# Patient Record
Sex: MALE | Race: WHITE | Employment: OTHER | URBAN - METROPOLITAN AREA
[De-identification: names, ages, dates, MRNs, and addresses within clinical notes are randomized per-mention and may not be internally consistent; named-entity substitution may affect disease eponyms.]

---

## 2021-02-27 ENCOUNTER — HOSPITAL ENCOUNTER (INPATIENT)
Age: 71
LOS: 4 days | Discharge: HOME HEALTH | DRG: 193 | End: 2021-03-03
Attending: EMERGENCY MEDICINE | Admitting: INTERNAL MEDICINE
Payer: MEDICARE

## 2021-02-27 ENCOUNTER — APPOINTMENT (OUTPATIENT)
Dept: GENERAL RADIOLOGY | Age: 71
DRG: 193 | End: 2021-02-27
Payer: MEDICARE

## 2021-02-27 DIAGNOSIS — E87.5 HYPERKALEMIA: ICD-10-CM

## 2021-02-27 DIAGNOSIS — J18.9 PNEUMONIA DUE TO ORGANISM: Primary | ICD-10-CM

## 2021-02-27 DIAGNOSIS — E87.20 LACTIC ACIDOSIS: ICD-10-CM

## 2021-02-27 DIAGNOSIS — J96.11 CHRONIC RESPIRATORY FAILURE WITH HYPOXIA (HCC): ICD-10-CM

## 2021-02-27 DIAGNOSIS — D64.9 ANEMIA, UNSPECIFIED TYPE: ICD-10-CM

## 2021-02-27 DIAGNOSIS — R73.9 HYPERGLYCEMIA: ICD-10-CM

## 2021-02-27 DIAGNOSIS — R79.89 ELEVATED SERUM CREATININE: ICD-10-CM

## 2021-02-27 DIAGNOSIS — J44.1 COPD EXACERBATION (HCC): ICD-10-CM

## 2021-02-27 DIAGNOSIS — E87.8 HYPOCHLOREMIA: ICD-10-CM

## 2021-02-27 LAB
ALBUMIN SERPL-MCNC: 4.3 G/DL (ref 3.5–5.2)
ALP BLD-CCNC: 60 U/L (ref 40–129)
ALT SERPL-CCNC: 30 U/L (ref 0–40)
ANION GAP SERPL CALCULATED.3IONS-SCNC: 7 MMOL/L (ref 7–16)
AST SERPL-CCNC: 22 U/L (ref 0–39)
BASOPHILIC STIPPLING: ABNORMAL
BASOPHILS ABSOLUTE: 0 E9/L (ref 0–0.2)
BASOPHILS RELATIVE PERCENT: 0 % (ref 0–2)
BILIRUB SERPL-MCNC: 0.3 MG/DL (ref 0–1.2)
BUN BLDV-MCNC: 34 MG/DL (ref 8–23)
CALCIUM SERPL-MCNC: 8.4 MG/DL (ref 8.6–10.2)
CHLORIDE BLD-SCNC: 95 MMOL/L (ref 98–107)
CO2: 32 MMOL/L (ref 22–29)
CREAT SERPL-MCNC: 1.5 MG/DL (ref 0.7–1.2)
EOSINOPHILS ABSOLUTE: 0.16 E9/L (ref 0.05–0.5)
EOSINOPHILS RELATIVE PERCENT: 2.6 % (ref 0–6)
GFR AFRICAN AMERICAN: 56
GFR NON-AFRICAN AMERICAN: 46 ML/MIN/1.73
GLUCOSE BLD-MCNC: 352 MG/DL (ref 74–99)
HCT VFR BLD CALC: 34.1 % (ref 37–54)
HEMOGLOBIN: 9.8 G/DL (ref 12.5–16.5)
HYPOCHROMIA: ABNORMAL
LACTIC ACID: 3.3 MMOL/L (ref 0.5–2.2)
LYMPHOCYTES ABSOLUTE: 0.5 E9/L (ref 1.5–4)
LYMPHOCYTES RELATIVE PERCENT: 7.8 % (ref 20–42)
MCH RBC QN AUTO: 23.7 PG (ref 26–35)
MCHC RBC AUTO-ENTMCNC: 28.7 % (ref 32–34.5)
MCV RBC AUTO: 82.4 FL (ref 80–99.9)
METER GLUCOSE: 148 MG/DL (ref 74–99)
MONOCYTES ABSOLUTE: 0.19 E9/L (ref 0.1–0.95)
MONOCYTES RELATIVE PERCENT: 3.4 % (ref 2–12)
NEUTROPHILS ABSOLUTE: 5.33 E9/L (ref 1.8–7.3)
NEUTROPHILS RELATIVE PERCENT: 86.2 % (ref 43–80)
NUCLEATED RED BLOOD CELLS: 0 /100 WBC
OVALOCYTES: ABNORMAL
PDW BLD-RTO: 15.3 FL (ref 11.5–15)
PLATELET # BLD: 141 E9/L (ref 130–450)
PMV BLD AUTO: 8.6 FL (ref 7–12)
POIKILOCYTES: ABNORMAL
POLYCHROMASIA: ABNORMAL
POTASSIUM SERPL-SCNC: 6.5 MMOL/L (ref 3.5–5)
PRO-BNP: 69 PG/ML (ref 0–125)
RBC # BLD: 4.14 E12/L (ref 3.8–5.8)
SARS-COV-2, NAAT: NOT DETECTED
SODIUM BLD-SCNC: 134 MMOL/L (ref 132–146)
STOMATOCYTES: ABNORMAL
TOTAL PROTEIN: 7 G/DL (ref 6.4–8.3)
TROPONIN: <0.01 NG/ML (ref 0–0.03)
WBC # BLD: 6.2 E9/L (ref 4.5–11.5)

## 2021-02-27 PROCEDURE — 2580000003 HC RX 258: Performed by: EMERGENCY MEDICINE

## 2021-02-27 PROCEDURE — 6370000000 HC RX 637 (ALT 250 FOR IP): Performed by: INTERNAL MEDICINE

## 2021-02-27 PROCEDURE — 80053 COMPREHEN METABOLIC PANEL: CPT

## 2021-02-27 PROCEDURE — 99223 1ST HOSP IP/OBS HIGH 75: CPT | Performed by: INTERNAL MEDICINE

## 2021-02-27 PROCEDURE — 84484 ASSAY OF TROPONIN QUANT: CPT

## 2021-02-27 PROCEDURE — 2580000003 HC RX 258: Performed by: INTERNAL MEDICINE

## 2021-02-27 PROCEDURE — 2060000000 HC ICU INTERMEDIATE R&B

## 2021-02-27 PROCEDURE — 2500000003 HC RX 250 WO HCPCS: Performed by: EMERGENCY MEDICINE

## 2021-02-27 PROCEDURE — 6360000002 HC RX W HCPCS: Performed by: EMERGENCY MEDICINE

## 2021-02-27 PROCEDURE — 82962 GLUCOSE BLOOD TEST: CPT

## 2021-02-27 PROCEDURE — 6370000000 HC RX 637 (ALT 250 FOR IP): Performed by: EMERGENCY MEDICINE

## 2021-02-27 PROCEDURE — 85025 COMPLETE CBC W/AUTO DIFF WBC: CPT

## 2021-02-27 PROCEDURE — 83605 ASSAY OF LACTIC ACID: CPT

## 2021-02-27 PROCEDURE — 83880 ASSAY OF NATRIURETIC PEPTIDE: CPT

## 2021-02-27 PROCEDURE — 93005 ELECTROCARDIOGRAM TRACING: CPT | Performed by: EMERGENCY MEDICINE

## 2021-02-27 PROCEDURE — 99284 EMERGENCY DEPT VISIT MOD MDM: CPT

## 2021-02-27 PROCEDURE — 94664 DEMO&/EVAL PT USE INHALER: CPT

## 2021-02-27 PROCEDURE — 87635 SARS-COV-2 COVID-19 AMP PRB: CPT

## 2021-02-27 PROCEDURE — 71045 X-RAY EXAM CHEST 1 VIEW: CPT

## 2021-02-27 RX ORDER — SODIUM CHLORIDE 9 MG/ML
INJECTION, SOLUTION INTRAVENOUS CONTINUOUS
Status: DISCONTINUED | OUTPATIENT
Start: 2021-02-27 | End: 2021-03-01 | Stop reason: ALTCHOICE

## 2021-02-27 RX ORDER — DOXYCYCLINE HYCLATE 100 MG/1
100 CAPSULE ORAL ONCE
Status: COMPLETED | OUTPATIENT
Start: 2021-02-27 | End: 2021-02-27

## 2021-02-27 RX ORDER — DEXTROSE MONOHYDRATE 50 MG/ML
100 INJECTION, SOLUTION INTRAVENOUS PRN
Status: DISCONTINUED | OUTPATIENT
Start: 2021-02-27 | End: 2021-03-03 | Stop reason: HOSPADM

## 2021-02-27 RX ORDER — GUAIFENESIN 400 MG/1
400 TABLET ORAL 2 TIMES DAILY
COMMUNITY

## 2021-02-27 RX ORDER — LISINOPRIL 5 MG/1
10 TABLET ORAL DAILY
COMMUNITY
End: 2022-09-08

## 2021-02-27 RX ORDER — FUROSEMIDE 40 MG/1
40 TABLET ORAL DAILY
COMMUNITY
End: 2022-09-08

## 2021-02-27 RX ORDER — ALBUTEROL SULFATE 2.5 MG/3ML
2.5 SOLUTION RESPIRATORY (INHALATION) ONCE
Status: COMPLETED | OUTPATIENT
Start: 2021-02-27 | End: 2021-02-27

## 2021-02-27 RX ORDER — ISOSORBIDE MONONITRATE 30 MG/1
30 TABLET, EXTENDED RELEASE ORAL DAILY
COMMUNITY

## 2021-02-27 RX ORDER — IPRATROPIUM BROMIDE AND ALBUTEROL SULFATE 2.5; .5 MG/3ML; MG/3ML
1 SOLUTION RESPIRATORY (INHALATION)
Status: DISCONTINUED | OUTPATIENT
Start: 2021-02-28 | End: 2021-02-27 | Stop reason: CLARIF

## 2021-02-27 RX ORDER — ALBUTEROL SULFATE 90 UG/1
2 AEROSOL, METERED RESPIRATORY (INHALATION) EVERY 6 HOURS PRN
Status: DISCONTINUED | OUTPATIENT
Start: 2021-02-27 | End: 2021-03-03 | Stop reason: HOSPADM

## 2021-02-27 RX ORDER — PREDNISONE 1 MG/1
15 TABLET ORAL DAILY
Status: ON HOLD | COMMUNITY
End: 2021-03-03 | Stop reason: HOSPADM

## 2021-02-27 RX ORDER — ACETAMINOPHEN 325 MG/1
650 TABLET ORAL EVERY 6 HOURS PRN
Status: DISCONTINUED | OUTPATIENT
Start: 2021-02-27 | End: 2021-03-03 | Stop reason: HOSPADM

## 2021-02-27 RX ORDER — DEXTROSE MONOHYDRATE 25 G/50ML
12.5 INJECTION, SOLUTION INTRAVENOUS PRN
Status: DISCONTINUED | OUTPATIENT
Start: 2021-02-27 | End: 2021-03-03 | Stop reason: HOSPADM

## 2021-02-27 RX ORDER — INSULIN GLARGINE 100 [IU]/ML
50 INJECTION, SOLUTION SUBCUTANEOUS NIGHTLY
COMMUNITY

## 2021-02-27 RX ORDER — ATORVASTATIN CALCIUM 40 MG/1
40 TABLET, FILM COATED ORAL DAILY
COMMUNITY

## 2021-02-27 RX ORDER — ZOLPIDEM TARTRATE 10 MG/1
TABLET ORAL NIGHTLY PRN
Status: ON HOLD | COMMUNITY
End: 2021-03-03 | Stop reason: HOSPADM

## 2021-02-27 RX ORDER — SODIUM CHLORIDE 0.9 % (FLUSH) 0.9 %
10 SYRINGE (ML) INJECTION EVERY 12 HOURS SCHEDULED
Status: DISCONTINUED | OUTPATIENT
Start: 2021-02-27 | End: 2021-03-03 | Stop reason: HOSPADM

## 2021-02-27 RX ORDER — SODIUM CHLORIDE 0.9 % (FLUSH) 0.9 %
10 SYRINGE (ML) INJECTION PRN
Status: DISCONTINUED | OUTPATIENT
Start: 2021-02-27 | End: 2021-03-03 | Stop reason: HOSPADM

## 2021-02-27 RX ORDER — GLIPIZIDE 10 MG/1
10 TABLET ORAL
Status: ON HOLD | COMMUNITY
End: 2021-03-03 | Stop reason: HOSPADM

## 2021-02-27 RX ORDER — PRIMIDONE 50 MG/1
50 TABLET ORAL NIGHTLY
COMMUNITY

## 2021-02-27 RX ORDER — METHYLPREDNISOLONE SODIUM SUCCINATE 125 MG/2ML
60 INJECTION, POWDER, LYOPHILIZED, FOR SOLUTION INTRAMUSCULAR; INTRAVENOUS ONCE
Status: COMPLETED | OUTPATIENT
Start: 2021-02-27 | End: 2021-02-27

## 2021-02-27 RX ORDER — ACETAMINOPHEN 650 MG/1
650 SUPPOSITORY RECTAL EVERY 6 HOURS PRN
Status: DISCONTINUED | OUTPATIENT
Start: 2021-02-27 | End: 2021-03-03 | Stop reason: HOSPADM

## 2021-02-27 RX ORDER — PREDNISONE 20 MG/1
40 TABLET ORAL DAILY
Status: DISCONTINUED | OUTPATIENT
Start: 2021-02-28 | End: 2021-03-02

## 2021-02-27 RX ORDER — NICOTINE POLACRILEX 4 MG
15 LOZENGE BUCCAL PRN
Status: DISCONTINUED | OUTPATIENT
Start: 2021-02-27 | End: 2021-03-03 | Stop reason: HOSPADM

## 2021-02-27 RX ORDER — INSULIN GLARGINE 100 [IU]/ML
50 INJECTION, SOLUTION SUBCUTANEOUS NIGHTLY
Status: DISCONTINUED | OUTPATIENT
Start: 2021-02-27 | End: 2021-03-03 | Stop reason: HOSPADM

## 2021-02-27 RX ORDER — POLYETHYLENE GLYCOL 3350 17 G/17G
17 POWDER, FOR SOLUTION ORAL DAILY PRN
Status: DISCONTINUED | OUTPATIENT
Start: 2021-02-27 | End: 2021-03-03 | Stop reason: HOSPADM

## 2021-02-27 RX ORDER — SODIUM CHLORIDE 0.9 % (FLUSH) 0.9 %
SYRINGE (ML) INJECTION
Status: DISPENSED
Start: 2021-02-27 | End: 2021-02-28

## 2021-02-27 RX ORDER — 0.9 % SODIUM CHLORIDE 0.9 %
1000 INTRAVENOUS SOLUTION INTRAVENOUS ONCE
Status: COMPLETED | OUTPATIENT
Start: 2021-02-27 | End: 2021-02-27

## 2021-02-27 RX ADMIN — INSULIN LISPRO 1 UNITS: 100 INJECTION, SOLUTION INTRAVENOUS; SUBCUTANEOUS at 23:50

## 2021-02-27 RX ADMIN — ALBUTEROL SULFATE 2.5 MG: 2.5 SOLUTION RESPIRATORY (INHALATION) at 22:05

## 2021-02-27 RX ADMIN — SODIUM BICARBONATE 50 MEQ: 84 INJECTION, SOLUTION INTRAVENOUS at 21:10

## 2021-02-27 RX ADMIN — SODIUM CHLORIDE 1000 ML: 9 INJECTION, SOLUTION INTRAVENOUS at 20:45

## 2021-02-27 RX ADMIN — DOXYCYCLINE HYCLATE 100 MG: 100 CAPSULE ORAL at 20:28

## 2021-02-27 RX ADMIN — INSULIN HUMAN 10 UNITS: 100 INJECTION, SOLUTION PARENTERAL at 21:10

## 2021-02-27 RX ADMIN — METHYLPREDNISOLONE SODIUM SUCCINATE 60 MG: 125 INJECTION, POWDER, FOR SOLUTION INTRAMUSCULAR; INTRAVENOUS at 20:23

## 2021-02-27 RX ADMIN — INSULIN GLARGINE 50 UNITS: 100 INJECTION, SOLUTION SUBCUTANEOUS at 23:49

## 2021-02-27 RX ADMIN — SODIUM CHLORIDE: 9 INJECTION, SOLUTION INTRAVENOUS at 23:45

## 2021-02-27 RX ADMIN — CALCIUM GLUCONATE 1000 MG: 98 INJECTION, SOLUTION INTRAVENOUS at 20:23

## 2021-02-27 ASSESSMENT — PAIN SCALES - GENERAL: PAINLEVEL_OUTOF10: 0

## 2021-02-28 ENCOUNTER — APPOINTMENT (OUTPATIENT)
Dept: CT IMAGING | Age: 71
DRG: 193 | End: 2021-02-28
Payer: MEDICARE

## 2021-02-28 LAB
ADENOVIRUS BY PCR: NOT DETECTED
ANION GAP SERPL CALCULATED.3IONS-SCNC: 6 MMOL/L (ref 7–16)
ANISOCYTOSIS: ABNORMAL
BASOPHILIC STIPPLING: ABNORMAL
BASOPHILS ABSOLUTE: 0 E9/L (ref 0–0.2)
BASOPHILS RELATIVE PERCENT: 0 % (ref 0–2)
BILIRUBIN URINE: NEGATIVE
BLOOD, URINE: NEGATIVE
BORDETELLA PARAPERTUSSIS BY PCR: NOT DETECTED
BORDETELLA PERTUSSIS BY PCR: NOT DETECTED
BUN BLDV-MCNC: 34 MG/DL (ref 8–23)
CALCIUM SERPL-MCNC: 8.2 MG/DL (ref 8.6–10.2)
CHLAMYDOPHILIA PNEUMONIAE BY PCR: NOT DETECTED
CHLORIDE BLD-SCNC: 96 MMOL/L (ref 98–107)
CLARITY: CLEAR
CO2: 32 MMOL/L (ref 22–29)
COLOR: YELLOW
CORONAVIRUS 229E BY PCR: NOT DETECTED
CORONAVIRUS HKU1 BY PCR: NOT DETECTED
CORONAVIRUS NL63 BY PCR: NOT DETECTED
CORONAVIRUS OC43 BY PCR: NOT DETECTED
CREAT SERPL-MCNC: 1.2 MG/DL (ref 0.7–1.2)
EOSINOPHILS ABSOLUTE: 0.01 E9/L (ref 0.05–0.5)
EOSINOPHILS RELATIVE PERCENT: 0.2 % (ref 0–6)
FERRITIN: 27 NG/ML
GFR AFRICAN AMERICAN: >60
GFR NON-AFRICAN AMERICAN: 59 ML/MIN/1.73
GLUCOSE BLD-MCNC: 374 MG/DL (ref 74–99)
GLUCOSE URINE: >=1000 MG/DL
HBA1C MFR BLD: 10.5 % (ref 4–5.6)
HCT VFR BLD CALC: 31.8 % (ref 37–54)
HEMOGLOBIN: 9.2 G/DL (ref 12.5–16.5)
HUMAN METAPNEUMOVIRUS BY PCR: NOT DETECTED
HUMAN RHINOVIRUS/ENTEROVIRUS BY PCR: NOT DETECTED
IMMATURE GRANULOCYTES #: 0.05 E9/L
IMMATURE GRANULOCYTES %: 1 % (ref 0–5)
INFLUENZA A BY PCR: NOT DETECTED
INFLUENZA B BY PCR: NOT DETECTED
IRON SATURATION: 12 % (ref 20–55)
IRON: 42 MCG/DL (ref 59–158)
KETONES, URINE: NEGATIVE MG/DL
LACTIC ACID: 0.7 MMOL/L (ref 0.5–2.2)
LEUKOCYTE ESTERASE, URINE: NEGATIVE
LYMPHOCYTES ABSOLUTE: 0.36 E9/L (ref 1.5–4)
LYMPHOCYTES RELATIVE PERCENT: 7.2 % (ref 20–42)
MCH RBC QN AUTO: 23.8 PG (ref 26–35)
MCHC RBC AUTO-ENTMCNC: 28.9 % (ref 32–34.5)
MCV RBC AUTO: 82.2 FL (ref 80–99.9)
METER GLUCOSE: 244 MG/DL (ref 74–99)
METER GLUCOSE: 254 MG/DL (ref 74–99)
METER GLUCOSE: 307 MG/DL (ref 74–99)
METER GLUCOSE: 331 MG/DL (ref 74–99)
MONOCYTES ABSOLUTE: 0.14 E9/L (ref 0.1–0.95)
MONOCYTES RELATIVE PERCENT: 2.8 % (ref 2–12)
MYCOPLASMA PNEUMONIAE BY PCR: NOT DETECTED
NEUTROPHILS ABSOLUTE: 4.45 E9/L (ref 1.8–7.3)
NEUTROPHILS RELATIVE PERCENT: 88.8 % (ref 43–80)
NITRITE, URINE: NEGATIVE
OVALOCYTES: ABNORMAL
PARAINFLUENZA VIRUS 1 BY PCR: NOT DETECTED
PARAINFLUENZA VIRUS 2 BY PCR: NOT DETECTED
PARAINFLUENZA VIRUS 3 BY PCR: NOT DETECTED
PARAINFLUENZA VIRUS 4 BY PCR: NOT DETECTED
PDW BLD-RTO: 15.3 FL (ref 11.5–15)
PH UA: 5 (ref 5–9)
PLATELET # BLD: 128 E9/L (ref 130–450)
PMV BLD AUTO: 9 FL (ref 7–12)
POIKILOCYTES: ABNORMAL
POTASSIUM REFLEX MAGNESIUM: 5.5 MMOL/L (ref 3.5–5)
POTASSIUM SERPL-SCNC: 5.2 MMOL/L (ref 3.5–5)
POTASSIUM SERPL-SCNC: 5.8 MMOL/L (ref 3.5–5)
PROCALCITONIN: 0.13 NG/ML (ref 0–0.08)
PROTEIN UA: NEGATIVE MG/DL
RBC # BLD: 3.87 E12/L (ref 3.8–5.8)
RESPIRATORY SYNCYTIAL VIRUS BY PCR: NOT DETECTED
SARS-COV-2, PCR: NOT DETECTED
SODIUM BLD-SCNC: 134 MMOL/L (ref 132–146)
SPECIFIC GRAVITY UA: 1.02 (ref 1–1.03)
TOTAL IRON BINDING CAPACITY: 339 MCG/DL (ref 250–450)
UROBILINOGEN, URINE: 0.2 E.U./DL
WBC # BLD: 5 E9/L (ref 4.5–11.5)

## 2021-02-28 PROCEDURE — 83036 HEMOGLOBIN GLYCOSYLATED A1C: CPT

## 2021-02-28 PROCEDURE — 6370000000 HC RX 637 (ALT 250 FOR IP): Performed by: INTERNAL MEDICINE

## 2021-02-28 PROCEDURE — 83605 ASSAY OF LACTIC ACID: CPT

## 2021-02-28 PROCEDURE — 94664 DEMO&/EVAL PT USE INHALER: CPT

## 2021-02-28 PROCEDURE — 94640 AIRWAY INHALATION TREATMENT: CPT

## 2021-02-28 PROCEDURE — 84132 ASSAY OF SERUM POTASSIUM: CPT

## 2021-02-28 PROCEDURE — 80048 BASIC METABOLIC PNL TOTAL CA: CPT

## 2021-02-28 PROCEDURE — 83540 ASSAY OF IRON: CPT

## 2021-02-28 PROCEDURE — 86738 MYCOPLASMA ANTIBODY: CPT

## 2021-02-28 PROCEDURE — 85025 COMPLETE CBC W/AUTO DIFF WBC: CPT

## 2021-02-28 PROCEDURE — 81003 URINALYSIS AUTO W/O SCOPE: CPT

## 2021-02-28 PROCEDURE — 83550 IRON BINDING TEST: CPT

## 2021-02-28 PROCEDURE — 2700000000 HC OXYGEN THERAPY PER DAY

## 2021-02-28 PROCEDURE — 6360000002 HC RX W HCPCS: Performed by: INTERNAL MEDICINE

## 2021-02-28 PROCEDURE — 36415 COLL VENOUS BLD VENIPUNCTURE: CPT

## 2021-02-28 PROCEDURE — 87449 NOS EACH ORGANISM AG IA: CPT

## 2021-02-28 PROCEDURE — 2060000000 HC ICU INTERMEDIATE R&B

## 2021-02-28 PROCEDURE — 84145 PROCALCITONIN (PCT): CPT

## 2021-02-28 PROCEDURE — 6370000000 HC RX 637 (ALT 250 FOR IP): Performed by: FAMILY MEDICINE

## 2021-02-28 PROCEDURE — 0202U NFCT DS 22 TRGT SARS-COV-2: CPT

## 2021-02-28 PROCEDURE — 71250 CT THORAX DX C-: CPT

## 2021-02-28 PROCEDURE — 99232 SBSQ HOSP IP/OBS MODERATE 35: CPT | Performed by: INTERNAL MEDICINE

## 2021-02-28 PROCEDURE — 82728 ASSAY OF FERRITIN: CPT

## 2021-02-28 PROCEDURE — 2580000003 HC RX 258: Performed by: INTERNAL MEDICINE

## 2021-02-28 PROCEDURE — 82962 GLUCOSE BLOOD TEST: CPT

## 2021-02-28 RX ORDER — GUAIFENESIN 400 MG/1
400 TABLET ORAL 2 TIMES DAILY
Status: DISCONTINUED | OUTPATIENT
Start: 2021-02-28 | End: 2021-03-03 | Stop reason: HOSPADM

## 2021-02-28 RX ORDER — SODIUM POLYSTYRENE SULFONATE 15 G/60ML
15 SUSPENSION ORAL; RECTAL ONCE
Status: COMPLETED | OUTPATIENT
Start: 2021-02-28 | End: 2021-02-28

## 2021-02-28 RX ORDER — ATORVASTATIN CALCIUM 40 MG/1
40 TABLET, FILM COATED ORAL DAILY
Status: DISCONTINUED | OUTPATIENT
Start: 2021-02-28 | End: 2021-03-03 | Stop reason: HOSPADM

## 2021-02-28 RX ORDER — IPRATROPIUM BROMIDE AND ALBUTEROL SULFATE 2.5; .5 MG/3ML; MG/3ML
1 SOLUTION RESPIRATORY (INHALATION)
Status: DISCONTINUED | OUTPATIENT
Start: 2021-02-28 | End: 2021-03-03 | Stop reason: HOSPADM

## 2021-02-28 RX ORDER — PRIMIDONE 50 MG/1
50 TABLET ORAL NIGHTLY
Status: DISCONTINUED | OUTPATIENT
Start: 2021-02-28 | End: 2021-03-03 | Stop reason: HOSPADM

## 2021-02-28 RX ORDER — ISOSORBIDE MONONITRATE 30 MG/1
30 TABLET, EXTENDED RELEASE ORAL DAILY
Status: DISCONTINUED | OUTPATIENT
Start: 2021-02-28 | End: 2021-03-03 | Stop reason: HOSPADM

## 2021-02-28 RX ADMIN — ATORVASTATIN CALCIUM 40 MG: 40 TABLET, FILM COATED ORAL at 09:24

## 2021-02-28 RX ADMIN — PRIMIDONE 50 MG: 50 TABLET ORAL at 21:11

## 2021-02-28 RX ADMIN — SODIUM POLYSTYRENE SULFONATE 15 G: 15 SUSPENSION ORAL; RECTAL at 19:19

## 2021-02-28 RX ADMIN — CEFTRIAXONE 1000 MG: 1 INJECTION, POWDER, FOR SOLUTION INTRAMUSCULAR; INTRAVENOUS at 02:01

## 2021-02-28 RX ADMIN — SODIUM CHLORIDE, PRESERVATIVE FREE 10 ML: 5 INJECTION INTRAVENOUS at 02:00

## 2021-02-28 RX ADMIN — GUAIFENESIN 400 MG: 400 TABLET, FILM COATED ORAL at 09:24

## 2021-02-28 RX ADMIN — CEFTRIAXONE 1000 MG: 1 INJECTION, POWDER, FOR SOLUTION INTRAMUSCULAR; INTRAVENOUS at 21:12

## 2021-02-28 RX ADMIN — ISOSORBIDE MONONITRATE 30 MG: 30 TABLET, EXTENDED RELEASE ORAL at 09:24

## 2021-02-28 RX ADMIN — IPRATROPIUM BROMIDE AND ALBUTEROL 1 PUFF: 20; 100 SPRAY, METERED RESPIRATORY (INHALATION) at 11:39

## 2021-02-28 RX ADMIN — INSULIN LISPRO 9 UNITS: 100 INJECTION, SOLUTION INTRAVENOUS; SUBCUTANEOUS at 16:02

## 2021-02-28 RX ADMIN — INSULIN GLARGINE 50 UNITS: 100 INJECTION, SOLUTION SUBCUTANEOUS at 21:19

## 2021-02-28 RX ADMIN — METOPROLOL TARTRATE 25 MG: 25 TABLET, FILM COATED ORAL at 09:24

## 2021-02-28 RX ADMIN — GUAIFENESIN 400 MG: 400 TABLET, FILM COATED ORAL at 21:11

## 2021-02-28 RX ADMIN — IPRATROPIUM BROMIDE AND ALBUTEROL SULFATE 1 AMPULE: 2.5; .5 SOLUTION RESPIRATORY (INHALATION) at 20:15

## 2021-02-28 RX ADMIN — IPRATROPIUM BROMIDE AND ALBUTEROL 1 PUFF: 20; 100 SPRAY, METERED RESPIRATORY (INHALATION) at 08:00

## 2021-02-28 RX ADMIN — ENOXAPARIN SODIUM 40 MG: 40 INJECTION, SOLUTION INTRAVENOUS; SUBCUTANEOUS at 09:24

## 2021-02-28 RX ADMIN — GUAIFENESIN 400 MG: 400 TABLET, FILM COATED ORAL at 02:41

## 2021-02-28 RX ADMIN — AZITHROMYCIN DIHYDRATE 500 MG: 500 INJECTION, POWDER, LYOPHILIZED, FOR SOLUTION INTRAVENOUS at 02:01

## 2021-02-28 RX ADMIN — INSULIN LISPRO 6 UNITS: 100 INJECTION, SOLUTION INTRAVENOUS; SUBCUTANEOUS at 16:00

## 2021-02-28 RX ADMIN — INSULIN LISPRO 4 UNITS: 100 INJECTION, SOLUTION INTRAVENOUS; SUBCUTANEOUS at 21:20

## 2021-02-28 RX ADMIN — INSULIN LISPRO 9 UNITS: 100 INJECTION, SOLUTION INTRAVENOUS; SUBCUTANEOUS at 11:21

## 2021-02-28 RX ADMIN — INSULIN LISPRO 4 UNITS: 100 INJECTION, SOLUTION INTRAVENOUS; SUBCUTANEOUS at 11:21

## 2021-02-28 RX ADMIN — PREDNISONE 40 MG: 20 TABLET ORAL at 09:24

## 2021-02-28 RX ADMIN — INSULIN LISPRO 8 UNITS: 100 INJECTION, SOLUTION INTRAVENOUS; SUBCUTANEOUS at 06:48

## 2021-02-28 RX ADMIN — METOPROLOL TARTRATE 25 MG: 25 TABLET, FILM COATED ORAL at 21:11

## 2021-02-28 RX ADMIN — AZITHROMYCIN DIHYDRATE 500 MG: 500 INJECTION, POWDER, LYOPHILIZED, FOR SOLUTION INTRAVENOUS at 21:17

## 2021-02-28 RX ADMIN — ALBUTEROL SULFATE 2 PUFF: 90 AEROSOL, METERED RESPIRATORY (INHALATION) at 02:42

## 2021-02-28 RX ADMIN — INSULIN LISPRO 9 UNITS: 100 INJECTION, SOLUTION INTRAVENOUS; SUBCUTANEOUS at 06:49

## 2021-02-28 ASSESSMENT — PAIN SCALES - GENERAL
PAINLEVEL_OUTOF10: 0
PAINLEVEL_OUTOF10: 0

## 2021-02-28 NOTE — H&P
[facility] History and Physical      CHIEF COMPLAINT:  Shortness of breath    History of Present Illness: 40-year-old male with history of COPD, BPH, chronic hypoxia on 3 L, diabetes mellitus type 2 on insulin. Follows in [place] but recently moved care to this area. Presented to the ED due to shortness of breath that is worse in the past 3 days associated with a cough productive of yellow sputum. His baseline cough is dry and less severe. Did notice some congestion and sneezing in the past week. No fever or chills. No sick contacts. No anosmia. ?  Had dyschezia this morning. Received second dose of moderna Covid vaccine on Tuesday. His wife also noticed that he has generalized weakness and drowsiness for the past 2 days. The patient states that he has had insomnia and not sleeping well at night. Used to follow with a pulmonologist and [place] but switched and is now seeing Dr. Teresa Camp. Quit smoking 15 years ago. Reports having haziness in the right lung that is chronic. Found to have a creatinine of 1.5 with elevated potassium on presentation. He recalls kidney/prostate issues and is referred to a specialist but he believes its urologist.  He is not sure if he has any kidney problems. Informant(s) for H&P:patient and wife. REVIEW OF SYSTEMS:  A comprehensive 14 point review of systems was negative except for: what is in the HPI  As tremors for the past few months. PMH:  Past Medical History:   Diagnosis Date    COPD (chronic obstructive pulmonary disease) ([address]     Diabetes mellitus ([address]     GERD (gastroesophageal reflux disease)     Hyperlipidemia        Surgical History:  Past Surgical History:   Procedure Laterality Date    CORONARY ANGIOPLASTY WITH STENT PLACEMENT         Medications Prior to Admission:    Prior to Admission medications    Not on File       Allergies:    Patient has no known allergies. Social History:   Quit 15 years ago. No alcohol    Family History:   Father lung cancer. PHYSICAL EXAM:  Vitals:  /60 Comment: manual  Pulse 84   Temp 97 °F (36.1 °C) (Oral)   Resp 18   Ht 6' 1\" (1.854 m)   Wt 247 lb (112 kg)   SpO2 96%   BMI 32.59 kg/m²   General Appearance: alert and oriented to person, place and time and in no acute distress  Skin: warm and dry, turgor not diminished  Head: normocephalic and atraumatic  Eyes: pupils equal, round, and reactive to light, extraocular eye movements intact, conjunctivae normal  Neck: neck supple and non tender without mass   Pulmonary/Chest: clear to auscultation bilaterally- no wheezes, rales or rhonchi, normal air movement, no respiratory distress, prolonged expiratory phase  Cardiovascular: normal rate, normal S1 and S2 and no M/R/R  Abdomen: soft, non-tender, non-distended, normal bowel sounds, no masses or organomegaly  Extremities: no cyanosis, no clubbing and no edema  Neurologic: no cranial nerve deficit and speech normal    LABS:  Recent Labs     02/27/21  1757      K 6.5*   CL 95*   CO2 32*   BUN 34*   CREATININE 1.5*   GLUCOSE 352*   CALCIUM 8.4*       Recent Labs     02/27/21  1757   WBC 6.2   RBC 4.14   HGB 9.8*   HCT 34.1*   MCV 82.4   MCH 23.7*   MCHC 28.7*   RDW 15.3*      MPV 8.6       No results for input(s): POCGLU in the last 72 hours. Radiology:   XR CHEST PORTABLE   Final Result   Right mid lung airspace opacity worrisome for pneumonia. EKG: No acute normality    ASSESSMENT:    COPD with acute exacerbation  ? Community-acquired pneumonia  Chronic respiratory failure with hypoxia    Hyperkalemia  Elevated creatinine: Unclear baseline  BPH  Diabetes mellitus type 2  Lactic acidosis  Microcytic anemia  Essential hypertension    PLAN:  COPD exacerbation:  -Check ABG  -Prednisone, bronchodilators  -Azithromycin    ? Community-acquired pneumonia: Check procalcitonin. Add Rocephin to azithromycin. Urine serology.     Hyperkalemia: Medical management. Trend. Low potassium diet. Lactic acidosis: IV fluids and trend    Elevated creatinine: Unclear baseline. Check bladder scan for residual.  They will search through [facility] for prior labs. Diabetes mellitus type 2: Basal/bolus with SSI    Microcytic anemia: Check iron panel    Code Status: Full  DVT prophylaxis: Lovenox      NOTE: This report was transcribed using voice recognition software. Every effort was made to ensure accuracy; however, inadvertent computerized transcription errors may be present.   Electronically signed by Amalia Byrnes MD on 2/27/2021 at 8:32 PM

## 2021-02-28 NOTE — H&P (VIEW-ONLY)
[facility] History and Physical      CHIEF COMPLAINT:  Shortness of breath    History of Present Illness: 77-year-old male with history of COPD, BPH, chronic hypoxia on 3 L, diabetes mellitus type 2 on insulin. Follows in [place] but recently moved care to this area. Presented to the ED due to shortness of breath that is worse in the past 3 days associated with a cough productive of yellow sputum. His baseline cough is dry and less severe. Did notice some congestion and sneezing in the past week. No fever or chills. No sick contacts. No anosmia. ?  Had dyschezia this morning. Received second dose of moderna Covid vaccine on Tuesday. His wife also noticed that he has generalized weakness and drowsiness for the past 2 days. The patient states that he has had insomnia and not sleeping well at night. Used to follow with a pulmonologist and [place] but switched and is now seeing Dr. Buddy Figueroa. Quit smoking 15 years ago. Reports having haziness in the right lung that is chronic. Found to have a creatinine of 1.5 with elevated potassium on presentation. He recalls kidney/prostate issues and is referred to a specialist but he believes its urologist.  He is not sure if he has any kidney problems. Informant(s) for H&P:patient and wife. REVIEW OF SYSTEMS:  A comprehensive 14 point review of systems was negative except for: what is in the HPI  As tremors for the past few months. PMH:  Past Medical History:   Diagnosis Date    [facility] (chronic obstructive [facility] disease) [facility] [facility]     Diabetes mellitus ([facility] [address] [facility]     GERD (gastroesophageal reflux disease)     Hyperlipidemia        Surgical History:  Past Surgical History:   Procedure Laterality Date    CORONARY ANGIOPLASTY WITH STENT PLACEMENT         Medications Prior to Admission:    Prior to Admission medications    Not on File       Allergies:    Patient has no known allergies. Social History:   Quit 15 years ago. No alcohol    Family History:   Father lung cancer. PHYSICAL EXAM:  Vitals:  /60 Comment: manual  Pulse 84   Temp 97 °F (36.1 °C) (Oral)   Resp 18   Ht 6' 1\" (1.854 m)   Wt 247 lb (112 kg)   SpO2 96%   BMI 32.59 kg/m²   General Appearance: alert and oriented to person, place and time and in no acute distress  Skin: warm and dry, turgor not diminished  Head: normocephalic and atraumatic  Eyes: pupils equal, round, and reactive to light, extraocular eye movements intact, conjunctivae normal  Neck: neck supple and non tender without mass   Pulmonary/Chest: clear to auscultation bilaterally- no wheezes, rales or rhonchi, normal air movement, no respiratory distress, prolonged expiratory phase  Cardiovascular: normal rate, normal S1 and S2 and no M/R/R  Abdomen: soft, non-tender, non-distended, normal bowel sounds, no masses or organomegaly  Extremities: no cyanosis, no clubbing and no edema  Neurologic: no cranial nerve deficit and speech normal    LABS:  Recent Labs     02/27/21  1757      K 6.5*   CL 95*   CO2 32*   BUN 34*   CREATININE 1.5*   GLUCOSE 352*   CALCIUM 8.4*       Recent Labs     02/27/21  1757   WBC 6.2   RBC 4.14   HGB 9.8*   HCT 34.1*   MCV 82.4   MCH 23.7*   MCHC 28.7*   RDW 15.3*      MPV 8.6       No results for input(s): POCGLU in the last 72 hours. Radiology:   XR CHEST PORTABLE   Final Result   Right mid lung airspace opacity worrisome for pneumonia. EKG: No acute normality    ASSESSMENT:    COPD with acute exacerbation  ? Community-acquired pneumonia  Chronic respiratory failure with hypoxia    Hyperkalemia  Elevated creatinine: Unclear baseline  BPH  Diabetes mellitus type 2  Lactic acidosis  Microcytic anemia  Essential hypertension    PLAN:  COPD exacerbation:  Check ABG  Prednisone, bronchodilators  Azithromycin    ? Community-acquired pneumonia: Check procalcitonin. Add Rocephin to azithromycin. Urine serology.     Hyperkalemia: Medical management. Trend. Low potassium diet. Lactic acidosis: IV fluids and trend    Elevated creatinine: Unclear baseline. Check bladder scan for residual.  They will search through [employer] for prior labs. Diabetes mellitus type 2: Basal/bolus with SSI    Microcytic anemia: Check iron panel    Code Status: Full  DVT prophylaxis: Lovenox      NOTE: This report was transcribed using voice recognition software. Every effort was made to ensure accuracy; however, inadvertent computerized transcription errors may be present.   Electronically signed by Brittany Damon MD on 2/27/2021 at 8:32 PM

## 2021-02-28 NOTE — PROGRESS NOTES
[facility]   [facility] Note    Admitting Date and Time: 2/27/2021  4:07 PM  Admit Dx: Hyperkalemia [E87.5]    Subjective: Admitted on 27th, patient with COPD, BPH, diabetes, chronic hypoxia, did have cough and sputum production, stopped tobacco 15 years ago, initial impression of possible pneumonia, possible COPD exacerbation. Covid negative. Low iron saturations, increased TIBC. Patient was admitted with Hyperkalemia [E87.5]. Patient is awake, alert, comfortable, sitting in the bed, does inform that he sees Dr. Catrachita Hugo    Per RN: No new issues    ROS: denies fever, chills, cp, sob, n/v, HA unless stated above.      guaiFENesin  400 mg Oral BID    atorvastatin  40 mg Oral Daily    isosorbide mononitrate  30 mg Oral Daily    metoprolol tartrate  25 mg Oral BID    primidone  50 mg Oral Nightly    sodium chloride flush  10 mL Intravenous 2 times per day    enoxaparin  40 mg Subcutaneous Daily    insulin glargine  50 Units Subcutaneous Nightly    insulin lispro  0-12 Units Subcutaneous TID WC    insulin lispro  0-6 Units Subcutaneous Nightly    insulin lispro  0.08 Units/kg Subcutaneous TID WC    predniSONE  40 mg Oral Daily    cefTRIAXone (ROCEPHIN) IV  1,000 mg Intravenous Q24H    azithromycin  500 mg Intravenous Q24H    albuterol-ipratropium  1 puff Inhalation Q4H WA         glucose, 15 g, PRN      dextrose, 12.5 g, PRN      glucagon (rDNA), 1 mg, PRN      dextrose, 100 mL/hr, PRN      sodium chloride flush, 10 mL, PRN      polyethylene glycol, 17 g, Daily PRN      acetaminophen, 650 mg, Q6H PRN    Or      acetaminophen, 650 mg, Q6H PRN      albuterol sulfate HFA, 2 puff, Q6H PRN         Objective:    /70   Pulse 96   Temp 96.3 °F (35.7 °C) (Axillary)   Resp 18   Ht 6' 1\" (1.854 m)   Wt 245 lb (111.1 kg)   SpO2 97%   BMI 32.32 kg/m²   General Appearance: alert and oriented to person, place and time, well-developed and well-nourished, in no acute

## 2021-02-28 NOTE — ED NOTES
Bladder scan done 105ml post void residual     Ebb NIKKIE Mina  02/27/21 2108       Herminia Murillo.  Francia Libman, NIKKIE  02/27/21 2123

## 2021-02-28 NOTE — PROGRESS NOTES
Spoke to Dr Cameron Ceron who is covering Dr Chela Saldana, notified her potassium went from   5.2 to 5.8

## 2021-02-28 NOTE — ED PROVIDER NOTES
HPI:  2/27/21, Time: 7:54 PM CARMINA Moore is a 65 y.o. male presenting to the ED for dizziness and shortness of breath beginning a few days ago. Symptoms have been moderate in severity and intermittent. No exacerbating or relieving factors. Patient reports associated symptoms of cough productive of yellow sputum which is worse than baseline. Patient has a history of COPD, and he is chronically on 3 L of oxygen at baseline. No increased oxygen requirements. No chest pain, syncope, abdominal pain, nausea, or vomiting. He has had some constipation. No black or bloody stools. Patient has a history of CAD status post stent placement. He is not currently on anticoagulation. States he has previously received his care in [place], but he would like to establish with a physician in [facility]. He also states that he was recently referred to a nephrologist, but he has not followed up yet. He is not on dialysis. States he has been having dysuria, urinary frequency, and urinary incontinence recently. Also states that he was having increased swelling to his lower extremities recently. He was placed on 40 mg of Lasix daily which he has been taking with good improvement of the swelling. No hemoptysis, recent illness, or known exposures to COVID-19. Review of Systems:   Pertinent positives and negatives are stated within HPI, all other systems reviewed and are negative.          --------------------------------------------- PAST HISTORY ---------------------------------------------  Past Medical History:  has a past medical history of COPD (chronic obstructive pulmonary disease) ([facility] [address] Diabetes mellitus ([facility] [address] GERD (gastroesophageal reflux disease), and Hyperlipidemia. Past Surgical History:  has a past surgical history that includes Coronary angioplasty with stent. Social History:      Family History: family history is not on file.      The patients home medications have been reviewed. Allergies: Patient has no known allergies.     -------------------------------------------------- RESULTS -------------------------------------------------  All laboratory and radiology results have been personally reviewed by myself   LABS:  Results for orders placed or performed during the hospital encounter of 02/27/21   COVID-19, Rapid    Specimen: Nasopharyngeal Swab   Result Value Ref Range    SARS-CoV-2, NAAT Not Detected Not Detected   CBC auto differential   Result Value Ref Range    WBC 6.2 4.5 - 11.5 E9/L    RBC 4.14 3.80 - 5.80 E12/L    Hemoglobin 9.8 (L) 12.5 - 16.5 g/dL    Hematocrit 34.1 (L) 37.0 - 54.0 %    MCV 82.4 80.0 - 99.9 fL    MCH 23.7 (L) 26.0 - 35.0 pg    MCHC 28.7 (L) 32.0 - 34.5 %    RDW 15.3 (H) 11.5 - 15.0 fL    Platelets 089 191 - 624 E9/L    MPV 8.6 7.0 - 12.0 fL    Neutrophils % 86.2 (H) 43.0 - 80.0 %    Lymphocytes % 7.8 (L) 20.0 - 42.0 %    Monocytes % 3.4 2.0 - 12.0 %    Eosinophils % 2.6 0.0 - 6.0 %    Basophils % 0.0 0.0 - 2.0 %    Neutrophils Absolute 5.33 1.80 - 7.30 E9/L    Lymphocytes Absolute 0.50 (L) 1.50 - 4.00 E9/L    Monocytes Absolute 0.19 0.10 - 0.95 E9/L    Eosinophils Absolute 0.16 0.05 - 0.50 E9/L    Basophils Absolute 0.00 0.00 - 0.20 E9/L    nRBC 0.0 /100 WBC    Polychromasia 1+     Hypochromia 1+     Poikilocytes 1+     Ovalocytes 1+     Stomatocytes 1+     Basophilic Stippling 1+    Comprehensive Metabolic Panel   Result Value Ref Range    Sodium 134 132 - 146 mmol/L    Potassium 6.5 (H) 3.5 - 5.0 mmol/L    Chloride 95 (L) 98 - 107 mmol/L    CO2 32 (H) 22 - 29 mmol/L    Anion Gap 7 7 - 16 mmol/L    Glucose 352 (H) 74 - 99 mg/dL    BUN 34 (H) 8 - 23 mg/dL    CREATININE 1.5 (H) 0.7 - 1.2 mg/dL    GFR Non-African American 46 >=60 mL/min/1.73    GFR African American 56     Calcium 8.4 (L) 8.6 - 10.2 mg/dL    Total Protein 7.0 6.4 - 8.3 g/dL    Albumin 4.3 3.5 - 5.2 g/dL    Total Bilirubin 0.3 0.0 - 1.2 mg/dL    Alkaline Phosphatase 60 40 - 129 U/L    ALT 30 0 - 40 U/L    AST 22 0 - 39 U/L   Lactic Acid, Plasma   Result Value Ref Range    Lactic Acid 3.3 (H) 0.5 - 2.2 mmol/L   Brain Natriuretic Peptide   Result Value Ref Range    Pro-BNP 69 0 - 125 pg/mL   Troponin   Result Value Ref Range    Troponin <0.01 0.00 - 0.03 ng/mL   EKG 12 Lead   Result Value Ref Range    Ventricular Rate 81 BPM    Atrial Rate 81 BPM    P-R Interval 132 ms    QRS Duration 88 ms    Q-T Interval 350 ms    QTc Calculation (Bazett) 406 ms    P Axis 70 degrees    R Axis 58 degrees    T Axis 57 degrees       RADIOLOGY:  Interpreted by Radiologist.  XR CHEST PORTABLE   Final Result   Right mid lung airspace opacity worrisome for pneumonia.          ------------------------- NURSING NOTES AND VITALS REVIEWED ---------------------------   The nursing notes within the ED encounter and vital signs as below have been reviewed. BP (!) 142/70   Pulse 82   Temp 98 °F (36.7 °C) (Oral)   Resp 14   Ht 6' 1\" (1.854 m)   Wt 247 lb (112 kg)   SpO2 96%   BMI 32.59 kg/m²   Oxygen Saturation Interpretation: Abnormal - but at baseline      ---------------------------------------------------PHYSICAL EXAM--------------------------------------      Constitutional/General: Alert and oriented x3, appears uncomfortable, non toxic in NAD  Head: Normocephalic and atraumatic  Eyes: PERRL, EOMI  Mouth: Oropharynx clear, handling secretions, no trismus  Neck: Supple, full ROM,   Pulmonary: Lungs mildly diminished but clear to auscultation bilaterally, no wheezes, rales, or rhonchi. Not in respiratory distress  Cardiovascular:  Regular rate and rhythm, no murmurs, gallops, or rubs. 2+ distal pulses  Abdomen: Soft, non tender, non distended,   Extremities: Moves all extremities x 4. Warm and well perfused. Pedal edema. No calf tenderness.   Skin: warm and dry without rash  Neurologic: GCS 15, no focal motor or sensory deficits   Psych: Normal Affect      ------------------------------ ED COURSE/MEDICAL DECISION MAKING----------------------  Medications   sodium chloride flush 0.9 % injection (has no administration in time range)   glucose (GLUTOSE) 40 % oral gel 15 g (has no administration in time range)   dextrose 50 % IV solution (has no administration in time range)   glucagon (rDNA) injection 1 mg (has no administration in time range)   dextrose 5 % solution (has no administration in time range)   0.9 % sodium chloride infusion (has no administration in time range)   sodium chloride flush 0.9 % injection 10 mL (has no administration in time range)   sodium chloride flush 0.9 % injection 10 mL (has no administration in time range)   enoxaparin (LOVENOX) injection 40 mg (has no administration in time range)   polyethylene glycol (GLYCOLAX) packet 17 g (has no administration in time range)   acetaminophen (TYLENOL) tablet 650 mg (has no administration in time range)     Or   acetaminophen (TYLENOL) suppository 650 mg (has no administration in time range)   insulin glargine (LANTUS) injection vial 50 Units (has no administration in time range)   insulin lispro (HUMALOG) injection vial 0-12 Units (has no administration in time range)   insulin lispro (HUMALOG) injection vial 0-6 Units (has no administration in time range)   insulin lispro (HUMALOG) injection vial 9 Units (has no administration in time range)   predniSONE (DELTASONE) tablet 40 mg (has no administration in time range)   albuterol sulfate  (90 Base) MCG/ACT inhaler 2 puff (has no administration in time range)   cefTRIAXone (ROCEPHIN) 1,000 mg in sterile water 10 mL IV syringe (has no administration in time range)   azithromycin (ZITHROMAX) 500 mg in D5W 250ml addavial (has no administration in time range)   albuterol-ipratropium (COMBIVENT RESPIMAT)  MCG/ACT inhaler 1 puff (has no administration in time range)   calcium gluconate 1,000 mg in dextrose 5 % 100 mL IVPB (0 mg Intravenous Stopped 2/27/21 2111)   sodium bicarbonate 8.4 % injection 50 mEq (50 mEq Intravenous Given 2/27/21 2110)   albuterol (PROVENTIL) nebulizer solution 2.5 mg (2.5 mg Nebulization Given 2/27/21 2205)   insulin regular (HUMULIN R;NOVOLIN R) injection 10 Units (10 Units Intravenous Given 2/27/21 2110)   methylPREDNISolone sodium (SOLU-MEDROL) injection 60 mg (60 mg Intravenous Given 2/27/21 2023)   doxycycline hyclate (VIBRAMYCIN) capsule 100 mg (100 mg Oral Given 2/27/21 2028)   0.9 % sodium chloride bolus (1,000 mLs Intravenous New Bag 2/27/21 2045)       Medical Decision Making/ED COURSE:   Patient is a 64-year-old male with history of COPD on 3 L of oxygen at baseline presenting with shortness of breath, productive cough, leg swelling, and urinary frequency. In the ED, patient was hemodynamically stable and afebrile. He was saturating well on his baseline oxygen requirements. On exam, patient was nontoxic with no evidence of acute respiratory distress. Patient was placed on the cardiac monitor. I interpreted findings. Rhythm -sinus. Labs and x-ray obtained. Patient administered doxycycline, steroids, and breathing treatments for suspected COPD exacerbation. I reviewed and interpreted labs. Creatinine was elevated at 1.5. There are no prior labs for comparison. Patient also had lactic acidosis of 3.3. He was also noted to have hyperkalemia of 6.5. No EKG changes. Patient given hyperkalemia protocol including calcium, sodium bicarbonate, insulin, and albuterol to prevent life-threatening arrhythmia. Patient also noted to be hyperglycemic at 352 with no anion gap to suggest DKA. Patient also noted to have anemia of 9.8. No evidence of active bleeding. Unsure of patient's baseline. Rapid COVID-19 testing negative. Chest x-ray showed likely pneumonia. Patient treated with IV fluids in addition to antibiotics. Patient will be admitted for further work-up and management. Urinalysis was pending upon admission.   Patient was able to void without difficulty in the ED.    Patient remained hemodynamically stable throughout ED course. ED Course as of Feb 27 2246   Sat Feb 27, 2021 2025 Hospitalist was consulted. Dr. Qing Alvarado accepted the patient for admission. [JA]   2059 EKG: This EKG is signed and interpreted by me. Rate: 81  Rhythm: Sinus  Interpretation: Normal sinus rhythm, normal WA interval, normal QRS, normal QT interval, no acute ST or T wave changes  Comparison: no previous EKG available        [JA]      ED Course User Index  [JA] Yadi Floyd MD       Critical Care:  Please note that the withdrawal or failure to initiate urgent interventions for this patient would likely result in a life threatening deterioration or permanent disability. Accordingly this patient received 32 minutes of critical care time, excluding separately billable procedures. Counseling: The emergency provider has spoken with the patient and discussed todays results, in addition to providing specific details for the plan of care and counseling regarding the diagnosis and prognosis. Questions are answered at this time and they are agreeable with the plan.      --------------------------------- IMPRESSION AND DISPOSITION ---------------------------------    IMPRESSION  1. Pneumonia due to organism    2. Hyperkalemia    3. Hyperglycemia    4. Lactic acidosis    5. Hypochloremia    6. COPD exacerbation ([address]    7. Chronic respiratory failure with hypoxia (HCC)    8. Anemia, unspecified type    9. Elevated serum creatinine        DISPOSITION  Disposition: Admit to telemetry  Patient condition is stable      NOTE: This report was transcribed using voice recognition software.  Every effort was made to ensure accuracy; however, inadvertent computerized transcription errors may be present    IYadi MD, am the primary provider of this record       Yadi Floyd MD  02/27/21 2242

## 2021-02-28 NOTE — CONSULTS
[address] NOTE    Patient: New Gonzales  MRN: [id number]  : 1950    Encounter Date: 2021  Encounter Time: 2:31 PM     Date of Admission: .2021  4:07 PM    Consulting Physician:  Primary Care Physician:      Patsy Prather MD     [phone]     None    PROBLEM LIST:  Patient Active Problem List   Diagnosis    Hyperkalemia     Reason for Consultation: Dyspnea, COPD    HPI:   Mr. Emanuel Jennings is a 64 y/o male with past medical history noted for COPD, BPH, chronic hypoxia on 3 L O2 NC that presented to [facility] with moderate dyspnea and yellow sputum production. Patient was established in [place] and recently moved to the area. He has IFTIKHAR but is not compliant with NIV mask. He denies anosmia, fevers, chills, sick contacts, COVID contacts, chest pains, nausea, vomiting, diarrhea. Patient received second dose of Moderna vaccine the Tuesday prior to admission. Patient is no longer a smoker as of 15 years prior to this point. CXR noted RML/RLL infiltrate and clinical examination concerning for right sided pneumonia. PAST MEDICAL HISTORY:   Past Medical History:   Diagnosis Date    COPD (chronic obstructive pulmonary disease) ([facility] [address]     Diabetes mellitus (HCC)     GERD (gastroesophageal reflux disease)     Hyperlipidemia        PAST SURGICAL HISTORY:   Past Surgical History:   Procedure Laterality Date    CORONARY ANGIOPLASTY WITH STENT PLACEMENT         FAMILY HISTORY:   No family history on file.     SOCIAL HISTORY:   Social History     Socioeconomic History    Marital status:      Spouse name: Not on file    Number of children: Not on file    Years of education: Not on file    Highest education level: Not on file   Occupational History    Not on file   Social Needs    Financial resource strain: Not on file    Food insecurity     Worry: Not on file     Inability: Not on file    Transportation needs     Medical: Not on file Non-medical: Not on file   Tobacco Use    Smoking status: Not on file   Substance and Sexual Activity    Alcohol use: Not on file    Drug use: Not on file    Sexual activity: Not on file   Lifestyle    Physical activity     Days per week: Not on file     Minutes per session: Not on file    Stress: Not on file   Relationships    Social connections     Talks on phone: Not on file     Gets together: Not on file     Attends Taoist service: Not on file     Active member of club or organization: Not on file     Attends meetings of clubs or organizations: Not on file     Relationship status: Not on file    Intimate partner violence     Fear of current or ex partner: Not on file     Emotionally abused: Not on file     Physically abused: Not on file     Forced sexual activity: Not on file   Other Topics Concern    Not on file   Social History Narrative    Not on file     Social History     Tobacco Use   Smoking Status Not on file     Social History     Substance and Sexual Activity   Alcohol Use Not on file     Social History     Substance and Sexual Activity   Drug Use Not on file     HOME MEDICATIONS:  Prior to Admission medications    Medication Sig Start Date End Date Taking?  Authorizing Provider   lisinopril (PRINIVIL;ZESTRIL) 5 MG tablet Take 5 mg by mouth daily   Yes Historical Provider, MD   atorvastatin (LIPITOR) 40 MG tablet Take 40 mg by mouth daily   Yes Historical Provider, MD   furosemide (LASIX) 40 MG tablet Take 40 mg by mouth daily   Yes Historical Provider, MD   glipiZIDE (GLUCOTROL) 10 MG tablet Take 10 mg by mouth 2 times daily (before meals)   Yes Historical Provider, MD   metFORMIN (GLUCOPHAGE) 1000 MG tablet Take 1,000 mg by mouth 2 times daily (with meals)   Yes Historical Provider, MD   metoprolol tartrate (LOPRESSOR) 25 MG tablet Take 25 mg by mouth 2 times daily   Yes Historical Provider, MD   isosorbide mononitrate (IMDUR) 30 MG extended release tablet Take 30 mg by mouth daily Yes Historical Provider, MD   insulin glargine (LANTUS) 100 UNIT/ML injection vial Inject 50 Units into the skin nightly   Yes Historical Provider, MD   primidone (MYSOLINE) 50 MG tablet Take 50 mg by mouth nightly   Yes Historical Provider, MD   zolpidem (AMBIEN) 10 MG tablet Take by mouth nightly as needed for Sleep.    Yes Historical Provider, MD   predniSONE (DELTASONE) 5 MG tablet Take 15 mg by mouth daily   Yes Historical Provider, MD   Multiple Vitamins-Minerals (OCULAR VITAMINS PO) Take by mouth   Yes Historical Provider, MD   guaiFENesin 400 MG tablet Take 400 mg by mouth 2 times daily   Yes Historical Provider, MD       CURRENT MEDICATIONS:  Current Facility-Administered Medications: guaiFENesin tablet 400 mg, 400 mg, Oral, BID  atorvastatin (LIPITOR) tablet 40 mg, 40 mg, Oral, Daily  isosorbide mononitrate (IMDUR) extended release tablet 30 mg, 30 mg, Oral, Daily  metoprolol tartrate (LOPRESSOR) tablet 25 mg, 25 mg, Oral, BID  primidone (MYSOLINE) tablet 50 mg, 50 mg, Oral, Nightly  glucose (GLUTOSE) 40 % oral gel 15 g, 15 g, Oral, PRN  dextrose 50 % IV solution, 12.5 g, Intravenous, PRN  glucagon (rDNA) injection 1 mg, 1 mg, Intramuscular, PRN  dextrose 5 % solution, 100 mL/hr, Intravenous, PRN  0.9 % sodium chloride infusion, , Intravenous, Continuous  sodium chloride flush 0.9 % injection 10 mL, 10 mL, Intravenous, 2 times per day  sodium chloride flush 0.9 % injection 10 mL, 10 mL, Intravenous, PRN  enoxaparin (LOVENOX) injection 40 mg, 40 mg, Subcutaneous, Daily  polyethylene glycol (GLYCOLAX) packet 17 g, 17 g, Oral, Daily PRN  acetaminophen (TYLENOL) tablet 650 mg, 650 mg, Oral, Q6H PRN **OR** acetaminophen (TYLENOL) suppository 650 mg, 650 mg, Rectal, Q6H PRN  insulin glargine (LANTUS) injection vial 50 Units, 50 Units, Subcutaneous, Nightly  insulin lispro (HUMALOG) injection vial 0-12 Units, 0-12 Units, Subcutaneous, TID WC  insulin lispro (HUMALOG) injection vial 0-6 Units, 0-6 Units, Subcutaneous, Nightly  insulin lispro (HUMALOG) injection vial 9 Units, 0.08 Units/kg, Subcutaneous, TID WC  predniSONE (DELTASONE) tablet 40 mg, 40 mg, Oral, Daily  albuterol sulfate  (90 Base) MCG/ACT inhaler 2 puff, 2 puff, Inhalation, Q6H PRN  cefTRIAXone (ROCEPHIN) 1,000 mg in sterile water 10 mL IV syringe, 1,000 mg, Intravenous, Q24H  azithromycin (ZITHROMAX) 500 mg in D5W 250ml addavial, 500 mg, Intravenous, Q24H  albuterol-ipratropium (COMBIVENT RESPIMAT)  MCG/ACT inhaler 1 puff, 1 puff, Inhalation, Q4H WA    IV MEDICATIONS:   dextrose      sodium chloride 100 mL/hr at 02/27/21 4843       ALLERGIES:  No Known Allergies    REVIEW OF SYSTEMS:  General ROS:     - Positive For:     - Negative For: chills, fatigue, fever, malaise, night sweats or sleep disturbance   ENT ROS:      - Positive For:     - Negative For: epistaxis, headaches, sinus pain, sneezing or sore throat   Allergy and Immunology ROS:     - Negative For: nasal congestion, postnasal drip or seasonal allergies   Hematological and Lymphatic ROS:      - Negative For: bleeding problems, bruising, fatigue, night sweats or pallor   Respiratory ROS:      - Positive For:       - Negative For: hemoptysis, pleuritic type chest pains  Cardiovascular ROS:      - Positive For:      - Negative For: chest pain, dyspnea on exertion, irregular heartbeat, loss of consciousness, murmur, orthopnea or palpitations   Gastrointestinal ROS:      - Positive For:     - Negative For: abdominal pain, appetite loss, blood in stools, change in bowel habits, change in stools, constipation, diarrhea, hematemesis, melena, nausea/vomiting or stool incontinence   Genito-Urinary ROS:      - Negative For: change in urinary stream, dysuria, hematuria or incontinence   Musculoskeletal ROS:      - Negative For: joint pain, joint stiffness, joint swelling or muscle pain   Neurological ROS:     - Negative For: behavioral changes, confusion, dizziness, headaches, impaired coordination/balance or memory loss   Dermatological ROS:      - Negative For: hair changes, lumps, mole changes, nail changes or pruritus    PHYSICAL EXAMINATION:     VITAL SIGNS:  /70   Pulse 82   Temp 98.2 °F (36.8 °C) (Oral)   Resp 18   Ht 6' 1\" (1.854 m)   Wt 245 lb (111.1 kg)   SpO2 96%   BMI 32.32 kg/m²   Wt Readings from Last 3 Encounters:   21 245 lb (111.1 kg)     Temp Readings from Last 3 Encounters:   21 98.2 °F (36.8 °C) (Oral)     TMAX:  BP Readings from Last 3 Encounters:   21 131/70     Pulse Readings from Last 3 Encounters:   21 82       CURRENT PULSE OXIMETRY: SpO2: 96 %  24HR PULSE OXIMETRY RANGE: SpO2  Av.6 %  Min: 92 %  Max: 97 %  CVP:      ________________________________________________________________________    VENTILATOR SETTINGS (if applicable):         Vent Information  SpO2: 96 %  Additional Respiratory  Assessments  Pulse: 82  Resp: 18  SpO2: 96 %  ETCO2:  Peak Inspiratory Pressure:  End-Inspiratory Plateau Pressure:    ABG:  No results for input(s): PH, PO2, PCO2, HCO3, BE, O2SAT, METHB, O2HB, COHB, O2CON, HHB, THB in the last 72 hours. ________________________________________________________________________    IV ACCESS:    NUTRITION: DIET CARDIAC; Low Potassium    INTAKE/OUTPUTS:  No intake/output data recorded.     Intake/Output Summary (Last 24 hours) at 2021 1431  Last data filed at 2021 1408  Gross per 24 hour   Intake 1180 ml   Output 400 ml   Net 780 ml     General Appearance: alert and oriented to person, place and time, well-developed and   well-nourished, in no acute distress   Eyes: pupils equal, round, and reactive to light, extraocular eye movements intact, conjunctivae normal and sclera anicteric   Neck: neck supple and non tender without mass, no thyromegaly, no thyroid nodules and no cervical adenopathy   Pulmonary/Chest: decreased breath sounds, no accessory muscles of inspiration, no focal wheezes  - rhonchi noted right side and RLL predominant   Cardiovascular: normal rate, regular rhythm, normal S1 and S2, no murmurs, rubs, clicks or gallops, distal pulses intact, no carotid bruits, no murmurs, no gallops, no carotid bruits and no JVD   Abdomen: soft, non-tender, non-distended, normal bowel sounds, no masses or organomegaly   Extremities: no cyanosis, no clubbing, no edema  Musculoskeletal: normal range of motion, no joint swelling, deformity or tenderness   Neurologic: reflexes normal and symmetric, no cranial nerve deficit noted    LABS/IMAGING:    CBC:  Lab Results   Component Value Date    WBC 5.0 02/28/2021    HGB 9.2 (L) 02/28/2021    HCT 31.8 (L) 02/28/2021    MCV 82.2 02/28/2021     (L) 02/28/2021    LYMPHOPCT 7.2 (L) 02/28/2021    RBC 3.87 02/28/2021    MCH 23.8 (L) 02/28/2021    MCHC 28.9 (L) 02/28/2021    RDW 15.3 (H) 02/28/2021    NEUTOPHILPCT 88.8 (H) 02/28/2021    MONOPCT 2.8 02/28/2021    BASOPCT 0.0 02/28/2021    NEUTROABS 4.45 02/28/2021    LYMPHSABS 0.36 (L) 02/28/2021    MONOSABS 0.14 02/28/2021    EOSABS 0.01 (L) 02/28/2021    BASOSABS 0.00 02/28/2021       Recent Labs     02/28/21  0516 02/27/21  1757   WBC 5.0 6.2   HGB 9.2* 9.8*   HCT 31.8* 34.1*   MCV 82.2 82.4   * 141       BMP:   Recent Labs     02/27/21  1757 02/28/21  0516 02/28/21  1045    134  --    K 6.5* 5.5* 5.2*   CL 95* 96*  --    CO2 32* 32*  --    BUN 34* 34*  --    CREATININE 1.5* 1.2  --        MG: No results found for: MG  Ca/Phos:   Lab Results   Component Value Date    CALCIUM 8.2 (L) 02/28/2021     Amylase: No results found for: AMYLASE  Lipase: No results found for: LIPASE  LIVER PROFILE:   Recent Labs     02/27/21  1757   AST 22   ALT 30   BILITOT 0.3   ALKPHOS 60       PT/INR: No results for input(s): PROTIME, INR in the last 72 hours. APTT: No results for input(s): APTT in the last 72 hours.     Cardiac Enzymes:  Lab Results   Component Value Date    TROPONINI <0.01 02/27/2021       Hgb A1C:   Lab Results   Component Value Date    LABA1C 10.5 (H) 02/28/2021     No results found for: EAG  RAFAEL: No results found for: RAFAEL  ESR: No results found for: SEDRATE  CRP: No results found for: CRP  D Dimer: No results found for: DDIMER  Folate and B12: No results found for: [id number], No results found for: FOLATE    Lactic Acid:   Lab Results   Component Value Date    LACTA 0.7 02/28/2021     Ammonia:   Cortisol:  Thyroid Studies:  No results found for: TSH, H8SXAXX, R0ODUIN, THYROIDAB    CT CHEST WO CONTRAST   Final Result   1. Pulmonary infiltrates in the right upper and lower lobes likely represent   pneumonia. 2. Shotty mediastinal lymph nodes are likely reactive. 3. Mildly enlarged thyroid gland, with multiple nodules and calcifications. Sonographic evaluation recommended. XR CHEST PORTABLE   Final Result   Right mid lung airspace opacity worrisome for pneumonia. ASSESSMENT:  1.) Right Multi-Lobar Pneumonia   2.) Acute Exacerbation of COPD  3.) Acute on Chronic Hypoxic Respiratory Failure   - baseline O2 demand 3 L O2 NC  - current oxygen demand 6 L O2 NC with increased work of breathing on 3 L O2 NC  4.) Underlying IFTIKHAR - not compliant with Nocturnal NIV  5.) Obesity   6.) Hyperkalemia     PLAN:  1.) O2, IS, steroids  2.) ceftriaxone, azithromycin, check procalcitonin   3.) CT chest reviewed, no role for bronchoscopy at this time  4.) sputum culture, urine legionella, urine strep pneumoniae, blood culture x 2  5.) outpatient PFT and PSG  6.) DVT Prophylaxis     - COVID negative  - duoneb q 6 hrs prn    Thank you Bonita Sinha* very much for allowing me to see this patient in consultation and follow up. Care reviewed with nursing staff, medical and surgical specialty care, primary care and the patient's family as available. Restraints are ordered when the patient can do harm to him/herself by pulling out devices.     Dali Page M.D.

## 2021-03-01 ENCOUNTER — APPOINTMENT (OUTPATIENT)
Dept: GENERAL RADIOLOGY | Age: 71
DRG: 193 | End: 2021-03-01
Payer: MEDICARE

## 2021-03-01 ENCOUNTER — APPOINTMENT (OUTPATIENT)
Dept: ULTRASOUND IMAGING | Age: 71
DRG: 193 | End: 2021-03-01
Payer: MEDICARE

## 2021-03-01 LAB
ANION GAP SERPL CALCULATED.3IONS-SCNC: 3 MMOL/L (ref 7–16)
ANISOCYTOSIS: ABNORMAL
BASOPHILIC STIPPLING: ABNORMAL
BASOPHILS ABSOLUTE: 0.02 E9/L (ref 0–0.2)
BASOPHILS RELATIVE PERCENT: 0.4 % (ref 0–2)
BUN BLDV-MCNC: 28 MG/DL (ref 8–23)
CALCIUM SERPL-MCNC: 8 MG/DL (ref 8.6–10.2)
CHLORIDE BLD-SCNC: 99 MMOL/L (ref 98–107)
CO2: 34 MMOL/L (ref 22–29)
CREAT SERPL-MCNC: 1.1 MG/DL (ref 0.7–1.2)
EKG ATRIAL RATE: 81 BPM
EKG P AXIS: 70 DEGREES
EKG P-R INTERVAL: 132 MS
EKG Q-T INTERVAL: 350 MS
EKG QRS DURATION: 88 MS
EKG QTC CALCULATION (BAZETT): 406 MS
EKG R AXIS: 58 DEGREES
EKG T AXIS: 57 DEGREES
EKG VENTRICULAR RATE: 81 BPM
EOSINOPHILS ABSOLUTE: 0.04 E9/L (ref 0.05–0.5)
EOSINOPHILS RELATIVE PERCENT: 0.7 % (ref 0–6)
GFR AFRICAN AMERICAN: >60
GFR NON-AFRICAN AMERICAN: >60 ML/MIN/1.73
GLUCOSE BLD-MCNC: 209 MG/DL (ref 74–99)
HCT VFR BLD CALC: 33.8 % (ref 37–54)
HEMOGLOBIN: 9.5 G/DL (ref 12.5–16.5)
HYPOCHROMIA: ABNORMAL
IMMATURE GRANULOCYTES #: 0.08 E9/L
IMMATURE GRANULOCYTES %: 1.4 % (ref 0–5)
L. PNEUMOPHILA SEROGP 1 UR AG: NORMAL
LYMPHOCYTES ABSOLUTE: 1.07 E9/L (ref 1.5–4)
LYMPHOCYTES RELATIVE PERCENT: 18.8 % (ref 20–42)
MCH RBC QN AUTO: 23.7 PG (ref 26–35)
MCHC RBC AUTO-ENTMCNC: 28.1 % (ref 32–34.5)
MCV RBC AUTO: 84.3 FL (ref 80–99.9)
METER GLUCOSE: 182 MG/DL (ref 74–99)
METER GLUCOSE: 217 MG/DL (ref 74–99)
METER GLUCOSE: 223 MG/DL (ref 74–99)
METER GLUCOSE: 246 MG/DL (ref 74–99)
MONOCYTES ABSOLUTE: 0.45 E9/L (ref 0.1–0.95)
MONOCYTES RELATIVE PERCENT: 7.9 % (ref 2–12)
NEUTROPHILS ABSOLUTE: 4.02 E9/L (ref 1.8–7.3)
NEUTROPHILS RELATIVE PERCENT: 70.8 % (ref 43–80)
OVALOCYTES: ABNORMAL
PDW BLD-RTO: 15.2 FL (ref 11.5–15)
PLATELET # BLD: 134 E9/L (ref 130–450)
PMV BLD AUTO: 8.9 FL (ref 7–12)
POIKILOCYTES: ABNORMAL
POTASSIUM REFLEX MAGNESIUM: 4.9 MMOL/L (ref 3.5–5)
POTASSIUM SERPL-SCNC: 4.9 MMOL/L (ref 3.5–5)
POTASSIUM SERPL-SCNC: 5.2 MMOL/L (ref 3.5–5)
POTASSIUM SERPL-SCNC: 5.3 MMOL/L (ref 3.5–5)
PROCALCITONIN: 0.12 NG/ML (ref 0–0.08)
RBC # BLD: 4.01 E12/L (ref 3.8–5.8)
SODIUM BLD-SCNC: 136 MMOL/L (ref 132–146)
STOMATOCYTES: ABNORMAL
STREP PNEUMONIAE ANTIGEN, URINE: NORMAL
WBC # BLD: 5.7 E9/L (ref 4.5–11.5)

## 2021-03-01 PROCEDURE — 76770 US EXAM ABDO BACK WALL COMP: CPT

## 2021-03-01 PROCEDURE — 82962 GLUCOSE BLOOD TEST: CPT

## 2021-03-01 PROCEDURE — 6370000000 HC RX 637 (ALT 250 FOR IP): Performed by: INTERNAL MEDICINE

## 2021-03-01 PROCEDURE — 36415 COLL VENOUS BLD VENIPUNCTURE: CPT

## 2021-03-01 PROCEDURE — 6360000002 HC RX W HCPCS: Performed by: INTERNAL MEDICINE

## 2021-03-01 PROCEDURE — 85025 COMPLETE CBC W/AUTO DIFF WBC: CPT

## 2021-03-01 PROCEDURE — 84145 PROCALCITONIN (PCT): CPT

## 2021-03-01 PROCEDURE — 99232 SBSQ HOSP IP/OBS MODERATE 35: CPT | Performed by: INTERNAL MEDICINE

## 2021-03-01 PROCEDURE — 94640 AIRWAY INHALATION TREATMENT: CPT

## 2021-03-01 PROCEDURE — 94660 CPAP INITIATION&MGMT: CPT

## 2021-03-01 PROCEDURE — 2700000000 HC OXYGEN THERAPY PER DAY

## 2021-03-01 PROCEDURE — 84132 ASSAY OF SERUM POTASSIUM: CPT

## 2021-03-01 PROCEDURE — 80048 BASIC METABOLIC PNL TOTAL CA: CPT

## 2021-03-01 PROCEDURE — 2580000003 HC RX 258: Performed by: INTERNAL MEDICINE

## 2021-03-01 PROCEDURE — 51798 US URINE CAPACITY MEASURE: CPT

## 2021-03-01 PROCEDURE — 93010 ELECTROCARDIOGRAM REPORT: CPT | Performed by: INTERNAL MEDICINE

## 2021-03-01 PROCEDURE — 74018 RADEX ABDOMEN 1 VIEW: CPT

## 2021-03-01 PROCEDURE — 6370000000 HC RX 637 (ALT 250 FOR IP): Performed by: NURSE PRACTITIONER

## 2021-03-01 PROCEDURE — 76536 US EXAM OF HEAD AND NECK: CPT

## 2021-03-01 PROCEDURE — 2060000000 HC ICU INTERMEDIATE R&B

## 2021-03-01 RX ORDER — TAMSULOSIN HYDROCHLORIDE 0.4 MG/1
0.4 CAPSULE ORAL DAILY
Status: DISCONTINUED | OUTPATIENT
Start: 2021-03-01 | End: 2021-03-03 | Stop reason: HOSPADM

## 2021-03-01 RX ADMIN — GUAIFENESIN 400 MG: 400 TABLET, FILM COATED ORAL at 21:37

## 2021-03-01 RX ADMIN — IPRATROPIUM BROMIDE AND ALBUTEROL SULFATE 1 AMPULE: 2.5; .5 SOLUTION RESPIRATORY (INHALATION) at 22:42

## 2021-03-01 RX ADMIN — ATORVASTATIN CALCIUM 40 MG: 40 TABLET, FILM COATED ORAL at 08:38

## 2021-03-01 RX ADMIN — INSULIN LISPRO 9 UNITS: 100 INJECTION, SOLUTION INTRAVENOUS; SUBCUTANEOUS at 06:00

## 2021-03-01 RX ADMIN — PRIMIDONE 50 MG: 50 TABLET ORAL at 21:36

## 2021-03-01 RX ADMIN — METOPROLOL TARTRATE 25 MG: 25 TABLET, FILM COATED ORAL at 21:37

## 2021-03-01 RX ADMIN — CEFTRIAXONE 1000 MG: 1 INJECTION, POWDER, FOR SOLUTION INTRAMUSCULAR; INTRAVENOUS at 21:37

## 2021-03-01 RX ADMIN — INSULIN LISPRO 4 UNITS: 100 INJECTION, SOLUTION INTRAVENOUS; SUBCUTANEOUS at 16:39

## 2021-03-01 RX ADMIN — TAMSULOSIN HYDROCHLORIDE 0.4 MG: 0.4 CAPSULE ORAL at 21:37

## 2021-03-01 RX ADMIN — AZITHROMYCIN DIHYDRATE 500 MG: 500 INJECTION, POWDER, LYOPHILIZED, FOR SOLUTION INTRAVENOUS at 21:40

## 2021-03-01 RX ADMIN — IPRATROPIUM BROMIDE AND ALBUTEROL SULFATE 1 AMPULE: 2.5; .5 SOLUTION RESPIRATORY (INHALATION) at 08:43

## 2021-03-01 RX ADMIN — PREDNISONE 40 MG: 20 TABLET ORAL at 08:38

## 2021-03-01 RX ADMIN — SODIUM CHLORIDE, PRESERVATIVE FREE 10 ML: 5 INJECTION INTRAVENOUS at 21:40

## 2021-03-01 RX ADMIN — SODIUM CHLORIDE: 9 INJECTION, SOLUTION INTRAVENOUS at 10:12

## 2021-03-01 RX ADMIN — INSULIN LISPRO 4 UNITS: 100 INJECTION, SOLUTION INTRAVENOUS; SUBCUTANEOUS at 06:03

## 2021-03-01 RX ADMIN — IPRATROPIUM BROMIDE AND ALBUTEROL SULFATE 1 AMPULE: 2.5; .5 SOLUTION RESPIRATORY (INHALATION) at 16:34

## 2021-03-01 RX ADMIN — ISOSORBIDE MONONITRATE 30 MG: 30 TABLET, EXTENDED RELEASE ORAL at 08:38

## 2021-03-01 RX ADMIN — INSULIN LISPRO 2 UNITS: 100 INJECTION, SOLUTION INTRAVENOUS; SUBCUTANEOUS at 21:39

## 2021-03-01 RX ADMIN — GUAIFENESIN 400 MG: 400 TABLET, FILM COATED ORAL at 08:38

## 2021-03-01 RX ADMIN — INSULIN LISPRO 2 UNITS: 100 INJECTION, SOLUTION INTRAVENOUS; SUBCUTANEOUS at 11:01

## 2021-03-01 RX ADMIN — IPRATROPIUM BROMIDE AND ALBUTEROL SULFATE 1 AMPULE: 2.5; .5 SOLUTION RESPIRATORY (INHALATION) at 12:48

## 2021-03-01 RX ADMIN — INSULIN GLARGINE 50 UNITS: 100 INJECTION, SOLUTION SUBCUTANEOUS at 21:38

## 2021-03-01 RX ADMIN — ENOXAPARIN SODIUM 40 MG: 40 INJECTION, SOLUTION INTRAVENOUS; SUBCUTANEOUS at 08:38

## 2021-03-01 RX ADMIN — METOPROLOL TARTRATE 25 MG: 25 TABLET, FILM COATED ORAL at 08:38

## 2021-03-01 RX ADMIN — INSULIN LISPRO 9 UNITS: 100 INJECTION, SOLUTION INTRAVENOUS; SUBCUTANEOUS at 11:03

## 2021-03-01 RX ADMIN — INSULIN LISPRO 9 UNITS: 100 INJECTION, SOLUTION INTRAVENOUS; SUBCUTANEOUS at 16:40

## 2021-03-01 ASSESSMENT — PAIN SCALES - GENERAL
PAINLEVEL_OUTOF10: 0

## 2021-03-01 NOTE — CONSULTS
3/1/2021 6:12 PM  Service: Urology  Group: [facility] (Klever/Lauren/Sara)    Rafita Will  [id number]     Chief Complaint:    Urinary retention    History of Present Illness: The patient is a 65 y.o. male patient who presented to the hospital 2 days ago with complaints of shortness of breath and cough. He was admitted for COPD exacerbation and pneumonia. He was bladder scanned today for approximately 600 mL and a Dickerson catheter was placed for approximately 600ml of output per nursing notes. This is currently draining yellow urine. He states that he saw a Urologist in [address] recently for elevated PSA. He cannot recall how elevated this was. He is now trying to establish care locally. He does not recall being told he needs a biopsy. He was to follow up again for what sounds to be a repeat PSA test. He does report some bothersome urinary symptoms at home that include weak stream and nocturia.        Past Medical History:   Diagnosis Date    COPD (chronic obstructive pulmonary disease) ([facility] [address]     Diabetes mellitus ([facility] [address]     GERD (gastroesophageal reflux disease)     Hyperlipidemia          Past Surgical History:   Procedure Laterality Date    CORONARY ANGIOPLASTY WITH STENT PLACEMENT         Medications Prior to Admission:    Medications Prior to Admission: lisinopril (PRINIVIL;ZESTRIL) 5 MG tablet, Take 5 mg by mouth daily  atorvastatin (LIPITOR) 40 MG tablet, Take 40 mg by mouth daily  furosemide (LASIX) 40 MG tablet, Take 40 mg by mouth daily  glipiZIDE (GLUCOTROL) 10 MG tablet, Take 10 mg by mouth 2 times daily (before meals)  metFORMIN (GLUCOPHAGE) 1000 MG tablet, Take 1,000 mg by mouth 2 times daily (with meals)  metoprolol tartrate (LOPRESSOR) 25 MG tablet, Take 25 mg by mouth 2 times daily  isosorbide mononitrate (IMDUR) 30 MG extended release tablet, Take 30 mg by mouth daily  insulin glargine (LANTUS) 100 UNIT/ML injection vial, Inject 50 Units into the skin nightly  primidone (MYSOLINE) 50 MG tablet, Take 50 mg by mouth nightly  zolpidem (AMBIEN) 10 MG tablet, Take by mouth nightly as needed for Sleep.  predniSONE (DELTASONE) 5 MG tablet, Take 15 mg by mouth daily  Multiple Vitamins-Minerals (OCULAR VITAMINS PO), Take by mouth  guaiFENesin 400 MG tablet, Take 400 mg by mouth 2 times daily    Allergies:    Patient has no known allergies. Social History:        Family History:   Non-contributory to this Urological problem  family history is not on file. Review of Systems:  Constitutional: No fever or chills   Respiratory: positive for cough and shortness of breath   Cardiovascular: negative for chest pain and dyspnea  Gastrointestinal: negative for abdominal pain, diarrhea, nausea and vomiting   Derm: negative for rash and skin lesion(s)  Neurological: negative for seizures and tremors  Musculoskeletal: Negative    Psychiatric: Negative   : As above in the HPI, otherwise negative  All other reviews are negative    Physical Exam:     Vitals:  BP (!) 146/81   Pulse 83   Temp 98.2 °F (36.8 °C) (Oral)   Resp 18   Ht 6' 1\" (1.854 m)   Wt 245 lb (111.1 kg)   SpO2 92%   BMI 32.32 kg/m²     General:  Awake, alert, oriented X 3. No apparent distress. HEENT:  Normocephalic, atraumatic. Lungs:  Respirations symmetric and non-labored. Abdomen:  soft, nontender, no masses  Extremities:  No clubbing, cyanosis, or edema  Skin:  Warm and dry, no open lesions or rashes  Neuro: There are no motor or sensory deficits in the 4 quadrant extremities   Rectal: deferred  Genitourinary:   Dickerson catheter intact draining yellow urine     Labs:     Recent Labs     02/27/21 1757 02/28/21  0516 03/01/21  0554   WBC 6.2 5.0 5.7   RBC 4.14 3.87 4.01   HGB 9.8* 9.2* 9.5*   HCT 34.1* 31.8* 33.8*   MCV 82.4 82.2 84.3   MCH 23.7* 23.8* 23.7*   MCHC 28.7* 28.9* 28.1*   RDW 15.3* 15.3* 15.2*    128* 134   MPV 8.6 9.0 8.9         Recent Labs     02/27/21 1757 02/28/21  0516 03/01/21  0554   CREATININE 1.5* 1.2 1.1 Assessment/plan:  Urinary retention (600ml)      UA unremarkable   Cont to watch the creatinine   Will add Flomax, watch for orthostatic hypotension  Renal ultrasound and KUB ordered   Cont the han catheter  This should be left indwelling until the patient is more ambulatory  Will need LILI and PSA (will need to obtain records from prior Urologist) as an outpatient as well   Will cont to follow               Electronically signed by ANDIE Harding CNP on 3/1/2021 at [address] PM   I have reviewed the chart and discussed the case with the nurse practitioner and I agree with the above note and plan

## 2021-03-01 NOTE — PROGRESS NOTES
Release of information form filled and faxed to [facility] for sleep study report for Dr Maki Galindo

## 2021-03-01 NOTE — CARE COORDINATION
CASE MANAGEMENT. ... Chart reviewed. Met with patient at the bedside. Mr Lorraine Iraheta was found walking back from the bathroom without slipper socks on. Instructed him to use when ambulating. There is a pair of socks at his bedside. He voices an understanding. Patient is currently on 6lnc. He has home o2. I confirmed with Khushbu Meng from [address] [place] [address] at [phone] that patient has stationary and portable o2 thru their agency. Stationary order reads 3lnc with exertion and the portable reads pulse dose of 3lnc. His portable o2 is at the bedside. Mr Lorraine Iraheta follows with Dr Salvador Arellano and uses [address] in [place]. I called and spoke with Mrs Lorraine Iraheta and confirmed the above. States he also has a glucometer/supplies and pulse ox. Mrs Lorraine Iraheta is interested in obtaining a new pcp for patient. [address] [facility] phone number placed in discharge instructions for her to call after discharge. She voices concerns that patient is unsteady on his feet, has urinary retention and states \"he dribbles\" with urination. She has concerns regarding his diet. He is currently on a cardiac diet/low potassium, but he is also diabetic. The above was addressed with patients direct care nurse and orders obtained for pt/ot eval, dietician consult, urinary consult and pvr check. Mr Lorraine Iraheta was also noted to have a moist nonproductive cough and orders placed to stop ivf. Discharge plan is for patient to return home. Mrs Lorraine Iraheta is interested in [facility]. Choices provided. She chose [facility]). Called referral to [facility] at  [phone]. No answer. Left vm. Will cont to follow along and assist with needs accordingly. In the meantime, patient is being followed pulm for pneumonia and jayshree. On iv rocephin and iv zithromax.

## 2021-03-01 NOTE — PROGRESS NOTES
PULMONARY/CRITICAL CARE PROGRESS NOTE    Patient: Heidy Coffey  MRN: [id number]  : 1950    Encounter Date: 3/1/2021  Encounter Time: 8:52 AM     Date of Admission: .2021  4:07 PM    Consulting Physician:  Primary Care Physician:      Flavio Conrad MD     [phone]     None    SUBJECTIVE:   Feels better this am, had a rough night with breathing but better than previous  Feels he is a little confused at times  On 6L O2 nc, wears 3L at home  Still with dyspnea, but no cough, wheezing  Denies CP/tightness     PAST MEDICAL HISTORY:     Past Medical History:   Diagnosis Date    COPD [facility]chronic obstructive pulmonary disease) ([facility]     [facility]     GERD (gastroesophageal reflux disease)     Hyperlipidemia        PAST SURGICAL HISTORY:   Past Surgical History:   Procedure Laterality Date    CORONARY ANGIOPLASTY WITH STENT PLACEMENT         FAMILY HISTORY:   No family history on file.     SOCIAL HISTORY:   Social History     Socioeconomic History    Marital status:      Spouse name: Not on file    Number of children: Not on file    Years of education: Not on file    Highest education level: Not on file   Occupational History    Not on file   Social Needs    Financial resource strain: Not on file    Food insecurity     Worry: Not on file     Inability: Not on file    Transportation needs     Medical: Not on file     Non-medical: Not on file   Tobacco Use    Smoking status: Not on file   Substance and Sexual Activity    Alcohol use: Not on file    Drug use: Not on file    Sexual activity: Not on file   Lifestyle    Physical activity     Days per week: Not on file     Minutes per session: Not on file    Stress: Not on file   Relationships    Social connections     Talks on phone: Not on file     Gets together: Not on file     Attends Worship service: Not on file     Active member of club or organization: Not on file     Attends meetings of clubs or organizations: Not on file     Relationship status: Not on file    Intimate partner violence     Fear of current or ex partner: Not on file     Emotionally abused: Not on file     Physically abused: Not on file     Forced sexual activity: Not on file   Other Topics Concern    Not on file   Social History Narrative    Not on file     Social History     Tobacco Use   Smoking Status Not on file     Social History     Substance and Sexual Activity   Alcohol Use Not on file     Social History     Substance and Sexual Activity   Drug Use Not on file     HOME MEDICATIONS:  Prior to Admission medications    Medication Sig Start Date End Date Taking? Authorizing Provider   lisinopril (PRINIVIL;ZESTRIL) 5 MG tablet Take 5 mg by mouth daily   Yes Historical Provider, MD   atorvastatin (LIPITOR) 40 MG tablet Take 40 mg by mouth daily   Yes Historical Provider, MD   furosemide (LASIX) 40 MG tablet Take 40 mg by mouth daily   Yes Historical Provider, MD   glipiZIDE (GLUCOTROL) 10 MG tablet Take 10 mg by mouth 2 times daily (before meals)   Yes Historical Provider, MD   metFORMIN (GLUCOPHAGE) 1000 MG tablet Take 1,000 mg by mouth 2 times daily (with meals)   Yes Historical Provider, MD   metoprolol tartrate (LOPRESSOR) 25 MG tablet Take 25 mg by mouth 2 times daily   Yes Historical Provider, MD   isosorbide mononitrate (IMDUR) 30 MG extended release tablet Take 30 mg by mouth daily   Yes Historical Provider, MD   insulin glargine (LANTUS) 100 UNIT/ML injection vial Inject 50 Units into the skin nightly   Yes Historical Provider, MD   primidone (MYSOLINE) 50 MG tablet Take 50 mg by mouth nightly   Yes Historical Provider, MD   zolpidem (AMBIEN) 10 MG tablet Take by mouth nightly as needed for Sleep.    Yes Historical Provider, MD   predniSONE (DELTASONE) 5 MG tablet Take 15 mg by mouth daily   Yes Historical Provider, MD   Multiple Vitamins-Minerals (OCULAR VITAMINS PO) Take by mouth   Yes Historical Provider, MD   guaiFENesin 400 MG tablet 100 mL/hr at 21 2345       ALLERGIES:  No Known Allergies    REVIEW OF SYSTEMS:  Dyspnea, confusion    PHYSICAL EXAMINATION:     VITAL SIGNS:  BP (!) 149/82   Pulse 85   Temp 97.8 °F (36.6 °C) (Oral)   Resp 20   Ht 6' 1\" (1.854 m)   Wt 245 lb (111.1 kg)   SpO2 94%   BMI 32.32 kg/m²   Wt Readings from Last 3 Encounters:   21 245 lb (111.1 kg)     Temp Readings from Last 3 Encounters:   21 97.8 °F (36.6 °C) (Oral)     TMAX:  BP Readings from Last 3 Encounters:   21 (!) 149/82     Pulse Readings from Last 3 Encounters:   21 85       CURRENT PULSE OXIMETRY: SpO2: 94 %  24HR PULSE OXIMETRY RANGE: SpO2  Av.1 %  Min: 94 %  Max: 98 %  CVP:      ________________________________________________________________________    VENTILATOR SETTINGS (if applicable):         Vent Information  SpO2: 94 %  Additional Respiratory  Assessments  Pulse: 85  Resp: 20  SpO2: 94 %  ETCO2:  Peak Inspiratory Pressure:  End-Inspiratory Plateau Pressure:    ABG:  No results for input(s): PH, PO2, PCO2, HCO3, BE, O2SAT, METHB, O2HB, COHB, O2CON, HHB, THB in the last 72 hours. ________________________________________________________________________    IV ACCESS:    NUTRITION: DIET CARDIAC; Low Potassium    INTAKE/OUTPUTS:  I/O last 3 completed shifts: In: 9257 [P.O.:720;  I.V.:700]  Out: 400 [Urine:400]    Intake/Output Summary (Last 24 hours) at 3/1/2021 1120  Last data filed at 3/1/2021 6736  Gross per 24 hour   Intake 1420 ml   Output --   Net 1420 ml     General Appearance: alert and oriented to person, place and time, well-developed and   well-nourished, in no acute distress   Eyes: pupils equal, round, and reactive to light, extraocular eye movements intact, conjunctivae normal and sclera anicteric   Neck: neck supple and non tender without mass, no thyromegaly, no thyroid nodules and no cervical adenopathy   Pulmonary/Chest: decreased breath sounds, no accessory muscles of inspiration, no rales/rhonchi. Exp wheezing throughout posterior  Cardiovascular: normal rate, regular rhythm, normal S1 and S2, no murmurs, rubs, clicks or gallops, distal pulses intact, and no JVD   Abdomen: soft, non-tender, non-distended, normal bowel sounds, no masses or organomegaly   Extremities: no cyanosis, no clubbing, no edema  Musculoskeletal: normal range of motion, no joint swelling, deformity or tenderness   Neurologic: reflexes normal and symmetric, no cranial nerve deficit noted    LABS/IMAGING:    CBC:  Lab Results   Component Value Date    WBC 5.7 03/01/2021    HGB 9.5 (L) 03/01/2021    HCT 33.8 (L) 03/01/2021    MCV 84.3 03/01/2021     03/01/2021    LYMPHOPCT 7.2 (L) 02/28/2021    RBC 4.01 03/01/2021    MCH 23.7 (L) 03/01/2021    MCHC 28.1 (L) 03/01/2021    RDW 15.2 (H) 03/01/2021    NEUTOPHILPCT 88.8 (H) 02/28/2021    MONOPCT 2.8 02/28/2021    BASOPCT 0.0 02/28/2021    NEUTROABS 4.45 02/28/2021    LYMPHSABS 0.36 (L) 02/28/2021    MONOSABS 0.14 02/28/2021    EOSABS 0.01 (L) 02/28/2021    BASOSABS 0.00 02/28/2021       Recent Labs     03/01/21  0554 02/28/21  0516 02/27/21  1757   WBC 5.7 5.0 6.2   HGB 9.5* 9.2* 9.8*   HCT 33.8* 31.8* 34.1*   MCV 84.3 82.2 82.4    128* 141       BMP:   Recent Labs     02/27/21  1757 02/28/21  0516 02/28/21  0516 03/01/21  0004 03/01/21  0554    134  --   --  136   K 6.5* 5.5*   < > 5.3* 4.9  4.9   CL 95* 96*  --   --  99   CO2 32* 32*  --   --  34*   BUN 34* 34*  --   --  28*   CREATININE 1.5* 1.2  --   --  1.1    < > = values in this interval not displayed.        MG: No results found for: MG  Ca/Phos:   Lab Results   Component Value Date    CALCIUM 8.0 (L) 03/01/2021     Amylase: No results found for: AMYLASE  Lipase: No results found for: LIPASE  LIVER PROFILE:   Recent Labs     02/27/21  1757   AST 22   ALT 30   BILITOT 0.3   ALKPHOS 60       Cardiac Enzymes:  Lab Results   Component Value Date    TROPONINI <0.01 02/27/2021       Hgb A1C:   Lab Results Component Value Date    LABA1C 10.5 (H) 02/28/2021     Lactic Acid:   Lab Results   Component Value Date    LACTA 0.7 02/28/2021     Results for Andrey Carranza (MRN [id number]) as of 3/1/2021 09:00   Ref. Range 2/28/2021 06:50   Influenza A by PCR Latest Ref Range: Not Detected  Not Detected   Influenza B by PCR Latest Ref Range: Not Detected  Not Detected   Adenovirus by PCR Latest Ref Range: Not Detected  Not Detected   Coronavirus 229E by PCR Latest Ref Range: Not Detected  Not Detected   Coronavirus HKU1 by PCR Latest Ref Range: Not Detected  Not Detected   Coronavirus NL63 by PCR Latest Ref Range: Not Detected  Not Detected   Coronavirus OC43 by PCR Latest Ref Range: Not Detected  Not Detected   Human Metapneumovirus by PCR Latest Ref Range: Not Detected  Not Detected   Human Rhinovirus/Enterovirus by PCR Latest Ref Range: Not Detected  Not Detected   Parainfluenza Virus 1 by PCR Latest Ref Range: Not Detected  Not Detected   Parainfluenza Virus 2 by PCR Latest Ref Range: Not Detected  Not Detected   Parainfluenza Virus 3 by PCR Latest Ref Range: Not Detected  Not Detected   Parainfluenza Virus 4 by PCR Latest Ref Range: Not Detected  Not Detected   Respiratory Syncytial Virus by PCR Latest Ref Range: Not Detected  Not Detected   Bordetella parapertussis by PCR Latest Ref Range: Not Detected  Not Detected   Chlamydophilia pneumoniae by PCR Latest Ref Range: Not Detected  Not Detected   Mycoplasma pneumoniae by PCR Latest Ref Range: Not Detected  Not Detected   SARS-CoV-2, PCR Latest Ref Range: Not Detected  Not Detected   Bordetella pertussis by PCR Latest Ref Range: Not Detected  Not Detected       CT CHEST WO CONTRAST   Final Result   1. Pulmonary infiltrates in the right upper and lower lobes likely represent   pneumonia. 2. Shotty mediastinal lymph nodes are likely reactive. 3. Mildly enlarged thyroid gland, with multiple nodules and calcifications. Sonographic evaluation recommended.       XR CHEST PORTABLE   Final Result   Right mid lung airspace opacity worrisome for pneumonia. ASSESSMENT:    68-year-old man with history of COPD, chronic hypoxic respiratory failure on 3 L DM, GERD, CAD with stents HLD presents on 2/27 with shortness of breath cough productive of sputum and increased lower extremity swelling  2/27 Covid rapid and PCR negative  Chest x-ray right middle lobe lower lobe infiltrate  2/28 CT chest showing right upper and lower lobe infiltrates enlarged thyroid with nodules and calcifications. Procalcitonin 0.13  3/1 on 6 L  Strep pneumonia Legionella negative    1. Right Multi-Lobar Pneumonia   2. Acute Exacerbation of COPD  3. Acute on Chronic Hypoxic Respiratory Failure - baseline O2 demand 3 L O2 NC  4. - current oxygen demand 6 L O2 NC with increased work of breathing on 3 L O2 NC  5. IFTIKHAR    6. nonccompliance with Nocturnal NIV  7. Anemia  8. Obesity   9. Hyperkalemia   10. CKD baseline creatinine unknown  11. CAD with stents    PLAN:  · Continue O2 at 6L NC - baseline at home is 3L. Wean as able  · Continue steroids   · Continue ceftriaxone, azithromycin  · Continue duonebs  · Procalcitonin 0.13  · Need sputum culture. Urine legionella, urine strep pneumoniae, blood culture x 2  - still pending  · outpatient PFT and repeat PSG  · Pt agreeable to attempt CPAP - he is not sure if he had a CPAP or BiPAP at home. He could not tolerate the pressure he said. Will order and titrate to comfort. · DVT Prophylaxis   · IS  · COVID negative    ANDIE Hood-CNP    I have personally participated in the history, exam, medical decision making with the NP on the date of service and I agree with all of the pertinent clinical information unless otherwise noted. I have also reviewed and agree with the past medical, family, and social history unless otherwise noted.   We will get ultrasound of the thyroid  Patient had a sleep study done at [facility] he was told it was very severe

## 2021-03-01 NOTE — PROGRESS NOTES
Measured post void residual, bladder scanner read greater than 565. Notified Dr. Franklin Ivy, new order received for urology consult.

## 2021-03-01 NOTE — PROGRESS NOTES
[facility] Hospitalist   Progress Note    Admitting Date and Time: 2/27/2021  4:07 PM  Admit Dx: Hyperkalemia [E87.5]    Subjective: Admitted on 27th, patient with COPD, BPH, diabetes, chronic hypoxia, did have cough and sputum production, stopped tobacco 15 years ago, initial impression of possible pneumonia, possible COPD exacerbation. Covid negative. Low iron saturations, increased TIBC. Seen by pulmonary, right-sided multilobar pneumonia, no role for bronchoscopy at this time. Patient was admitted with Hyperkalemia [E87.5]. Patient is awake, alert, comfortable, sitting in the bed, do have some improvement in his shortness of breath. Per RN: No new issues    ROS: denies fever, chills, cp, sob, n/v, HA unless stated above.      guaiFENesin  400 mg Oral BID    atorvastatin  40 mg Oral Daily    isosorbide mononitrate  30 mg Oral Daily    metoprolol tartrate  25 mg Oral BID    primidone  50 mg Oral Nightly    ipratropium-albuterol  1 ampule Inhalation Q4H WA    sodium chloride flush  10 mL Intravenous 2 times per day    enoxaparin  40 mg Subcutaneous Daily    insulin glargine  50 Units Subcutaneous Nightly    insulin lispro  0-12 Units Subcutaneous TID WC    insulin lispro  0-6 Units Subcutaneous Nightly    insulin lispro  0.08 Units/kg Subcutaneous TID WC    predniSONE  40 mg Oral Daily    cefTRIAXone (ROCEPHIN) IV  1,000 mg Intravenous Q24H    azithromycin  500 mg Intravenous Q24H         glucose, 15 g, PRN      dextrose, 12.5 g, PRN      glucagon (rDNA), 1 mg, PRN      dextrose, 100 mL/hr, PRN      sodium chloride flush, 10 mL, PRN      polyethylene glycol, 17 g, Daily PRN      acetaminophen, 650 mg, Q6H PRN    Or      acetaminophen, 650 mg, Q6H PRN      albuterol sulfate HFA, 2 puff, Q6H PRN         Objective:    BP (!) 149/82   Pulse 85   Temp 97.8 °F (36.6 °C) (Oral)   Resp 20   Ht 6' 1\" (1.854 m)   Wt 245 lb (111.1 kg)   SpO2 94%   BMI 32.32 kg/m² General Appearance: alert and oriented to person, place and time, well-developed and well-nourished, in no acute distress  Skin: warm and dry, no rash or erythema  Head: normocephalic and atraumatic  Eyes: pupils equal, round, and reactive to light, extraocular eye movements intact, conjunctivae normal  ENT: tympanic membrane, external ear and ear canal normal bilaterally, oropharynx clear and moist with normal mucous membranes  Neck: neck supple and non tender without mass, no thyromegaly or thyroid nodules, no cervical lymphadenopathy   Pulmonary/Chest: clear to auscultation bilaterally- no wheezes, rales or rhonchi, normal air movement, no respiratory distress  Cardiovascular: normal rate, normal S1 and S2, no gallops, intact distal pulses and no carotid bruits  Abdomen: soft, non-tender, non-distended, normal bowel sounds, no masses or organomegaly      Recent Labs     02/27/21 1757 02/28/21 0516 02/28/21  0516 03/01/21  0004 03/01/21  0554    134  --   --  136   K 6.5* 5.5*   < > 5.3* 4.9  4.9   CL 95* 96*  --   --  99   CO2 32* 32*  --   --  34*   BUN 34* 34*  --   --  28*   CREATININE 1.5* 1.2  --   --  1.1   GLUCOSE 352* 374*  --   --  209*   CALCIUM 8.4* 8.2*  --   --  8.0*    < > = values in this interval not displayed. Recent Labs     02/27/21 1757 02/28/21 0516 03/01/21  0554   WBC 6.2 5.0 5.7   RBC 4.14 3.87 4.01   HGB 9.8* 9.2* 9.5*   HCT 34.1* 31.8* 33.8*   MCV 82.4 82.2 84.3   MCH 23.7* 23.8* 23.7*   MCHC 28.7* 28.9* 28.1*   RDW 15.3* 15.3* 15.2*    128* 134   MPV 8.6 9.0 8.9     K5.5 /4.9  Creatinine 1.2 /1.1  Hemoglobin 9.2 /9.5   /134    Radiology:   CT CHEST WO CONTRAST   Final Result   1. Pulmonary infiltrates in the right upper and lower lobes likely represent   pneumonia. 2. Shotty mediastinal lymph nodes are likely reactive. 3. Mildly enlarged thyroid gland, with multiple nodules and calcifications. Sonographic evaluation recommended.       XR CHEST PORTABLE   Final Result   Right mid lung airspace opacity worrisome for pneumonia. Assessment:    Active Problems:    Hyperkalemia  Resolved Problems:    * No resolved hospital problems. *      Plan:  1. COPD exacerbation, patient remains on steroids as well as aerosols, pulmonary following  2. Community-acquired pneumonia, patient with Rocephin and Zithromax, day 3  3. Lactic acidosis, improved  4. Diabetes, on insulin, Lantus 50+ sliding scale. Fair control, more due to prednisone. 5. Hyperlipidemia, remains on statins. 6. DVT prophylaxis with Lovenox. 7. DC planning, not ready yet, will be when okay from pulmonary side.         Electronically signed by Chelsey Hanley MD on 3/1/2021 at 9:02 AM

## 2021-03-01 NOTE — PROGRESS NOTES
[address] [place] [address] notified of consult. Will see patient later or tomorrow.   Gilberto WASSERMAN  3:29 PM

## 2021-03-02 LAB
ALBUMIN SERPL-MCNC: 3.8 G/DL (ref 3.5–5.2)
ALP BLD-CCNC: 56 U/L (ref 40–129)
ALT SERPL-CCNC: 59 U/L (ref 0–40)
ANION GAP SERPL CALCULATED.3IONS-SCNC: 2 MMOL/L (ref 7–16)
ANISOCYTOSIS: ABNORMAL
AST SERPL-CCNC: 35 U/L (ref 0–39)
BASOPHILIC STIPPLING: ABNORMAL
BASOPHILS ABSOLUTE: 0 E9/L (ref 0–0.2)
BASOPHILS RELATIVE PERCENT: 0 % (ref 0–2)
BILIRUB SERPL-MCNC: <0.2 MG/DL (ref 0–1.2)
BUN BLDV-MCNC: 20 MG/DL (ref 8–23)
CALCIUM SERPL-MCNC: 8.4 MG/DL (ref 8.6–10.2)
CHLORIDE BLD-SCNC: 99 MMOL/L (ref 98–107)
CO2: 36 MMOL/L (ref 22–29)
CREAT SERPL-MCNC: 1.1 MG/DL (ref 0.7–1.2)
EOSINOPHILS ABSOLUTE: 0.06 E9/L (ref 0.05–0.5)
EOSINOPHILS RELATIVE PERCENT: 1.7 % (ref 0–6)
GFR AFRICAN AMERICAN: >60
GFR NON-AFRICAN AMERICAN: >60 ML/MIN/1.73
GLUCOSE BLD-MCNC: 155 MG/DL (ref 74–99)
HCT VFR BLD CALC: 33.4 % (ref 37–54)
HEMOGLOBIN: 9.4 G/DL (ref 12.5–16.5)
HYPOCHROMIA: ABNORMAL
LYMPHOCYTES ABSOLUTE: 0.72 E9/L (ref 1.5–4)
LYMPHOCYTES RELATIVE PERCENT: 19.1 % (ref 20–42)
MCH RBC QN AUTO: 23.7 PG (ref 26–35)
MCHC RBC AUTO-ENTMCNC: 28.1 % (ref 32–34.5)
MCV RBC AUTO: 84.3 FL (ref 80–99.9)
METER GLUCOSE: 170 MG/DL (ref 74–99)
METER GLUCOSE: 196 MG/DL (ref 74–99)
METER GLUCOSE: 263 MG/DL (ref 74–99)
METER GLUCOSE: 282 MG/DL (ref 74–99)
MONOCYTES ABSOLUTE: 0.15 E9/L (ref 0.1–0.95)
MONOCYTES RELATIVE PERCENT: 3.5 % (ref 2–12)
MYELOCYTE PERCENT: 2.6 % (ref 0–0)
NEUTROPHILS ABSOLUTE: 2.89 E9/L (ref 1.8–7.3)
NEUTROPHILS RELATIVE PERCENT: 73.1 % (ref 43–80)
NUCLEATED RED BLOOD CELLS: 0.9 /100 WBC
OVALOCYTES: ABNORMAL
PDW BLD-RTO: 15 FL (ref 11.5–15)
PLATELET # BLD: 127 E9/L (ref 130–450)
PMV BLD AUTO: 8.8 FL (ref 7–12)
POIKILOCYTES: ABNORMAL
POLYCHROMASIA: ABNORMAL
POTASSIUM REFLEX MAGNESIUM: 4.3 MMOL/L (ref 3.5–5)
POTASSIUM SERPL-SCNC: 4.3 MMOL/L (ref 3.5–5)
RBC # BLD: 3.96 E12/L (ref 3.8–5.8)
SODIUM BLD-SCNC: 137 MMOL/L (ref 132–146)
TOTAL PROTEIN: 6.2 G/DL (ref 6.4–8.3)
WBC # BLD: 3.8 E9/L (ref 4.5–11.5)

## 2021-03-02 PROCEDURE — 94660 CPAP INITIATION&MGMT: CPT

## 2021-03-02 PROCEDURE — 2700000000 HC OXYGEN THERAPY PER DAY

## 2021-03-02 PROCEDURE — 94640 AIRWAY INHALATION TREATMENT: CPT

## 2021-03-02 PROCEDURE — 97161 PT EVAL LOW COMPLEX 20 MIN: CPT

## 2021-03-02 PROCEDURE — 6370000000 HC RX 637 (ALT 250 FOR IP): Performed by: INTERNAL MEDICINE

## 2021-03-02 PROCEDURE — 80048 BASIC METABOLIC PNL TOTAL CA: CPT

## 2021-03-02 PROCEDURE — 87206 SMEAR FLUORESCENT/ACID STAI: CPT

## 2021-03-02 PROCEDURE — 6370000000 HC RX 637 (ALT 250 FOR IP): Performed by: NURSE PRACTITIONER

## 2021-03-02 PROCEDURE — 2060000000 HC ICU INTERMEDIATE R&B

## 2021-03-02 PROCEDURE — 82962 GLUCOSE BLOOD TEST: CPT

## 2021-03-02 PROCEDURE — 80053 COMPREHEN METABOLIC PANEL: CPT

## 2021-03-02 PROCEDURE — 36415 COLL VENOUS BLD VENIPUNCTURE: CPT

## 2021-03-02 PROCEDURE — 85025 COMPLETE CBC W/AUTO DIFF WBC: CPT

## 2021-03-02 PROCEDURE — 97165 OT EVAL LOW COMPLEX 30 MIN: CPT

## 2021-03-02 PROCEDURE — 99222 1ST HOSP IP/OBS MODERATE 55: CPT | Performed by: INTERNAL MEDICINE

## 2021-03-02 PROCEDURE — 6360000002 HC RX W HCPCS: Performed by: INTERNAL MEDICINE

## 2021-03-02 PROCEDURE — 87070 CULTURE OTHR SPECIMN AEROBIC: CPT

## 2021-03-02 PROCEDURE — 2580000003 HC RX 258: Performed by: INTERNAL MEDICINE

## 2021-03-02 PROCEDURE — 99232 SBSQ HOSP IP/OBS MODERATE 35: CPT | Performed by: INTERNAL MEDICINE

## 2021-03-02 RX ORDER — PREDNISONE 1 MG/1
10 TABLET ORAL DAILY
Status: DISCONTINUED | OUTPATIENT
Start: 2021-03-09 | End: 2021-03-03 | Stop reason: HOSPADM

## 2021-03-02 RX ORDER — PREDNISONE 20 MG/1
20 TABLET ORAL DAILY
Status: DISCONTINUED | OUTPATIENT
Start: 2021-03-06 | End: 2021-03-03 | Stop reason: HOSPADM

## 2021-03-02 RX ADMIN — INSULIN GLARGINE 50 UNITS: 100 INJECTION, SOLUTION SUBCUTANEOUS at 20:49

## 2021-03-02 RX ADMIN — GUAIFENESIN 400 MG: 400 TABLET, FILM COATED ORAL at 20:48

## 2021-03-02 RX ADMIN — INSULIN LISPRO 2 UNITS: 100 INJECTION, SOLUTION INTRAVENOUS; SUBCUTANEOUS at 06:14

## 2021-03-02 RX ADMIN — INSULIN LISPRO 3 UNITS: 100 INJECTION, SOLUTION INTRAVENOUS; SUBCUTANEOUS at 20:49

## 2021-03-02 RX ADMIN — IPRATROPIUM BROMIDE AND ALBUTEROL SULFATE 1 AMPULE: 2.5; .5 SOLUTION RESPIRATORY (INHALATION) at 07:39

## 2021-03-02 RX ADMIN — IPRATROPIUM BROMIDE AND ALBUTEROL SULFATE 1 AMPULE: 2.5; .5 SOLUTION RESPIRATORY (INHALATION) at 19:44

## 2021-03-02 RX ADMIN — TAMSULOSIN HYDROCHLORIDE 0.4 MG: 0.4 CAPSULE ORAL at 08:58

## 2021-03-02 RX ADMIN — ATORVASTATIN CALCIUM 40 MG: 40 TABLET, FILM COATED ORAL at 08:59

## 2021-03-02 RX ADMIN — ENOXAPARIN SODIUM 40 MG: 40 INJECTION, SOLUTION INTRAVENOUS; SUBCUTANEOUS at 08:58

## 2021-03-02 RX ADMIN — SODIUM CHLORIDE, PRESERVATIVE FREE 10 ML: 5 INJECTION INTRAVENOUS at 08:59

## 2021-03-02 RX ADMIN — INSULIN LISPRO 2 UNITS: 100 INJECTION, SOLUTION INTRAVENOUS; SUBCUTANEOUS at 11:26

## 2021-03-02 RX ADMIN — INSULIN LISPRO 4 UNITS: 100 INJECTION, SOLUTION INTRAVENOUS; SUBCUTANEOUS at 16:40

## 2021-03-02 RX ADMIN — INSULIN LISPRO 9 UNITS: 100 INJECTION, SOLUTION INTRAVENOUS; SUBCUTANEOUS at 06:18

## 2021-03-02 RX ADMIN — INSULIN LISPRO 9 UNITS: 100 INJECTION, SOLUTION INTRAVENOUS; SUBCUTANEOUS at 11:28

## 2021-03-02 RX ADMIN — GUAIFENESIN 400 MG: 400 TABLET, FILM COATED ORAL at 08:59

## 2021-03-02 RX ADMIN — PREDNISONE 40 MG: 20 TABLET ORAL at 09:02

## 2021-03-02 RX ADMIN — IPRATROPIUM BROMIDE AND ALBUTEROL SULFATE 1 AMPULE: 2.5; .5 SOLUTION RESPIRATORY (INHALATION) at 16:12

## 2021-03-02 RX ADMIN — PRIMIDONE 50 MG: 50 TABLET ORAL at 20:48

## 2021-03-02 RX ADMIN — METOPROLOL TARTRATE 25 MG: 25 TABLET, FILM COATED ORAL at 08:58

## 2021-03-02 RX ADMIN — INSULIN LISPRO 9 UNITS: 100 INJECTION, SOLUTION INTRAVENOUS; SUBCUTANEOUS at 16:43

## 2021-03-02 RX ADMIN — METOPROLOL TARTRATE 25 MG: 25 TABLET, FILM COATED ORAL at 20:48

## 2021-03-02 RX ADMIN — ISOSORBIDE MONONITRATE 30 MG: 30 TABLET, EXTENDED RELEASE ORAL at 08:58

## 2021-03-02 RX ADMIN — IPRATROPIUM BROMIDE AND ALBUTEROL SULFATE 1 AMPULE: 2.5; .5 SOLUTION RESPIRATORY (INHALATION) at 11:46

## 2021-03-02 RX ADMIN — SODIUM CHLORIDE, PRESERVATIVE FREE 10 ML: 5 INJECTION INTRAVENOUS at 20:55

## 2021-03-02 ASSESSMENT — PAIN SCALES - GENERAL
PAINLEVEL_OUTOF10: 0
PAINLEVEL_OUTOF10: 0

## 2021-03-02 NOTE — PROGRESS NOTES
non-distended  Genitourinary: Han catheter intact draining yellow urine  Extremities: no cyanosis, clubbing or edema         Labs:     Recent Labs     03/02/21  0615      K 4.3  4.3   CL 99   CO2 36*   BUN 20   CREATININE 1.1   GLUCOSE 155*   CALCIUM 8.4*       Lab Results   Component Value Date    HGB 9.4 03/02/2021    HCT 33.4 03/02/2021     Narrative   EXAMINATION:   RETROPERITONEAL ULTRASOUND OF THE KIDNEYS AND URINARY BLADDER   3/1/2021   COMPARISON:   None   HISTORY:   ORDERING SYSTEM PROVIDED HISTORY: rule out hydronephrosis   TECHNOLOGIST PROVIDED HISTORY:   Reason for exam:->rule out hydronephrosis   What reading provider will be dictating this exam?->CRC   FINDINGS:   Kidneys: The right kidney measures 12.2 cm in length and the left kidney measures 11.5   cm in length. Right kidney: Corticomedullary differentiation and cortical thickness is   decreased.  Anechoic thin walled nonvascular 2.3 cm right upper pole renal   cyst.  No concerning renal mass.  No intrarenal calcification.  There is no   hydronephrosis. Left kidney: Corticomedullary differentiation and cortical thickness is   decreased.  Anechoic thin walled nonvascular 4.5 cm left lower pole renal   cyst.  No concerning renal mass, intrarenal calcification, nor hydronephrosis. Bladder:   A Han catheter is decompressing the bladder       Impression   1.  Mild bilateral renal cortical thinning.  No evidence of hydronephrosis. Simple appearing bilateral renal cysts.  These are nonaggressive and require   no follow-up. 2.  A Han catheter is decompressing the bladder.                  Assessment/Plan:  Urinary retention (600ml)  Bilateral renal cysts      UA unremarkable   Cont to watch the creatinine   Continue the Flomax  Renal ultrasound reviewed, bilateral renal cysts, no hydronephrosis   Recommend good bowel regimen  Cont the han catheter  This should be left indwelling until the patient is more ambulatory and has a good bowel regimen  Possible voiding trial prior to discharge if bowel regimen in place  Will need LILI and PSA (will need to obtain records from prior Urologist) as an outpatient as well   Will cont to follow             ANDIE Alvarez CNP  Urology

## 2021-03-02 NOTE — PROGRESS NOTES
[x]Therapeutic exercise to improve tolerance and functional strength for ADLs   [x]Balance retraining/tolerance tasks for facilitation of postural control with dynamic challenges during ADLs . []Neuromuscular re-education: facilitation of righting/equilibrium reactions, midline orientation, scapular stability/mobility, Normalization muscle     tone and facilitation active functional movement/Attention                         []Delirium prevention/treatment    [x]Positioning to improve functional independence  []Other:       Rehab Potential:  Good for established goals     Patient / Family Goal: return home      Patient and/or family were instructed on functional diagnosis, prognosis/goals and OT plan of care. Demonstrated good  understanding. Eval Complexity: Low      Time In:1041  Time Out: 1100        Min Units   OT Eval Low 97165  x 1   OT Eval Medium 29068      OT Eval High 37659       OT Re-Eval [id number]       Therapeutic Ex 70004       Therapeutic Activities 65661       ADL/Self Care 16349       Orthotic Management 58061       Neuro Re-Ed 11723       Non-Billable Time          Evaluation Time includes thorough review of current medical information, gathering information on past medical history/social history and prior level of function, completion of standardized testing/informal observation of tasks, assessment of data and education on plan of care and goals.             David Betancourt OTR/L [id number]

## 2021-03-02 NOTE — PROGRESS NOTES
PULMONARY/CRITICAL CARE PROGRESS NOTE    Patient: Aline Alves  MRN: [id number]  : 1950    Encounter Date: 3/2/2021  Encounter Time: 11:22 AM     Date of Admission: .2021  4:07 PM    Consulting Physician:  Primary Care Physician:      Avinash Barraza MD     [phone]     None    SUBJECTIVE:   Resting in bedside chair, wife at bedside . Patient on 6L. Breathing feels better today. Still with occasional cough.  Tried bipap only short period-mask uncomfortable                CURRENT MEDICATIONS:  Current Facility-Administered Medications: tamsulosin (FLOMAX) capsule 0.4 mg, 0.4 mg, Oral, Daily  guaiFENesin tablet 400 mg, 400 mg, Oral, BID  atorvastatin (LIPITOR) tablet 40 mg, 40 mg, Oral, Daily  isosorbide mononitrate (IMDUR) extended release tablet 30 mg, 30 mg, Oral, Daily  metoprolol tartrate (LOPRESSOR) tablet 25 mg, 25 mg, Oral, BID  primidone (MYSOLINE) tablet 50 mg, 50 mg, Oral, Nightly  ipratropium-albuterol (DUONEB) nebulizer solution 1 ampule, 1 ampule, Inhalation, Q4H WA  glucose (GLUTOSE) 40 % oral gel 15 g, 15 g, Oral, PRN  dextrose 50 % IV solution, 12.5 g, Intravenous, PRN  glucagon (rDNA) injection 1 mg, 1 mg, Intramuscular, PRN  dextrose 5 % solution, 100 mL/hr, Intravenous, PRN  sodium chloride flush 0.9 % injection 10 mL, 10 mL, Intravenous, 2 times per day  sodium chloride flush 0.9 % injection 10 mL, 10 mL, Intravenous, PRN  enoxaparin (LOVENOX) injection 40 mg, 40 mg, Subcutaneous, Daily  polyethylene glycol (GLYCOLAX) packet 17 g, 17 g, Oral, Daily PRN  acetaminophen (TYLENOL) tablet 650 mg, 650 mg, Oral, Q6H PRN **OR** acetaminophen (TYLENOL) suppository 650 mg, 650 mg, Rectal, Q6H PRN  insulin glargine (LANTUS) injection vial 50 Units, 50 Units, Subcutaneous, Nightly  insulin lispro (HUMALOG) injection vial 0-12 Units, 0-12 Units, Subcutaneous, TID WC  insulin lispro (HUMALOG) injection vial 0-6 Units, 0-6 Units, Subcutaneous, Nightly  insulin lispro (HUMALOG) injection vial 9 Units, 0.08 Units/kg, Subcutaneous, TID WC  predniSONE (DELTASONE) tablet 40 mg, 40 mg, Oral, Daily  albuterol sulfate  (90 Base) MCG/ACT inhaler 2 puff, 2 puff, Inhalation, Q6H PRN  cefTRIAXone (ROCEPHIN) 1,000 mg in sterile water 10 mL IV syringe, 1,000 mg, Intravenous, Q24H  azithromycin (ZITHROMAX) 500 mg in D5W 250ml addavial, 500 mg, Intravenous, Q24H    IV MEDICATIONS:   dextrose         ALLERGIES:  No Known Allergies    REVIEW OF SYSTEMS:  Dyspnea, confusion    PHYSICAL EXAMINATION:     VITAL SIGNS:  BP (!) 178/81   Pulse 94   Temp 98.6 °F (37 °C) (Oral)   Resp 16   Ht 6' 1\" (1.854 m)   Wt 245 lb (111.1 kg)   SpO2 95%   BMI 32.32 kg/m²   Wt Readings from Last 3 Encounters:   21 245 lb (111.1 kg)     Temp Readings from Last 3 Encounters:   21 98.6 °F (37 °C) (Oral)     TMAX:  BP Readings from Last 3 Encounters:   21 (!) 178/81     Pulse Readings from Last 3 Encounters:   21 94       CURRENT PULSE OXIMETRY: SpO2: 95 %  24HR PULSE OXIMETRY RANGE: SpO2  Av.8 %  Min: 92 %  Max: 95 %  CVP:      ________________________________________________________________________  ________________________________________________________    IV ACCESS:    NUTRITION: DIET CARDIAC; Low Potassium    INTAKE/OUTPUTS:  I/O last 3 completed shifts: In: 860 [P.O.:860]  Out: 2700 [Urine:2700]    Intake/Output Summary (Last 24 hours) at 3/2/2021 1122  Last data filed at 3/2/2021 0845  Gross per 24 hour   Intake 820 ml   Output 2700 ml   Net -1880 ml       EXAM: Moderately obese  General:  Awake, alert, oriented X 3. No apparent distress. HEENT:  moist membranes   Cardiac: S1S2, no murmur   Lungs:  Respirations symmetric and non-labored.  Rales > right   Abdomen:  soft, nontender, no masses  Extremities:  No clubbing, cyanosis, 1+ BLE  edema  Skin:  Warm and dry, no open lesions or rashes  Neuro A/ox3, LUCERO, appropriate        LABS/IMAGING:    CBC:  Lab Results   Component Value Date    WBC 3.8 (L) 03/02/2021    HGB 9.4 (L) 03/02/2021    HCT 33.4 (L) 03/02/2021    MCV 84.3 03/02/2021     (L) 03/02/2021    LYMPHOPCT 19.1 (L) 03/02/2021    RBC 3.96 03/02/2021    MCH 23.7 (L) 03/02/2021    MCHC 28.1 (L) 03/02/2021    RDW 15.0 03/02/2021    NEUTOPHILPCT 73.1 03/02/2021    MONOPCT 3.5 03/02/2021    BASOPCT 0.0 03/02/2021    NEUTROABS 2.89 03/02/2021    LYMPHSABS 0.72 (L) 03/02/2021    MONOSABS 0.15 03/02/2021    EOSABS 0.06 03/02/2021    BASOSABS 0.00 03/02/2021       Recent Labs     03/02/21  0615 03/01/21  0554 02/28/21  0516   WBC 3.8* 5.7 5.0   HGB 9.4* 9.5* 9.2*   HCT 33.4* 33.8* 31.8*   MCV 84.3 84.3 82.2   * 134 128*       BMP:   Recent Labs     02/28/21  0516 02/28/21  0516 03/01/21  0554 03/01/21  1102 03/02/21  0615     --  136  --  137   K 5.5*   < > 4.9  4.9 5.2* 4.3  4.3   CL 96*  --  99  --  99   CO2 32*  --  34*  --  36*   BUN 34*  --  28*  --  20   CREATININE 1.2  --  1.1  --  1.1    < > = values in this interval not displayed. MG: No results found for: MG  Ca/Phos:   Lab Results   Component Value Date    CALCIUM 8.4 (L) 03/02/2021     Amylase: No results found for: AMYLASE  Lipase: No results found for: LIPASE  LIVER PROFILE:   Recent Labs     02/27/21  1757 03/02/21  0615   AST 22 35   ALT 30 59*   BILITOT 0.3 <0.2   ALKPHOS 60 56       Results for Ernesto Tay (MRN [id number]) as of 3/1/2021 09:00   Ref.  Range 2/28/2021 06:50   Influenza A by PCR Latest Ref Range: Not Detected  Not Detected   Influenza B by PCR Latest Ref Range: Not Detected  Not Detected   Adenovirus by PCR Latest Ref Range: Not Detected  Not Detected   Coronavirus 229E by PCR Latest Ref Range: Not Detected  Not Detected   Coronavirus HKU1 by PCR Latest Ref Range: Not Detected  Not Detected   Coronavirus NL63 by PCR Latest Ref Range: Not Detected  Not Detected   Coronavirus OC43 by PCR Latest Ref Range: Not Detected  Not Detected   Human Metapneumovirus by PCR Latest Ref Range: Not Detected Not Detected   Human Rhinovirus/Enterovirus by PCR Latest Ref Range: Not Detected  Not Detected   Parainfluenza Virus 1 by PCR Latest Ref Range: Not Detected  Not Detected   Parainfluenza Virus 2 by PCR Latest Ref Range: Not Detected  Not Detected   Parainfluenza Virus 3 by PCR Latest Ref Range: Not Detected  Not Detected   Parainfluenza Virus 4 by PCR Latest Ref Range: Not Detected  Not Detected   Respiratory Syncytial Virus by PCR Latest Ref Range: Not Detected  Not Detected   Bordetella parapertussis by PCR Latest Ref Range: Not Detected  Not Detected   Chlamydophilia pneumoniae by PCR Latest Ref Range: Not Detected  Not Detected   Mycoplasma pneumoniae by PCR Latest Ref Range: Not Detected  Not Detected   SARS-CoV-2, PCR Latest Ref Range: Not Detected  Not Detected   Bordetella pertussis by PCR Latest Ref Range: Not Detected  Not Detected       US HEAD NECK SOFT TISSUE THYROID   Final Result   Enlarged thyroid with multiple nodules overall most consistent with a   multinodular goiter. Continued annual sonographic surveillance is recommended. US RETROPERITONEAL COMPLETE   Final Result   1. Mild bilateral renal cortical thinning. No evidence of hydronephrosis. Simple appearing bilateral renal cysts. These are nonaggressive and require   no follow-up. 2.  A Dickerson catheter is decompressing the bladder. XR ABDOMEN (KUB) (SINGLE AP VIEW)   Final Result   Large amount of stool in the colon which may reflect constipation. No radiographic evidence of renal or ureteral calculi. CT CHEST WO CONTRAST   Final Result   1. Pulmonary infiltrates in the right upper and lower lobes likely represent   pneumonia. 2. Shotty mediastinal lymph nodes are likely reactive. 3. Mildly enlarged thyroid gland, with multiple nodules and calcifications. Sonographic evaluation recommended. XR CHEST PORTABLE   Final Result   Right mid lung airspace opacity worrisome for pneumonia. ASSESSMENT:    64-year-old man with history of COPD, chronic hypoxic respiratory failure on 3 L DM, GERD, CAD with stents HLD presents on 2/27 with shortness of breath cough productive of sputum and increased lower extremity swelling  2/27 Covid rapid and PCR negative  Chest x-ray right middle lobe lower lobe infiltrate  2/28 CT chest showing right upper and lower lobe infiltrates enlarged thyroid with nodules and calcifications. Procalcitonin 0.13  3/1 on 6 L  Strep pneumonia Legionella negative  3/2 6L    1. Right Multi-Lobar Pneumonia   2. Acute Exacerbation of COPD  3. Acute on Chronic Hypoxic Respiratory Failure - baseline O2 demand 3 L O2 NC  4. - current oxygen demand 6 L O2 NC with increased work of breathing on 3 L O2 NC  5. IFTIKHAR    6. nonccompliance with Nocturnal NIV  7. Anemia  8. Obesity   9. Hyperkalemia   10. CKD baseline creatinine unknown  11. CAD with stents    PLAN:  · Continue O2 at 6L NC - baseline at home is 3L. Wean as able, Keep POX >90%  · Continue steroid taper 30x3, 20x3, 10x3  · Will dc abx, pct.13--->.12  · Continue duonebs  · Known asbestos exposure previously. · Need sputum culture. Urine legionella, urine strep pneumoniae, blood culture x 2  - still pending  · outpatient PFT and repeat PSG  We will get the sleep study from [facility]. · Pt agreeable to attempt CPAP - he is not sure if he had a CPAP or BiPAP at home. He could not tolerate the pressure he said. Will order and titrate to comfort, attempted last night for short time. .  · DVT Prophylaxis     SILVER Rodriguez-BC     I have personally participated in the history, exam, medical decision making with the NP on the date of service and I agree with all of the pertinent clinical information unless otherwise noted. I have also reviewed and agree with the past medical, family, and social history unless otherwise noted.     Obtained sleep study from [facility]  · 8/17/2018 apnea link showed apnea-hypopnea index of 19 supine was 24. At that time he weighed 235 pounds. 22 minutes were with saturations below 90%  · CPAP titration 10/16/2018 showed that he required a CPAP of 12 cm water pressure    Wife was on speaker phone when I was talking to the patient  Says that he is foggy   patient is to go home on prednisone we will set up an outpatient sleep study ASAP.   We will try to get BiPAP for him for treatment  Wean O2 to keep sats above 92%

## 2021-03-02 NOTE — PROGRESS NOTES
Physical Therapy    Facility/Department: [address] INTERMEDIATE 1  Initial Assessment    NAME: Caleb Clements  : 1950  MRN: [id number]    Date of Service: 3/2/2021       REQUIRES PT FOLLOW UP: Yes       Patient Diagnosis(es): The primary encounter diagnosis was Pneumonia due to organism. Diagnoses of Hyperkalemia, Hyperglycemia, Lactic acidosis, Hypochloremia, COPD exacerbation (HCC), Chronic respiratory failure with hypoxia ([address] Anemia, unspecified type, and Elevated serum creatinine were also pertinent to this visit. has a past medical history of COPD (chronic obstructive pulmonary disease) ([address] Diabetes mellitus ([address] GERD (gastroesophageal reflux disease), and Hyperlipidemia. has a past surgical history that includes Coronary angioplasty with stent. Evaluating Therapist: Adrianna Cool PT     Referring Provider:   Dr. Beulah Welch #:  [id number]   DIAGNOSIS:  Hyperkalemia   PRECAUTIONS: falls, O2@ 6 LNC     Social:  Pt lives with wife  in a  2  floor plan  2 steps and no  rails to enter. Prior to admission pt walked with  No AD, independent. Home O2      Initial Evaluation  Date: 3/2/2021  Treatment      Short Term/ Long Term   Goals   Was pt agreeable to Eval/treatment?  Yes      Does pt have pain?  none reported      Bed Mobility  Rolling:  NT   Supine to sit: independent   Sit to supine: NT   Scooting: independent   Independent    Transfers Sit to stand:  S/I   Stand to sit:  S/I   Stand pivot:  SBA    independent    Ambulation    300  feet with no AD with  SBA    300 feet+  with  No AD with  Independent        Stair negotiation: ascended and descended NT    4-12  steps with 0-1  rail with S/I    LE ROM  WFL     LE strength  WFL      AM- PAC RAW score   20/ 24            Pt is alert and Oriented x  3     Balance:  SBA, fall risk due to mild unsteadiness at times with mobility   Endurance: [facility]   Bed/Chair alarm: no     ASSESSMENT  Pt displays functional ability as noted in the objective portion of this evaluation. Treatment/Education:     Mobility as above. Cues to slow down with mobility and for increased attention to O2 line. Mild unsteadiness noted with turning; fall risk   O2@ 6 LNC . Pulse ox at rest 94%, decreased to 90% with gait, returned to 94 % with rest. Cues for proper breathing technique given    Pt educated on fall risk,  Safe and proper technique with mobility        Patient response to education:   Pt verbalized understanding Pt demonstrated skill Pt requires further education in this area   x  with cues   x       Comments:  Pt left  Sitting EOB per pt request after session, with call light in reach. Rehab potential is Good for reaching above PT goals. Pts/ family goals   1. None stated     Patient and or family understand(s) diagnosis, prognosis, and plan of care. -  Yes     PLAN  PT care will be provided in accordance with the objectives noted above. Whenever appropriate, clear delegation orders will be provided for nursing staff. Exercises and functional mobility practice will be used as well as appropriate assistive devices or modalities to obtain goals. Patient and family education will also be administered as needed. PLAN OF CARE:    Current Treatment Recommendations     [x] Strengthening     [] ROM   [x] Balance Training   [x] Endurance Training   [x] Transfer Training   [x] Gait Training   [x] Stair Training   [] Positioning   [x] Safety and Education Training   [x] Patient/Caregiver Education   [] HEP  [] Other       Frequency of treatments will be 2-5x/week x 7 days. Time in: 1422   Time out: 1439       Evaluation Time includes thorough review of current medical information, gathering information on past medical history/social history and prior level of function, completion of standardized testing/informal observation of tasks, assessment of data and education on plan of care and goals.     CPT codes:  [x] Low Complexity PT evaluation 78083  []

## 2021-03-02 NOTE — PROGRESS NOTES
[facility]   Progress Note    Admitting Date and Time: 2/27/2021  4:07 PM  Admit Dx: Hyperkalemia [E87.5]    Subjective: Admitted on 27th, patient with COPD, BPH, diabetes, chronic hypoxia, did have cough and sputum production, stopped tobacco 15 years ago, initial impression of possible pneumonia, possible COPD exacerbation. Covid negative. Low iron saturations, increased TIBC. Seen by pulmonary, right-sided multilobar pneumonia, no role for bronchoscopy at this time. PVR was requested yesterday afternoon, high, urology consult requested. Seen by urology, Flomax added, catheter to be left in. Pulmonary plans for steroid taper. Pulmonary did trial of CPAP, patient did well. Patient was admitted with Hyperkalemia [E87.5]. Patient is awake, alert, comfortable, sitting in the bed, do have some improvement in his shortness of breath. Per RN: No new issues    ROS: denies fever, chills, cp, sob, n/v, HA unless stated above.      [START ON 3/3/2021] predniSONE  30 mg Oral Daily    Followed by   Beverly Belle ON 3/6/2021] predniSONE  20 mg Oral Daily    Followed by   Beverly Belle ON 3/9/2021] predniSONE  10 mg Oral Daily    tamsulosin  0.4 mg Oral Daily    guaiFENesin  400 mg Oral BID    atorvastatin  40 mg Oral Daily    isosorbide mononitrate  30 mg Oral Daily    metoprolol tartrate  25 mg Oral BID    primidone  50 mg Oral Nightly    ipratropium-albuterol  1 ampule Inhalation Q4H WA    sodium chloride flush  10 mL Intravenous 2 times per day    enoxaparin  40 mg Subcutaneous Daily    insulin glargine  50 Units Subcutaneous Nightly    insulin lispro  0-12 Units Subcutaneous TID WC    insulin lispro  0-6 Units Subcutaneous Nightly    insulin lispro  0.08 Units/kg Subcutaneous TID WC         glucose, 15 g, PRN      dextrose, 12.5 g, PRN      glucagon (rDNA), 1 mg, PRN      dextrose, 100 mL/hr, PRN      sodium chloride flush, 10 mL, PRN      polyethylene glycol, 17 g, Daily subspecialties.         Electronically signed by Princess Salvatore MD on 3/2/2021 at 3:09 PM

## 2021-03-02 NOTE — CARE COORDINATION
CASE MANAGEMENT. .. Chart reviewed. Patient being followed by pulm and urology. Dickerson cath remains in place for urinary retention and patient continues on flomax. Consider voiding trial prior to discharge. While here, patient is agreeable to trial cpap at hs. Requiring 6lnc. Baseline is 3lnc. Will wean as tolerated. Potassium levels stable today at 4.3. Plan is home with [facility]. Spoke with Kelly Owen from [address]. He accepted patient at discharge. Demos and h+p  faxed to him at [phone]. Will need to fax c orders once obtained along with dc summary. Will follow.

## 2021-03-03 VITALS
HEIGHT: 73 IN | DIASTOLIC BLOOD PRESSURE: 84 MMHG | HEART RATE: 107 BPM | OXYGEN SATURATION: 98 % | TEMPERATURE: 98.2 F | BODY MASS INDEX: 32.7 KG/M2 | WEIGHT: 246.7 LBS | RESPIRATION RATE: 16 BRPM | SYSTOLIC BLOOD PRESSURE: 167 MMHG

## 2021-03-03 LAB
ANION GAP SERPL CALCULATED.3IONS-SCNC: 4 MMOL/L (ref 7–16)
ANISOCYTOSIS: ABNORMAL
BASOPHILS ABSOLUTE: 0.01 E9/L (ref 0–0.2)
BASOPHILS RELATIVE PERCENT: 0.3 % (ref 0–2)
BUN BLDV-MCNC: 17 MG/DL (ref 8–23)
CALCIUM SERPL-MCNC: 8.7 MG/DL (ref 8.6–10.2)
CHLORIDE BLD-SCNC: 99 MMOL/L (ref 98–107)
CO2: 35 MMOL/L (ref 22–29)
CREAT SERPL-MCNC: 1.2 MG/DL (ref 0.7–1.2)
EOSINOPHILS ABSOLUTE: 0.04 E9/L (ref 0.05–0.5)
EOSINOPHILS RELATIVE PERCENT: 1.1 % (ref 0–6)
GFR AFRICAN AMERICAN: >60
GFR NON-AFRICAN AMERICAN: 59 ML/MIN/1.73
GLUCOSE BLD-MCNC: 133 MG/DL (ref 74–99)
HCT VFR BLD CALC: 30.5 % (ref 37–54)
HEMOGLOBIN: 8.7 G/DL (ref 12.5–16.5)
HYPOCHROMIA: ABNORMAL
IMMATURE GRANULOCYTES #: 0.05 E9/L
IMMATURE GRANULOCYTES %: 1.4 % (ref 0–5)
LYMPHOCYTES ABSOLUTE: 0.68 E9/L (ref 1.5–4)
LYMPHOCYTES RELATIVE PERCENT: 18.8 % (ref 20–42)
MCH RBC QN AUTO: 23.8 PG (ref 26–35)
MCHC RBC AUTO-ENTMCNC: 28.5 % (ref 32–34.5)
MCV RBC AUTO: 83.3 FL (ref 80–99.9)
METER GLUCOSE: 120 MG/DL (ref 74–99)
METER GLUCOSE: 179 MG/DL (ref 74–99)
METER GLUCOSE: 408 MG/DL (ref 74–99)
MONOCYTES ABSOLUTE: 0.27 E9/L (ref 0.1–0.95)
MONOCYTES RELATIVE PERCENT: 7.5 % (ref 2–12)
NEUTROPHILS ABSOLUTE: 2.57 E9/L (ref 1.8–7.3)
NEUTROPHILS RELATIVE PERCENT: 70.9 % (ref 43–80)
OVALOCYTES: ABNORMAL
PDW BLD-RTO: 15 FL (ref 11.5–15)
PLATELET # BLD: 125 E9/L (ref 130–450)
PMV BLD AUTO: 9.2 FL (ref 7–12)
POIKILOCYTES: ABNORMAL
POLYCHROMASIA: ABNORMAL
POTASSIUM REFLEX MAGNESIUM: 4.3 MMOL/L (ref 3.5–5)
RBC # BLD: 3.66 E12/L (ref 3.8–5.8)
SODIUM BLD-SCNC: 138 MMOL/L (ref 132–146)
WBC # BLD: 3.6 E9/L (ref 4.5–11.5)

## 2021-03-03 PROCEDURE — 85025 COMPLETE CBC W/AUTO DIFF WBC: CPT

## 2021-03-03 PROCEDURE — 94640 AIRWAY INHALATION TREATMENT: CPT

## 2021-03-03 PROCEDURE — 94660 CPAP INITIATION&MGMT: CPT

## 2021-03-03 PROCEDURE — 36415 COLL VENOUS BLD VENIPUNCTURE: CPT

## 2021-03-03 PROCEDURE — 6370000000 HC RX 637 (ALT 250 FOR IP): Performed by: INTERNAL MEDICINE

## 2021-03-03 PROCEDURE — 6370000000 HC RX 637 (ALT 250 FOR IP): Performed by: NURSE PRACTITIONER

## 2021-03-03 PROCEDURE — 2700000000 HC OXYGEN THERAPY PER DAY

## 2021-03-03 PROCEDURE — 99232 SBSQ HOSP IP/OBS MODERATE 35: CPT | Performed by: INTERNAL MEDICINE

## 2021-03-03 PROCEDURE — 6370000000 HC RX 637 (ALT 250 FOR IP): Performed by: CLINICAL NURSE SPECIALIST

## 2021-03-03 PROCEDURE — 2580000003 HC RX 258: Performed by: INTERNAL MEDICINE

## 2021-03-03 PROCEDURE — 97110 THERAPEUTIC EXERCISES: CPT

## 2021-03-03 PROCEDURE — 99239 HOSP IP/OBS DSCHRG MGMT >30: CPT | Performed by: INTERNAL MEDICINE

## 2021-03-03 PROCEDURE — 6360000002 HC RX W HCPCS: Performed by: INTERNAL MEDICINE

## 2021-03-03 PROCEDURE — 80048 BASIC METABOLIC PNL TOTAL CA: CPT

## 2021-03-03 PROCEDURE — 82962 GLUCOSE BLOOD TEST: CPT

## 2021-03-03 RX ORDER — PREDNISONE 10 MG/1
30 TABLET ORAL DAILY
Qty: 30 TABLET | Refills: 0 | Status: SHIPPED | OUTPATIENT
Start: 2021-03-04 | End: 2021-03-03

## 2021-03-03 RX ORDER — PREDNISONE 10 MG/1
10 TABLET ORAL DAILY
Qty: 3 TABLET | Refills: 0 | Status: SHIPPED | OUTPATIENT
Start: 2021-03-10 | End: 2021-03-13

## 2021-03-03 RX ORDER — ALBUTEROL SULFATE 90 UG/1
2 AEROSOL, METERED RESPIRATORY (INHALATION) EVERY 6 HOURS PRN
Qty: 1 INHALER | Refills: 3 | Status: SHIPPED | OUTPATIENT
Start: 2021-03-03

## 2021-03-03 RX ORDER — PREDNISONE 20 MG/1
20 TABLET ORAL DAILY
Qty: 10 TABLET | Refills: 0 | Status: SHIPPED | OUTPATIENT
Start: 2021-03-06 | End: 2021-03-03

## 2021-03-03 RX ORDER — PREDNISONE 10 MG/1
10 TABLET ORAL DAILY
Qty: 10 TABLET | Refills: 0 | Status: SHIPPED | OUTPATIENT
Start: 2021-03-09 | End: 2021-03-03

## 2021-03-03 RX ORDER — PREDNISONE 20 MG/1
20 TABLET ORAL DAILY
Qty: 3 TABLET | Refills: 0 | Status: SHIPPED | OUTPATIENT
Start: 2021-03-07 | End: 2021-03-10

## 2021-03-03 RX ORDER — IPRATROPIUM BROMIDE AND ALBUTEROL SULFATE 2.5; .5 MG/3ML; MG/3ML
3 SOLUTION RESPIRATORY (INHALATION)
Qty: 360 ML | Refills: 0 | Status: SHIPPED | OUTPATIENT
Start: 2021-03-03

## 2021-03-03 RX ORDER — PREDNISONE 10 MG/1
30 TABLET ORAL DAILY
Qty: 9 TABLET | Refills: 0 | Status: SHIPPED | OUTPATIENT
Start: 2021-03-04 | End: 2021-03-07

## 2021-03-03 RX ORDER — TAMSULOSIN HYDROCHLORIDE 0.4 MG/1
0.4 CAPSULE ORAL DAILY
Qty: 30 CAPSULE | Refills: 3 | Status: SHIPPED | OUTPATIENT
Start: 2021-03-04

## 2021-03-03 RX ADMIN — INSULIN LISPRO 15 UNITS: 100 INJECTION, SOLUTION INTRAVENOUS; SUBCUTANEOUS at 18:06

## 2021-03-03 RX ADMIN — INSULIN LISPRO 3 UNITS: 100 INJECTION, SOLUTION INTRAVENOUS; SUBCUTANEOUS at 12:09

## 2021-03-03 RX ADMIN — INSULIN LISPRO 15 UNITS: 100 INJECTION, SOLUTION INTRAVENOUS; SUBCUTANEOUS at 12:09

## 2021-03-03 RX ADMIN — TAMSULOSIN HYDROCHLORIDE 0.4 MG: 0.4 CAPSULE ORAL at 09:41

## 2021-03-03 RX ADMIN — SODIUM CHLORIDE, PRESERVATIVE FREE 10 ML: 5 INJECTION INTRAVENOUS at 09:41

## 2021-03-03 RX ADMIN — IPRATROPIUM BROMIDE AND ALBUTEROL SULFATE 1 AMPULE: 2.5; .5 SOLUTION RESPIRATORY (INHALATION) at 16:33

## 2021-03-03 RX ADMIN — GUAIFENESIN 400 MG: 400 TABLET, FILM COATED ORAL at 09:41

## 2021-03-03 RX ADMIN — INSULIN LISPRO 18 UNITS: 100 INJECTION, SOLUTION INTRAVENOUS; SUBCUTANEOUS at 17:58

## 2021-03-03 RX ADMIN — ENOXAPARIN SODIUM 40 MG: 40 INJECTION, SOLUTION INTRAVENOUS; SUBCUTANEOUS at 09:40

## 2021-03-03 RX ADMIN — IPRATROPIUM BROMIDE AND ALBUTEROL SULFATE 1 AMPULE: 2.5; .5 SOLUTION RESPIRATORY (INHALATION) at 10:00

## 2021-03-03 RX ADMIN — IPRATROPIUM BROMIDE AND ALBUTEROL SULFATE 1 AMPULE: 2.5; .5 SOLUTION RESPIRATORY (INHALATION) at 13:01

## 2021-03-03 RX ADMIN — PREDNISONE 30 MG: 5 TABLET ORAL at 09:41

## 2021-03-03 RX ADMIN — METOPROLOL TARTRATE 25 MG: 25 TABLET, FILM COATED ORAL at 09:41

## 2021-03-03 RX ADMIN — ISOSORBIDE MONONITRATE 30 MG: 30 TABLET, EXTENDED RELEASE ORAL at 09:41

## 2021-03-03 RX ADMIN — ATORVASTATIN CALCIUM 40 MG: 40 TABLET, FILM COATED ORAL at 09:40

## 2021-03-03 ASSESSMENT — PAIN SCALES - GENERAL
PAINLEVEL_OUTOF10: 0
PAINLEVEL_OUTOF10: 0

## 2021-03-03 NOTE — PROGRESS NOTES
muscle     tone and facilitation active functional movement/Attention                         []? ? Delirium prevention/treatment    [x]? Positioning to improve functional independence  []? ? Other:     Treatment Charges: Mins Units   Ther Ex  [id number] 1   Manual Therapy Kendra Naranjo [id number]     ADL/Home Mgt [id number]     Neuro Re-ed [id number]     Group Therapy      Orthotic manage/training  [id number]     Non-Billable Time         Total Time: Maya 70 YUSUF/L [id number]

## 2021-03-03 NOTE — CONSULTS
ENDOCRINOLOGY INITIAL CONSULTATION NOTE      Date of admission: 2/27/2021  Date of service: 3/2/2021  Admitting physician: Lizet York MD   Primary Care Physician: Caro Graff MD  Consultant physician: Vania Yun MD     Reason for the consultation:  Uncontrolled DM, MNG     History of Present Illness: The history is provided by the patient. Accuracy of the patient data is excellent    Surjit Mooney is a very pleasant 65 y.o. old male with PMH COPD, Obesity, DM type 2 and other listed below admitted to [facility] on 2/27/2021 because of SOB. endocrine service was consulted for management of MNG and DM type 2,  Over the past few days pt was c/o worsening productive cough with yellow sputum. He denied fever, chills, sick contacts,     As a part of work up of SOB, CT chest was done and incidentally showed enlarged thyroid    Dedicated thyroid US 3/1/2021  Thyroid glans diffusely heterogeneous  Right lobe measured 10.2 x 6.3 x 6.4 cm --> large hypoechoic nodule measures 6.8 cm    Lt lobe measures 9.2 x 3.7 x 4.3 cm --> multiple nodules, largest measuring 5 cm, 3.1 cm and 2.6 cm    Isthmus measures 1.9 cm --> no nodules   No abnormal lymph nodes in the imaged portions of the neck. CT chest 3/1/2021 --> MNG goiter with some compression on trachea      Regarding DM type 2  The patient was diagnosed with type 2 DM long time ago. Prior to admission patient was on Lantus 50 units daily, Metformin 1000 mg BID, Glipizide 10 mg BID. Patient has had no hypoglycemic episodes. Patient has not been eating consistent carbohydrate meals, self-blood glucose monitoring has been above goal prior to admission. In addition, patient reported microvascular complications (neuropathy).     Lab Results   Component Value Date    LABA1C 10.5 02/28/2021     Inpatient diet:   Carb Restricted diet     Point of care glucose monitoring   (Independently reviewed)   Recent Labs     02/28/21  2119 03/01/21  0553 03/01/21  1059 03/01/21  1550 03/01/21 2032 03/02/21  0612 03/02/21  1121 03/02/21  1639   GLUMET 307* 217* 182* 246* 223* 170* 196* 282*       Past medical history:   Past Medical History:   Diagnosis Date    COPD (chronic obstructive pulmonary disease) ([facility]     Diabetes mellitus ([facility]     GERD (gastroesophageal reflux disease)     Hyperlipidemia        Past surgical history:  Past Surgical History:   Procedure Laterality Date    CORONARY ANGIOPLASTY WITH STENT PLACEMENT         Social history:   Tobacco:   has no history on file for tobacco.  Alcohol:   has no history on file for alcohol. Drugs:   has no history on file for drug. Family history:    No family history on file. Allergy and drug reactions:   No Known Allergies    Scheduled Meds:   [START ON 3/3/2021] predniSONE  30 mg Oral Daily    Followed by   Mallissa Going ON 3/6/2021] predniSONE  20 mg Oral Daily    Followed by   Mallissa Going ON 3/9/2021] predniSONE  10 mg Oral Daily    tamsulosin  0.4 mg Oral Daily    guaiFENesin  400 mg Oral BID    atorvastatin  40 mg Oral Daily    isosorbide mononitrate  30 mg Oral Daily    metoprolol tartrate  25 mg Oral BID    primidone  50 mg Oral Nightly    ipratropium-albuterol  1 ampule Inhalation Q4H WA    sodium chloride flush  10 mL Intravenous 2 times per day    enoxaparin  40 mg Subcutaneous Daily    insulin glargine  50 Units Subcutaneous Nightly    insulin lispro  0-12 Units Subcutaneous TID WC    insulin lispro  0-6 Units Subcutaneous Nightly    insulin lispro  0.08 Units/kg Subcutaneous TID WC     PRN Meds:       glucose, 15 g, PRN      dextrose, 12.5 g, PRN      glucagon (rDNA), 1 mg, PRN      dextrose, 100 mL/hr, PRN      sodium chloride flush, 10 mL, PRN      polyethylene glycol, 17 g, Daily PRN      acetaminophen, 650 mg, Q6H PRN    Or      acetaminophen, 650 mg, Q6H PRN      albuterol sulfate HFA, 2 puff, Q6H PRN      Continuous Infusions:   dextrose         Review of Systems  All systems reviewed.  All negative except for symptoms mentioned in HPI     OBJECTIVE    BP (!) 178/81   Pulse 94   Temp 98.6 °F (37 °C) (Oral)   Resp 18   Ht 6' 1\" (1.854 m)   Wt 245 lb (111.1 kg)   SpO2 95%   BMI 32.32 kg/m²     Intake/Output Summary (Last 24 hours) at 3/2/2021 2036  Last data filed at 3/2/2021 1345  Gross per 24 hour   Intake 380 ml   Output 3400 ml   Net -3020 ml       Physical examination:  General: awake alert, oriented x3  HEENT: normocephalic non traumatic, no exophthalmos   Neck: supple, + thyromegaly, thyroid tenderness,    Pulm: bilateral basal crabcles with prolonged expiratory phase   CVS: S1 + S2  Abd: soft lax, no tenderness  Skin: warm, no lesions, no rash. No open wounds, no ulcers   Neuro: CN intact,  muscle power normal  Psych: normal mood, and affect    Review of Laboratory Data:  I personally reviewed the following labs:   Recent Labs     02/28/21 0516 03/01/21  0554 03/02/21  0615   WBC 5.0 5.7 3.8*   RBC 3.87 4.01 3.96   HGB 9.2* 9.5* 9.4*   HCT 31.8* 33.8* 33.4*   MCV 82.2 84.3 84.3   MCH 23.8* 23.7* 23.7*   MCHC 28.9* 28.1* 28.1*   RDW 15.3* 15.2* 15.0   * 134 127*   MPV 9.0 8.9 8.8     Recent Labs     02/28/21  0516 02/28/21  0516 03/01/21  0554 03/01/21  1102 03/02/21  0615     --  136  --  137   K 5.5*   < > 4.9  4.9 5.2* 4.3  4.3   CL 96*  --  99  --  99   CO2 32*  --  34*  --  36*   BUN 34*  --  28*  --  20   CREATININE 1.2  --  1.1  --  1.1   GLUCOSE 374*  --  209*  --  155*   CALCIUM 8.2*  --  8.0*  --  8.4*   PROT  --   --   --   --  6.2*   LABALBU  --   --   --   --  3.8   BILITOT  --   --   --   --  <0.2   ALKPHOS  --   --   --   --  56   AST  --   --   --   --  35   ALT  --   --   --   --  59*    < > = values in this interval not displayed.      No results found for: BHYDRXBUT  Lab Results   Component Value Date    LABA1C 10.5 02/28/2021     No results found for: TSH, T4FREE, J4PLLNT, FT3, C7OGZVM, TSI, TPOABS, THGAB  Lab Results   Component Value Date    LABA1C 10.5 02/28/2021    GLUCOSE 155 03/02/2021     No results found for: TRIG, HDL, LDLCALC, CHOL    Blood culture   No results found for: [facility]    Radiology:  US HEAD NECK SOFT TISSUE THYROID   Final Result   Enlarged thyroid with multiple nodules overall most consistent with a   multinodular goiter. Continued annual sonographic surveillance is recommended. US RETROPERITONEAL COMPLETE   Final Result   1. Mild bilateral renal cortical thinning. No evidence of hydronephrosis. Simple appearing bilateral renal cysts. These are nonaggressive and require   no follow-up. 2.  A Dickerson catheter is decompressing the bladder. XR ABDOMEN (KUB) (SINGLE AP VIEW)   Final Result   Large amount of stool in the colon which may reflect constipation. No radiographic evidence of renal or ureteral calculi. CT CHEST WO CONTRAST   Final Result   1. Pulmonary infiltrates in the right upper and lower lobes likely represent   pneumonia. 2. Shotty mediastinal lymph nodes are likely reactive. 3. Mildly enlarged thyroid gland, with multiple nodules and calcifications. Sonographic evaluation recommended. XR CHEST PORTABLE   Final Result   Right mid lung airspace opacity worrisome for pneumonia.           Medical Records/Labs/Images review:   I personally reviewed and summarized previous records   All labs and imaging were reviewed independently     [address] [facility] CIARA Ldumila Engel, a 65 y.o.-old male seen today for inpatient diabetes management     Diabetes Mellitus type 2    · Patient's diabetes is uncontrolled , A1c 10.5% and worsened with steroids   · For now, will change diabetes regimen to:  · Lantus 50 units nightly   · Humalog 12 units with meals   · High dose sliding scale with meals and at night   · Continue glucose check with meals and at bedtime   · Will titrate insulin dose based on the blood glucose trend & insulin requirement  · Will arrange for patient to be seen in endocrinology clinic upon discharge for

## 2021-03-03 NOTE — DISCHARGE SUMMARY
[facility] EMERGENCY SERVICE Physician Discharge Summary         SAMPSON Lopez 57    [phone]    call to establish a new primary care physician    Lewis Brennan [address]  phone: [phone]     home health care services      Activity level: As tolerated    Diet: DIET CARDIAC; Low Potassium    Labs:    Dispo: Home    Condition at discharge: Stable    Continue supplemental oxygen via nasal canula @ 2 LPM round-the-clock. Continue CPAP / BiPAP during sleep as prior to admission. Patient ID:  Rafita Will  [id number]  39 y.o.  1950    Admit date: 2/27/2021    Discharge date and time:  3/3/2021  10:05 AM    Admission Diagnoses: Active Problems:    Hyperkalemia  Resolved Problems:    * No resolved hospital problems. *      Discharge Diagnoses: Active Problems:    Hyperkalemia  Resolved Problems:    * No resolved hospital problems. *      Consults:  IP CONSULT TO PULMONOLOGY  IP CONSULT TO DIETITIAN  IP CONSULT TO UROLOGY  IP CONSULT TO ENDOCRINOLOGY    Procedures: CT, ultrasound    Hospital Course: Patient was admitted with Hyperkalemia [E87.5]. Patient Admitted on 27th, patient with COPD, BPH, diabetes, chronic hypoxia, did have cough and sputum production, stopped tobacco 15 years ago, initial impression of possible pneumonia, possible COPD exacerbation. Covid negative. Low iron saturations, increased TIBC. Seen by pulmonary, right-sided multilobar pneumonia, no role for bronchoscopy at this time. Patient was treated with antibiotics. There were stopped by pulmonary following improvement. PVR was requested yesterday afternoon, high, urology consult requested. Seen by urology, Flomax added, catheter to be left in. Pulmonary plans for steroid taper. Pulmonary did trial of CPAP, patient did well.   Patient with multinodule goiter, endocrine consultation was requested for both management of diabetes as well as further work-up on 03/03/21  0312   WBC 5.7 3.8* 3.6*   RBC 4.01 3.96 3.66*   HGB 9.5* 9.4* 8.7*   HCT 33.8* 33.4* 30.5*   MCV 84.3 84.3 83.3   MCH 23.7* 23.7* 23.8*   MCHC 28.1* 28.1* 28.5*   RDW 15.2* 15.0 15.0    127* 125*   MPV 8.9 8.8 9.2       Imaging:   US HEAD NECK SOFT TISSUE THYROID   Final Result   Enlarged thyroid with multiple nodules overall most consistent with a   multinodular goiter. Continued annual sonographic surveillance is recommended. US RETROPERITONEAL COMPLETE   Final Result   1. Mild bilateral renal cortical thinning. No evidence of hydronephrosis. Simple appearing bilateral renal cysts. These are nonaggressive and require   no follow-up. 2.  A Dickerson catheter is decompressing the bladder. XR ABDOMEN (KUB) (SINGLE AP VIEW)   Final Result   Large amount of stool in the colon which may reflect constipation. No radiographic evidence of renal or ureteral calculi. CT CHEST WO CONTRAST   Final Result   1. Pulmonary infiltrates in the right upper and lower lobes likely represent   pneumonia. 2. Shotty mediastinal lymph nodes are likely reactive. 3. Mildly enlarged thyroid gland, with multiple nodules and calcifications. Sonographic evaluation recommended. XR CHEST PORTABLE   Final Result   Right mid lung airspace opacity worrisome for pneumonia. Patient Instructions:      Medication List      START taking these medications    albuterol sulfate  (90 Base) MCG/ACT inhaler  Inhale 2 puffs into the lungs every 6 hours as needed for Wheezing     Full Kit Nebulizer Set Misc  Use as directed with nebulized medication.      * insulin lispro 100 UNIT/ML injection vial  Commonly known as: HUMALOG  Inject 0-18 Units into the skin 3 times daily (with meals)     * insulin lispro 100 UNIT/ML injection vial  Commonly known as: HUMALOG  Inject 0-9 Units into the skin nightly     * insulin lispro 100 UNIT/ML injection vial  Commonly known as: HUMALOG  Inject 15 Units into the skin 3 times daily (with meals)     ipratropium-albuterol 0.5-2.5 (3) MG/3ML Soln nebulizer solution  Commonly known as: DUONEB  Inhale 3 mLs into the lungs every 4 hours (while awake)     tamsulosin 0.4 MG capsule  Commonly known as: FLOMAX  Take 1 capsule by mouth daily  Start taking on: March 4, 2021         * This list has 3 medication(s) that are the same as other medications prescribed for you. Read the directions carefully, and ask your doctor or other care provider to review them with you. CHANGE how you take these medications    * predniSONE 10 MG tablet  Commonly known as: DELTASONE  Take 3 tablets by mouth daily for 10 days  Start taking on: March 4, 2021  What changed:   · medication strength  · how much to take     * predniSONE 20 MG tablet  Commonly known as: DELTASONE  Take 1 tablet by mouth daily for 10 days  Start taking on: March 6, 2021  What changed: You were already taking a medication with the same name, and this prescription was added. Make sure you understand how and when to take each. * predniSONE 10 MG tablet  Commonly known as: DELTASONE  Take 1 tablet by mouth daily for 10 days  Start taking on: March 9, 2021  What changed: You were already taking a medication with the same name, and this prescription was added. Make sure you understand how and when to take each. * This list has 3 medication(s) that are the same as other medications prescribed for you. Read the directions carefully, and ask your doctor or other care provider to review them with you.             CONTINUE taking these medications    atorvastatin 40 MG tablet  Commonly known as: LIPITOR     furosemide 40 MG tablet  Commonly known as: LASIX     guaiFENesin 400 MG tablet     insulin glargine 100 UNIT/ML injection vial  Commonly known as: LANTUS     isosorbide mononitrate 30 MG extended release tablet  Commonly known as: IMDUR     lisinopril 5 MG tablet  Commonly known as: PRINIVIL;ZESTRIL     metFORMIN 1000 MG tablet  Commonly known as: GLUCOPHAGE     metoprolol tartrate 25 MG tablet  Commonly known as: LOPRESSOR     OCULAR VITAMINS PO     primidone 50 MG tablet  Commonly known as: MYSOLINE        STOP taking these medications    glipiZIDE 10 MG tablet  Commonly known as: GLUCOTROL     zolpidem 10 MG tablet  Commonly known as: AMBIEN           Where to Get Your Medications      These medications were sent to [address] [place] [address], [facility] [phone]  [facility] Kw[address] Malone [address], [address]    Phone: [phone]   · albuterol sulfate  (90 Base) MCG/ACT inhaler  · insulin lispro 100 UNIT/ML injection vial  · insulin lispro 100 UNIT/ML injection vial  · insulin lispro 100 UNIT/ML injection vial  · ipratropium-albuterol 0.5-2.5 (3) MG/3ML Soln nebulizer solution  · predniSONE 10 MG tablet  · predniSONE 10 MG tablet  · predniSONE 20 MG tablet  · tamsulosin 0.4 MG capsule     You can get these medications from any pharmacy    Bring a paper prescription for each of these medications  · Full Kit Nebulizer Set Misc           Note that more than 30 minutes was spent in preparing discharge papers, discussing discharge with patient, medication review, etc.    Signed:  Electronically signed by Cary Duane, MD on 3/3/2021 at 10:05 AM    NOTE: This report was transcribed using voice recognition software. Every effort was made to ensure accuracy; however, inadvertent computerized transcription errors may be present.

## 2021-03-03 NOTE — PROGRESS NOTES
PULMONARY/CRITICAL CARE PROGRESS NOTE    Patient: Freddy Walls  MRN: [id number]  : 1950    Encounter Date: 3/3/2021  Encounter Time: 6:47 AM     Date of Admission: .2021  4:07 PM    Consulting Physician:  Primary Care Physician:      Yamilex Machuca MD     [phone]     None    SUBJECTIVE:   Sleeping but wakes with name  Breathing better but not back to baseline yet  On 4L n/c with 3L baseline  Occ cough  Attempted CPAP last night but only wore it for 1-1 1/2 hrs. States mask did not fit right.       CURRENT MEDICATIONS:  Current Facility-Administered Medications: predniSONE (DELTASONE) tablet 30 mg, 30 mg, Oral, Daily **FOLLOWED BY** [START ON 3/6/2021] predniSONE (DELTASONE) tablet 20 mg, 20 mg, Oral, Daily **FOLLOWED BY** [START ON 3/9/2021] predniSONE (DELTASONE) tablet 10 mg, 10 mg, Oral, Daily  insulin lispro (HUMALOG) injection vial 15 Units, 15 Units, Subcutaneous, TID WC  insulin lispro (HUMALOG) injection vial 0-18 Units, 0-18 Units, Subcutaneous, TID WC  insulin lispro (HUMALOG) injection vial 0-9 Units, 0-9 Units, Subcutaneous, Nightly  tamsulosin (FLOMAX) capsule 0.4 mg, 0.4 mg, Oral, Daily  guaiFENesin tablet 400 mg, 400 mg, Oral, BID  atorvastatin (LIPITOR) tablet 40 mg, 40 mg, Oral, Daily  isosorbide mononitrate (IMDUR) extended release tablet 30 mg, 30 mg, Oral, Daily  metoprolol tartrate (LOPRESSOR) tablet 25 mg, 25 mg, Oral, BID  primidone (MYSOLINE) tablet 50 mg, 50 mg, Oral, Nightly  ipratropium-albuterol (DUONEB) nebulizer solution 1 ampule, 1 ampule, Inhalation, Q4H WA  glucose (GLUTOSE) 40 % oral gel 15 g, 15 g, Oral, PRN  dextrose 50 % IV solution, 12.5 g, Intravenous, PRN  glucagon (rDNA) injection 1 mg, 1 mg, Intramuscular, PRN  dextrose 5 % solution, 100 mL/hr, Intravenous, PRN  sodium chloride flush 0.9 % injection 10 mL, 10 mL, Intravenous, 2 times per day  sodium chloride flush 0.9 % injection 10 mL, 10 mL, Intravenous, PRN  enoxaparin (LOVENOX) injection 40 mg, or rashes  Neuro A/ox3, LUCERO, appropriate        LABS/IMAGING:    CBC:  Lab Results   Component Value Date    WBC 3.6 (L) 03/03/2021    HGB 8.7 (L) 03/03/2021    HCT 30.5 (L) 03/03/2021    MCV 83.3 03/03/2021     (L) 03/03/2021    LYMPHOPCT 18.8 (L) 03/03/2021    RBC 3.66 (L) 03/03/2021    MCH 23.8 (L) 03/03/2021    MCHC 28.5 (L) 03/03/2021    RDW 15.0 03/03/2021    NEUTOPHILPCT 70.9 03/03/2021    MONOPCT 7.5 03/03/2021    BASOPCT 0.3 03/03/2021    NEUTROABS 2.57 03/03/2021    LYMPHSABS 0.68 (L) 03/03/2021    MONOSABS 0.27 03/03/2021    EOSABS 0.04 (L) 03/03/2021    BASOSABS 0.01 03/03/2021       Recent Labs     03/03/21  0312 03/02/21  0615 03/01/21  0554   WBC 3.6* 3.8* 5.7   HGB 8.7* 9.4* 9.5*   HCT 30.5* 33.4* 33.8*   MCV 83.3 84.3 84.3   * 127* 134       BMP:   Recent Labs     03/01/21  0554 03/01/21  0554 03/02/21  0615 03/03/21  0312     --  137 138   K 4.9  4.9   < > 4.3  4.3 4.3   CL 99  --  99 99   CO2 34*  --  36* 35*   BUN 28*  --  20 17   CREATININE 1.1  --  1.1 1.2    < > = values in this interval not displayed. MG: No results found for: MG  Ca/Phos:   Lab Results   Component Value Date    CALCIUM 8.7 03/03/2021     Amylase: No results found for: AMYLASE  Lipase: No results found for: LIPASE  LIVER PROFILE:   Recent Labs     03/02/21  0615   AST 35   ALT 59*   BILITOT <0.2   ALKPHOS 56       Results for Debria Median (MRN [id number]) as of 3/1/2021 09:00   Ref.  Range 2/28/2021 06:50   Influenza A by PCR Latest Ref Range: Not Detected  Not Detected   Influenza B by PCR Latest Ref Range: Not Detected  Not Detected   Adenovirus by PCR Latest Ref Range: Not Detected  Not Detected   Coronavirus 229E by PCR Latest Ref Range: Not Detected  Not Detected   Coronavirus HKU1 by PCR Latest Ref Range: Not Detected  Not Detected   Coronavirus NL63 by PCR Latest Ref Range: Not Detected  Not Detected   Coronavirus OC43 by PCR Latest Ref Range: Not Detected  Not Detected   Human Metapneumovirus by PCR Latest Ref Range: Not Detected  Not Detected   Human Rhinovirus/Enterovirus by PCR Latest Ref Range: Not Detected  Not Detected   Parainfluenza Virus 1 by PCR Latest Ref Range: Not Detected  Not Detected   Parainfluenza Virus 2 by PCR Latest Ref Range: Not Detected  Not Detected   Parainfluenza Virus 3 by PCR Latest Ref Range: Not Detected  Not Detected   Parainfluenza Virus 4 by PCR Latest Ref Range: Not Detected  Not Detected   Respiratory Syncytial Virus by PCR Latest Ref Range: Not Detected  Not Detected   Bordetella parapertussis by PCR Latest Ref Range: Not Detected  Not Detected   Chlamydophilia pneumoniae by PCR Latest Ref Range: Not Detected  Not Detected   Mycoplasma pneumoniae by PCR Latest Ref Range: Not Detected  Not Detected   SARS-CoV-2, PCR Latest Ref Range: Not Detected  Not Detected   Bordetella pertussis by PCR Latest Ref Range: Not Detected  Not Detected       US HEAD NECK SOFT TISSUE THYROID   Final Result   Enlarged thyroid with multiple nodules overall most consistent with a   multinodular goiter. Continued annual sonographic surveillance is recommended. US RETROPERITONEAL COMPLETE   Final Result   1. Mild bilateral renal cortical thinning. No evidence of hydronephrosis. Simple appearing bilateral renal cysts. These are nonaggressive and require   no follow-up. 2.  A Dickerson catheter is decompressing the bladder. XR ABDOMEN (KUB) (SINGLE AP VIEW)   Final Result   Large amount of stool in the colon which may reflect constipation. No radiographic evidence of renal or ureteral calculi. CT CHEST WO CONTRAST   Final Result   1. Pulmonary infiltrates in the right upper and lower lobes likely represent   pneumonia. 2. Shotty mediastinal lymph nodes are likely reactive. 3. Mildly enlarged thyroid gland, with multiple nodules and calcifications. Sonographic evaluation recommended.       XR CHEST PORTABLE   Final Result   Right mid lung airspace agree with the past medical, family, and social history unless otherwise noted. Okay for DC with prednisone taper  Home sleep study written for.   Patient willing to try nasal pillows

## 2021-03-03 NOTE — CARE COORDINATION
CASE MANAGEMENT. Gage Almonte [facility] Chart reviewed. Discharge order noted. Confirmed with patient that he has nebulizer at home for aerosols. Per pulm, patient will follow up for outpt sleep study etc. Nursing to clarify with urology regarding need for han cath at discharge or not. [facility] orders will need placed once all home going needs are finalized. Need to fax [facility] orders and dc summary to [facility] at [phone] and notify them of discharge by calling [phone]. Will cont to follow.

## 2021-03-03 NOTE — CARE COORDINATION
CASE MANAGEMENT. .. Patient will discharge home today with han cath. [facility] orders obtained and faxed to Parul Lopez with [facility] Isaiah Augustine at [phone] along with the dc summary. 79

## 2021-03-03 NOTE — PROGRESS NOTES
Spoke with Alma with Urology, pt will be discharged with han cath and voiding trials will be done as outpt.

## 2021-03-04 ENCOUNTER — CARE COORDINATION (OUTPATIENT)
Dept: CASE MANAGEMENT | Age: 71
End: 2021-03-04

## 2021-03-04 DIAGNOSIS — E11.65 POORLY CONTROLLED DIABETES MELLITUS (HCC): Primary | ICD-10-CM

## 2021-03-04 LAB
CULTURE, RESPIRATORY: NORMAL
MYCOPLASMA PNEUMONIAE IGM: NORMAL
SMEAR, RESPIRATORY: NORMAL

## 2021-03-04 RX ORDER — PEN NEEDLE, DIABETIC 31 G X1/4"
1 NEEDLE, DISPOSABLE MISCELLANEOUS 4 TIMES DAILY
Qty: 150 EACH | Refills: 11 | Status: SHIPPED
Start: 2021-03-04 | End: 2022-03-22

## 2021-03-04 RX ORDER — INSULIN LISPRO 100 [IU]/ML
INJECTION, SOLUTION INTRAVENOUS; SUBCUTANEOUS
Qty: 10 PEN | Refills: 5 | Status: SHIPPED | OUTPATIENT
Start: 2021-03-04

## 2021-03-04 RX ORDER — BLOOD SUGAR DIAGNOSTIC
1 STRIP MISCELLANEOUS 4 TIMES DAILY
Qty: 150 EACH | Refills: 11 | Status: SHIPPED
Start: 2021-03-04 | End: 2021-03-22 | Stop reason: SDUPTHER

## 2021-03-04 NOTE — PROGRESS NOTES
3/4/21  1130am-pt's wife called with many questions regarding the discharge order for insulin,  Message left at office of Dr Clau Whiting with instruction for his office to call the patient's wife to answer questions regarding the insulin.

## 2021-03-04 NOTE — CARE COORDINATION
[address] Transitions Initial Follow Up Call    Call within 2 business days of discharge: Yes    Patient: Semaj Padron Patient : 1950   MRN: [id number]  Reason for Admission: Pneumonia, SOB   Discharge Date: 3/3/21 RARS: Readmission Risk Score: 17      Last Discharge [facility]       Complaint Diagnosis Description Type Department Provider    21 Shortness of Breath; Tremors; Neck Pain Pneumonia due to organism . .. ED to Hosp-Admission (Discharged) (ADMITTED) AKILAH Murcia MD; Becky Worthy. .. Spoke with: [place] and then his wife Adelita Speak per his request     Facility: [address] [place] [address]      Non-face-to-face services provided:  Obtained and reviewed discharge summary and/or continuity of care documents    Care Transitions 24 Hour Call    Schedule Follow Up Appointment with PCP: Declined  Do you have any ongoing symptoms?: No  Do you have a copy of your discharge instructions?: Yes  Do you have all of your prescriptions and are they filled?: No  Have you been contacted by a [facility] Pharmacist?: No  Have you scheduled your follow up appointment?: No  Were you discharged with any Home Care or Post Acute Services: Yes  Post Acute Services: Home Health (Comment: [facility] )  Do you feel like you have everything you need to keep you well at home?: Yes  Care Transitions Interventions     Spoke with Chico briefly and then his wife Adelita Speak per his request for initial BPCI care transition call post hospital discharge. Explained the role of Care Transition Nurse and the BPCI-A program, they are agreeable to follow up post discharge from the hospital.     Aaron Larson reports that he is \"terrible\". Luiz then got on the phone and expressed her frustration with the discharge. She stated he was discharged at 8:30 last night without a completed insulin sliding scale.  She stated she tried to tell the nurse that the pharmacy would be closed by the time they got to [facility] and they would not be able to obtain the new medications, including the insulin and a nebulizer, but feels that nobody listened to them. Irma Bloom reports Chico used his rescue inhaler multiple times to get through the night. She picked up the nebulizer and insulin today, however the insulin was only a vial and did not include needles, the sliding scale, and she stated they were never taught how to administer insulin based off of a sliding scale. Irma Bloom reports that the han catheter is draining without issue, the urine fluctuates between yellow and having some blood at times, which she reports urology told her is normal. He is wearing 3L O2. Irma Bloom reports speaking with [facility] earlier and they will start services tomorrow, 3/5/2021. CTN offered the number for the Patient Advocate, she kindly declined. She reports that she just got off the phone with Dr. Doyle [place] office prior to this call and they are supposed to send needles to the pharmacy and the nurse also gave her a sliding scale to use. She plans to drive to his office tomorrow to learn how to give the insulin.l     Med review completed. CTN explained that a member of the Care Transition Central Team will be contacting them for further follow up calls, she is in agreement and denies any other needs or concerns at this time.       Follow Up  Future Appointments   Date Time Provider Iwona Guan   3/30/2021 11:00 AM Saad Ford MD [facility]       Joshua Bray RN

## 2021-03-04 NOTE — TELEPHONE ENCOUNTER
Hospital called: patient discharged from hospital yesterday. Patient's family has questions regarding insulin orders. Please call wife Lowell Jaquez at [phone].   Thank you

## 2021-03-04 NOTE — PROGRESS NOTES
03/02/21 2048 03/03/21  0627 03/03/21  1123 03/03/21  1757   GLUMET 223* 170* 196* 282* 263* 120* 179* 408*     Subjective  · Seen and examined     Scheduled Meds:   predniSONE  30 mg Oral Daily    Followed by   Carlin Lopes ON 3/6/2021] predniSONE  20 mg Oral Daily    Followed by   Carlin Lopes ON 3/9/2021] predniSONE  10 mg Oral Daily    insulin lispro  15 Units Subcutaneous TID WC    insulin lispro  0-18 Units Subcutaneous TID WC    insulin lispro  0-9 Units Subcutaneous Nightly    tamsulosin  0.4 mg Oral Daily    guaiFENesin  400 mg Oral BID    atorvastatin  40 mg Oral Daily    isosorbide mononitrate  30 mg Oral Daily    metoprolol tartrate  25 mg Oral BID    primidone  50 mg Oral Nightly    ipratropium-albuterol  1 ampule Inhalation Q4H WA    sodium chloride flush  10 mL Intravenous 2 times per day    enoxaparin  40 mg Subcutaneous Daily    insulin glargine  50 Units Subcutaneous Nightly     PRN Meds:       glucose, 15 g, PRN      dextrose, 12.5 g, PRN      glucagon (rDNA), 1 mg, PRN      dextrose, 100 mL/hr, PRN      sodium chloride flush, 10 mL, PRN      polyethylene glycol, 17 g, Daily PRN      acetaminophen, 650 mg, Q6H PRN    Or      acetaminophen, 650 mg, Q6H PRN      albuterol sulfate HFA, 2 puff, Q6H PRN      Continuous Infusions:   dextrose         Review of Systems  All systems reviewed.  All negative except for symptoms mentioned in HPI     OBJECTIVE    BP (!) 167/84   Pulse 107   Temp 98.2 °F (36.8 °C) (Temporal)   Resp 16   Ht 6' 1\" (1.854 m)   Wt 246 lb 11.2 oz (111.9 kg)   SpO2 98%   BMI 32.55 kg/m²     Intake/Output Summary (Last 24 hours) at 3/3/2021 1919  Last data filed at 3/3/2021 1702  Gross per 24 hour   Intake 720 ml   Output 2300 ml   Net -1580 ml       Physical examination:  General: awake alert, oriented x3  HEENT: normocephalic non traumatic, no exophthalmos   Neck: supple, + thyromegaly, thyroid tenderness,    Pulm: bilateral basal crabcles with prolonged expiratory phase   CVS: S1 + S2  Abd: soft lax, no tenderness  Skin: warm, no lesions, no rash. No open wounds, no ulcers   Neuro: CN intact,  muscle power normal  Psych: normal mood, and affect    Review of Laboratory Data:  I personally reviewed the following labs:   Recent Labs     03/01/21  0554 03/02/21 0615 03/03/21 0312   WBC 5.7 3.8* 3.6*   RBC 4.01 3.96 3.66*   HGB 9.5* 9.4* 8.7*   HCT 33.8* 33.4* 30.5*   MCV 84.3 84.3 83.3   MCH 23.7* 23.7* 23.8*   MCHC 28.1* 28.1* 28.5*   RDW 15.2* 15.0 15.0    127* 125*   MPV 8.9 8.8 9.2     Recent Labs     03/01/21 0554 03/01/21 0554 03/02/21 0615 03/03/21 0312     --  137 138   K 4.9  4.9   < > 4.3  4.3 4.3   CL 99  --  99 99   CO2 34*  --  36* 35*   BUN 28*  --  20 17   CREATININE 1.1  --  1.1 1.2   GLUCOSE 209*  --  155* 133*   CALCIUM 8.0*  --  8.4* 8.7   PROT  --   --  6.2*  --    LABALBU  --   --  3.8  --    BILITOT  --   --  <0.2  --    ALKPHOS  --   --  56  --    AST  --   --  35  --    ALT  --   --  59*  --     < > = values in this interval not displayed. No results found for: BHYDRXBUT  Lab Results   Component Value Date    LABA1C 10.5 02/28/2021     No results found for: TSH, T4FREE, I2VRMZC, FT3, W8ZKEJN, TSI, TPOABS, THGAB  Lab Results   Component Value Date    LABA1C 10.5 02/28/2021    GLUCOSE 133 03/03/2021     No results found for: TRIG, HDL, LDLCALC, CHOL    Blood culture   No results found for: [facility]    Radiology:  US HEAD NECK SOFT TISSUE THYROID   Final Result   Enlarged thyroid with multiple nodules overall most consistent with a   multinodular goiter. Continued annual sonographic surveillance is recommended. US RETROPERITONEAL COMPLETE   Final Result   1. Mild bilateral renal cortical thinning. No evidence of hydronephrosis. Simple appearing bilateral renal cysts. These are nonaggressive and require   no follow-up. 2.  A Dickerson catheter is decompressing the bladder.       XR ABDOMEN (KUB) (SINGLE AP VIEW)   Final understood and agreed with the plan. Thank you for allowing us to participate in the care of this patient. Please do not hesitate to contact us with any additional questions. Tae Guevara MD  Endocrinologist, [facility] - BE[facility]AVIORAL HEALTH [facility] Diabetes Care and Endocrinology   [address] [place] [address]   Phone: [phone]  Fax: [phone]  --------------------------------------------  An electronic signature was used to authenticate this note.  Jose Pierre MD on 3/3/2021 at 7:19 PM

## 2021-03-05 ENCOUNTER — TELEPHONE (OUTPATIENT)
Dept: ENDOCRINOLOGY | Age: 71
End: 2021-03-05

## 2021-03-05 DIAGNOSIS — E04.2 MULTINODULAR GOITER: Primary | ICD-10-CM

## 2021-03-05 NOTE — TELEPHONE ENCOUNTER
The pt's wife was notified yesterday. I faxed the [address] [facility] letter with the order to IR this morning. IR is aware.

## 2021-03-09 DIAGNOSIS — Z01.818 PRE-OP TESTING: Primary | ICD-10-CM

## 2021-03-10 ENCOUNTER — CARE COORDINATION (OUTPATIENT)
Dept: CASE MANAGEMENT | Age: 71
End: 2021-03-10

## 2021-03-10 NOTE — CARE COORDINATION
[facility] Follow Up Call    3/10/2021    Patient: Marie Thompson  Patient : 1950   MRN: <[id number]>  Reason for Admission:   Discharge Date: 3/3/21 RARS: Readmission Risk Score: 17         Spoke with:  Louie Merrill, patient and his wife    Follow Up:  Contacted patient for BPCI-A follow up. Spoke with patient initially. He stated \"how do you think I'm doing, I'm not doing very well. He then placed his wife on the phone. CTN introduced self and explained reason for call. Wife explained discharge situation as she previously discussed during initial transition call. She stated they are moving forward. Guzman Blanchard has a biopsy appointment tomorrow. She stated Guzman Blanchard will be having his nodules removed at a facility in [place]. She stated right now the appointment is scheduled for 21 but waiting on biopsy. She stated that his medications and insulin have been straightened out. Home health nurse is making visits. Per wife, Guzman Blanchard is currently using 5 L of oxygen. He has a pulmonary appointment on 3/23/21. Dickerson catheter is in place. She stated he is getting very annoyed with the catheter. Has appointment with urology on Friday, 3/12/21.       Future Appointments   Date Time Provider Iwona Guan   3/11/2021 11:00 AM [facility] US RM 1 SEYZ [facility] Radiolo   3/30/2021 11:00 AM Saad Madrigal MD [facility]       Jeannette Velasco RN

## 2021-03-11 ENCOUNTER — HOSPITAL ENCOUNTER (OUTPATIENT)
Dept: ULTRASOUND IMAGING | Age: 71
Discharge: HOME | End: 2021-03-13
Payer: MEDICARE

## 2021-03-11 DIAGNOSIS — E04.2 MULTINODULAR GOITER: ICD-10-CM

## 2021-03-11 PROCEDURE — 88173 CYTOPATH EVAL FNA REPORT: CPT

## 2021-03-11 PROCEDURE — 10005 FNA BX W/US GDN 1ST LES: CPT | Performed by: RADIOLOGY

## 2021-03-11 PROCEDURE — 10005 FNA BX W/US GDN 1ST LES: CPT

## 2021-03-11 PROCEDURE — 10006 FNA BX W/US GDN EA ADDL: CPT | Performed by: RADIOLOGY

## 2021-03-11 PROCEDURE — 88305 TISSUE EXAM BY PATHOLOGIST: CPT

## 2021-03-11 NOTE — INTERVAL H&P NOTE
H&P Update    Patient's History and Physical  was reviewed. The patient appears likely to able to tolerate the procedure. Risk and benefits discussed including ultimate complications, possibly death and consent obtained.     Sunshine Dutta, II

## 2021-03-14 ENCOUNTER — TELEPHONE (OUTPATIENT)
Dept: ENDOCRINOLOGY | Age: 71
End: 2021-03-14

## 2021-03-14 DIAGNOSIS — E04.2 MULTINODULAR GOITER: Primary | ICD-10-CM

## 2021-03-14 NOTE — TELEPHONE ENCOUNTER
Notify pt,  I have reviewed your recent results    Excellent Biopsy to largest thyroid nodules was benign but because of thee size of thyroid nodules we still recommend thyroid surgery     Will refer to [facility]

## 2021-03-15 NOTE — TELEPHONE ENCOUNTER
The pt's wife was notified. I also faxed the reports to Dr Krystian Kenny. The pt's wife is aware that she needs to bring the cd and the slides to his appt.

## 2021-03-17 ENCOUNTER — CARE COORDINATION (OUTPATIENT)
Dept: CASE MANAGEMENT | Age: 71
End: 2021-03-17

## 2021-03-17 NOTE — CARE COORDINATION
[facility] 45 Transitions Follow Up Call    3/17/2021    Patient: Yemi Bautista  Patient : 1950   MRN: <[id number]>  Reason for Admission: PNA  Discharge Date: 3/3/21 RARS: Readmission Risk Score: 17         Spoke with: Pt's wife Zaid Morrow stated pt is doing well, has procedure scheduled for Thyroid surgery on 21 in [place]. Pt was d/c's from PT/OT, has visiting RN until 3/19/21. Pt's BS was 110 this morning. Stated they are still adjusting to insulin and sliding scale but are improving. Declined referral to dietician. Wife stated she needs to  CD with radiology info, prefers to pick it up at [facility]. Stated she left VM but has not heard back. CTN called and left message to [facility] Radiology dept requesting call back at number provided. CTN received call back from [facility] in Radiology who stated she will make a CD of pt's tests and have it ready at Radiology desk for pickup tomorrow. Stated wife must either be POA or have written consent to pick it up without pt. CTN called Orion Bee and updated her. Orion Bee is not POA. Informed her that [facility] stated she can call dept and they may be able to bring it out to the car, other may have consent form signed by pt giving permission to have her pick it up. Care Transitions Subsequent and Final Call    Subsequent and Final Calls  Do you have any ongoing symptoms?: No  Have your medications changed?: No  Do you have any questions related to your medications?: No  Do you currently have any active services?: Yes  Are you currently active with any services?: Home Health  Do you have any needs or concerns that I can assist you with?: Yes  Patient-reported Needs or Concerns: needs to  CD with test results from [facility] for upcoming procedure  Identified Barriers: None  Care Transitions Interventions  Other Interventions:            Follow Up  Future Appointments   Date Time Provider Iwona Guan   3/30/2021 11:00 AM Saad Allen MD [facility]

## 2021-03-22 DIAGNOSIS — E11.65 POORLY CONTROLLED DIABETES MELLITUS (HCC): ICD-10-CM

## 2021-03-22 RX ORDER — BLOOD SUGAR DIAGNOSTIC
1 STRIP MISCELLANEOUS 4 TIMES DAILY
Qty: 150 EACH | Refills: 11 | Status: SHIPPED
Start: 2021-03-22 | End: 2021-03-30

## 2021-03-30 ENCOUNTER — OFFICE VISIT (OUTPATIENT)
Dept: ENDOCRINOLOGY | Age: 71
End: 2021-03-30
Payer: MEDICARE

## 2021-03-30 VITALS
TEMPERATURE: 97 F | SYSTOLIC BLOOD PRESSURE: 124 MMHG | DIASTOLIC BLOOD PRESSURE: 74 MMHG | BODY MASS INDEX: 32.06 KG/M2 | HEART RATE: 90 BPM | WEIGHT: 243 LBS | OXYGEN SATURATION: 94 %

## 2021-03-30 DIAGNOSIS — E04.2 MULTINODULAR GOITER: Primary | ICD-10-CM

## 2021-03-30 DIAGNOSIS — E55.9 VITAMIN D DEFICIENCY: ICD-10-CM

## 2021-03-30 DIAGNOSIS — E11.65 POORLY CONTROLLED DIABETES MELLITUS (HCC): ICD-10-CM

## 2021-03-30 PROCEDURE — 1111F DSCHRG MED/CURRENT MED MERGE: CPT | Performed by: INTERNAL MEDICINE

## 2021-03-30 PROCEDURE — G8419 CALC BMI OUT NRM PARAM NOF/U: HCPCS | Performed by: INTERNAL MEDICINE

## 2021-03-30 PROCEDURE — 99214 OFFICE O/P EST MOD 30 MIN: CPT | Performed by: INTERNAL MEDICINE

## 2021-03-30 PROCEDURE — 2022F DILAT RTA XM EVC RTNOPTHY: CPT | Performed by: INTERNAL MEDICINE

## 2021-03-30 PROCEDURE — G8484 FLU IMMUNIZE NO ADMIN: HCPCS | Performed by: INTERNAL MEDICINE

## 2021-03-30 PROCEDURE — 1036F TOBACCO NON-USER: CPT | Performed by: INTERNAL MEDICINE

## 2021-03-30 PROCEDURE — 3017F COLORECTAL CA SCREEN DOC REV: CPT | Performed by: INTERNAL MEDICINE

## 2021-03-30 PROCEDURE — G8428 CUR MEDS NOT DOCUMENT: HCPCS | Performed by: INTERNAL MEDICINE

## 2021-03-30 PROCEDURE — 3046F HEMOGLOBIN A1C LEVEL >9.0%: CPT | Performed by: INTERNAL MEDICINE

## 2021-03-30 PROCEDURE — 4040F PNEUMOC VAC/ADMIN/RCVD: CPT | Performed by: INTERNAL MEDICINE

## 2021-03-30 PROCEDURE — 1123F ACP DISCUSS/DSCN MKR DOCD: CPT | Performed by: INTERNAL MEDICINE

## 2021-03-30 RX ORDER — BLOOD SUGAR DIAGNOSTIC
1 STRIP MISCELLANEOUS 3 TIMES DAILY
Qty: 150 EACH | Refills: 11 | Status: SHIPPED | OUTPATIENT
Start: 2021-03-30

## 2021-03-30 NOTE — PROGRESS NOTES
[address] [place] [address] Department of Endocrinology Diabetes and Metabolism   [address] [facility] [address]   Phone: [phone]  Fax: [phone]    Date of service: 3/30/2021  Primary Care Physician: Robel Black MD  Provider: Héctor Sheehan MD     Reason for the consultation:  DM type 2 on insulin, MNG     History of Present Illness: The history is provided by the patient. Accuracy of the patient data is excellent    Ashley Tirado is a very pleasant 65 y.o. old male with PMH COPD, Obesity, DM type 2 who was recently admitted to [address] [place] [address] because of worsening SOB and found to have large multinodular goiter      As a part of work up of SOB, CT chest was done and incidentally showed enlarged thyroid    Dedicated thyroid US 3/1/2021  Thyroid glans diffusely heterogeneous  Right lobe measured 10.2 x 6.3 x 6.4 cm --> large hypoechoic nodule measures 6.8 cm    Lt lobe measures 9.2 x 3.7 x 4.3 cm --> multiple nodules, largest measuring 5 cm, 3.1 cm and 2.6 cm    Isthmus measures 1.9 cm --> no nodules   No abnormal lymph nodes in the imaged portions of the neck. CT chest 3/1/2021 --> MNG goiter with some compression on trachea      3/11/2021 - FNA to dominant Rt (6.8 cm), Lt 5 cm nodules --> benign     DM type 2  The patient was diagnosed with type 2 DM long time ago. Prior to admission patient was on Lantus 45 units daily, Metformin 1000 mg BID, Glipizide 10 mg BID. Patient has had no hypoglycemic episodes. Patient has not been eating consistent carbohydrate meals, self-blood glucose monitoring has been above goal prior to admission. In addition, patient reported microvascular complications (neuropathy).     Lab Results   Component Value Date    LABA1C 10.5 02/28/2021     Past medical history:   Past Medical History:   Diagnosis Date    [facility]ronic obstructive [facility]) [facility]     Diabetes mellitus [facility]     GERD (gastroesophageal reflux disease)     Hyperlipidemia Past surgical history:  Past Surgical History:   Procedure Laterality Date    CORONARY ANGIOPLASTY WITH STENT PLACEMENT         Social history:   Tobacco:   has no history on file for tobacco.  Alcohol:   has no history on file for alcohol. Drugs:   has no history on file for drug. Family history:    No family history on file. Allergy and drug reactions:   No Known Allergies    Current medications  Current Outpatient Medications on File Prior to Visit   Medication Sig Dispense Refill    insulin lispro, 1 Unit Dial, (HUMALOG KWIKPEN) 100 UNIT/ML SOPN 12 units before meals plus a scale. A max of 100 units/day 10 pen 5    Insulin Pen Needle (PEN NEEDLES) 31G X 6 MM MISC 1 each by Does not apply route 4 times daily 150 each 11    tamsulosin (FLOMAX) 0.4 MG capsule Take 1 capsule by mouth daily 30 capsule 3    ipratropium-albuterol (DUONEB) 0.5-2.5 (3) MG/3ML SOLN nebulizer solution Inhale 3 mLs into the lungs every 4 hours (while awake) 360 mL 0    albuterol sulfate  (90 Base) MCG/ACT inhaler Inhale 2 puffs into the lungs every 6 hours as needed for Wheezing 1 Inhaler 3    Respiratory Therapy Supplies (FULL KIT NEBULIZER SET) MISC Indications: Acute Worsening of Chronic Obstructive Pulmonary Disease Use as directed with nebulized medication.  1 each 0    lisinopril (PRINIVIL;ZESTRIL) 5 MG tablet Take 5 mg by mouth daily      atorvastatin (LIPITOR) 40 MG tablet Take 40 mg by mouth daily      furosemide (LASIX) 40 MG tablet Take 40 mg by mouth daily      metFORMIN (GLUCOPHAGE) 1000 MG tablet Take 1,000 mg by mouth 2 times daily (with meals)      metoprolol tartrate (LOPRESSOR) 25 MG tablet Take 25 mg by mouth 2 times daily      isosorbide mononitrate (IMDUR) 30 MG extended release tablet Take 30 mg by mouth daily      insulin glargine (LANTUS) 100 UNIT/ML injection vial Inject 50 Units into the skin nightly      primidone (MYSOLINE) 50 MG tablet Take 50 mg by mouth nightly      Multiple Vitamins-Minerals (OCULAR VITAMINS PO) Take by mouth      guaiFENesin 400 MG tablet Take 400 mg by mouth 2 times daily       No current facility-administered medications on file prior to visit. OBJECTIVE    /74   Pulse 90   Temp 97 °F (36.1 °C)   Wt 243 lb (110.2 kg)   SpO2 94%   BMI 32.06 kg/m²   No intake or output data in the 24 hours ending 03/30/21 1127    Physical examination:  General: awake alert, oriented x3  HEENT: normocephalic non traumatic, no exophthalmos   Neck: supple, + thyromegaly, thyroid tenderness,    Pulm: bilateral basal crabcles with prolonged expiratory phase   CVS: S1 + S2  Abd: soft lax, no tenderness  Skin: warm, no lesions, no rash. No open wounds, no ulcers   Neuro: CN intact,  muscle power normal  Psych: normal mood, and affect    Review of Laboratory Data:  I personally reviewed the following labs:   No results for input(s): WBC, RBC, HGB, HCT, MCV, MCH, MCHC, RDW, PLT, MPV in the last 72 hours. No results for input(s): NA, K, CL, CO2, BUN, CREATININE, GLUCOSE, CALCIUM, PROT, LABALBU, BILITOT, ALKPHOS, AST, ALT in the last 72 hours.   No results found for: BHYDRXBUT  Lab Results   Component Value Date    LABA1C 10.5 02/28/2021     No results found for: TSH, T4FREE, C8CEFWJ, FT3, T6UTVVI, TSI, TPOABS, THGAB  Lab Results   Component Value Date    LABA1C 10.5 02/28/2021    GLUCOSE 133 03/03/2021     No results found for: TRIG, HDL, LDLCALC, CHOL    Blood culture   No results found for: [facility]    Radiology:  No orders to display     [address], a 65 y.o.-old male seen today for inpatient diabetes management     Diabetes Mellitus type 2    · Patient's diabetes is uncontrolled , A1c 10.5%   · Change DM regimen to Metformin 1000 mg BID, Lantus 50 units nightly, Humalog 15 units with meals + ss 3:50>150   · Continue glucose check with meals and at bedtime   · Will titrate insulin dose based on the blood glucose trend & insulin requirement  · Will arrange for patient to be seen in endocrinology clinic upon discharge for routine diabetes maintenance and prevention. Multinodular goiter   · I have reviewed images of both thyroid US and CT scan. Thyroid gland significantly enlarged with multiple large nodules causing some compression on his airway. This might (at least to some extent) playing some role in his progressive SOB over the past few years   · 3/11/2021 - FNA to dominant Rt side (6.8 cm), Lt (5cm) nodules --> benign   · Given compressive symptoms and the size of thyroid nodules (>4cm) pt will need thyroid surgery     HLD   · Continue Lipitor 40 mg daily     The above issues were reviewed with the patient who understood and agreed with the plan. Thank you for allowing us to participate in the care of this patient. Please do not hesitate to contact us with any additional questions. Sandip Jin MD  Endocrinologist, [facility] - BEHAVIORAL HEALTH SERVICES Diabetes Care and Endocrinology   [address] [facility] [address]   Phone: [phone]  Fax: [phone]  --------------------------------------------  An electronic signature was used to authenticate this note.  Tristin Moses MD on 3/30/2021 at 11:27 AM

## 2021-03-31 ENCOUNTER — CARE COORDINATION (OUTPATIENT)
Dept: CASE MANAGEMENT | Age: 71
End: 2021-03-31

## 2021-03-31 NOTE — CARE COORDINATION
[facility] 45 Transitions Follow Up Call    3/31/2021    Patient: Donna Martin  Patient : 1950   MRN: <[id number]>  Reason for Admission:   Discharge Date: 3/3/21 RARS: Readmission Risk Score: 17    Patient reports he is doing ok. He has to have a goiter and some nodules taken out of his throat. Appetite and fluid intake good. Bladder and bowel elimination normal. He denies cp, chills, fever or swelling. wears oxygen at 3 lpm. FBS this morning was 94. No questions or concerns at this time. Will continue to follow. Care Transitions Subsequent and Final Call    Subsequent and Final Calls  Are you currently active with any services?: Home Health  Care Transitions Interventions  Other Interventions:            Follow Up  Future Appointments   Date Time Provider Iwona Guan   2021  8:20 AM Saad Norris MD [facility]       Kasey Mota LPN

## 2021-04-09 ENCOUNTER — CARE COORDINATION (OUTPATIENT)
Dept: CASE MANAGEMENT | Age: 71
End: 2021-04-09

## 2021-04-09 NOTE — CARE COORDINATION
Attempted to reach pt for BPCI-A follow up call. Woman answered and states pt is resting right now and to call him another time.      Jeaneth Chris RN  Care Transition Coordinator  [phone]

## 2021-04-19 ENCOUNTER — CARE COORDINATION (OUTPATIENT)
Dept: CASE MANAGEMENT | Age: 71
End: 2021-04-19

## 2021-04-19 NOTE — CARE COORDINATION
[facility] 45 Transitions Follow Up Call    2021    Patient: Xiomy Pierce  Patient : 1950   MRN: <[id number]>  Reason for Admission:   Discharge Date: 3/3/21 RARS: Readmission Risk Score: 17    Attempted to contact patient for BPCI-A follow up. Unable to reach patient. User busy signal.  Attempted to call back a second time. Call was picked up and immediately disconnected. Will try again at a later time.       Follow Up  Future Appointments   Date Time Provider Department Center   2021  8:20 AM Willie Das MD [facility]       Patricia Gutierrez RN

## 2021-05-06 ENCOUNTER — CARE COORDINATION (OUTPATIENT)
Dept: CASE MANAGEMENT | Age: 71
End: 2021-05-06

## 2021-05-06 NOTE — CARE COORDINATION
[address] [facility] ([facility]) Follow Up Call  Qualifying Diagnosis related to Pulmonary Function and Health. 5/6/2021  Patient Name:  Natan Morillo   YOB: 1950  Discharge Date:  3/3/21  RARS:  Readmission Risk Score: 17    PCP:  Lynn Prieto MD    Assessment:      Short of Breath: yes, always, getting worse.  Cough Frequency, Productive/Color: Occasional coughing spells, \"rattles\". Instructed importance of seeing his Pulmonologist ASAP. V/U   Appetite:OK   Sleep pattern: \"always bad\", states that the medications he has tried have more side effects. Instructed that the ordering physician can work with him to assist him to sleep. Suggested that he communicate this to his physician. V/U   Thinking clearly:yes   Tired/weakness: legs are weak   Fever, Chills Sweating: denies   Headaches: denies  [address],  Active: denies need   Medication Needs/Questions: denies   Transportation Need:  drives   Live Alone: no   Ability to Prepare Meals, Bathe: ok   Recent loss of balance, Falls: denies   Do you feel like you have everything you need to stay well at home? yes   Follow Up Appointment: completed   Agreeable to ongoing Care Transition Communications: yes  Care Transitions will continue to follow per [facility] Program.  Leena Noel, RN, CTN  Follow Up Concerns: SOB, Cough, Rattles, difficulty sleeping, weakness.     Future Appointments   Date Time Provider Iwona Guan   7/20/2021  8:20 AM Franco Loera MD [facility]

## 2021-05-20 ENCOUNTER — CARE COORDINATION (OUTPATIENT)
Dept: CASE MANAGEMENT | Age: 71
End: 2021-05-20

## 2021-05-20 NOTE — CARE COORDINATION
[facility] Transitions Bundled Payments for Care Improvement (BP) Follow Up Call  Qualifying Diagnosis related to Pulmonary Function and Health.    5/20/2021  Patient Name:  [place]   YOB: 1950  Discharge Date:  3/3/21  RARS:  Readmission Risk Score: 17    PCP:  Willie Mercado MD    Assessment:     Michi Ravens of Breath: always   Cough Frequency, Productive/Color: dry, frequent. Feels like the presence of a 6.5 mm goiter and 4 other nodules impaire his ability to breath. States he has physician appointments set up. Needs to be cleared for the removal of goiters.  Appetite: ok   Sleep pattern: does not sleep, states is irritable and naps a little bit during the day.  Thinking clearly: \"probably not\"   Tired/weakness: \"Terrible\"  [address],  Active: no   Medication Needs/Questions: denies   Transportation Need: drives   Live Alone: wife is in [place] for a few weeks.  Ability to Prepare Meals, Bathe: ok   Recent loss of balance, Falls: denies   Agreeable to ongoing Care Transition Communications:  Care Transitions will continue to follow per [facility] , RN, CTN  Follow Up Concerns: waiting on appointments to be cleared for removal of goiters. Insomnia, weakness, short of breath, evening coughing spells.     Future Appointments   Date Time Provider Iwona Guan   7/20/2021  8:20 AM Erwin Doherty MD [facility]

## 2021-05-27 ENCOUNTER — CARE COORDINATION (OUTPATIENT)
Dept: CASE MANAGEMENT | Age: 71
End: 2021-05-27

## 2021-07-23 ENCOUNTER — HOSPITAL ENCOUNTER (INPATIENT)
Dept: HOSPITAL 83 - ED | Age: 71
LOS: 5 days | Discharge: NURSING, REHAB OR LONG-TERM CARE | DRG: 871 | End: 2021-07-28
Attending: STUDENT IN AN ORGANIZED HEALTH CARE EDUCATION/TRAINING PROGRAM | Admitting: STUDENT IN AN ORGANIZED HEALTH CARE EDUCATION/TRAINING PROGRAM
Payer: MEDICARE

## 2021-07-23 VITALS — DIASTOLIC BLOOD PRESSURE: 62 MMHG | SYSTOLIC BLOOD PRESSURE: 148 MMHG

## 2021-07-23 VITALS — DIASTOLIC BLOOD PRESSURE: 50 MMHG | SYSTOLIC BLOOD PRESSURE: 102 MMHG

## 2021-07-23 VITALS — DIASTOLIC BLOOD PRESSURE: 66 MMHG

## 2021-07-23 VITALS — DIASTOLIC BLOOD PRESSURE: 52 MMHG

## 2021-07-23 VITALS — DIASTOLIC BLOOD PRESSURE: 49 MMHG

## 2021-07-23 VITALS — SYSTOLIC BLOOD PRESSURE: 98 MMHG | DIASTOLIC BLOOD PRESSURE: 60 MMHG

## 2021-07-23 VITALS — DIASTOLIC BLOOD PRESSURE: 67 MMHG | SYSTOLIC BLOOD PRESSURE: 128 MMHG

## 2021-07-23 VITALS — DIASTOLIC BLOOD PRESSURE: 88 MMHG

## 2021-07-23 VITALS — WEIGHT: 242.51 LBS | HEIGHT: 72.83 IN | BODY MASS INDEX: 32.14 KG/M2

## 2021-07-23 VITALS — SYSTOLIC BLOOD PRESSURE: 132 MMHG | DIASTOLIC BLOOD PRESSURE: 57 MMHG

## 2021-07-23 VITALS — DIASTOLIC BLOOD PRESSURE: 64 MMHG

## 2021-07-23 DIAGNOSIS — Z79.899: ICD-10-CM

## 2021-07-23 DIAGNOSIS — Z20.822: ICD-10-CM

## 2021-07-23 DIAGNOSIS — E87.1: ICD-10-CM

## 2021-07-23 DIAGNOSIS — Z79.52: ICD-10-CM

## 2021-07-23 DIAGNOSIS — Z91.19: ICD-10-CM

## 2021-07-23 DIAGNOSIS — E66.01: ICD-10-CM

## 2021-07-23 DIAGNOSIS — S09.90XA: ICD-10-CM

## 2021-07-23 DIAGNOSIS — N17.0: ICD-10-CM

## 2021-07-23 DIAGNOSIS — E11.65: ICD-10-CM

## 2021-07-23 DIAGNOSIS — G47.00: ICD-10-CM

## 2021-07-23 DIAGNOSIS — J96.21: ICD-10-CM

## 2021-07-23 DIAGNOSIS — E87.3: ICD-10-CM
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
## 2021-07-23 DIAGNOSIS — Z80.1: ICD-10-CM

## 2021-07-23 DIAGNOSIS — E11.649: ICD-10-CM

## 2021-07-23 DIAGNOSIS — J15.6: ICD-10-CM

## 2021-07-23 DIAGNOSIS — J96.22: ICD-10-CM
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
## 2021-07-23 DIAGNOSIS — Z80.0: ICD-10-CM

## 2021-07-23 DIAGNOSIS — I10: ICD-10-CM
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
## 2021-07-23 DIAGNOSIS — Z95.5: ICD-10-CM

## 2021-07-23 DIAGNOSIS — Z79.4: ICD-10-CM

## 2021-07-23 DIAGNOSIS — D64.9: ICD-10-CM

## 2021-07-23 DIAGNOSIS — A41.9: Primary | ICD-10-CM

## 2021-07-23 DIAGNOSIS — J44.0: ICD-10-CM

## 2021-07-23 DIAGNOSIS — I25.10: ICD-10-CM

## 2021-07-23 DIAGNOSIS — D72.819: ICD-10-CM

## 2021-07-23 DIAGNOSIS — E04.2: ICD-10-CM

## 2021-07-23 DIAGNOSIS — N40.0: ICD-10-CM

## 2021-07-23 DIAGNOSIS — F17.210: ICD-10-CM

## 2021-07-23 DIAGNOSIS — E87.5: ICD-10-CM

## 2021-07-23 DIAGNOSIS — E78.5: ICD-10-CM

## 2021-07-23 LAB
25(OH)D3 SERPL-MCNC: 34.6 NG/ML (ref 30–100)
ALBUMIN SERPL-MCNC: 3.2 GM/DL (ref 3.1–4.5)
ALBUMIN SERPL-MCNC: 3.4 GM/DL (ref 3.1–4.5)
ALP SERPL-CCNC: 72 U/L (ref 45–117)
ALP SERPL-CCNC: 79 U/L (ref 45–117)
ALT SERPL W P-5'-P-CCNC: 32 U/L (ref 12–78)
ALT SERPL W P-5'-P-CCNC: 32 U/L (ref 12–78)
AST SERPL-CCNC: 19 IU/L (ref 3–35)
AST SERPL-CCNC: 22 IU/L (ref 3–35)
BASOPHILS # BLD AUTO: 0 10*3/UL (ref 0–0.1)
BASOPHILS # BLD AUTO: 0 10*3/UL (ref 0–0.1)
BASOPHILS NFR BLD AUTO: 0.2 % (ref 0–1)
BASOPHILS NFR BLD AUTO: 0.2 % (ref 0–1)
BUN SERPL-MCNC: 36 MG/DL (ref 7–24)
BUN SERPL-MCNC: 36 MG/DL (ref 7–24)
CHLORIDE SERPL-SCNC: 104 MMOL/L (ref 98–107)
CHLORIDE SERPL-SCNC: 105 MMOL/L (ref 98–107)
CHOLEST SERPL-MCNC: 84 MG/DL (ref ?–200)
CREAT SERPL-MCNC: 1.52 MG/DL (ref 0.7–1.3)
CREAT SERPL-MCNC: 1.64 MG/DL (ref 0.7–1.3)
EOSINOPHIL # BLD AUTO: 0 10*3/UL (ref 0–0.4)
EOSINOPHIL # BLD AUTO: 0 10*3/UL (ref 0–0.4)
EOSINOPHIL # BLD AUTO: 0.6 % (ref 1–4)
EOSINOPHIL # BLD AUTO: 0.9 % (ref 1–4)
ERYTHROCYTE [DISTWIDTH] IN BLOOD BY AUTOMATED COUNT: 14 % (ref 0–14.5)
ERYTHROCYTE [DISTWIDTH] IN BLOOD BY AUTOMATED COUNT: 14.2 % (ref 0–14.5)
HCT VFR BLD AUTO: 31.7 % (ref 42–52)
HCT VFR BLD AUTO: 33 % (ref 42–52)
LDLC SERPL DIRECT ASSAY-MCNC: 22 MG/DL (ref 9–159)
LYMPHOCYTES # BLD AUTO: 0.6 10*3/UL (ref 1.3–4.4)
LYMPHOCYTES # BLD AUTO: 0.6 10*3/UL (ref 1.3–4.4)
LYMPHOCYTES NFR BLD AUTO: 11.4 % (ref 27–41)
LYMPHOCYTES NFR BLD AUTO: 12.9 % (ref 27–41)
MCH RBC QN AUTO: 24.1 PG (ref 27–31)
MCH RBC QN AUTO: 24.4 PG (ref 27–31)
MCHC RBC AUTO-ENTMCNC: 27.9 G/DL (ref 33–37)
MCHC RBC AUTO-ENTMCNC: 28.1 G/DL (ref 33–37)
MCV RBC AUTO: 86.4 FL (ref 80–94)
MCV RBC AUTO: 86.8 FL (ref 80–94)
MONOCYTES # BLD AUTO: 0.3 10*3/UL (ref 0.1–1)
MONOCYTES # BLD AUTO: 0.5 10*3/UL (ref 0.1–1)
MONOCYTES NFR BLD MANUAL: 10.6 % (ref 3–9)
MONOCYTES NFR BLD MANUAL: 7 % (ref 3–9)
NEUT #: 3.2 10*3/UL (ref 2.3–7.9)
NEUT #: 3.8 10*3/UL (ref 2.3–7.9)
NEUT %: 72.7 % (ref 47–73)
NEUT %: 78.9 % (ref 47–73)
NRBC BLD QL AUTO: 0 % (ref 0–0)
NRBC BLD QL AUTO: 0 10*3/UL (ref 0–0)
PLATELET # BLD AUTO: 162 10*3/UL (ref 130–400)
PLATELET # BLD AUTO: 163 10*3/UL (ref 130–400)
PMV BLD AUTO: 10 FL (ref 9.6–12.3)
PMV BLD AUTO: 9.2 FL (ref 9.6–12.3)
POTASSIUM SERPL-SCNC: 4.2 MMOL/L (ref 3.5–5.1)
POTASSIUM SERPL-SCNC: 5.2 MMOL/L (ref 3.5–5.1)
PROT SERPL-MCNC: 6.7 GM/DL (ref 6.4–8.2)
PROT SERPL-MCNC: 6.9 GM/DL (ref 6.4–8.2)
RBC # BLD AUTO: 3.65 10*6/UL (ref 4.5–5.9)
RBC # BLD AUTO: 3.82 10*6/UL (ref 4.5–5.9)
SODIUM SERPL-SCNC: 138 MMOL/L (ref 136–145)
SODIUM SERPL-SCNC: 139 MMOL/L (ref 136–145)
T4 FREE SERPL-MCNC: 0.94 NG/DL (ref 0.76–1.46)
TRIGL SERPL-MCNC: 52 MG/DL (ref ?–150)
TSH SERPL DL<=0.005 MIU/L-ACNC: 0.94 UIU/ML (ref 0.36–4.75)
VITAMIN B12: 274 PG/ML (ref 247–911)
WBC NRBC COR # BLD AUTO: 4.4 10*3/UL (ref 4.8–10.8)
WBC NRBC COR # BLD AUTO: 4.8 10*3/UL (ref 4.8–10.8)

## 2021-07-24 VITALS — DIASTOLIC BLOOD PRESSURE: 50 MMHG | SYSTOLIC BLOOD PRESSURE: 110 MMHG

## 2021-07-24 VITALS — SYSTOLIC BLOOD PRESSURE: 96 MMHG | DIASTOLIC BLOOD PRESSURE: 46 MMHG

## 2021-07-24 VITALS — DIASTOLIC BLOOD PRESSURE: 74 MMHG | SYSTOLIC BLOOD PRESSURE: 146 MMHG

## 2021-07-24 VITALS — DIASTOLIC BLOOD PRESSURE: 67 MMHG

## 2021-07-24 VITALS — DIASTOLIC BLOOD PRESSURE: 89 MMHG | SYSTOLIC BLOOD PRESSURE: 136 MMHG

## 2021-07-24 VITALS — DIASTOLIC BLOOD PRESSURE: 62 MMHG

## 2021-07-24 VITALS — SYSTOLIC BLOOD PRESSURE: 148 MMHG | DIASTOLIC BLOOD PRESSURE: 62 MMHG

## 2021-07-24 LAB
BASOPHILS # BLD AUTO: 0 10*3/UL (ref 0–0.1)
BASOPHILS NFR BLD AUTO: 0.4 % (ref 0–1)
BUN SERPL-MCNC: 20 MG/DL (ref 7–24)
CHLORIDE SERPL-SCNC: 101 MMOL/L (ref 98–107)
CREAT SERPL-MCNC: 1.09 MG/DL (ref 0.7–1.3)
EOSINOPHIL # BLD AUTO: 0.1 10*3/UL (ref 0–0.4)
EOSINOPHIL # BLD AUTO: 1.8 % (ref 1–4)
ERYTHROCYTE [DISTWIDTH] IN BLOOD BY AUTOMATED COUNT: 14.1 % (ref 0–14.5)
HCT VFR BLD AUTO: 30.9 % (ref 42–52)
LYMPHOCYTES # BLD AUTO: 0.6 10*3/UL (ref 1.3–4.4)
LYMPHOCYTES NFR BLD AUTO: 12.2 % (ref 27–41)
MCH RBC QN AUTO: 24 PG (ref 27–31)
MCHC RBC AUTO-ENTMCNC: 28.2 G/DL (ref 33–37)
MCV RBC AUTO: 85.1 FL (ref 80–94)
MONOCYTES # BLD AUTO: 0.4 10*3/UL (ref 0.1–1)
MONOCYTES NFR BLD MANUAL: 8.2 % (ref 3–9)
NEUT #: 3.4 10*3/UL (ref 2.3–7.9)
NEUT %: 76.1 % (ref 47–73)
NRBC BLD QL AUTO: 0 % (ref 0–0)
PLATELET # BLD AUTO: 176 10*3/UL (ref 130–400)
PMV BLD AUTO: 9.8 FL (ref 9.6–12.3)
POTASSIUM SERPL-SCNC: 4.7 MMOL/L (ref 3.5–5.1)
RBC # BLD AUTO: 3.63 10*6/UL (ref 4.5–5.9)
SODIUM SERPL-SCNC: 138 MMOL/L (ref 136–145)
WBC NRBC COR # BLD AUTO: 4.5 10*3/UL (ref 4.8–10.8)

## 2021-07-25 VITALS — DIASTOLIC BLOOD PRESSURE: 46 MMHG

## 2021-07-25 VITALS — SYSTOLIC BLOOD PRESSURE: 128 MMHG | DIASTOLIC BLOOD PRESSURE: 74 MMHG

## 2021-07-25 VITALS — DIASTOLIC BLOOD PRESSURE: 66 MMHG

## 2021-07-25 VITALS — SYSTOLIC BLOOD PRESSURE: 115 MMHG | DIASTOLIC BLOOD PRESSURE: 44 MMHG

## 2021-07-25 VITALS — DIASTOLIC BLOOD PRESSURE: 64 MMHG | SYSTOLIC BLOOD PRESSURE: 150 MMHG

## 2021-07-25 LAB
BASOPHILS # BLD AUTO: 0 10*3/UL (ref 0–0.1)
BASOPHILS NFR BLD AUTO: 0.3 % (ref 0–1)
BUN SERPL-MCNC: 15 MG/DL (ref 7–24)
CHLORIDE SERPL-SCNC: 100 MMOL/L (ref 98–107)
CREAT SERPL-MCNC: 1.18 MG/DL (ref 0.7–1.3)
EOSINOPHIL # BLD AUTO: 0.1 10*3/UL (ref 0–0.4)
EOSINOPHIL # BLD AUTO: 1.7 % (ref 1–4)
ERYTHROCYTE [DISTWIDTH] IN BLOOD BY AUTOMATED COUNT: 14.1 % (ref 0–14.5)
HCT VFR BLD AUTO: 29.9 % (ref 42–52)
LYMPHOCYTES # BLD AUTO: 0.5 10*3/UL (ref 1.3–4.4)
LYMPHOCYTES NFR BLD AUTO: 14.1 % (ref 27–41)
MCH RBC QN AUTO: 24.2 PG (ref 27–31)
MCHC RBC AUTO-ENTMCNC: 28.1 G/DL (ref 33–37)
MCV RBC AUTO: 86.2 FL (ref 80–94)
MONOCYTES # BLD AUTO: 0.3 10*3/UL (ref 0.1–1)
MONOCYTES NFR BLD MANUAL: 8.6 % (ref 3–9)
NEUT #: 2.5 10*3/UL (ref 2.3–7.9)
NEUT %: 73.3 % (ref 47–73)
NRBC BLD QL AUTO: 0 % (ref 0–0)
PLATELET # BLD AUTO: 147 10*3/UL (ref 130–400)
PMV BLD AUTO: 9.7 FL (ref 9.6–12.3)
POTASSIUM SERPL-SCNC: 4.5 MMOL/L (ref 3.5–5.1)
RBC # BLD AUTO: 3.47 10*6/UL (ref 4.5–5.9)
SODIUM SERPL-SCNC: 135 MMOL/L (ref 136–145)
WBC NRBC COR # BLD AUTO: 3.5 10*3/UL (ref 4.8–10.8)

## 2021-07-26 VITALS — SYSTOLIC BLOOD PRESSURE: 109 MMHG | DIASTOLIC BLOOD PRESSURE: 56 MMHG

## 2021-07-26 VITALS — DIASTOLIC BLOOD PRESSURE: 52 MMHG

## 2021-07-26 VITALS — DIASTOLIC BLOOD PRESSURE: 59 MMHG

## 2021-07-26 VITALS — DIASTOLIC BLOOD PRESSURE: 76 MMHG

## 2021-07-26 VITALS — DIASTOLIC BLOOD PRESSURE: 64 MMHG | SYSTOLIC BLOOD PRESSURE: 116 MMHG

## 2021-07-27 VITALS — DIASTOLIC BLOOD PRESSURE: 52 MMHG

## 2021-07-27 VITALS — DIASTOLIC BLOOD PRESSURE: 68 MMHG

## 2021-07-27 VITALS — DIASTOLIC BLOOD PRESSURE: 50 MMHG | SYSTOLIC BLOOD PRESSURE: 106 MMHG

## 2021-07-27 VITALS — DIASTOLIC BLOOD PRESSURE: 49 MMHG

## 2021-07-27 VITALS — DIASTOLIC BLOOD PRESSURE: 71 MMHG

## 2021-07-27 LAB
BASE EXCESS BLDA CALC-SCNC: 10.9 MMOL/L (ref -2–2)
BASE EXCESS BLDA CALC-SCNC: 11.7 MMOL/L (ref -2–2)
BASE EXCESS BLDA CALC-SCNC: 13.6 MMOL/L (ref -2–2)
BASOPHILS # BLD AUTO: 0 10*3/UL (ref 0–0.1)
BASOPHILS NFR BLD AUTO: 0.3 % (ref 0–1)
BUN SERPL-MCNC: 14 MG/DL (ref 7–24)
CHLORIDE SERPL-SCNC: 99 MMOL/L (ref 98–107)
CREAT SERPL-MCNC: 1.23 MG/DL (ref 0.7–1.3)
EOSINOPHIL # BLD AUTO: 0.1 10*3/UL (ref 0–0.4)
EOSINOPHIL # BLD AUTO: 1.7 % (ref 1–4)
ERYTHROCYTE [DISTWIDTH] IN BLOOD BY AUTOMATED COUNT: 14.1 % (ref 0–14.5)
HCT VFR BLD AUTO: 30.3 % (ref 42–52)
LYMPHOCYTES # BLD AUTO: 0.4 10*3/UL (ref 1.3–4.4)
LYMPHOCYTES NFR BLD AUTO: 12.4 % (ref 27–41)
MCH RBC QN AUTO: 24.2 PG (ref 27–31)
MCHC RBC AUTO-ENTMCNC: 27.4 G/DL (ref 33–37)
MCV RBC AUTO: 88.3 FL (ref 80–94)
MONOCYTES # BLD AUTO: 0.3 10*3/UL (ref 0.1–1)
MONOCYTES NFR BLD MANUAL: 9 % (ref 3–9)
NEUT #: 2.6 10*3/UL (ref 2.3–7.9)
NEUT %: 75.2 % (ref 47–73)
NRBC BLD QL AUTO: 0 % (ref 0–0)
PCO2 BLDA: 72 MMHG (ref 35–45)
PCO2 BLDA: 76 MMHG (ref 35–45)
PCO2 BLDA: 82 MMHG (ref 35–45)
PH BLDA: 7.33 [PH] (ref 7.35–7.45)
PH BLDA: 7.33 [PH] (ref 7.35–7.45)
PH BLDA: 7.34 [PH] (ref 7.35–7.45)
PLATELET # BLD AUTO: 141 10*3/UL (ref 130–400)
PMV BLD AUTO: 9.3 FL (ref 9.6–12.3)
PO2 BLDA: 74.1 MM[HG] (ref 80–90)
PO2 BLDA: 76.8 MM[HG] (ref 80–90)
PO2 BLDA: 79.5 MM[HG] (ref 80–90)
POTASSIUM SERPL-SCNC: 5.1 MMOL/L (ref 3.5–5.1)
RBC # BLD AUTO: 3.43 10*6/UL (ref 4.5–5.9)
SAO2 % BLDA: 94 % (ref 95–97)
SAO2 % BLDA: 96 % (ref 95–97)
SAO2 % BLDA: 96 % (ref 95–97)
SODIUM SERPL-SCNC: 135 MMOL/L (ref 136–145)
WBC NRBC COR # BLD AUTO: 3.5 10*3/UL (ref 4.8–10.8)

## 2021-07-27 PROCEDURE — 5A09357 ASSISTANCE WITH RESPIRATORY VENTILATION, LESS THAN 24 CONSECUTIVE HOURS, CONTINUOUS POSITIVE AIRWAY PRESSURE: ICD-10-PCS | Performed by: STUDENT IN AN ORGANIZED HEALTH CARE EDUCATION/TRAINING PROGRAM

## 2021-07-27 PROCEDURE — 5A0935A ASSISTANCE WITH RESPIRATORY VENTILATION, LESS THAN 24 CONSECUTIVE HOURS, HIGH NASAL FLOW/VELOCITY: ICD-10-PCS | Performed by: STUDENT IN AN ORGANIZED HEALTH CARE EDUCATION/TRAINING PROGRAM

## 2021-07-28 VITALS — DIASTOLIC BLOOD PRESSURE: 53 MMHG

## 2021-07-28 VITALS — DIASTOLIC BLOOD PRESSURE: 73 MMHG

## 2021-07-28 LAB
BASE EXCESS BLDA CALC-SCNC: 13.1 MMOL/L (ref -2–2)
BASOPHILS # BLD AUTO: 0 10*3/UL (ref 0–0.1)
BASOPHILS NFR BLD AUTO: 0.3 % (ref 0–1)
BUN SERPL-MCNC: 16 MG/DL (ref 7–24)
CHLORIDE SERPL-SCNC: 99 MMOL/L (ref 98–107)
CREAT SERPL-MCNC: 1.17 MG/DL (ref 0.7–1.3)
EOSINOPHIL # BLD AUTO: 0.1 10*3/UL (ref 0–0.4)
EOSINOPHIL # BLD AUTO: 1.3 % (ref 1–4)
ERYTHROCYTE [DISTWIDTH] IN BLOOD BY AUTOMATED COUNT: 14.3 % (ref 0–14.5)
HCT VFR BLD AUTO: 30.3 % (ref 42–52)
LYMPHOCYTES # BLD AUTO: 0.5 10*3/UL (ref 1.3–4.4)
LYMPHOCYTES NFR BLD AUTO: 12.4 % (ref 27–41)
MCH RBC QN AUTO: 24.2 PG (ref 27–31)
MCHC RBC AUTO-ENTMCNC: 27.4 G/DL (ref 33–37)
MCV RBC AUTO: 88.3 FL (ref 80–94)
MONOCYTES # BLD AUTO: 0.3 10*3/UL (ref 0.1–1)
MONOCYTES NFR BLD MANUAL: 8.8 % (ref 3–9)
NEUT #: 3 10*3/UL (ref 2.3–7.9)
NEUT %: 75.9 % (ref 47–73)
NRBC BLD QL AUTO: 0 10*3/UL (ref 0–0)
PCO2 BLDA: 93 MMHG (ref 35–45)
PH BLDA: 7.28 [PH] (ref 7.35–7.45)
PLATELET # BLD AUTO: 153 10*3/UL (ref 130–400)
PMV BLD AUTO: 9.7 FL (ref 9.6–12.3)
PO2 BLDA: 65.3 MM[HG] (ref 80–90)
POTASSIUM SERPL-SCNC: 5.5 MMOL/L (ref 3.5–5.1)
RBC # BLD AUTO: 3.43 10*6/UL (ref 4.5–5.9)
SAO2 % BLDA: 92 % (ref 95–97)
SODIUM SERPL-SCNC: 137 MMOL/L (ref 136–145)
WBC NRBC COR # BLD AUTO: 3.9 10*3/UL (ref 4.8–10.8)

## 2021-07-28 PROCEDURE — 5A09357 ASSISTANCE WITH RESPIRATORY VENTILATION, LESS THAN 24 CONSECUTIVE HOURS, CONTINUOUS POSITIVE AIRWAY PRESSURE: ICD-10-PCS | Performed by: STUDENT IN AN ORGANIZED HEALTH CARE EDUCATION/TRAINING PROGRAM

## 2022-03-21 DIAGNOSIS — E11.65 POORLY CONTROLLED DIABETES MELLITUS (HCC): ICD-10-CM

## 2022-03-22 RX ORDER — PEN NEEDLE, DIABETIC 32GX 5/32"
NEEDLE, DISPOSABLE MISCELLANEOUS
Qty: 100 EACH | Refills: 5 | Status: SHIPPED | OUTPATIENT
Start: 2022-03-22

## 2022-05-17 ENCOUNTER — HOSPITAL ENCOUNTER (OUTPATIENT)
Dept: CT IMAGING | Age: 72
Discharge: HOME | End: 2022-05-19
Payer: MEDICARE

## 2022-05-17 DIAGNOSIS — J44.9 OBSTRUCTIVE CHRONIC BRONCHITIS WITHOUT EXACERBATION (HCC): ICD-10-CM

## 2022-05-17 PROCEDURE — 71250 CT THORAX DX C-: CPT

## 2022-09-01 ENCOUNTER — HOSPITAL ENCOUNTER (OUTPATIENT)
Dept: CT IMAGING | Age: 72
Discharge: HOME | DRG: 291 | End: 2022-09-03
Payer: MEDICARE

## 2022-09-01 DIAGNOSIS — R93.89 ABNORMAL CHEST CT: ICD-10-CM

## 2022-09-01 PROCEDURE — 71250 CT THORAX DX C-: CPT

## 2022-09-04 ENCOUNTER — HOSPITAL ENCOUNTER (INPATIENT)
Age: 72
LOS: 4 days | Discharge: HOME | DRG: 291 | End: 2022-09-08
Attending: EMERGENCY MEDICINE | Admitting: INTERNAL MEDICINE
Payer: MEDICARE

## 2022-09-04 ENCOUNTER — APPOINTMENT (OUTPATIENT)
Dept: GENERAL RADIOLOGY | Age: 72
DRG: 291 | End: 2022-09-04
Payer: MEDICARE

## 2022-09-04 DIAGNOSIS — R06.02 SHORTNESS OF BREATH: ICD-10-CM

## 2022-09-04 DIAGNOSIS — I50.9 ACUTE ON CHRONIC CONGESTIVE HEART FAILURE, UNSPECIFIED HEART FAILURE TYPE (HCC): Primary | ICD-10-CM

## 2022-09-04 LAB
ANION GAP SERPL CALCULATED.3IONS-SCNC: 11 MMOL/L (ref 7–16)
BUN BLDV-MCNC: 27 MG/DL (ref 6–23)
CALCIUM SERPL-MCNC: 9.2 MG/DL (ref 8.6–10.2)
CHLORIDE BLD-SCNC: 99 MMOL/L (ref 98–107)
CO2: 30 MMOL/L (ref 22–29)
CREAT SERPL-MCNC: 1.4 MG/DL (ref 0.7–1.2)
D DIMER: <200 NG/ML DDU
GFR AFRICAN AMERICAN: 60
GFR NON-AFRICAN AMERICAN: 50 ML/MIN/1.73
GLUCOSE BLD-MCNC: 133 MG/DL (ref 74–99)
HCT VFR BLD CALC: 36 % (ref 37–54)
HEMOGLOBIN: 11.6 G/DL (ref 12.5–16.5)
INFLUENZA A BY PCR: NOT DETECTED
INFLUENZA B BY PCR: NOT DETECTED
INR BLD: 1.1
MCH RBC QN AUTO: 28.9 PG (ref 26–35)
MCHC RBC AUTO-ENTMCNC: 32.2 % (ref 32–34.5)
MCV RBC AUTO: 89.8 FL (ref 80–99.9)
METER GLUCOSE: 79 MG/DL (ref 74–99)
PDW BLD-RTO: 14.2 FL (ref 11.5–15)
PLATELET # BLD: 141 E9/L (ref 130–450)
PMV BLD AUTO: 9.4 FL (ref 7–12)
POTASSIUM SERPL-SCNC: 5.3 MMOL/L (ref 3.5–5)
PRO-BNP: 143 PG/ML (ref 0–125)
PROTHROMBIN TIME: 11.9 SEC (ref 9.3–12.4)
RBC # BLD: 4.01 E12/L (ref 3.8–5.8)
SARS-COV-2, NAAT: NOT DETECTED
SODIUM BLD-SCNC: 140 MMOL/L (ref 132–146)
TROPONIN, HIGH SENSITIVITY: 29 NG/L (ref 0–11)
TROPONIN, HIGH SENSITIVITY: 34 NG/L (ref 0–11)
WBC # BLD: 7.6 E9/L (ref 4.5–11.5)

## 2022-09-04 PROCEDURE — 6360000002 HC RX W HCPCS: Performed by: INTERNAL MEDICINE

## 2022-09-04 PROCEDURE — 2580000003 HC RX 258: Performed by: INTERNAL MEDICINE

## 2022-09-04 PROCEDURE — 99285 EMERGENCY DEPT VISIT HI MDM: CPT

## 2022-09-04 PROCEDURE — 83880 ASSAY OF NATRIURETIC PEPTIDE: CPT

## 2022-09-04 PROCEDURE — 87635 SARS-COV-2 COVID-19 AMP PRB: CPT

## 2022-09-04 PROCEDURE — 2060000000 HC ICU INTERMEDIATE R&B

## 2022-09-04 PROCEDURE — 93005 ELECTROCARDIOGRAM TRACING: CPT | Performed by: PHYSICIAN ASSISTANT

## 2022-09-04 PROCEDURE — 85610 PROTHROMBIN TIME: CPT

## 2022-09-04 PROCEDURE — 85378 FIBRIN DEGRADE SEMIQUANT: CPT

## 2022-09-04 PROCEDURE — 87502 INFLUENZA DNA AMP PROBE: CPT

## 2022-09-04 PROCEDURE — 6370000000 HC RX 637 (ALT 250 FOR IP): Performed by: INTERNAL MEDICINE

## 2022-09-04 PROCEDURE — 82962 GLUCOSE BLOOD TEST: CPT

## 2022-09-04 PROCEDURE — 84484 ASSAY OF TROPONIN QUANT: CPT

## 2022-09-04 PROCEDURE — 71046 X-RAY EXAM CHEST 2 VIEWS: CPT

## 2022-09-04 PROCEDURE — 80048 BASIC METABOLIC PNL TOTAL CA: CPT

## 2022-09-04 PROCEDURE — 99223 1ST HOSP IP/OBS HIGH 75: CPT | Performed by: INTERNAL MEDICINE

## 2022-09-04 PROCEDURE — 85027 COMPLETE CBC AUTOMATED: CPT

## 2022-09-04 PROCEDURE — 94640 AIRWAY INHALATION TREATMENT: CPT

## 2022-09-04 PROCEDURE — 6360000002 HC RX W HCPCS: Performed by: STUDENT IN AN ORGANIZED HEALTH CARE EDUCATION/TRAINING PROGRAM

## 2022-09-04 RX ORDER — ALBUTEROL SULFATE 2.5 MG/3ML
2.5 SOLUTION RESPIRATORY (INHALATION) 4 TIMES DAILY
Status: DISCONTINUED | OUTPATIENT
Start: 2022-09-04 | End: 2022-09-08 | Stop reason: HOSPADM

## 2022-09-04 RX ORDER — ACETAMINOPHEN 325 MG/1
650 TABLET ORAL EVERY 6 HOURS PRN
Status: DISCONTINUED | OUTPATIENT
Start: 2022-09-04 | End: 2022-09-08 | Stop reason: HOSPADM

## 2022-09-04 RX ORDER — DEXTROSE MONOHYDRATE 100 MG/ML
INJECTION, SOLUTION INTRAVENOUS CONTINUOUS PRN
Status: DISCONTINUED | OUTPATIENT
Start: 2022-09-04 | End: 2022-09-08 | Stop reason: HOSPADM

## 2022-09-04 RX ORDER — INSULIN LISPRO 100 [IU]/ML
0-4 INJECTION, SOLUTION INTRAVENOUS; SUBCUTANEOUS NIGHTLY
Status: DISCONTINUED | OUTPATIENT
Start: 2022-09-04 | End: 2022-09-08 | Stop reason: HOSPADM

## 2022-09-04 RX ORDER — METHYLPREDNISOLONE SODIUM SUCCINATE 40 MG/ML
40 INJECTION, POWDER, LYOPHILIZED, FOR SOLUTION INTRAMUSCULAR; INTRAVENOUS EVERY 12 HOURS
Status: DISCONTINUED | OUTPATIENT
Start: 2022-09-04 | End: 2022-09-06

## 2022-09-04 RX ORDER — FUROSEMIDE 10 MG/ML
20 INJECTION INTRAMUSCULAR; INTRAVENOUS ONCE
Status: COMPLETED | OUTPATIENT
Start: 2022-09-04 | End: 2022-09-04

## 2022-09-04 RX ORDER — FUROSEMIDE 40 MG/1
40 TABLET ORAL DAILY
Status: DISCONTINUED | OUTPATIENT
Start: 2022-09-05 | End: 2022-09-07

## 2022-09-04 RX ORDER — INSULIN LISPRO 100 [IU]/ML
12 INJECTION, SOLUTION INTRAVENOUS; SUBCUTANEOUS
Status: DISCONTINUED | OUTPATIENT
Start: 2022-09-05 | End: 2022-09-08 | Stop reason: HOSPADM

## 2022-09-04 RX ORDER — ENOXAPARIN SODIUM 100 MG/ML
30 INJECTION SUBCUTANEOUS 2 TIMES DAILY
Status: DISCONTINUED | OUTPATIENT
Start: 2022-09-04 | End: 2022-09-08 | Stop reason: HOSPADM

## 2022-09-04 RX ORDER — INSULIN GLARGINE 100 [IU]/ML
50 INJECTION, SOLUTION SUBCUTANEOUS NIGHTLY
Status: DISCONTINUED | OUTPATIENT
Start: 2022-09-04 | End: 2022-09-08 | Stop reason: HOSPADM

## 2022-09-04 RX ORDER — SODIUM CHLORIDE 9 MG/ML
INJECTION, SOLUTION INTRAVENOUS PRN
Status: DISCONTINUED | OUTPATIENT
Start: 2022-09-04 | End: 2022-09-08 | Stop reason: HOSPADM

## 2022-09-04 RX ORDER — LISINOPRIL 10 MG/1
10 TABLET ORAL DAILY
Status: DISCONTINUED | OUTPATIENT
Start: 2022-09-05 | End: 2022-09-08 | Stop reason: HOSPADM

## 2022-09-04 RX ORDER — TAMSULOSIN HYDROCHLORIDE 0.4 MG/1
0.4 CAPSULE ORAL 2 TIMES DAILY
Status: DISCONTINUED | OUTPATIENT
Start: 2022-09-04 | End: 2022-09-08 | Stop reason: HOSPADM

## 2022-09-04 RX ORDER — ASPIRIN 81 MG/1
81 TABLET ORAL DAILY
COMMUNITY

## 2022-09-04 RX ORDER — INSULIN LISPRO 100 [IU]/ML
0-8 INJECTION, SOLUTION INTRAVENOUS; SUBCUTANEOUS
Status: DISCONTINUED | OUTPATIENT
Start: 2022-09-05 | End: 2022-09-08 | Stop reason: HOSPADM

## 2022-09-04 RX ORDER — POLYETHYLENE GLYCOL 3350 17 G/17G
17 POWDER, FOR SOLUTION ORAL DAILY PRN
Status: DISCONTINUED | OUTPATIENT
Start: 2022-09-04 | End: 2022-09-08 | Stop reason: HOSPADM

## 2022-09-04 RX ORDER — ISOSORBIDE MONONITRATE 30 MG/1
30 TABLET, EXTENDED RELEASE ORAL DAILY
Status: DISCONTINUED | OUTPATIENT
Start: 2022-09-05 | End: 2022-09-08 | Stop reason: HOSPADM

## 2022-09-04 RX ORDER — ACETAMINOPHEN 650 MG/1
650 SUPPOSITORY RECTAL EVERY 6 HOURS PRN
Status: DISCONTINUED | OUTPATIENT
Start: 2022-09-04 | End: 2022-09-08 | Stop reason: HOSPADM

## 2022-09-04 RX ORDER — LISINOPRIL 5 MG/1
5 TABLET ORAL DAILY
Status: DISCONTINUED | OUTPATIENT
Start: 2022-09-05 | End: 2022-09-04

## 2022-09-04 RX ORDER — PREDNISONE 10 MG/1
15 TABLET ORAL DAILY
COMMUNITY

## 2022-09-04 RX ORDER — SODIUM CHLORIDE 0.9 % (FLUSH) 0.9 %
5-40 SYRINGE (ML) INJECTION EVERY 12 HOURS SCHEDULED
Status: DISCONTINUED | OUTPATIENT
Start: 2022-09-04 | End: 2022-09-08 | Stop reason: HOSPADM

## 2022-09-04 RX ORDER — ONDANSETRON 4 MG/1
4 TABLET, ORALLY DISINTEGRATING ORAL EVERY 8 HOURS PRN
Status: DISCONTINUED | OUTPATIENT
Start: 2022-09-04 | End: 2022-09-08 | Stop reason: HOSPADM

## 2022-09-04 RX ORDER — SODIUM CHLORIDE 0.9 % (FLUSH) 0.9 %
5-40 SYRINGE (ML) INJECTION PRN
Status: DISCONTINUED | OUTPATIENT
Start: 2022-09-04 | End: 2022-09-08 | Stop reason: HOSPADM

## 2022-09-04 RX ORDER — TAMSULOSIN HYDROCHLORIDE 0.4 MG/1
0.4 CAPSULE ORAL DAILY
Status: DISCONTINUED | OUTPATIENT
Start: 2022-09-05 | End: 2022-09-04

## 2022-09-04 RX ORDER — ASPIRIN 81 MG/1
81 TABLET, CHEWABLE ORAL DAILY
Status: DISCONTINUED | OUTPATIENT
Start: 2022-09-05 | End: 2022-09-08 | Stop reason: HOSPADM

## 2022-09-04 RX ORDER — ONDANSETRON 2 MG/ML
4 INJECTION INTRAMUSCULAR; INTRAVENOUS EVERY 6 HOURS PRN
Status: DISCONTINUED | OUTPATIENT
Start: 2022-09-04 | End: 2022-09-08 | Stop reason: HOSPADM

## 2022-09-04 RX ORDER — ATORVASTATIN CALCIUM 40 MG/1
40 TABLET, FILM COATED ORAL DAILY
Status: DISCONTINUED | OUTPATIENT
Start: 2022-09-05 | End: 2022-09-08 | Stop reason: HOSPADM

## 2022-09-04 RX ORDER — PRIMIDONE 50 MG/1
50 TABLET ORAL NIGHTLY
Status: DISCONTINUED | OUTPATIENT
Start: 2022-09-04 | End: 2022-09-08 | Stop reason: HOSPADM

## 2022-09-04 RX ADMIN — FUROSEMIDE 20 MG: 10 INJECTION INTRAMUSCULAR; INTRAVENOUS at 20:54

## 2022-09-04 RX ADMIN — METHYLPREDNISOLONE SODIUM SUCCINATE 40 MG: 40 INJECTION, POWDER, LYOPHILIZED, FOR SOLUTION INTRAMUSCULAR; INTRAVENOUS at 23:28

## 2022-09-04 RX ADMIN — ALBUTEROL SULFATE 2.5 MG: 2.5 SOLUTION RESPIRATORY (INHALATION) at 23:46

## 2022-09-04 RX ADMIN — TAMSULOSIN HYDROCHLORIDE 0.4 MG: 0.4 CAPSULE ORAL at 23:28

## 2022-09-04 RX ADMIN — SODIUM CHLORIDE, PRESERVATIVE FREE 10 ML: 5 INJECTION INTRAVENOUS at 23:29

## 2022-09-04 RX ADMIN — ENOXAPARIN SODIUM 30 MG: 100 INJECTION SUBCUTANEOUS at 23:28

## 2022-09-04 ASSESSMENT — ENCOUNTER SYMPTOMS
PHOTOPHOBIA: 0
COUGH: 1
DIARRHEA: 0
RHINORRHEA: 0
ABDOMINAL PAIN: 0
SHORTNESS OF BREATH: 1
VOMITING: 0
VOICE CHANGE: 0
NAUSEA: 0
TROUBLE SWALLOWING: 0

## 2022-09-04 NOTE — ED NOTES
Resting   HR- 72  Sp02- 94% on 4L n/c     After ambulating approx 25 ft   HR- 98  Sp02- 88% on 4L n/c      During ambulation pt c/o feeling SOB and stating it was getting harder to breath.         Chadwick Farrell RN  09/04/22 6546

## 2022-09-04 NOTE — ED NOTES
Department of Emergency Medicine  FIRST PROVIDER TRIAGE NOTE             Independent MLP           9/4/22  4:06 PM EDT    Date of Encounter: 9/4/22   MRN: [id number]      HPI: Alicia Fowler is a 66 y.o. male who presents to the ED for Leg Swelling (Bilat.), Shortness of Breath (On home O2), and Cough (Causes pain across upper back)       ROS: Negative for cp, abd pain, back pain, or fever. PE: Gen Appearance/Constitutional: alert  HEENT: NC/NT. PERRLA,  Airway patent. Neck: supple     Initial Plan of Care: All treatment areas with department are currently occupied. Plan to order/Initiate the following while awaiting opening in ED: labs, EKG, and imaging studies.   Initiate Treatment-Testing, Proceed toTreatment Area When Bed Available for ED Attending/MLP to Continue Care    Electronically signed by GUEVARA Sanders   DD: 9/4/22       Albertina Leone [place]  09/04/22 6124

## 2022-09-04 NOTE — ED PROVIDER NOTES
HPI   Patient is a 49-year-old male with past medical history of hyperlipidemia, diabetes, COPD on chronic oxygen at 4 L ATC and CHF presenting the emergency department due to worsening shortness of breath. Patient states that her symptoms have been ongoing for several days and progressively worsened today. He is also complaining of vague chest pain as well. He localizes the chest pain to his mid chest.  It is sharp, intermittent and radiates to the back occasionally. Patient states that when the pain is present it takes his breath away. Pain is worsened by cough but better by nothing. Apparently, patient's shortness of breath is worsened with exertion. At this point, patient can only walk about 10 feet before feeling extremely short of breath. Of note, patient states that he saw his pulmonologist on Tuesday as an outpatient. CT chest without contrast was done on 9/1/2022 and remarkable for persistent medial right upper lobe soft tissue mass concerning for mucous pleasant. Patient had PET scan scheduled for next week but has not gone yet. Due to his worsening symptoms, he came to the ED for further evaluation. Patient does admit to cough but he states that this is chronic. Cough is dry with no sputum production. He is concerned because the cough is making his chest pain worse. Patient otherwise denying other symptoms including nausea, vomiting, diaphoresis, fever, chills, headache, lightheadedness, syncope, generalized weakness, sore throat, abdominal pain, urinary or bowel changes. His symptoms are moderate with no remitting or exacerbating factors. Patient has not required any additional increase in his oxygen requirements despite his symptoms. ADLs slightly affected due to shortness of breath with low levels of exertion. Patient denies any recent sick contacts. Review of Systems   Constitutional:  Negative for chills and fever.    HENT:  Negative for congestion, rhinorrhea, trouble swallowing and voice change. Eyes:  Negative for photophobia and visual disturbance. Respiratory:  Positive for cough and shortness of breath. Chronic dry cough   Cardiovascular:  Positive for chest pain. Negative for palpitations. Gastrointestinal:  Negative for abdominal pain, diarrhea, nausea and vomiting. Genitourinary:  Negative for dysuria, frequency and hematuria. Musculoskeletal:  Negative for arthralgias. Skin:  Negative for rash and wound. Neurological:  Negative for dizziness and headaches. Psychiatric/Behavioral:  Negative for behavioral problems and confusion. Physical Exam  Constitutional:       General: He is not in acute distress. Appearance: Normal appearance. He is not ill-appearing. HENT:      Head: Normocephalic and atraumatic. Mouth/Throat:      Mouth: Mucous membranes are moist.      Pharynx: Oropharynx is clear. Eyes:      Pupils: Pupils are equal, round, and reactive to light. Cardiovascular:      Rate and Rhythm: Normal rate and regular rhythm. Pulses: Normal pulses. Heart sounds: Normal heart sounds. Pulmonary:      Effort: Pulmonary effort is normal. No respiratory distress. Breath sounds: No wheezing or rales. Comments: Mild bibasilar crackles. No wheezes or ronchi noted. Abdominal:      Tenderness: There is no abdominal tenderness. There is no guarding or rebound. Musculoskeletal:      Cervical back: Normal range of motion and neck supple. Right lower leg: Edema present. Left lower leg: Edema present. Comments: 1-2 + pitting edema of the bilateral lower extremities. Skin:     General: Skin is warm and dry. Capillary Refill: Capillary refill takes less than 2 seconds. Neurological:      General: No focal deficit present. Mental Status: He is alert and oriented to person, place, and time. Cranial Nerves: No cranial nerve deficit.       Coordination: Coordination normal.   Psychiatric: Mood and Affect: Mood normal.         Behavior: Behavior normal.        MDM   Patient is a 49-year-old male with past medical history of hyperlipidemia, diabetes, COPD on chronic oxygen at 4 L ATC and CHF presenting the emergency department due to worsening shortness of breath. On initial evaluation, patient in no acute respiratory distress. Saturations appropriate on chronic home oxygen needs. Physical exam remarkable for mild bibasilar crackles but no wheezes or rhonchi noted. Patient breathing somewhat comfortably with mild conversational dyspnea. 1-2+ pitting edema of the bilateral lower extremities noted. Work-up in the emergency department remarkable for mild bilateral pleural effusions. Viral testing negative. Patient ambulated short distance and felt extremely short of breath. Clinically patient appears slightly fluid overloaded. Troponin elevated at 34 with a repeat of 29, delta 5. Likely type II ischemia in the setting of demand secondary to shortness of breath. D-dimer negative. EKG sinus and nonspecific with no new ischemic changes noted. Patient given 20 mg Lasix in the emergency department. Plan for admission for further work-up and evaluation. Spoke with Dr. Mulu Dumont who accepted patient for admission. Patient remained stable for admission and was agreeable with plan. ED Course as of 09/04/22 2146   Silvino Fabian Sep 04, 2022   1756 EKG: This EKG is signed and interpreted by me. Rate: 70  Rhythm: Sinus  Interpretation: no acute changes, NSR, Qtc at 406, normal axis, no st elevation, no ectopy  Comparison: was normal  [BP]   1939 Ambulated patient around department. He was noting worsening shortness of breath with short walk.   [PP]      ED Course User Index  [BP] Yruy Madison DO  [PP] Dee Villalpando DO       --------------------------------------------- PAST HISTORY ---------------------------------------------  Past Medical History:  has a past medical history of COPD (chronic obstructive pulmonary disease) ([facility] [address] Diabetes mellitus ([facility] [address] GERD (gastroesophageal reflux disease), and Hyperlipidemia. Past Surgical History:  has a past surgical history that includes Coronary angioplasty with stent. Social History:  reports that he has never smoked. He has never used smokeless tobacco. He reports that he does not use drugs. Family History: family history is not on file. The patients home medications have been reviewed. Allergies: Patient has no known allergies.     -------------------------------------------------- RESULTS -------------------------------------------------    LABS:  Results for orders placed or performed during the hospital encounter of 09/04/22   COVID-19, Rapid    Specimen: Nasopharyngeal Swab   Result Value Ref Range    SARS-CoV-2, NAAT Not Detected Not Detected   RAPID INFLUENZA A/B ANTIGENS    Specimen: Nasopharyngeal   Result Value Ref Range    Influenza A by PCR Not Detected Not Detected    Influenza B by PCR Not Detected Not Detected   Basic metabolic panel   Result Value Ref Range    Sodium 140 132 - 146 mmol/L    Potassium 5.3 (H) 3.5 - 5.0 mmol/L    Chloride 99 98 - 107 mmol/L    CO2 30 (H) 22 - 29 mmol/L    Anion Gap 11 7 - 16 mmol/L    Glucose 133 (H) 74 - 99 mg/dL    BUN 27 (H) 6 - 23 mg/dL    Creatinine 1.4 (H) 0.7 - 1.2 mg/dL    GFR Non-African American 50 >=60 mL/min/1.73    GFR African American 60     Calcium 9.2 8.6 - 10.2 mg/dL   CBC   Result Value Ref Range    WBC 7.6 4.5 - 11.5 E9/L    RBC 4.01 3.80 - 5.80 E12/L    Hemoglobin 11.6 (L) 12.5 - 16.5 g/dL    Hematocrit 36.0 (L) 37.0 - 54.0 %    MCV 89.8 80.0 - 99.9 fL    MCH 28.9 26.0 - 35.0 pg    MCHC 32.2 32.0 - 34.5 %    RDW 14.2 11.5 - 15.0 fL    Platelets 592 132 - 883 E9/L    MPV 9.4 7.0 - 12.0 fL   Troponin   Result Value Ref Range    Troponin, High Sensitivity 34 (H) 0 - 11 ng/L   Brain Natriuretic Peptide   Result Value Ref Range    Pro- (H) 0 - 125 pg/mL   Protime-INR   Result Value Ref Range    Protime 11.9 9.3 - 12.4 sec    INR 1.1    Troponin   Result Value Ref Range    Troponin, High Sensitivity 29 (H) 0 - 11 ng/L   D-Dimer, Quantitative   Result Value Ref Range    D-Dimer, Quant <200 ng/mL DDU   EKG 12 Lead   Result Value Ref Range    Ventricular Rate 70 BPM    Atrial Rate 70 BPM    P-R Interval 138 ms    QRS Duration 82 ms    Q-T Interval 376 ms    QTc Calculation (Bazett) 406 ms    P Axis 65 degrees    R Axis 47 degrees    T Axis 63 degrees       RADIOLOGY:  XR CHEST (2 VW)   Final Result   1. Cardiomegaly, small bilateral pleural effusions, and bibasilar   atelectasis or infiltrates. 2.  Right suprahilar mass. Please see recent CT chest report and with CT   recommendation for PET-CT evaluation or CT-guided tissue sampling.               ------------------------- NURSING NOTES AND VITALS REVIEWED ---------------------------  Date / Time Roomed:  9/4/2022  4:46 PM  ED Bed Assignment:  [id number]    The nursing notes within the ED encounter and vital signs as below have been reviewed. Patient Vitals for the past 24 hrs:   BP Temp Temp src Pulse Resp SpO2 Height Weight   09/04/22 2027 (!) 190/86 98 °F (36.7 °C) Oral 68 16 94 % -- --   09/04/22 1604 (!) 135/101 97 °F (36.1 °C) Temporal 66 14 100 % 6' 1\" (1.854 m) 255 lb (115.7 kg)       Oxygen Saturation Interpretation: Normal    ------------------------------------------ PROGRESS NOTES ------------------------------------------  Re-evaluation(s):  See ED course    Counseling:  I have spoken with the patient and discussed todays results, in addition to providing specific details for the plan of care and counseling regarding the diagnosis and prognosis. Their questions are answered at this time and they are agreeable with the plan of admission.    --------------------------------- ADDITIONAL PROVIDER NOTES ---------------------------------  Consultations:  Time: 1939. Spoke with Dr. Mulu Dumont. Discussed case.   They will admit the patient. This patient's ED course included: a personal history and physicial examination, re-evaluation prior to disposition, multiple bedside re-evaluations, IV medications, cardiac monitoring, and continuous pulse oximetry    This patient has remained hemodynamically stable during their ED course. Diagnosis:  1. Acute on chronic congestive heart failure, unspecified heart failure type ([address]    2. Shortness of breath        Disposition:  Patient's disposition: Admit to telemetry  Patient's condition is stable.             Gigi Montgomery DO  Resident  09/04/22 2168

## 2022-09-05 PROBLEM — I50.9 ACUTE ON CHRONIC CONGESTIVE HEART FAILURE (HCC): Status: ACTIVE | Noted: 2022-09-05

## 2022-09-05 PROBLEM — J44.1 CHRONIC OBSTRUCTIVE PULMONARY DISEASE WITH ACUTE EXACERBATION (HCC): Status: ACTIVE | Noted: 2022-09-05

## 2022-09-05 PROBLEM — I25.119 CORONARY ARTERY DISEASE INVOLVING NATIVE CORONARY ARTERY OF NATIVE HEART WITH ANGINA PECTORIS (HCC): Status: ACTIVE | Noted: 2022-09-05

## 2022-09-05 LAB
ALBUMIN SERPL-MCNC: 4 G/DL (ref 3.5–5.2)
ALP BLD-CCNC: 62 U/L (ref 40–129)
ALT SERPL-CCNC: 22 U/L (ref 0–40)
ANION GAP SERPL CALCULATED.3IONS-SCNC: 11 MMOL/L (ref 7–16)
ANISOCYTOSIS: ABNORMAL
AST SERPL-CCNC: 20 U/L (ref 0–39)
BASOPHILS ABSOLUTE: 0.02 E9/L (ref 0–0.2)
BASOPHILS RELATIVE PERCENT: 0.4 % (ref 0–2)
BILIRUB SERPL-MCNC: 0.4 MG/DL (ref 0–1.2)
BUN BLDV-MCNC: 26 MG/DL (ref 6–23)
CALCIUM SERPL-MCNC: 9 MG/DL (ref 8.6–10.2)
CHLORIDE BLD-SCNC: 98 MMOL/L (ref 98–107)
CO2: 31 MMOL/L (ref 22–29)
CREAT SERPL-MCNC: 1.2 MG/DL (ref 0.7–1.2)
EKG ATRIAL RATE: 70 BPM
EKG P AXIS: 65 DEGREES
EKG P-R INTERVAL: 138 MS
EKG Q-T INTERVAL: 376 MS
EKG QRS DURATION: 82 MS
EKG QTC CALCULATION (BAZETT): 406 MS
EKG R AXIS: 47 DEGREES
EKG T AXIS: 63 DEGREES
EKG VENTRICULAR RATE: 70 BPM
EOSINOPHILS ABSOLUTE: 0.01 E9/L (ref 0.05–0.5)
EOSINOPHILS RELATIVE PERCENT: 0.2 % (ref 0–6)
GFR AFRICAN AMERICAN: >60
GFR NON-AFRICAN AMERICAN: 59 ML/MIN/1.73
GLUCOSE BLD-MCNC: 209 MG/DL (ref 74–99)
HCT VFR BLD CALC: 33.2 % (ref 37–54)
HEMOGLOBIN: 10.9 G/DL (ref 12.5–16.5)
HYPOCHROMIA: ABNORMAL
IMMATURE GRANULOCYTES #: 0.12 E9/L
IMMATURE GRANULOCYTES %: 2.3 % (ref 0–5)
LYMPHOCYTES ABSOLUTE: 0.49 E9/L (ref 1.5–4)
LYMPHOCYTES RELATIVE PERCENT: 9.3 % (ref 20–42)
MCH RBC QN AUTO: 29.1 PG (ref 26–35)
MCHC RBC AUTO-ENTMCNC: 32.8 % (ref 32–34.5)
MCV RBC AUTO: 88.5 FL (ref 80–99.9)
METER GLUCOSE: 160 MG/DL (ref 74–99)
METER GLUCOSE: 181 MG/DL (ref 74–99)
METER GLUCOSE: 205 MG/DL (ref 74–99)
METER GLUCOSE: 309 MG/DL (ref 74–99)
MONOCYTES ABSOLUTE: 0.18 E9/L (ref 0.1–0.95)
MONOCYTES RELATIVE PERCENT: 3.4 % (ref 2–12)
NEUTROPHILS ABSOLUTE: 4.43 E9/L (ref 1.8–7.3)
NEUTROPHILS RELATIVE PERCENT: 84.4 % (ref 43–80)
OVALOCYTES: ABNORMAL
PDW BLD-RTO: 13.9 FL (ref 11.5–15)
PLATELET # BLD: 111 E9/L (ref 130–450)
PMV BLD AUTO: 9 FL (ref 7–12)
POIKILOCYTES: ABNORMAL
POTASSIUM REFLEX MAGNESIUM: 5 MMOL/L (ref 3.5–5)
RBC # BLD: 3.75 E12/L (ref 3.8–5.8)
SODIUM BLD-SCNC: 140 MMOL/L (ref 132–146)
TOTAL PROTEIN: 6.5 G/DL (ref 6.4–8.3)
WBC # BLD: 5.3 E9/L (ref 4.5–11.5)

## 2022-09-05 PROCEDURE — 94640 AIRWAY INHALATION TREATMENT: CPT

## 2022-09-05 PROCEDURE — 2580000003 HC RX 258: Performed by: INTERNAL MEDICINE

## 2022-09-05 PROCEDURE — 6360000002 HC RX W HCPCS: Performed by: INTERNAL MEDICINE

## 2022-09-05 PROCEDURE — 6370000000 HC RX 637 (ALT 250 FOR IP): Performed by: INTERNAL MEDICINE

## 2022-09-05 PROCEDURE — 99222 1ST HOSP IP/OBS MODERATE 55: CPT | Performed by: INTERNAL MEDICINE

## 2022-09-05 PROCEDURE — 80053 COMPREHEN METABOLIC PANEL: CPT

## 2022-09-05 PROCEDURE — 36415 COLL VENOUS BLD VENIPUNCTURE: CPT

## 2022-09-05 PROCEDURE — 2700000000 HC OXYGEN THERAPY PER DAY

## 2022-09-05 PROCEDURE — 82962 GLUCOSE BLOOD TEST: CPT

## 2022-09-05 PROCEDURE — 85025 COMPLETE CBC W/AUTO DIFF WBC: CPT

## 2022-09-05 PROCEDURE — 99233 SBSQ HOSP IP/OBS HIGH 50: CPT | Performed by: INTERNAL MEDICINE

## 2022-09-05 PROCEDURE — 94660 CPAP INITIATION&MGMT: CPT

## 2022-09-05 PROCEDURE — 2060000000 HC ICU INTERMEDIATE R&B

## 2022-09-05 PROCEDURE — 6360000002 HC RX W HCPCS: Performed by: NURSE PRACTITIONER

## 2022-09-05 RX ORDER — FUROSEMIDE 10 MG/ML
40 INJECTION INTRAMUSCULAR; INTRAVENOUS ONCE
Status: COMPLETED | OUTPATIENT
Start: 2022-09-05 | End: 2022-09-05

## 2022-09-05 RX ORDER — BUDESONIDE 0.5 MG/2ML
500 INHALANT ORAL 2 TIMES DAILY
Status: DISCONTINUED | OUTPATIENT
Start: 2022-09-05 | End: 2022-09-08 | Stop reason: HOSPADM

## 2022-09-05 RX ORDER — FUROSEMIDE 10 MG/ML
INJECTION INTRAMUSCULAR; INTRAVENOUS
Status: DISPENSED
Start: 2022-09-05 | End: 2022-09-05

## 2022-09-05 RX ORDER — ARFORMOTEROL TARTRATE 15 UG/2ML
15 SOLUTION RESPIRATORY (INHALATION) 2 TIMES DAILY
Status: DISCONTINUED | OUTPATIENT
Start: 2022-09-05 | End: 2022-09-08 | Stop reason: HOSPADM

## 2022-09-05 RX ADMIN — ARFORMOTEROL TARTRATE 15 MCG: 15 SOLUTION RESPIRATORY (INHALATION) at 20:48

## 2022-09-05 RX ADMIN — SODIUM CHLORIDE, PRESERVATIVE FREE 10 ML: 5 INJECTION INTRAVENOUS at 21:08

## 2022-09-05 RX ADMIN — INSULIN LISPRO 12 UNITS: 100 INJECTION, SOLUTION INTRAVENOUS; SUBCUTANEOUS at 11:23

## 2022-09-05 RX ADMIN — SODIUM CHLORIDE, PRESERVATIVE FREE 10 ML: 5 INJECTION INTRAVENOUS at 09:15

## 2022-09-05 RX ADMIN — BUDESONIDE 500 MCG: 0.5 SUSPENSION RESPIRATORY (INHALATION) at 20:48

## 2022-09-05 RX ADMIN — INSULIN LISPRO 12 UNITS: 100 INJECTION, SOLUTION INTRAVENOUS; SUBCUTANEOUS at 16:08

## 2022-09-05 RX ADMIN — ISOSORBIDE MONONITRATE 30 MG: 30 TABLET, EXTENDED RELEASE ORAL at 09:14

## 2022-09-05 RX ADMIN — PRIMIDONE 50 MG: 50 TABLET ORAL at 21:08

## 2022-09-05 RX ADMIN — METHYLPREDNISOLONE SODIUM SUCCINATE 40 MG: 40 INJECTION, POWDER, LYOPHILIZED, FOR SOLUTION INTRAMUSCULAR; INTRAVENOUS at 11:22

## 2022-09-05 RX ADMIN — ALBUTEROL SULFATE 2.5 MG: 2.5 SOLUTION RESPIRATORY (INHALATION) at 08:11

## 2022-09-05 RX ADMIN — LISINOPRIL 10 MG: 10 TABLET ORAL at 09:14

## 2022-09-05 RX ADMIN — ALBUTEROL SULFATE 2.5 MG: 2.5 SOLUTION RESPIRATORY (INHALATION) at 16:23

## 2022-09-05 RX ADMIN — ATORVASTATIN CALCIUM 40 MG: 40 TABLET, FILM COATED ORAL at 09:15

## 2022-09-05 RX ADMIN — ENOXAPARIN SODIUM 30 MG: 100 INJECTION SUBCUTANEOUS at 21:07

## 2022-09-05 RX ADMIN — ALBUTEROL SULFATE 2.5 MG: 2.5 SOLUTION RESPIRATORY (INHALATION) at 20:48

## 2022-09-05 RX ADMIN — INSULIN LISPRO 2 UNITS: 100 INJECTION, SOLUTION INTRAVENOUS; SUBCUTANEOUS at 09:18

## 2022-09-05 RX ADMIN — ASPIRIN 81 MG CHEWABLE TABLET 81 MG: 81 TABLET CHEWABLE at 09:14

## 2022-09-05 RX ADMIN — TAMSULOSIN HYDROCHLORIDE 0.4 MG: 0.4 CAPSULE ORAL at 21:08

## 2022-09-05 RX ADMIN — INSULIN LISPRO 6 UNITS: 100 INJECTION, SOLUTION INTRAVENOUS; SUBCUTANEOUS at 11:24

## 2022-09-05 RX ADMIN — TAMSULOSIN HYDROCHLORIDE 0.4 MG: 0.4 CAPSULE ORAL at 09:14

## 2022-09-05 RX ADMIN — METHYLPREDNISOLONE SODIUM SUCCINATE 40 MG: 40 INJECTION, POWDER, LYOPHILIZED, FOR SOLUTION INTRAMUSCULAR; INTRAVENOUS at 21:07

## 2022-09-05 RX ADMIN — ENOXAPARIN SODIUM 30 MG: 100 INJECTION SUBCUTANEOUS at 09:14

## 2022-09-05 RX ADMIN — METOPROLOL TARTRATE 75 MG: 50 TABLET, FILM COATED ORAL at 09:14

## 2022-09-05 RX ADMIN — METOPROLOL TARTRATE 75 MG: 50 TABLET, FILM COATED ORAL at 21:08

## 2022-09-05 RX ADMIN — ALBUTEROL SULFATE 2.5 MG: 2.5 SOLUTION RESPIRATORY (INHALATION) at 12:08

## 2022-09-05 RX ADMIN — METFORMIN HYDROCHLORIDE 1000 MG: 1000 TABLET ORAL at 09:14

## 2022-09-05 RX ADMIN — PRIMIDONE 50 MG: 50 TABLET ORAL at 00:33

## 2022-09-05 RX ADMIN — INSULIN LISPRO 12 UNITS: 100 INJECTION, SOLUTION INTRAVENOUS; SUBCUTANEOUS at 09:16

## 2022-09-05 RX ADMIN — BUDESONIDE 500 MCG: 0.5 SUSPENSION RESPIRATORY (INHALATION) at 09:27

## 2022-09-05 RX ADMIN — METFORMIN HYDROCHLORIDE 1000 MG: 1000 TABLET ORAL at 16:08

## 2022-09-05 RX ADMIN — FUROSEMIDE 40 MG: 10 INJECTION, SOLUTION INTRAMUSCULAR; INTRAVENOUS at 09:22

## 2022-09-05 RX ADMIN — ARFORMOTEROL TARTRATE 15 MCG: 15 SOLUTION RESPIRATORY (INHALATION) at 09:27

## 2022-09-05 NOTE — PLAN OF CARE
Problem: Discharge Planning  Goal: Discharge to home or other facility with appropriate resources  Outcome: Progressing     Problem: Safety - Adult  Goal: Free from fall injury  9/5/2022 1010 by Cris Chavez RN  Outcome: Progressing  Flowsheets (Taken 9/5/2022 0815)  Free From Fall Injury: Instruct family/caregiver on patient safety  9/5/2022 0233 by Amanda Ramirez RN  Outcome: Progressing     Problem: Chronic Conditions and Co-morbidities  Goal: Patient's chronic conditions and co-morbidity symptoms are monitored and maintained or improved  Outcome: Progressing

## 2022-09-05 NOTE — CONSULTS
[address]    PULMONARY CONSULTATION NOTE    Patient: Peter Rogers  MRN: [id number]  : 1950    Encounter Date: 2022  Encounter Time: 8:40 AM     Date of Admission: .2022  4:46 PM    Consulting Physician:  Primary Care Physician:      Marva León MD     [phone]     None    PROBLEM LIST:  Patient Active Problem List   Diagnosis    Hyperkalemia    Shortness of breath       Reason for Consultation:  Lung mass, COPD     HPI:   66year old  male known to [facility] with PMHx of COPD on brovana/budesonide and chronic prednisone 15mg, IFTIKHAR on Bipap, chronic resp failure on 4. 5LNC, known lung mass presented to [facility] 2022 with worsening dyspnea for the past 3-4 days. Patient was seen in the office  and given prednisone with little relief. When seen today, patient states breathing is a little better, still with dyspnea on exertion, CP with exertion/dsypnea, chronic leg edema. Denies cough, wz, fever, chills, ha, dizziness, abd pain, muscle ache. No sick contact, traveling, prolonged immobilization. Not up to date on health screenings. In ER, cxr showing some pulm edema, s/p lasix   pulm consulted. On 4.5lnc      PAST MEDICAL HISTORY:     Past Medical History:   Diagnosis Date    COPD (chronic obstructive pulmonary disease) (HCC)     Diabetes mellitus (HCC)     GERD (gastroesophageal reflux disease)     Hyperlipidemia    IFTIKHAR  DM    PAST SURGICAL HISTORY:   Past Surgical History:   Procedure Laterality Date    CORONARY ANGIOPLASTY WITH STENT PLACEMENT         FAMILY HISTORY:   No family history on file. M colon cancer  F lung cancer     SOCIAL HISTORY:   1ppd 20 years. No aa or drug use. Worked in [employer].  with 2 children. 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   Social History     Socioeconomic History    Marital status:      Spouse name: Not on file    Number of children: Not on file    Years of education: Not on file    Highest education level: Not on file Occupational History    Not on file   Tobacco Use    Smoking status: Never    Smokeless tobacco: Never   Vaping Use    Vaping Use: Not on file   Substance and Sexual Activity    Alcohol use: Not on file    Drug use: Never    Sexual activity: Not on file   Other Topics Concern    Not on file   Social History Narrative    Not on file     Social Determinants of Health     Financial Resource Strain: Not on file   Food Insecurity: Not on file   Transportation Needs: Not on file   Physical Activity: Not on file   Stress: Not on file   Social Connections: Not on file   Intimate Partner Violence: Not on file   Housing Stability: Not on file     Social History     Tobacco Use   Smoking Status Never   Smokeless Tobacco Never     Social History     Substance and Sexual Activity   Alcohol Use None     Social History     Substance and Sexual Activity   Drug Use Never     HOME MEDICATIONS:  Prior to Admission medications    Medication Sig Start Date End Date Taking? Authorizing Provider   aspirin 81 MG EC tablet Take 81 mg by mouth daily   Yes Historical Provider, MD   predniSONE (DELTASONE) 10 MG tablet Take 15 mg by mouth daily   Yes Historical Provider, MD   BD PEN NEEDLE FAUSTO 2ND GEN 32G X 4 MM MISC USE  4 TIMES DAILY 3/22/22   Sima Adamson MD   blood glucose test strips (FREESTYLE PRECISION CIRA TEST) strip 1 each by In Vitro route 3 times daily As needed. 3/30/21   Saad Su MD   insulin lispro, 1 Unit Dial, (HUMALOG KWIKPEN) 100 UNIT/ML SOPN 12 units before meals plus a scale.  A max of 100 units/day 3/4/21   Sima Adamson MD   [facility] 0.4 MG capsule Take 1 capsule by mouth daily  Patient taking differently: Take 0.4 mg by mouth 2 times daily 3/4/21   Martin Stoddard MD   ipratropium-albuterol (DUONEB) 0.5-2.5 (3) MG/3ML SOLN nebulizer solution Inhale 3 mLs into the lungs every 4 hours (while awake) 3/3/21   Bonita North MD   albuterol sulfate  (90 Base) MCG/ACT inhaler Inhale 2 puffs into the lungs every 6 hours as needed for Wheezing 3/3/21   Bonita Santos MD   Respiratory Therapy Supplies (FULL KIT NEBULIZER SET) MISC Indications: Acute Worsening of Chronic Obstructive Pulmonary Disease Use as directed with nebulized medication.  3/3/21   Maura Gonzalez MD   lisinopril (PRINIVIL;ZESTRIL) 5 MG tablet Take 10 mg by mouth daily    Historical Provider, MD   atorvastatin (LIPITOR) 40 MG tablet Take 40 mg by mouth daily    Historical Provider, MD   furosemide (LASIX) 40 MG tablet Take 40 mg by mouth daily    Historical Provider, MD   metFORMIN (GLUCOPHAGE) 1000 MG tablet Take 1,000 mg by mouth 2 times daily (with meals)    Historical Provider, MD   metoprolol tartrate (LOPRESSOR) 25 MG tablet Take 75 mg by mouth 2 times daily    Historical Provider, MD   isosorbide mononitrate (IMDUR) 30 MG extended release tablet Take 30 mg by mouth daily    Historical Provider, MD   insulin glargine (LANTUS) 100 UNIT/ML injection vial Inject 50 Units into the skin nightly    Historical Provider, MD   primidone (MYSOLINE) 50 MG tablet Take 50 mg by mouth nightly    Historical Provider, MD   Multiple Vitamins-Minerals (OCULAR VITAMINS PO) Take by mouth    Historical Provider, MD   guaiFENesin 400 MG tablet Take 400 mg by mouth 2 times daily    Historical Provider, MD       CURRENT MEDICATIONS:  Current Facility-Administered Medications: albuterol (PROVENTIL) nebulizer solution 2.5 mg, 2.5 mg, Nebulization, 4x daily  atorvastatin (LIPITOR) tablet 40 mg, 40 mg, Oral, Daily  furosemide (LASIX) tablet 40 mg, 40 mg, Oral, Daily  insulin glargine (LANTUS) injection vial 50 Units, 50 Units, SubCUTAneous, Nightly  insulin lispro (HUMALOG) injection vial 12 Units, 12 Units, SubCUTAneous, TID WC  insulin lispro (HUMALOG) injection vial 0-8 Units, 0-8 Units, SubCUTAneous, TID WC  insulin lispro (HUMALOG) injection vial 0-4 Units, 0-4 Units, SubCUTAneous, Nightly  glucose chewable tablet 16 g, 4 tablet, Oral, PRN  dextrose bolus 10% 125 mL, 125 mL, IntraVENous, PRN **OR** dextrose bolus 10% 250 mL, 250 mL, IntraVENous, PRN  glucagon (rDNA) injection 1 mg, 1 mg, SubCUTAneous, PRN  dextrose 10 % infusion, , IntraVENous, Continuous PRN  methylPREDNISolone sodium (SOLU-MEDROL) injection 40 mg, 40 mg, IntraVENous, Q12H  isosorbide mononitrate (IMDUR) extended release tablet 30 mg, 30 mg, Oral, Daily  metFORMIN (GLUCOPHAGE) tablet 1,000 mg, 1,000 mg, Oral, BID WC  primidone (MYSOLINE) tablet 50 mg, 50 mg, Oral, Nightly  sodium chloride flush 0.9 % injection 5-40 mL, 5-40 mL, IntraVENous, 2 times per day  sodium chloride flush 0.9 % injection 5-40 mL, 5-40 mL, IntraVENous, PRN  0.9 % sodium chloride infusion, , IntraVENous, PRN  enoxaparin Sodium (LOVENOX) injection 30 mg, 30 mg, SubCUTAneous, BID  ondansetron (ZOFRAN-ODT) disintegrating tablet 4 mg, 4 mg, Oral, Q8H PRN **OR** ondansetron (ZOFRAN) injection 4 mg, 4 mg, IntraVENous, Q6H PRN  polyethylene glycol (GLYCOLAX) packet 17 g, 17 g, Oral, Daily PRN  acetaminophen (TYLENOL) tablet 650 mg, 650 mg, Oral, Q6H PRN **OR** acetaminophen (TYLENOL) suppository 650 mg, 650 mg, Rectal, Q6H PRN  perflutren lipid microspheres (DEFINITY) injection 1.65 mg, 1.5 mL, IntraVENous, ONCE PRN  aspirin chewable tablet 81 mg, 81 mg, Oral, Daily  lisinopril (PRINIVIL;ZESTRIL) tablet 10 mg, 10 mg, Oral, Daily  metoprolol tartrate (LOPRESSOR) tablet 75 mg, 75 mg, Oral, BID  tamsulosin (FLOMAX) capsule 0.4 mg, 0.4 mg, Oral, BID    IV MEDICATIONS:   dextrose      sodium chloride         ALLERGIES:  No Known Allergies    REVIEW OF SYSTEMS:  General ROS:     - Positive For:     - Negative For: chills, fatigue, fever, malaise, night sweats or sleep disturbance   ENT ROS:      - Positive For:     - Negative For: epistaxis, headaches, sinus pain, sneezing or sore throat   Allergy and Immunology ROS:     - Negative For: nasal congestion, postnasal drip or seasonal allergies   Hematological and Lymphatic ROS:      - Negative For: bleeding problems, bruising, fatigue, night sweats or pallor   Respiratory ROS:      - Positive For:  dyspnea      - Negative For: hemoptysis, pleuritic type chest pains  Cardiovascular ROS:      - Positive For: CP, edema      - Negative For:  irregular heartbeat, loss of consciousness, murmur, orthopnea or palpitations   Gastrointestinal ROS:      - Positive For:     - Negative For: abdominal pain, appetite loss, blood in stools, change in bowel habits  Genito-Urinary ROS:      - Negative For: change in urinary stream, dysuria, hematuria or incontinence   Musculoskeletal ROS:      - Negative For: joint pain, joint stiffness, joint swelling or muscle pain   Neurological ROS:     - Negative For: behavioral changes, confusion, dizziness, headaches, impaired coordination/balance or memory loss   Dermatological ROS:      - Negative For: hair changes, lumps, mole changes, nail changes or pruritus    PHYSICAL EXAMINATION:     VITAL SIGNS:  BP (!) 171/77   Pulse 76   Temp 97.9 °F (36.6 °C) (Oral)   Resp 18   Ht 6' 1\" (1.854 m)   Wt 255 lb (115.7 kg)   SpO2 95%   BMI 33.64 kg/m²   Wt Readings from Last 3 Encounters:   22 255 lb (115.7 kg)   21 243 lb (110.2 kg)   21 246 lb 11.2 oz (111.9 kg)     Temp Readings from Last 3 Encounters:   22 97.9 °F (36.6 °C) (Oral)   21 97 °F (36.1 °C)   21 98.2 °F (36.8 °C) (Temporal)     TMAX:  BP Readings from Last 3 Encounters:   22 (!) 171/77   21 124/74   21 (!) 167/84     Pulse Readings from Last 3 Encounters:   22 76   21 90   21 107       CURRENT PULSE OXIMETRY: SpO2: 95 %  24HR PULSE OXIMETRY RANGE: SpO2  Av %  Min: 94 %  Max: 100 %  CVP:      ________________________________________________________________________    VENTILATOR SETTINGS (if applicable):             Additional Respiratory Assessments  Heart Rate: 76  Resp: 18  SpO2: 95 %  ETCO2:  Peak Inspiratory Pressure:  End-Inspiratory Plateau Pressure:    ABG:  No results for input(s): PH, PO2, PCO2, HCO3, BE, O2SAT, METHB, O2HB, COHB, O2CON, HHB, THB in the last 72 hours. FiO2 : 40 %     ________________________________________________________________________    IV ACCESS:    NUTRITION: ADULT DIET; Regular; 4 carb choices (60 gm/meal)    INTAKE/OUTPUTS:  No intake/output data recorded.   No intake or output data in the 24 hours ending 09/05/22 0840    General Appearance: alert and oriented to person, place and time, well-developed and   well-nourished, in no acute distress on 4.5 lnc   Eyes: pupils equal, round, and reactive to light, extraocular eye movements intact, conjunctivae normal and sclera anicteric   Neck: neck supple and non tender without mass,  no cervical adenopathy   Pulmonary/Chest: decreased breath sounds, no accessory muscles of inspiration, equal, nonlabored, mild wz   Cardiovascular: normal rate, regular rhythm, normal S1 and S2, no murmurs, rubs, clicks or gallops, distal pulses intact, no carotid bruits, no murmurs, no gallops, no carotid bruits and no JVD   Abdomen: soft, non-tender, non-distended, normal bowel sounds, no masses or organomegaly, obese   Extremities: no cyanosis, no clubbing, 1+ ble edema  Musculoskeletal: normal range of motion  Neurologic: AOX4    LABS/IMAGING:    CBC:  Lab Results   Component Value Date    WBC 5.3 09/05/2022    HGB 10.9 (L) 09/05/2022    HCT 33.2 (L) 09/05/2022    MCV 88.5 09/05/2022     (L) 09/05/2022    LABLYMP 0.5 (L) 07/28/2021    LYMPHOPCT 9.3 (L) 09/05/2022    RBC 3.75 (L) 09/05/2022    MCH 29.1 09/05/2022    MCHC 32.8 09/05/2022    RDW 13.9 09/05/2022    NEUTOPHILPCT 84.4 (H) 09/05/2022    MONOPCT 3.4 09/05/2022    BASOPCT 0.4 09/05/2022    NEUTROABS 4.43 09/05/2022    LYMPHSABS 0.49 (L) 09/05/2022    MONOSABS 0.18 09/05/2022    EOSABS 0.01 (L) 09/05/2022    BASOSABS 0.02 09/05/2022       Recent Labs     09/05/22  4246 09/04/22  1730   WBC 5.3 7.6   HGB 10.9* 11.6*   HCT 33.2* 36.0*   MCV 88.5 89.8   * 141       BMP:   Recent Labs     09/04/22  1709 09/05/22  0616    140   K 5.3* 5.0   CL 99 98   CO2 30* 31*   BUN 27* 26*   CREATININE 1.4* 1.2       MG:   Lab Results   Component Value Date/Time    MG 1.7 07/23/2021 04:59 AM     Ca/Phos:   Lab Results   Component Value Date    CALCIUM 9.0 09/05/2022    PHOS 5.9 (H) 07/23/2021     Amylase: No results found for: AMYLASE  Lipase: No results found for: LIPASE  LIVER PROFILE:   Recent Labs     09/05/22  0616   AST 20   ALT 22   BILITOT 0.4   ALKPHOS 62       PT/INR:   Recent Labs     09/04/22 1709   PROTIME 11.9   INR 1.1     APTT: No results for input(s): APTT in the last 72 hours. Cardiac Enzymes:  Lab Results   Component Value Date    TROPONINI <0.01 02/27/2021       Hgb A1C:   Lab Results   Component Value Date    LABA1C 7.2 (H) 07/23/2021     Lab Results   Component Value Date     07/23/2021     RAFAEL: No results found for: RAFAEL  ESR: No results found for: SEDRATE  CRP: No results found for: CRP  D Dimer:   Lab Results   Component Value Date    DDIMER <200 09/04/2022     Folate and B12:   Lab Results   Component Value Date    [id number] 274 07/23/2021   , No results found for: FOLATE    Lactic Acid:   Lab Results   Component Value Date    LACTA 0.7 02/28/2021     Ammonia:   Cortisol:  Thyroid Studies:  Lab Results   Component Value Date    TSH 0.937 07/23/2021       Toxicology:    MICROBIOLOGY:  Inf a/b and covid negative     CXR:  9/4 cxr    Cardiomegaly, small bilateral pleural effusions, and bibasilar  atelectasis or infiltrates. 2.  Right suprahilar mass. Please see recent CT chest report and with CT  recommendation for PET-CT evaluation or CT-guided tissue sampling. CT Chest:  9/1 Persistent medial right upper lobe soft tissue mass measuring 5.9 x 3.5 x  2.1 cm.   Neoplasm should be excluded        ASSESSMENT/Plan:  COPD exacerbation  Uses brovana, budesonide, prednisone 15mg as an outpateint  On solumedrol 40mg bid and albuterol  Start brovana and budesonide   Chronic hypoxic respiratory failure  Wears 4.5 lnc at baseline  Currently on 4.5 lnc   Lung mass   As seen on ct 9/1, being followed by [facility]  Former smoker, worked in [employer], not up to date on health screenings, family history of cancer in parents   Patient was scheduled for outpatient PET scan 9/6  IFTIKHAR  Continue NIV qhs and PRN  Pulmonary edema  S/p dose of lasix   Consider echo       Bernardo Ireland, APRN - CNP

## 2022-09-05 NOTE — PROGRESS NOTES
Admission database completed to the best of this RN's ability. Patient medications reviewed with patient at bedside. Notified Dr. Kate Wu of updated medications/changes. Also notified him that no one is on call for cardiology when trying to place consult. Per Dr. Kate Wu okay to consult cardiology in the morning.

## 2022-09-05 NOTE — PROGRESS NOTES
[facility] Progress Note    --------------------------------------------------------------------------------------  Assessment  SOB/BEEBE due to COPD exacerbation with chronic hypoxia  Non-zero troponin / hx CAD / questionable CHF  Pleural effusions  NIDDM  Hx anemia, GERD, HTN, HPL    Plan  Continue Merlin Ode / wean as able / wears 4L at baseline  Continue IV Solumedrol / breathing tx  Continue metformin / standard diabetic mgmt w insulin while inpt; expect some higher BGs being on steroids  Check echo  May benefit from ischemic eval given hx  S/p IV Lasix x1 and home diuretic continued - keep eye on renal function  Pulmonology and cardiology have both been consulted; f/u w their recommendations  Please see orders for further plan of care    Code status  Full  DVT prophylaxis Lovenox  Disposition  Likely home when ready  --------------------------------------------------------------------------------------    Admission Date  9/4/2022  4:46 PM  Chief Complaint SOB, leg swelling, cough    Subjective  History of Present Illness  71M PMH COPD, CAD, NIDDM, GERD, HPL, HTN, anemia presented to ED late 9/4 w inc SOB and LE swelling over the past two weeks, now difficult to walk just <20 feet to bathroom. On Lasix but he was unaware of past dx of CHF. Workup here showed cardiomegaly and small effusions on CXR as well as previously-noted R suprahilar mass for which pt was already scheduled to get PET-CT. Pt was admitted, started on IV steroids, nebulized breathing tx, abx being held d/t no convincing evidence of infection. Had 1 dose Lasix in ED but further diuresis deferred for now as pt had mild renal dysfunction on admission now resolved.       Resting in bed  No distress  On home amount of O2 / afebrile  No family present during my visit  No new issues identified today  Case was discussed with nursing    Review of Systems - 12-point review of systems has been reviewed and is otherwise negative except as listed in the HPI    Objective  Physical Exam  Vitals: BP (!) 171/77   Pulse 76   Temp 97.9 °F (36.6 °C) (Oral)   Resp 18   Ht 6' 1\" (1.854 m)   Wt 255 lb (115.7 kg)   SpO2 95%   BMI 33.64 kg/m²   General: well-developed, well-nourished, no acute distress, cooperative  Skin: generally warm, dry, and intact, with normal color  HEENT: normocephalic, atraumatic, no gross abnormalities, +NCO2  Respiratory: clear to auscultation bilaterally without respiratory distress  Cardiovascular: regular rate and rhythm without murmur / rub / gallop  Abdominal: soft, nontender, nondistended, normoactive bowel sounds  Extremities: no obvious edema or deformity  Neurologic: awake, alert, no gross deficits  Psychiatric: normal affect, cooperative    Electronically signed by Shruti Sutton DO on 9/5/2022 at 8:33 AM

## 2022-09-05 NOTE — H&P
[facility] History and Physical          ASSESSMENT:      Principal Problem:    Shortness of breath  Resolved Problems:    * No resolved hospital problems. *      PLAN:    1. Increasing shortness of breath and dyspnea on exertion  Patient with severe COPD and chronic respiratory failure on 4 L of oxygen. However symptoms have become much worse over the past several weeks. Likely multifactorial in nature. Did hear fair amount of rhonchi so we will start IV steroids and around-the-clock nebulizers. No change in volume or character of sputum so we will hold antibiotics. Pulmonary consult with Dr. Wilson Ards. Patient's history sounds more like worsening fluid overload and CHF, however BNP is low. Chest x-ray does mention pleural effusions which were not seen 4 days ago on CT scan. He did get 1 dose of IV Lasix in the emergency room, but creatinine is 1.4 which is elevated for him. This may be cardiorenal in nature but will not dose more Lasix until repeat creatinine in the morning. I have also continued his ACE inhibitor. I am mostly worried about history of coronary artery disease with no work-up in many years and stents that are more than 8years old. EKG looks completely normal to me. Troponins mildly positive but with creatinine of 1.4 and GFR of 50. I did continue his oral Lasix for now. Cardiology consultation has been ordered as well as echocardiogram.  He will almost certainly need either ischemia evaluation or heart cath. 2.   History of coronary artery disease  Continue Lipitor, Imdur, lisinopril, Lopressor. As above, will likely need ischemia eval.    3.   Diabetes -insulin requiring  Continue Lantus and mealtime Humalog. Medium dose sliding scale and mealtime Accu-Cheks.   Watch sugars closely as patient started on IV steroids    Code Status: Full  DVT prophylaxis: Lovenox    CHIEF COMPLAINT:  Shortness of breath    History of Present Illness: Hyperlipidemia        Surgical History:  Past Surgical History:   Procedure Laterality Date    CORONARY ANGIOPLASTY WITH STENT PLACEMENT         Medications Prior to Admission:    Prior to Admission medications    Medication Sig Start Date End Date Taking? Authorizing Provider   BD PEN NEEDLE FAUSTO 2ND GEN 32G X 4 MM MISC USE  4 TIMES DAILY 3/22/22   Alia Gan MD   blood glucose test strips (FREESTYLE PRECISION CIRA TEST) strip 1 each by In Vitro route 3 times daily As needed. 3/30/21   Saad Villalobos MD   insulin lispro, 1 Unit Dial, (HUMALOG KWIKPEN) 100 UNIT/ML SOPN 12 units before meals plus a scale. A max of 100 units/day 3/4/21   Alia Gan MD   [facility]) 0.4 MG capsule Take 1 capsule by mouth daily 3/4/21   Bonita Helton MD   ipratropium-albuterol (DUONEB) 0.5-2.5 (3) MG/3ML SOLN nebulizer solution Inhale 3 mLs into the lungs every 4 hours (while awake) 3/3/21   Bonita Helton MD   albuterol sulfate  (90 Base) MCG/ACT inhaler Inhale 2 puffs into the lungs every 6 hours as needed for Wheezing 3/3/21   Bonita Helton MD   Respiratory Therapy Supplies (FULL KIT NEBULIZER SET) MISC Indications: Acute Worsening of Chronic Obstructive Pulmonary Disease Use as directed with nebulized medication.  3/3/21   Shira Avalos MD   lisinopril (PRINIVIL;ZESTRIL) 5 MG tablet Take 5 mg by mouth daily    Historical Provider, MD   atorvastatin (LIPITOR) 40 MG tablet Take 40 mg by mouth daily    Historical Provider, MD   furosemide (LASIX) 40 MG tablet Take 40 mg by mouth daily    Historical Provider, MD   metFORMIN (GLUCOPHAGE) 1000 MG tablet Take 1,000 mg by mouth 2 times daily (with meals)    Historical Provider, MD   metoprolol tartrate (LOPRESSOR) 25 MG tablet Take 25 mg by mouth 2 times daily    Historical Provider, MD   isosorbide mononitrate (IMDUR) 30 MG extended release tablet Take 30 mg by mouth daily    Historical Provider, MD insulin glargine (LANTUS) 100 UNIT/ML injection vial Inject 50 Units into the skin nightly    Historical Provider, MD   primidone (MYSOLINE) 50 MG tablet Take 50 mg by mouth nightly    Historical Provider, MD   Multiple Vitamins-Minerals (OCULAR VITAMINS PO) Take by mouth    Historical Provider, MD   guaiFENesin 400 MG tablet Take 400 mg by mouth 2 times daily    Historical Provider, MD       Allergies:    Patient has no known allergies. Social History:    reports that he has never smoked. He has never used smokeless tobacco. He reports that he does not use drugs. Family History:   HTN    PHYSICAL EXAM:  Vitals:  BP (!) 190/86   Pulse 68   Temp 98 °F (36.7 °C) (Oral)   Resp 16   Ht 6' 1\" (1.854 m)   Wt 255 lb (115.7 kg)   SpO2 94%   BMI 33.64 kg/m²   General Appearance: alert and oriented to person, place and time and in no acute distress  Skin: warm and dry  Head: normocephalic and atraumatic  Eyes: pupils equal, round, and reactive to light, extraocular eye movements intact, conjunctivae normal  Neck: neck supple and non tender   Pulmonary/Chest: decreased sounds throughout, rhonchi bilateral bases, no wheezes or crackles  Cardiovascular: regular, no murmur  Abdomen: soft, non-tender, non-distended, obese  Extremities: 2+ bilateral LE edema  Neurologic: grossly non-focal    LABS:  Recent Labs     09/04/22  1709      K 5.3*   CL 99   CO2 30*   BUN 27*   CREATININE 1.4*   GLUCOSE 133*   CALCIUM 9.2       Recent Labs     09/04/22  1730   WBC 7.6   RBC 4.01   HGB 11.6*   HCT 36.0*   MCV 89.8   MCH 28.9   MCHC 32.2   RDW 14.2      MPV 9.4       Radiology:   XR CHEST (2 VW)   Final Result   1. Cardiomegaly, small bilateral pleural effusions, and bibasilar   atelectasis or infiltrates. 2.  Right suprahilar mass. Please see recent CT chest report and with CT   recommendation for PET-CT evaluation or CT-guided tissue sampling.              EKG: NSR, normal appearing EKG    NOTE: This report was transcribed using voice recognition software. Every effort was made to ensure accuracy; however, inadvertent computerized transcription errors may be present.   Electronically signed by Jass Banks MD on 9/4/2022 at 9:18 PM

## 2022-09-06 LAB
ALBUMIN SERPL-MCNC: 4.2 G/DL (ref 3.5–5.2)
ALP BLD-CCNC: 66 U/L (ref 40–129)
ALT SERPL-CCNC: 20 U/L (ref 0–40)
ANION GAP SERPL CALCULATED.3IONS-SCNC: 11 MMOL/L (ref 7–16)
ANION GAP SERPL CALCULATED.3IONS-SCNC: 8 MMOL/L (ref 7–16)
AST SERPL-CCNC: 19 U/L (ref 0–39)
BASOPHILS ABSOLUTE: 0.01 E9/L (ref 0–0.2)
BASOPHILS RELATIVE PERCENT: 0.1 % (ref 0–2)
BILIRUB SERPL-MCNC: 0.4 MG/DL (ref 0–1.2)
BUN BLDV-MCNC: 32 MG/DL (ref 6–23)
BUN BLDV-MCNC: 32 MG/DL (ref 6–23)
CALCIUM SERPL-MCNC: 9 MG/DL (ref 8.6–10.2)
CALCIUM SERPL-MCNC: 9.3 MG/DL (ref 8.6–10.2)
CHLORIDE BLD-SCNC: 92 MMOL/L (ref 98–107)
CHLORIDE BLD-SCNC: 96 MMOL/L (ref 98–107)
CO2: 31 MMOL/L (ref 22–29)
CO2: 33 MMOL/L (ref 22–29)
CREAT SERPL-MCNC: 1.3 MG/DL (ref 0.7–1.2)
CREAT SERPL-MCNC: 1.5 MG/DL (ref 0.7–1.2)
EOSINOPHILS ABSOLUTE: 0 E9/L (ref 0.05–0.5)
EOSINOPHILS RELATIVE PERCENT: 0 % (ref 0–6)
GFR AFRICAN AMERICAN: 55
GFR AFRICAN AMERICAN: >60
GFR NON-AFRICAN AMERICAN: 46 ML/MIN/1.73
GFR NON-AFRICAN AMERICAN: 54 ML/MIN/1.73
GLUCOSE BLD-MCNC: 206 MG/DL (ref 74–99)
GLUCOSE BLD-MCNC: 213 MG/DL (ref 74–99)
HCT VFR BLD CALC: 30.7 % (ref 37–54)
HCT VFR BLD CALC: 33.3 % (ref 37–54)
HEMOGLOBIN: 10.2 G/DL (ref 12.5–16.5)
HEMOGLOBIN: 10.9 G/DL (ref 12.5–16.5)
IMMATURE GRANULOCYTES #: 0.14 E9/L
IMMATURE GRANULOCYTES %: 1.5 % (ref 0–5)
LYMPHOCYTES ABSOLUTE: 0.71 E9/L (ref 1.5–4)
LYMPHOCYTES RELATIVE PERCENT: 7.4 % (ref 20–42)
MCH RBC QN AUTO: 28 PG (ref 26–35)
MCH RBC QN AUTO: 28.3 PG (ref 26–35)
MCHC RBC AUTO-ENTMCNC: 32.7 % (ref 32–34.5)
MCHC RBC AUTO-ENTMCNC: 33.2 % (ref 32–34.5)
MCV RBC AUTO: 85 FL (ref 80–99.9)
MCV RBC AUTO: 85.6 FL (ref 80–99.9)
METER GLUCOSE: 153 MG/DL (ref 74–99)
METER GLUCOSE: 184 MG/DL (ref 74–99)
METER GLUCOSE: 211 MG/DL (ref 74–99)
METER GLUCOSE: 232 MG/DL (ref 74–99)
MONOCYTES ABSOLUTE: 0.28 E9/L (ref 0.1–0.95)
MONOCYTES RELATIVE PERCENT: 2.9 % (ref 2–12)
NEUTROPHILS ABSOLUTE: 8.49 E9/L (ref 1.8–7.3)
NEUTROPHILS RELATIVE PERCENT: 88.1 % (ref 43–80)
PDW BLD-RTO: 13.7 FL (ref 11.5–15)
PDW BLD-RTO: 13.9 FL (ref 11.5–15)
PLATELET # BLD: 118 E9/L (ref 130–450)
PLATELET # BLD: 140 E9/L (ref 130–450)
PMV BLD AUTO: 8.9 FL (ref 7–12)
PMV BLD AUTO: 9 FL (ref 7–12)
POTASSIUM SERPL-SCNC: 5.6 MMOL/L (ref 3.5–5)
POTASSIUM SERPL-SCNC: 5.6 MMOL/L (ref 3.5–5)
RBC # BLD: 3.61 E12/L (ref 3.8–5.8)
RBC # BLD: 3.89 E12/L (ref 3.8–5.8)
SODIUM BLD-SCNC: 134 MMOL/L (ref 132–146)
SODIUM BLD-SCNC: 137 MMOL/L (ref 132–146)
TOTAL PROTEIN: 6.8 G/DL (ref 6.4–8.3)
WBC # BLD: 6.6 E9/L (ref 4.5–11.5)
WBC # BLD: 9.6 E9/L (ref 4.5–11.5)

## 2022-09-06 PROCEDURE — 82962 GLUCOSE BLOOD TEST: CPT

## 2022-09-06 PROCEDURE — 99232 SBSQ HOSP IP/OBS MODERATE 35: CPT | Performed by: INTERNAL MEDICINE

## 2022-09-06 PROCEDURE — 6370000000 HC RX 637 (ALT 250 FOR IP): Performed by: INTERNAL MEDICINE

## 2022-09-06 PROCEDURE — 99233 SBSQ HOSP IP/OBS HIGH 50: CPT | Performed by: INTERNAL MEDICINE

## 2022-09-06 PROCEDURE — 6370000000 HC RX 637 (ALT 250 FOR IP): Performed by: NURSE PRACTITIONER

## 2022-09-06 PROCEDURE — 94640 AIRWAY INHALATION TREATMENT: CPT

## 2022-09-06 PROCEDURE — 6360000002 HC RX W HCPCS: Performed by: NURSE PRACTITIONER

## 2022-09-06 PROCEDURE — 2580000003 HC RX 258: Performed by: INTERNAL MEDICINE

## 2022-09-06 PROCEDURE — 80048 BASIC METABOLIC PNL TOTAL CA: CPT

## 2022-09-06 PROCEDURE — 36415 COLL VENOUS BLD VENIPUNCTURE: CPT

## 2022-09-06 PROCEDURE — 6360000002 HC RX W HCPCS: Performed by: INTERNAL MEDICINE

## 2022-09-06 PROCEDURE — 80053 COMPREHEN METABOLIC PANEL: CPT

## 2022-09-06 PROCEDURE — 85027 COMPLETE CBC AUTOMATED: CPT

## 2022-09-06 PROCEDURE — 2700000000 HC OXYGEN THERAPY PER DAY

## 2022-09-06 PROCEDURE — 2060000000 HC ICU INTERMEDIATE R&B

## 2022-09-06 PROCEDURE — 94660 CPAP INITIATION&MGMT: CPT

## 2022-09-06 PROCEDURE — 85025 COMPLETE CBC W/AUTO DIFF WBC: CPT

## 2022-09-06 RX ORDER — PREDNISONE 20 MG/1
40 TABLET ORAL DAILY
Status: DISCONTINUED | OUTPATIENT
Start: 2022-09-06 | End: 2022-09-08 | Stop reason: HOSPADM

## 2022-09-06 RX ORDER — GUAIFENESIN/DEXTROMETHORPHAN 100-10MG/5
5 SYRUP ORAL EVERY 4 HOURS PRN
Status: DISCONTINUED | OUTPATIENT
Start: 2022-09-06 | End: 2022-09-08 | Stop reason: HOSPADM

## 2022-09-06 RX ADMIN — GUAIFENESIN AND DEXTROMETHORPHAN 5 ML: 100; 10 SYRUP ORAL at 20:41

## 2022-09-06 RX ADMIN — SODIUM CHLORIDE, PRESERVATIVE FREE 10 ML: 5 INJECTION INTRAVENOUS at 20:42

## 2022-09-06 RX ADMIN — INSULIN GLARGINE 50 UNITS: 100 INJECTION, SOLUTION SUBCUTANEOUS at 20:41

## 2022-09-06 RX ADMIN — PREDNISONE 40 MG: 20 TABLET ORAL at 14:48

## 2022-09-06 RX ADMIN — SODIUM CHLORIDE, PRESERVATIVE FREE 10 ML: 5 INJECTION INTRAVENOUS at 08:41

## 2022-09-06 RX ADMIN — PRIMIDONE 50 MG: 50 TABLET ORAL at 20:40

## 2022-09-06 RX ADMIN — ARFORMOTEROL TARTRATE 15 MCG: 15 SOLUTION RESPIRATORY (INHALATION) at 21:16

## 2022-09-06 RX ADMIN — ATORVASTATIN CALCIUM 40 MG: 40 TABLET, FILM COATED ORAL at 08:40

## 2022-09-06 RX ADMIN — ALBUTEROL SULFATE 2.5 MG: 2.5 SOLUTION RESPIRATORY (INHALATION) at 08:26

## 2022-09-06 RX ADMIN — ASPIRIN 81 MG CHEWABLE TABLET 81 MG: 81 TABLET CHEWABLE at 08:40

## 2022-09-06 RX ADMIN — METOPROLOL TARTRATE 75 MG: 50 TABLET, FILM COATED ORAL at 20:40

## 2022-09-06 RX ADMIN — LISINOPRIL 10 MG: 10 TABLET ORAL at 08:41

## 2022-09-06 RX ADMIN — METFORMIN HYDROCHLORIDE 1000 MG: 1000 TABLET ORAL at 08:40

## 2022-09-06 RX ADMIN — GUAIFENESIN AND DEXTROMETHORPHAN 5 ML: 100; 10 SYRUP ORAL at 14:37

## 2022-09-06 RX ADMIN — INSULIN LISPRO 2 UNITS: 100 INJECTION, SOLUTION INTRAVENOUS; SUBCUTANEOUS at 06:00

## 2022-09-06 RX ADMIN — ALBUTEROL SULFATE 2.5 MG: 2.5 SOLUTION RESPIRATORY (INHALATION) at 17:07

## 2022-09-06 RX ADMIN — INSULIN LISPRO 12 UNITS: 100 INJECTION, SOLUTION INTRAVENOUS; SUBCUTANEOUS at 11:27

## 2022-09-06 RX ADMIN — METHYLPREDNISOLONE SODIUM SUCCINATE 40 MG: 40 INJECTION, POWDER, LYOPHILIZED, FOR SOLUTION INTRAMUSCULAR; INTRAVENOUS at 08:41

## 2022-09-06 RX ADMIN — INSULIN LISPRO 12 UNITS: 100 INJECTION, SOLUTION INTRAVENOUS; SUBCUTANEOUS at 16:58

## 2022-09-06 RX ADMIN — METFORMIN HYDROCHLORIDE 1000 MG: 1000 TABLET ORAL at 16:57

## 2022-09-06 RX ADMIN — METOPROLOL TARTRATE 75 MG: 50 TABLET, FILM COATED ORAL at 08:40

## 2022-09-06 RX ADMIN — BUDESONIDE 500 MCG: 0.5 SUSPENSION RESPIRATORY (INHALATION) at 08:26

## 2022-09-06 RX ADMIN — ACETAMINOPHEN 650 MG: 325 TABLET ORAL at 20:40

## 2022-09-06 RX ADMIN — BUDESONIDE 500 MCG: 0.5 SUSPENSION RESPIRATORY (INHALATION) at 21:16

## 2022-09-06 RX ADMIN — ALBUTEROL SULFATE 2.5 MG: 2.5 SOLUTION RESPIRATORY (INHALATION) at 12:17

## 2022-09-06 RX ADMIN — TAMSULOSIN HYDROCHLORIDE 0.4 MG: 0.4 CAPSULE ORAL at 20:40

## 2022-09-06 RX ADMIN — ENOXAPARIN SODIUM 30 MG: 100 INJECTION SUBCUTANEOUS at 20:40

## 2022-09-06 RX ADMIN — INSULIN LISPRO 12 UNITS: 100 INJECTION, SOLUTION INTRAVENOUS; SUBCUTANEOUS at 06:03

## 2022-09-06 RX ADMIN — TAMSULOSIN HYDROCHLORIDE 0.4 MG: 0.4 CAPSULE ORAL at 08:40

## 2022-09-06 RX ADMIN — ALBUTEROL SULFATE 2.5 MG: 2.5 SOLUTION RESPIRATORY (INHALATION) at 21:16

## 2022-09-06 RX ADMIN — ISOSORBIDE MONONITRATE 30 MG: 30 TABLET, EXTENDED RELEASE ORAL at 08:41

## 2022-09-06 RX ADMIN — ENOXAPARIN SODIUM 30 MG: 100 INJECTION SUBCUTANEOUS at 08:41

## 2022-09-06 RX ADMIN — ARFORMOTEROL TARTRATE 15 MCG: 15 SOLUTION RESPIRATORY (INHALATION) at 08:26

## 2022-09-06 ASSESSMENT — PAIN SCALES - GENERAL
PAINLEVEL_OUTOF10: 7
PAINLEVEL_OUTOF10: 3

## 2022-09-06 ASSESSMENT — PAIN DESCRIPTION - DESCRIPTORS: DESCRIPTORS: DISCOMFORT

## 2022-09-06 ASSESSMENT — PAIN DESCRIPTION - ORIENTATION: ORIENTATION: MID

## 2022-09-06 ASSESSMENT — PAIN DESCRIPTION - LOCATION: LOCATION: CHEST

## 2022-09-06 NOTE — PROGRESS NOTES
[address]    PULMONARY PROGRESS NOTE    Patient: Lu Guo  MRN: [id number]  : 1950    Encounter Date: 2022  Encounter Time: 1:57 PM     Date of Admission: .2022  4:46 PM    Consulting Physician:  Primary Care Physician:      Rina Barry MD     [phone]     None    PROBLEM LIST:  Patient Active Problem List   Diagnosis    Hyperkalemia    Shortness of breath    Acute on chronic congestive heart failure (HCC)    Coronary artery disease involving native coronary artery of native heart with angina pectoris (HCC)    Chronic obstructive pulmonary disease with acute exacerbation ([facility] [address]       SUBJECTIVE:  Sitting on edge of bed with breathing treatment  Son at bedside  Has sob with activity  Has a dry cough - he feels it's from the upper airway  Was scheduled for PET today - had to reschedule      CURRENT MEDICATIONS:  Current Facility-Administered Medications: guaiFENesin-dextromethorphan (ROBITUSSIN DM) 100-10 MG/5ML syrup 5 mL, 5 mL, Oral, Q4H PRN  Arformoterol Tartrate (BROVANA) nebulizer solution 15 mcg, 15 mcg, Nebulization, BID  budesonide (PULMICORT) nebulizer suspension 500 mcg, 500 mcg, Nebulization, BID  albuterol (PROVENTIL) nebulizer solution 2.5 mg, 2.5 mg, Nebulization, 4x daily  atorvastatin (LIPITOR) tablet 40 mg, 40 mg, Oral, Daily  [Held by provider] furosemide (LASIX) tablet 40 mg, 40 mg, Oral, Daily  insulin glargine (LANTUS) injection vial 50 Units, 50 Units, SubCUTAneous, Nightly  insulin lispro (HUMALOG) injection vial 12 Units, 12 Units, SubCUTAneous, TID WC  insulin lispro (HUMALOG) injection vial 0-8 Units, 0-8 Units, SubCUTAneous, TID WC  insulin lispro (HUMALOG) injection vial 0-4 Units, 0-4 Units, SubCUTAneous, Nightly  glucose chewable tablet 16 g, 4 tablet, Oral, PRN  dextrose bolus 10% 125 mL, 125 mL, IntraVENous, PRN **OR** dextrose bolus 10% 250 mL, 250 mL, IntraVENous, PRN  glucagon (rDNA) injection 1 mg, 1 mg, SubCUTAneous, PRN  dextrose 10 % infusion, , IntraVENous, Continuous PRN  methylPREDNISolone sodium (SOLU-MEDROL) injection 40 mg, 40 mg, IntraVENous, Q12H  isosorbide mononitrate (IMDUR) extended release tablet 30 mg, 30 mg, Oral, Daily  metFORMIN (GLUCOPHAGE) tablet 1,000 mg, 1,000 mg, Oral, BID WC  primidone (MYSOLINE) tablet 50 mg, 50 mg, Oral, Nightly  sodium chloride flush 0.9 % injection 5-40 mL, 5-40 mL, IntraVENous, 2 times per day  sodium chloride flush 0.9 % injection 5-40 mL, 5-40 mL, IntraVENous, PRN  0.9 % sodium chloride infusion, , IntraVENous, PRN  enoxaparin Sodium (LOVENOX) injection 30 mg, 30 mg, SubCUTAneous, BID  ondansetron (ZOFRAN-ODT) disintegrating tablet 4 mg, 4 mg, Oral, Q8H PRN **OR** ondansetron (ZOFRAN) injection 4 mg, 4 mg, IntraVENous, Q6H PRN  polyethylene glycol (GLYCOLAX) packet 17 g, 17 g, Oral, Daily PRN  acetaminophen (TYLENOL) tablet 650 mg, 650 mg, Oral, Q6H PRN **OR** acetaminophen (TYLENOL) suppository 650 mg, 650 mg, Rectal, Q6H PRN  perflutren lipid microspheres (DEFINITY) injection 1.65 mg, 1.5 mL, IntraVENous, ONCE PRN  aspirin chewable tablet 81 mg, 81 mg, Oral, Daily  lisinopril (PRINIVIL;ZESTRIL) tablet 10 mg, 10 mg, Oral, Daily  metoprolol tartrate (LOPRESSOR) tablet 75 mg, 75 mg, Oral, BID  tamsulosin (FLOMAX) capsule 0.4 mg, 0.4 mg, Oral, BID    IV MEDICATIONS:   dextrose      sodium chloride         ALLERGIES:  No Known Allergies      PHYSICAL EXAMINATION:     VITAL SIGNS:  BP (!) 155/91   Pulse 73   Temp 98.4 °F (36.9 °C) (Oral)   Resp 18   Ht 6' 1\" (1.854 m)   Wt 251 lb 9.6 oz (114.1 kg)   SpO2 93%   BMI 33.19 kg/m²   Wt Readings from Last 3 Encounters:   09/06/22 251 lb 9.6 oz (114.1 kg)   03/30/21 243 lb (110.2 kg)   03/03/21 246 lb 11.2 oz (111.9 kg)     Temp Readings from Last 3 Encounters:   09/06/22 98.4 °F (36.9 °C) (Oral)   03/30/21 97 °F (36.1 °C)   03/03/21 98.2 °F (36.8 °C) (Temporal)     TMAX:  BP Readings from Last 3 Encounters:   09/06/22 (!) 155/91 21 124/74   21 (!) 167/84     Pulse Readings from Last 3 Encounters:   22 73   21 90   21 107       CURRENT PULSE OXIMETRY: SpO2: 93 %  24HR PULSE OXIMETRY RANGE: SpO2  Av.5 %  Min: 93 %  Max: 100 %  CVP:      ________________________________________________________________________    VENTILATOR SETTINGS (if applicable): Additional Respiratory Assessments  Heart Rate: 73  Resp: 18  SpO2: 93 %  ETCO2:  Peak Inspiratory Pressure:  End-Inspiratory Plateau Pressure:    ABG:  No results for input(s): PH, PO2, PCO2, HCO3, BE, O2SAT, METHB, O2HB, COHB, O2CON, HHB, THB in the last 72 hours. FiO2 : 40 %     ________________________________________________________________________    IV ACCESS:    NUTRITION: ADULT DIET; Regular; 4 carb choices (60 gm/meal); Low Fat/Low Chol/High Fiber/JARRED    INTAKE/OUTPUTS:  I/O last 3 completed shifts: In: 400 [P.O.:400]  Out: -   No intake or output data in the 24 hours ending 22 1357    General Appearance: NAD, appears stated age  Eyes: perrla, conjunctivae normal and sclera anicteric   Neck: neck supple   Pulmonary/Chest: decreased breath sounds, no accessory muscles of inspiration, equal, nonlabored. Diminished. Cardiovascular: normal rate, regular rhythm, normal S1 and S2, + murmur.   Abdomen: soft, non-tender, non-distended, normal bowel sounds, no masses or organomegaly, obese   Extremities: no cyanosis, no clubbing, Soft ble edema  Musculoskeletal: normal range of motion  Neurologic: AOX4, follows commands    LABS/IMAGING:    CBC:  Lab Results   Component Value Date    WBC 9.6 2022    HGB 10.9 (L) 2022    HCT 33.3 (L) 2022    MCV 85.6 2022     2022    LABLYMP 0.5 (L) 2021    LYMPHOPCT 7.4 (L) 2022    RBC 3.89 2022    MCH 28.0 2022    MCHC 32.7 2022    RDW 13.9 2022    NEUTOPHILPCT 88.1 (H) 2022    MONOPCT 2.9 2022    BASOPCT 0.1 2022 NEUTROABS 8.49 (H) 09/06/2022    LYMPHSABS 0.71 (L) 09/06/2022    MONOSABS 0.28 09/06/2022    EOSABS 0.00 (L) 09/06/2022    BASOSABS 0.01 09/06/2022       Recent Labs     09/06/22  1225 09/06/22  0320 09/05/22  0616   WBC 9.6 6.6 5.3   HGB 10.9* 10.2* 10.9*   HCT 33.3* 30.7* 33.2*   MCV 85.6 85.0 88.5    118* 111*       BMP:   Recent Labs     09/05/22  0616 09/06/22  0320 09/06/22  1225    137 134   K 5.0 5.6* 5.6*   CL 98 96* 92*   CO2 31* 33* 31*   BUN 26* 32* 32*   CREATININE 1.2 1.5* 1.3*       MG:   Lab Results   Component Value Date/Time    MG 1.7 07/23/2021 04:59 AM     Ca/Phos:   Lab Results   Component Value Date    CALCIUM 9.3 09/06/2022    PHOS 5.9 (H) 07/23/2021     Amylase: No results found for: AMYLASE  Lipase: No results found for: LIPASE  LIVER PROFILE:   Recent Labs     09/05/22  0616 09/06/22  1225   AST 20 19   ALT 22 20   BILITOT 0.4 0.4   ALKPHOS 62 66       PT/INR:   Recent Labs     09/04/22  1709   PROTIME 11.9   INR 1.1     APTT: No results for input(s): APTT in the last 72 hours. Cardiac Enzymes:  Lab Results   Component Value Date    TROPONINI <0.01 02/27/2021       Hgb A1C:   Lab Results   Component Value Date    LABA1C 7.2 (H) 07/23/2021     Lab Results   Component Value Date     07/23/2021     RAFAEL: No results found for: RAFAEL  ESR: No results found for: SEDRATE  CRP: No results found for: CRP  D Dimer:   Lab Results   Component Value Date    DDIMER <200 09/04/2022     Folate and B12:   Lab Results   Component Value Date    XFRUBNXT99 274 07/23/2021   , No results found for: FOLATE    Lactic Acid:   Lab Results   Component Value Date    LACTA 0.7 02/28/2021     Ammonia:   Cortisol:  Thyroid Studies:  Lab Results   Component Value Date    TSH 0.937 07/23/2021       Toxicology:    MICROBIOLOGY:  Inf a/b and covid negative     CXR:  9/4 cxr    Cardiomegaly, small bilateral pleural effusions, and bibasilar  atelectasis or infiltrates. 2.  Right suprahilar mass. Please see recent CT chest report and with CT  recommendation for PET-CT evaluation or CT-guided tissue sampling. CT Chest:  9/1 Persistent medial right upper lobe soft tissue mass measuring 5.9 x 3.5 x  2.1 cm. Neoplasm should be excluded        ASSESSMENT/Plan:  COPD exacerbation  Continue brovana, budesonide. Albuterol  Will change solu medrol to prednisone taper - takes prednisone 15mg daily as an outpatient   Procal ordered yesterday  Chronic hypoxic respiratory failure  Wears 4.5 lnc at baseline - down to 3L here  Lung mass   As seen on ct 9/1, being followed by [facility]  Former smoker, worked in [employer], not up to date on health screenings, family history of cancer in parents   Patient was scheduled for outpatient PET scan today, 09/06. Son is to call and hopefully can coordinate to have it scheduled when discharged  IFTIKHAR  Continue NIV qhs and PRN  Faithful with NIV  Pulmonary edema  S/p dose of lasix - inaccurate I/O  Echo ordered      ANDIE Lomax CNP     Attending Attestation Note:    Patient seen and examined with Hospital Staff, NP. I have extensively reviewed the chart lab work and imaging. I agree with above. Modifications and amendment of note made as necessary.     In addition, the following apply:    Current Facility-Administered Medications   Medication Dose Route Frequency Provider Last Rate Last Admin    guaiFENesin-dextromethorphan (ROBITUSSIN DM) 100-10 MG/5ML syrup 5 mL  5 mL Oral Q4H PRN ANDIE Estevez NP   5 mL at 09/06/22 1437    predniSONE (DELTASONE) tablet 40 mg  40 mg Oral Daily ANDIE Lomax CNP        Arformoterol Tartrate (BROVANA) nebulizer solution 15 mcg  15 mcg Nebulization BID Ashley Lombard, APRN - CNP   15 mcg at 09/06/22 0826    budesonide (PULMICORT) nebulizer suspension 500 mcg  500 mcg Nebulization BID Ashley Lombard, APRN - CNP   500 mcg at 09/06/22 0826    albuterol (PROVENTIL) nebulizer solution 2.5 mg  2.5 mg Nebulization 4x daily Kari Sic Flaquito Chaney MD   2.5 mg at 09/06/22 1217    atorvastatin (LIPITOR) tablet 40 mg  40 mg Oral Daily Onofre Pedro MD   40 mg at 09/06/22 0840    [Held by provider] furosemide (LASIX) tablet 40 mg  40 mg Oral Daily Onofre Pedro MD        insulin glargine (LANTUS) injection vial 50 Units  50 Units SubCUTAneous Nightly Onofre Pedro MD        insulin lispro (HUMALOG) injection vial 12 Units  12 Units SubCUTAneous TID  Onofre Pedro MD   12 Units at 09/06/22 1127    insulin lispro (HUMALOG) injection vial 0-8 Units  0-8 Units SubCUTAneous TID JO-ANN Pedro MD   2 Units at 09/06/22 0600    insulin lispro (HUMALOG) injection vial 0-4 Units  0-4 Units SubCUTAneous Nightly Onofre Pedro MD        glucose chewable tablet 16 g  4 tablet Oral PRN Onofre Pedro MD        dextrose bolus 10% 125 mL  125 mL IntraVENous RAÚL Pedro MD        Or    dextrose bolus 10% 250 mL  250 mL IntraVENous PRAVINASH Pedro MD        glucagon (rDNA) injection 1 mg  1 mg SubCUTAneous PRN Onofre Pedro MD        dextrose 10 % infusion   IntraVENous Continuous RAÚL Pedro MD        isosorbide mononitrate (IMDUR) extended release tablet 30 mg  30 mg Oral Daily Onofre Pedro MD   30 mg at 09/06/22 0841    metFORMIN (GLUCOPHAGE) tablet 1,000 mg  1,000 mg Oral BID JO-ANN Pedro MD   1,000 mg at 09/06/22 0840    primidone (MYSOLINE) tablet 50 mg  50 mg Oral Nightly Onofre Pedro MD   50 mg at 09/05/22 2108    sodium chloride flush 0.9 % injection 5-40 mL  5-40 mL IntraVENous 2 times per day Onofre Pedro MD   10 mL at 09/06/22 0841    sodium chloride flush 0.9 % injection 5-40 mL  5-40 mL IntraVENous PRN Onofre Pedro MD        0.9 % sodium chloride infusion   IntraVENous PRAVINASH Pedro MD        enoxaparin Sodium (LOVENOX) injection 30 mg  30 mg SubCUTAneous BID Onofre Pedro MD   30 mg at 09/06/22 0841    ondansetron (ZOFRAN-ODT) disintegrating tablet 4 mg  4 mg Oral Q8H PRN Onofre Pedro MD Or    ondansetron (ZOFRAN) injection 4 mg  4 mg IntraVENous Q6H PRN Nanette Lee MD        polyethylene glycol (GLYCOLAX) packet 17 g  17 g Oral Daily PRN Nanette Lee MD        acetaminophen (TYLENOL) tablet 650 mg  650 mg Oral Q6H PRN Nanette Lee MD        Or    acetaminophen (TYLENOL) suppository 650 mg  650 mg Rectal Q6H PRN Nanette Lee MD        perflutren lipid microspheres (DEFINITY) injection 1.65 mg  1.5 mL IntraVENous ONCE PRN Nanette Lee MD        aspirin chewable tablet 81 mg  81 mg Oral Daily Nanette Lee MD   81 mg at 09/06/22 0840    lisinopril (PRINIVIL;ZESTRIL) tablet 10 mg  10 mg Oral Daily Nanette Lee MD   10 mg at 09/06/22 0841    metoprolol tartrate (LOPRESSOR) tablet 75 mg  75 mg Oral BID Nanette Lee MD   75 mg at 09/06/22 0840    tamsulosin (FLOMAX) capsule 0.4 mg  0.4 mg Oral BID Nanette Lee MD   0.4 mg at 09/06/22 0840      - AlbuterolDanyelle Pulmicort  - outpatient PET/CT supposed to be completed 9/6/2022 through [facility] (radiology notified patient will need to reschedule)  - supportive caret to continue   - steroid wean to PO  - await D/C planning     Komal Echeverria MD  9/6/2022  2:42 PM

## 2022-09-06 NOTE — PLAN OF CARE
Patient's chart updated to reflect:      . - HF care plan, HF education points and HF discharge instructions.  -Orders: 2 gram sodium diet, daily weights, I/O.  -PCP and cardiology follow up appointments to be scheduled within 7 days of hospital discharge. -CHF education session will be provided to the patient prior to hospital discharge.     Gilberto Pelayo RN BSN  Heart Failure Navigator

## 2022-09-06 NOTE — PROGRESS NOTES
Date: 9/5/2022    Time: 11:14 PM    Patient Placed On BIPAP/CPAP/ Non-Invasive Ventilation? Yes    If no must comment. Facial area red/color change? No           If YES are Blister/Lesion present? No  If yes must notify nursing staff  BIPAP/CPAP skin barrier? Yes    Skin barrier type:mepilexlite       Comments: red alarm cord, red outlet.         Vito Barfield RCP

## 2022-09-06 NOTE — PROGRESS NOTES
[facility] Progress Note    Admitting Date and Time: 9/4/2022  4:46 PM  Admit Dx: Shortness of breath [R06.02]  Acute on chronic congestive heart failure, unspecified heart failure type ([address] [I50.9]    Subjective:  Patient is being followed for Shortness of breath [R06.02]  Acute on chronic congestive heart failure, unspecified heart failure type ([address] [I50.9]     Seen at bedside  Awake alert and oriented  States his breathing and leg swelling is better today, but is concerned he is only on 3 L via NC   States pain in chest/ribs when he coughs   Nonproductive cough noted   No acute events overnight     ROS: denies fever, chills, cp, sob, n/v, HA unless stated above.       Arformoterol Tartrate  15 mcg Nebulization BID    budesonide  500 mcg Nebulization BID    albuterol  2.5 mg Nebulization 4x daily    atorvastatin  40 mg Oral Daily    [Held by provider] furosemide  40 mg Oral Daily    insulin glargine  50 Units SubCUTAneous Nightly    insulin lispro  12 Units SubCUTAneous TID WC    insulin lispro  0-8 Units SubCUTAneous TID WC    insulin lispro  0-4 Units SubCUTAneous Nightly    methylPREDNISolone  40 mg IntraVENous Q12H    isosorbide mononitrate  30 mg Oral Daily    metFORMIN  1,000 mg Oral BID WC    primidone  50 mg Oral Nightly    sodium chloride flush  5-40 mL IntraVENous 2 times per day    enoxaparin  30 mg SubCUTAneous BID    aspirin  81 mg Oral Daily    lisinopril  10 mg Oral Daily    metoprolol tartrate  75 mg Oral BID    tamsulosin  0.4 mg Oral BID     guaiFENesin-dextromethorphan, 5 mL, Q4H PRN  glucose, 4 tablet, PRN  dextrose bolus, 125 mL, PRN   Or  dextrose bolus, 250 mL, PRN  glucagon (rDNA), 1 mg, PRN  dextrose, , Continuous PRN  sodium chloride flush, 5-40 mL, PRN  sodium chloride, , PRN  ondansetron, 4 mg, Q8H PRN   Or  ondansetron, 4 mg, Q6H PRN  polyethylene glycol, 17 g, Daily PRN  acetaminophen, 650 mg, Q6H PRN   Or  acetaminophen, 650 mg, Q6H PRN  perflutren lipid microspheres, 1.5 mL, ONCE PRN       Objective:    BP (!) 155/91   Pulse 73   Temp 98.4 °F (36.9 °C) (Oral)   Resp 18   Ht 6' 1\" (1.854 m)   Wt 251 lb 9.6 oz (114.1 kg)   SpO2 98%   BMI 33.19 kg/m²     General Appearance: alert and oriented to person, place and time and in no acute distress + overweight  Skin: warm and dry  Head: normocephalic and atraumatic  Eyes: pupils equal, round, and reactive to light, extraocular eye movements intact, conjunctivae normal  Neck: neck supple and non tender without mass   Pulmonary/Chest: DIMINISHED to auscultation bilaterally- no wheezes, rales or rhonchi, normal air movement, no respiratory distress + 3 L via NC   Cardiovascular: normal rate, normal S1 and S2 and no carotid bruits  Abdomen: soft, non-tender, non-distended, normal bowel sounds, no masses or organomegaly  Extremities: no cyanosis, no clubbing and +nonpitting edema  Neurologic: no cranial nerve deficit and speech normal        Recent Labs     09/04/22  1709 09/05/22  0616 09/06/22  0320    140 137   K 5.3* 5.0 5.6*   CL 99 98 96*   CO2 30* 31* 33*   BUN 27* 26* 32*   CREATININE 1.4* 1.2 1.5*   GLUCOSE 133* 209* 213*   CALCIUM 9.2 9.0 9.0       Recent Labs     09/04/22  1730 09/05/22  0616 09/06/22  0320   WBC 7.6 5.3 6.6   RBC 4.01 3.75* 3.61*   HGB 11.6* 10.9* 10.2*   HCT 36.0* 33.2* 30.7*   MCV 89.8 88.5 85.0   MCH 28.9 29.1 28.3   MCHC 32.2 32.8 33.2   RDW 14.2 13.9 13.7    111* 118*   MPV 9.4 9.0 9.0       Radiology: reviewed       Assessment:    Principal Problem:    Shortness of breath  Active Problems:    Acute on chronic congestive heart failure (HCC)    Coronary artery disease involving native coronary artery of native heart with angina pectoris (HCC)    Chronic obstructive pulmonary disease with acute exacerbation (HCC)  Resolved Problems:    * No resolved hospital problems.  *      Plan:  COPD exacerbation with chronic hypoxic respiratory failure   Follows with Dr Suad Shannon - pulmonary following, appreciate recs   Continue on Solumedrol, albuterol, brovana, budesonide  Baseline oxygen 4.5 L at home. Resting comfortably on 3 L via NC with pulse ox > 95% since admission   Hx lung mass found on CT 9/1. Former smoker, worked in [employer], not up to date on health screenings. Scheduled for outpatient PET 9/6 - [facility] following   CHF/CAD  Cardiology following   Has been on lasix at home. Was not sure why prescribed. Continue lasix as per cardiology. STRICT I/O - there is no output recorded since admission. Echo  GONZALES   Cr 1.5 increased from 1.4 at admission. Monitor   NIDDM   Continue metformin, Lantus nightly, insulin sliding scale   On steroids, will monitor BS but would expect elevated   Hx anemia, GERD, HTN, HLD, IFTIKHAR   Continue home medications     Code status                 Full  DVT prophylaxis         Lovenox  Disposition                  Home when medically stable     30 minutes time spent reviewing patient chart, assessing patient, discussing plan of care with patient and family, discussing plan of care with collaborating physician, and charting.      Electronically signed by ANDIE Evans NP on 9/6/2022 at 11:37 AM

## 2022-09-06 NOTE — PROGRESS NOTES
INPATIENT CARDIOLOGY FOLLOW-UP    Name: Regulo Higuera    Age: 66 y.o. Date of Admission: 9/4/2022  4:46 PM    Date of Service: 9/6/2022    Chief Complaint: Follow-up for Shortness of breath, coronary artery disease      Interim History:  No new overnight cardiac complaints except for BEEBE, feeling better, still has swelling of the legs and BEEBE. Currently with no complaints of CP, palpitations, dizziness, or lightheadedness. SR on telemetry.     Review of Systems:   Cardiac: As per HPI  General: No fever, chills  Pulmonary: As per HPI  HEENT: No visual disturbances, difficult swallowing  GI: No nausea, vomiting  Endocrine: No thyroid disease or DM  Musculoskeletal: LUCERO x 4, no focal motor deficits  Skin: Intact, no rashes  Neuro/Psych: No headache or seizures    Problem List:  Patient Active Problem List   Diagnosis    Hyperkalemia    Shortness of breath    Acute on chronic congestive heart failure (HCC)    Coronary artery disease involving native coronary artery of native heart with angina pectoris (HCC)    Chronic obstructive pulmonary disease with acute exacerbation (HCC)       Allergies:  No Known Allergies    Current Medications:  Current Facility-Administered Medications   Medication Dose Route Frequency Provider Last Rate Last Admin    guaiFENesin-dextromethorphan (ROBITUSSIN DM) 100-10 MG/5ML syrup 5 mL  5 mL Oral Q4H PRN ANDIE Harvey NP        predniSONE (DELTASONE) tablet 40 mg  40 mg Oral Daily ANDIE Bell CNP        Arformoterol Tartrate (BROVANA) nebulizer solution 15 mcg  15 mcg Nebulization BID ANDIE Escalona CNP   15 mcg at 09/06/22 0826    budesonide (PULMICORT) nebulizer suspension 500 mcg  500 mcg Nebulization BID ANDIE Escalona CNP   500 mcg at 09/06/22 0826    albuterol (PROVENTIL) nebulizer solution 2.5 mg  2.5 mg Nebulization 4x daily Ratna Verdin MD   2.5 mg at 09/06/22 1217    atorvastatin (LIPITOR) tablet 40 mg  40 mg Oral Daily Ratna Verdin MD   40 mg at 09/06/22 0840    [Held by provider] furosemide (LASIX) tablet 40 mg  40 mg Oral Daily Rafat Peña MD        insulin glargine (LANTUS) injection vial 50 Units  50 Units SubCUTAneous Nightly Rafat Peña MD        insulin lispro (HUMALOG) injection vial 12 Units  12 Units SubCUTAneous TID  Rafat Peña MD   12 Units at 09/06/22 1127    insulin lispro (HUMALOG) injection vial 0-8 Units  0-8 Units SubCUTAneous TID  Rafat Peña MD   2 Units at 09/06/22 0600    insulin lispro (HUMALOG) injection vial 0-4 Units  0-4 Units SubCUTAneous Nightly Rafat Peña MD        glucose chewable tablet 16 g  4 tablet Oral PRN Rafat Peña MD        dextrose bolus 10% 125 mL  125 mL IntraVENous PRN Rafat Peña MD        Or    dextrose bolus 10% 250 mL  250 mL IntraVENous PRN Rafat Peña MD        glucagon (rDNA) injection 1 mg  1 mg SubCUTAneous PRN Rafat Peña MD        dextrose 10 % infusion   IntraVENous Continuous PRN Rafat Peña MD        isosorbide mononitrate (IMDUR) extended release tablet 30 mg  30 mg Oral Daily Rafat Peña MD   30 mg at 09/06/22 0841    metFORMIN (GLUCOPHAGE) tablet 1,000 mg  1,000 mg Oral BID  Rafat Peña MD   1,000 mg at 09/06/22 0840    primidone (MYSOLINE) tablet 50 mg  50 mg Oral Nightly Rafat Peña MD   50 mg at 09/05/22 2108    sodium chloride flush 0.9 % injection 5-40 mL  5-40 mL IntraVENous 2 times per day Rafat Peña MD   10 mL at 09/06/22 0841    sodium chloride flush 0.9 % injection 5-40 mL  5-40 mL IntraVENous PRN Rafat Peña MD        0.9 % sodium chloride infusion   IntraVENous PRN Rafat Peña MD        enoxaparin Sodium (LOVENOX) injection 30 mg  30 mg SubCUTAneous BID Rafat Peña MD   30 mg at 09/06/22 0841    ondansetron (ZOFRAN-ODT) disintegrating tablet 4 mg  4 mg Oral Q8H PRN Rafat Peña MD        Or    ondansetron [facility]) injection 4 mg  4 mg IntraVENous Q6H PRN Rafat Peña MD        polyethylene glycol (GLYCOLAX) packet 17 g  17 g Oral Daily PRN Josep Umaña MD        acetaminophen (TYLENOL) tablet 650 mg  650 mg Oral Q6H PRN Josep Umaña MD        Or    acetaminophen (TYLENOL) suppository 650 mg  650 mg Rectal Q6H PRN Josep Umaña MD        perflutren lipid microspheres (DEFINITY) injection 1.65 mg  1.5 mL IntraVENous ONCE PRN Josep Umaña MD        aspirin chewable tablet 81 mg  81 mg Oral Daily Josep Umaña MD   81 mg at 09/06/22 0840    lisinopril (PRINIVIL;ZESTRIL) tablet 10 mg  10 mg Oral Daily Josep Umaña MD   10 mg at 09/06/22 0841    metoprolol tartrate (LOPRESSOR) tablet 75 mg  75 mg Oral BID Josep Umaña MD   75 mg at 09/06/22 0840    tamsulosin (FLOMAX) capsule 0.4 mg  0.4 mg Oral BID Josep Umaña MD   0.4 mg at 09/06/22 0840      dextrose      sodium chloride         Physical Exam:  BP (!) 155/91   Pulse 73   Temp 98.4 °F (36.9 °C) (Oral)   Resp 18   Ht 6' 1\" (1.854 m)   Wt 251 lb 9.6 oz (114.1 kg)   SpO2 93%   BMI 33.19 kg/m²   Wt Readings from Last 3 Encounters:   09/06/22 251 lb 9.6 oz (114.1 kg)   03/30/21 243 lb (110.2 kg)   03/03/21 246 lb 11.2 oz (111.9 kg)     Appearance: Awake, alert, no acute respiratory distress  Skin: Intact, no rash  Head: Normocephalic, atraumatic  Eyes: EOMI, no conjunctival erythema  ENMT: No pharyngeal erythema, MMM, no rhinorrhea  Neck: Supple, no elevated JVP, no carotid bruits  Lungs: Clear to auscultation bilaterally. No wheezes, rales, or rhonchi. Cardiac: Regular rate and rhythm, +S1S2, no murmurs apparent  Abdomen: Soft, nontender, +bowel sounds  Extremities: Moves all extremities x 4, 2+ lower extremity edema  Neurologic: No focal motor deficits apparent, normal mood and affect  Peripheral Pulses: Intact posterior tibial pulses bilaterally    Intake/Output:  No intake or output data in the 24 hours ending 09/06/22 1432  No intake/output data recorded.     Laboratory Tests:  Recent Labs     09/05/22  0616 09/06/22  7054 22  1225    137 134   K 5.0 5.6* 5.6*   CL 98 96* 92*   CO2 31* 33* 31*   BUN 26* 32* 32*   CREATININE 1.2 1.5* 1.3*   GLUCOSE 209* 213* 206*   CALCIUM 9.0 9.0 9.3     Lab Results   Component Value Date/Time    MG 1.7 2021 04:59 AM     Recent Labs     22  0616 22  1225   ALKPHOS 62 66   ALT 22 20   AST 20 19   PROT 6.5 6.8   BILITOT 0.4 0.4   LABALBU 4.0 4.2     Recent Labs     22  0616 22  0320 22  1225   WBC 5.3 6.6 9.6   RBC 3.75* 3.61* 3.89   HGB 10.9* 10.2* 10.9*   HCT 33.2* 30.7* 33.3*   MCV 88.5 85.0 85.6   MCH 29.1 28.3 28.0   MCHC 32.8 33.2 32.7   RDW 13.9 13.7 13.9   * 118* 140   MPV 9.0 9.0 8.9     Lab Results   Component Value Date    TROPONINI <0.01 2021     Lab Results   Component Value Date    INR 1.1 2022    PROTIME 11.9 2022     Lab Results   Component Value Date    TSH 0.937 2021     Lab Results   Component Value Date    LABA1C 7.2 (H) 2021     Lab Results   Component Value Date     2021     Lab Results   Component Value Date    CHOL 84 2021     Lab Results   Component Value Date    TRIG 52 2021     Lab Results   Component Value Date    HDL 52 2021     Lab Results   Component Value Date    LDLCHOLESTEROL 22 2021     Lab Results   Component Value Date    VLDL 10 2021     No results found for: CHOLHDLRATIO    Cardiac Tests:  EK/4/22: NSR 70 bpm No ST/T changes     Telemetry:   SR 70s      Vitals and labs were reviewed: As above     Chest X-ray:   22  Impression   1. Cardiomegaly, small bilateral pleural effusions, and bibasilar   atelectasis or infiltrates. 2.  Right suprahilar mass. Please see recent CT chest report and with CT   recommendation for PET-CT evaluation or CT-guided tissue sampling. CT chest 22  Personally reviewed with LAD stent and coronary calcifications  Impression   1.   Persistent medial right upper lobe soft tissue mass measuring 5.9 x 3.5 x   2.1 cm. Neoplasm should be excluded. Consider PET-CT examination or tissue   sampling. 2.  Mild splenomegaly. 3.  New mild inferior endplate partial compression fracture of the vertebral   body of T9. No lytic or blastic masses are visible. 4.  Chronic moderate lobular thyromegaly with calcifications. Findings may   reflect thyroid goiter. Echocardiogram:      Stress test:       Cardiac catheterization: Not available     -------------------------------------------------------------------------------------------------------------------------------------------------------------  IMPRESSION:  Severe dyspnea with exertion. Possible anginal equivalent, Improving. Probable acute heart failure, unknown ejection fraction. proBNP only 143 pg/mL. Mildly hypervolemic  Coronary artery disease prior PCI to LAD and other coronary 8 to 9 years ago. No anginal symptoms  Minimally elevated hs-cTnT: 34-29 ng/L. Likely acute myocardial injury, not consistent with ACS  Anemia Hgb 10.9  GONZALES creatinine 1.4 -> 1.2>>1.5 with hyperkalemia  Hypertension  DM2 insulin  Severe COPD with exacerbation, with chronic hypoxic respiratory failure on continuous oxygen  IFTIKHAR on nocturnal BiPAP  Lung mass being followed outpatient with plans for PET evaluation     RECOMMENDATIONS:     Hold Lasix for today  Strict I's and O's, change diet to cardiac/low-sodium, low potassium diet. Echo pending  Will consider ischemic evaluation once respiratory status improves  Continue medical treatment of CAD with aspirin, statin  Continue current cardiac medication otherwise  Cardiac records from Dr. Brenda Lozano are pending  Patient being treated for COPD exacerbation per pulmonary  Further care per primary service and consultants  Mild hyperkalemia, follow low potassium diet. Aggressive risk factor modification      Apple Child MD., [facility]   [facility]) Cardiology

## 2022-09-06 NOTE — PROGRESS NOTES
09/06/22 0015   NIV Type   $NIV $Daily Charge   Skin Protection for O2 Device Yes   Mode Biphasic   Mask Type Full face mask   Mask Size Medium   Settings/Measurements   IPAP 15 cmH20   CPAP/EPAP 10 cmH2O   Vt (Measured) 478 mL   Rate Ordered 12   Resp 13   FiO2  40 %   Mask Leak (lpm) 45 lpm   Comfort Level Good   Using Accessory Muscles No   Patient Observation   Observations red outlet   Alarm Settings   Alarms On Y   Low Pressure (cmH2O)   (in wall)   Oxygen Therapy/Pulse Ox   O2 Therapy Oxygen   $Oxygen $Daily Charge   O2 Device Nasal cannula

## 2022-09-06 NOTE — CONSULTS
CHF NURSE NAVIGATOR CONSULT NOTE:    Patient currently admitted with diagnosis of Unknown heart failure. Patient was awake and alert, sitting at the bedside during the consultation. He was engaged and asked appropriate questions throughout the education session. He is agreeable to heart failure education, low salt diet and self monitoring once discharged. Scheduling with the CHF clinic No: [facility] after discharge . Future Appointments   Date Time Provider Iwona Abreuisti   9/12/2022  8:00 AM SEB PET INJECTION ROOM [facility] PET LEONOR Radiolog   9/12/2022  9:00 AM SEB PET SCAN [facility] PET SEB Radiolog     Barriers to Care:  Contributing risk factors for Heart Failure are identified as lack of education. The patient is ordered:  Diet: ADULT DIET; Regular; 4 carb choices (60 gm/meal); Low Fat/Low Chol/High Fiber/JARRED   Sodium controlled diet Yes  Fluid restriction daily ordered (fluid restriction recommended if patient is hyponatremic and/or diuretic is initiated or increased) No  FR:   Daily Weights: Patient Vitals for the past 96 hrs (Last 3 readings):   Weight   09/06/22 0459 251 lb 9.6 oz (114.1 kg)   09/04/22 1604 255 lb (115.7 kg)     I/O:   Intake/Output Summary (Last 24 hours) at 9/6/2022 1113  Last data filed at 9/5/2022 1210  Gross per 24 hour   Intake 180 ml   Output --   Net 180 ml            We reviewed the introduction to Heart Failure, the HF zones, signs and symptoms to report on day 1 of onset, medications, medication compliance, the importance of obtaining daily weights, following a low sodium diet, reading food labels for the sodium content, keeping physician appointments, and smoking cessation. We discussed writing / tracking daily weights on a calendar / log because a 5 pound gain in 1 week can sneak up if you are not tracking it.         I advised patient they can reduce the risk for Heart Failure exacerbations by modifying / controlling the risk factors. We discussed self-managed care which includes the following:  to take medications as prescribed, report any intolerable side effects of medications to the cardiologist / doctor, do not just stop taking the medication; follow a cardiac heart healthy / low sodium diet; weigh yourself daily, exercise regularly- per doctor recommendation and not to smoke or use an excess amount of alcohol. We discussed calling the cardiologist / doctor with a weight gain of 3 pounds in one day or a total of 5 pounds or more in one week. Also, if you should have a significant weight loss of 3# or more in one day to call the doctor, they may need to decrease or hold the diuretic dose. On days you feels nauseated and not eating / drinking, having emesis or diarrhea,  informed to call the cardiologist  / doctor, they may need to decrease or hold diuretic to avoid dehydration. I stressed the importance of informing their cardiologist the first day of onset of any of the signs and symptoms in the \"Yellow Zone\" of the HF Zones. Patient verbalizes understanding. Greater than 30 minutes was spent educating patient. The Heart Failure Booklet given to the patient with additional patient education addressing:  What is Heart Failure? Things You Can Do to Live Well with HF  How to Take Your Medications  How to Eat Less Salt  [place] its Salt?   Exercising Well with Heart Failure  Signs and symptoms of HF to report  Weight Yourself Each Day  Home Self Management- activity, weight tracking, taking medications as prescribed, meals /diet planning (sodium and fluid restriction), how to read food labels, keeping physician follow ups, smoking cessation, follow the Heart Failure Zones  The Heart Failure zones  Every Dose Every Day    Instructed  to call 911 if you have any of the following symptoms:       Struggling to breathe unrelieved with rest,     Having chest pain     Have confusion or cant think clearly    Verl Rucks, RN BSN, RN  Heart Failure Navigator    CONGESTIVE HEART FAILURE (CHF) AHA GUIDELINES  (Must be completed for Primary Diagnosis CHF or History of CHF)    Discharge Plan:  I placed the Heart Failure Home Instructions in patient's discharge instructions. Per Heart Failure GWTG, the patient should have a follow-up appointment made within 7 days of discharge. New Diagnosis No    ECHO Results most recent:  No results found for: LVEF, LVEFMODE                                     Social History     Tobacco Use   Smoking Status Never   Smokeless Tobacco Never      There is no immunization history on file for this patient.      Angiotensin-Converting-Enzyme (ACE) inhibitor ordered:  [x] Yes  [] No (specify contraindication):    [] Contraindicated  [] Hypotensive patient who was at immediate risk of cardiogenic shock  [] Hospitalized patient who experienced marked azotemia  [] Other Contraindications  [] Not Eligible  [] Not Tolerant  [] Patient Reason  [] System Reason  [] Other Reason    Angiotensin II receptor blockers (ARB) ordered:  [] Yes  [x] No (specify contraindication):    [] Contraindicated  [] Hypotensive patient who was at immediate risk of cardiogenic shock  [] Hospitalized patient who experienced marked azotemia  [x] Other Contraindications    ARNI - Angiotensin Receptor Neprilysin Inhibitor ordered:  [] Yes  [x] No (specify contraindication):    [] ACE inhibitor use within the prior 36 hours  [] Allergy  [] Hyperkalemia  [] Hypotension  [] Renal dysfunction defined as creatinine > 2.5 mg/dL in men or > 2.0 mg/dL in women  [] Other Contraindications  [] Not Eligible  [] Not Tolerant  [] Patient Reason  []System Reason  [x]Other Reason    Beta Blocker ordered:    [] Yes  [x] No (specify contraindication):    [] Contraindicated  [] Asthma  [] Fluid Overload  [] Low Blood Pressure  [] Patient recently treated with an intravenous positive inotropic agent  [x] Other Contraindications  [] Not Eligible  [] Not Tolerant  [] Patient Reason  [] System Reason    SGLT2 Inhibitor ordered:  [] Yes  [x] No (specify contraindication):    [] Contraindicated  [] Patient currently on dialysis  [] Ketoacidosis  [] Known hypersensitivity to the medication  [] Type I diabetes (not approved for use in patients with Type I diabetes due to increased risk of ketoacidosis)  [] Other Contraindications  [] Not Eligible  [] Not Tolerant  [] Patient Reason  [] System Reason  [x] Other Reason    Aldosterone Antagonist ordered:  [] Yes  [x] No (specify contraindication):    [] Contraindicated  [] Allergy due to aldosterone receptor antagonist  [] Hyperkalemia  [] Renal dysfunction defined as creatinine >2.5 mg/dL in men or >2.0 mg/dL in women.   [] Other contraindications  [] Not Eligible  [] Not Tolerant  [] Patient Reason  [] System Reason  [x] Other Reason

## 2022-09-06 NOTE — PLAN OF CARE
Problem: Discharge Planning  Goal: Discharge to home or other facility with appropriate resources  9/5/2022 2349 by Apollo Khan RN  Outcome: Progressing  9/5/2022 1010 by Guille Little RN  Outcome: Progressing     Problem: Safety - Adult  Goal: Free from fall injury  9/5/2022 2349 by Apollo Khan RN  Outcome: Progressing  9/5/2022 1010 by Guille Little RN  Outcome: Progressing  Flowsheets (Taken 9/5/2022 0815)  Free From Fall Injury: Instruct family/caregiver on patient safety

## 2022-09-06 NOTE — DISCHARGE INSTRUCTIONS
HEART FAILURE  / CONGESTIVE HEART FAILURE  DISCHARGE INSTRUCTIONS:  GUIDELINES TO FOLLOW AT HOME    Self- Managed Care:     MEDICATIONS:  Take your medication as directed. If you are experiencing any side effects, inform your doctor, Do not stop taking any of your medications without letting your doctor know. Check with your doctor before taking any over-the-counter medications / herbal / or dietary supplements. They may interfere with your other medications. Do not take ibuprofen (Advil or Motrin) and naproxen (Aleve) without talking to your doctor first. They could make your heart failure worse. WEIGHT MONITORING:   Weigh yourself everyday (with the same scale) around the same time of the day and write it down. (you can chart them on a calendar or keep track of them on paper. Notify your doctor of a weight gain of 3 pounds or more in 1 day   OR a total of 5 pounds or more in 1 week    Take your weight record to your doctor visits  Also, the same goes if you loose more than 3# in one day, let your heart doctor know. DIET:   Cardiac heart healthy diet- Low saturated / low trans fat, no added salt, caffeine restricted, Low sodium diet-   No more than 2,000mg (2 grams) of salt / sodium per day (which equals to a little less than  a teaspoon of salt)  If your doctor wants you on a fluid restriction. ..it is usually recommended a fluid limit of 2,000cc -  Fluid restriction- 2,000 ml (milliliters) = 64 ounces = you can have 8 glasses of fluid per day (each glass 8 ounces)    Follow a low salt diet - avoid using salt at the table, avoid / limit use of canned soups, processed / packaged foods, salted snacks, olives and pickles. Do not use a salt substitute without checking with your doctor, they may contain a high amount of potassioum. (Mrs. Dillan Ochoa is safe to use).     Limit the use of alcohol       CALL YOUR DOCTOR THE FIRST DAY YOU NOTICE ANY OF THESE   SYMPTOMS:  You have a weight gain of 3 pounds or more in 1 day         OR 5 pounds or more in one week  More shortness of breath  More swelling of your stomach, legs, ankles or feet  Feeling more tired, No energy  Dry hacky cough  Dizziness  More chest pain / discomfort       (CALL 911 IF ANY OF THE FOLLOWING OCCURS  Chest pain (not relieved with nitroglycerine, if you have been prescribed this medication)  Severe shortness of breath  Faint / Pass out  Confusion / cannot think clearly  If symptoms get worse           SMOKING - TOBACCO USE:  * IF YOU SMOKE - STOP! Kick the habit. [address] Program is offered at [facility] [address] and [address] [facility]. Call [phone] extension [phone] for more information. ACTIVITY:   (Ask your doctor when you will be able to return to work and before starting any exercise program.  Do not drive unless unless your doctor has given you permission to do so). Start light exercise. Even if you can only do a small amount, exercise will help you get stronger, have more energy, help manage your weight and decrease  stress. Walking is an easy way to get exercise. Start out slowly and  increase the amount you walk as tolerated  If you become short of breath, dizzy or have chest pain; stop, sit down, and rest.  If you feel \"wiped out\" the day after you exercise, walk at a slower pace or for a shorter distance. You can gradually increase the pace or amount of time. (Do not exercise right after a meal or in extreme temperatures, such as above 85 degrees, if the air is really humid, or wind chill is less than 20 degrees)                                             ADDITIONAL INFORMATION:  Avoid getting sick from colds and the flu. Stay away from friends or family that you know may have a contagious illness  Get plenty of rest   Get a flu shot each year. Get a pneumococcal vaccine shot.  If you have had one before, ask your doctor whether you need another dose. My Goal for Self-management of Heart Failure Includes 5 steps :    1. Notice a change in symptoms ( weight gain, short of breath, leg swelling, decreased activity level, bloating. ...)    2. Evaluate the change: (use the Heart Failure Zones )     3. Decide to take action: decide what your options are, such as: (call your doctor for an extra visit, take a prescribed medication, such as your water pill if your doctor has given you directions to do so, [address])    4. Come up with a strategy:  (now you call the doctor for advice / appointment. This is where you take action!!! Do not wait, catch the symptom early and treat it before it worsens. 5. Evaluate the response: The next day, check your Heart Failure Zones: are you in the GREEN ZONE (safe zone)? Worsening symptoms of YELLOW ZONE? Or have you moved to the RED ZONE and need to call 911 or go to the Emergency Room for evaluation? Call your doctor's office to update them on your symptoms of heart failure.

## 2022-09-06 NOTE — CARE COORDINATION
Met w/ patient. Explained role of  and plan of care. Lives w/ wife in a 2 story house- 1 step to entrance- bedroom and bath on 2nd floor- has 1/2 bath on 1st floor. Requiring O2 3lNC vs 40% Bipap at HS- wears home O2 5lNC provided by [facility] [phone]. Has nebulizer, home Bipap, glucometer and all necessary diabetic testing supplies. Drives. Hx [facility]. PCP is Dr. Amrik Goyal and pharmacy is [facility]. Continues on iv steroid. Per pt, plan is to return home on discharge-states son or wife will provide transportation. Anticipating no needs.  Will follow Dinorah Coffey RNcase manager

## 2022-09-07 LAB
ANION GAP SERPL CALCULATED.3IONS-SCNC: 9 MMOL/L (ref 7–16)
BUN BLDV-MCNC: 32 MG/DL (ref 6–23)
CALCIUM SERPL-MCNC: 8.8 MG/DL (ref 8.6–10.2)
CHLORIDE BLD-SCNC: 94 MMOL/L (ref 98–107)
CO2: 31 MMOL/L (ref 22–29)
CREAT SERPL-MCNC: 1.3 MG/DL (ref 0.7–1.2)
GFR AFRICAN AMERICAN: >60
GFR NON-AFRICAN AMERICAN: 54 ML/MIN/1.73
GLUCOSE BLD-MCNC: 168 MG/DL (ref 74–99)
HCT VFR BLD CALC: 31.2 % (ref 37–54)
HEMOGLOBIN: 10.4 G/DL (ref 12.5–16.5)
LV EF: 63 %
LVEF MODALITY: NORMAL
MCH RBC QN AUTO: 28.7 PG (ref 26–35)
MCHC RBC AUTO-ENTMCNC: 33.3 % (ref 32–34.5)
MCV RBC AUTO: 86 FL (ref 80–99.9)
METER GLUCOSE: 101 MG/DL (ref 74–99)
METER GLUCOSE: 110 MG/DL (ref 74–99)
METER GLUCOSE: 131 MG/DL (ref 74–99)
METER GLUCOSE: 146 MG/DL (ref 74–99)
PDW BLD-RTO: 13.7 FL (ref 11.5–15)
PLATELET # BLD: 124 E9/L (ref 130–450)
PMV BLD AUTO: 9.2 FL (ref 7–12)
POTASSIUM SERPL-SCNC: 4.7 MMOL/L (ref 3.5–5)
RBC # BLD: 3.63 E12/L (ref 3.8–5.8)
SODIUM BLD-SCNC: 134 MMOL/L (ref 132–146)
WBC # BLD: 7.5 E9/L (ref 4.5–11.5)

## 2022-09-07 PROCEDURE — 6360000002 HC RX W HCPCS: Performed by: INTERNAL MEDICINE

## 2022-09-07 PROCEDURE — 93306 TTE W/DOPPLER COMPLETE: CPT

## 2022-09-07 PROCEDURE — 6370000000 HC RX 637 (ALT 250 FOR IP): Performed by: INTERNAL MEDICINE

## 2022-09-07 PROCEDURE — 94660 CPAP INITIATION&MGMT: CPT

## 2022-09-07 PROCEDURE — 6360000002 HC RX W HCPCS: Performed by: NURSE PRACTITIONER

## 2022-09-07 PROCEDURE — 2700000000 HC OXYGEN THERAPY PER DAY

## 2022-09-07 PROCEDURE — 85027 COMPLETE CBC AUTOMATED: CPT

## 2022-09-07 PROCEDURE — 6370000000 HC RX 637 (ALT 250 FOR IP): Performed by: NURSE PRACTITIONER

## 2022-09-07 PROCEDURE — 99233 SBSQ HOSP IP/OBS HIGH 50: CPT | Performed by: INTERNAL MEDICINE

## 2022-09-07 PROCEDURE — 82962 GLUCOSE BLOOD TEST: CPT

## 2022-09-07 PROCEDURE — 2060000000 HC ICU INTERMEDIATE R&B

## 2022-09-07 PROCEDURE — 2580000003 HC RX 258: Performed by: INTERNAL MEDICINE

## 2022-09-07 PROCEDURE — 80048 BASIC METABOLIC PNL TOTAL CA: CPT

## 2022-09-07 PROCEDURE — 94640 AIRWAY INHALATION TREATMENT: CPT

## 2022-09-07 PROCEDURE — 36415 COLL VENOUS BLD VENIPUNCTURE: CPT

## 2022-09-07 RX ORDER — IBUPROFEN 400 MG/1
400 TABLET ORAL EVERY 6 HOURS PRN
Status: DISCONTINUED | OUTPATIENT
Start: 2022-09-07 | End: 2022-09-08 | Stop reason: HOSPADM

## 2022-09-07 RX ORDER — PANTOPRAZOLE SODIUM 40 MG/1
40 TABLET, DELAYED RELEASE ORAL 2 TIMES DAILY
Status: DISCONTINUED | OUTPATIENT
Start: 2022-09-07 | End: 2022-09-08 | Stop reason: HOSPADM

## 2022-09-07 RX ORDER — FUROSEMIDE 20 MG/1
20 TABLET ORAL DAILY
Status: DISCONTINUED | OUTPATIENT
Start: 2022-09-07 | End: 2022-09-08

## 2022-09-07 RX ORDER — CALCIUM CARBONATE 200(500)MG
500 TABLET,CHEWABLE ORAL 3 TIMES DAILY PRN
Status: DISCONTINUED | OUTPATIENT
Start: 2022-09-07 | End: 2022-09-08 | Stop reason: HOSPADM

## 2022-09-07 RX ADMIN — ALBUTEROL SULFATE 2.5 MG: 2.5 SOLUTION RESPIRATORY (INHALATION) at 15:44

## 2022-09-07 RX ADMIN — ENOXAPARIN SODIUM 30 MG: 100 INJECTION SUBCUTANEOUS at 10:15

## 2022-09-07 RX ADMIN — INSULIN LISPRO 12 UNITS: 100 INJECTION, SOLUTION INTRAVENOUS; SUBCUTANEOUS at 09:00

## 2022-09-07 RX ADMIN — GUAIFENESIN AND DEXTROMETHORPHAN 5 ML: 100; 10 SYRUP ORAL at 16:40

## 2022-09-07 RX ADMIN — BUDESONIDE 500 MCG: 0.5 SUSPENSION RESPIRATORY (INHALATION) at 19:21

## 2022-09-07 RX ADMIN — SODIUM CHLORIDE, PRESERVATIVE FREE 10 ML: 5 INJECTION INTRAVENOUS at 20:16

## 2022-09-07 RX ADMIN — METFORMIN HYDROCHLORIDE 1000 MG: 1000 TABLET ORAL at 10:16

## 2022-09-07 RX ADMIN — PANTOPRAZOLE SODIUM 40 MG: 40 TABLET, DELAYED RELEASE ORAL at 20:15

## 2022-09-07 RX ADMIN — PREDNISONE 40 MG: 20 TABLET ORAL at 10:18

## 2022-09-07 RX ADMIN — TAMSULOSIN HYDROCHLORIDE 0.4 MG: 0.4 CAPSULE ORAL at 10:16

## 2022-09-07 RX ADMIN — TAMSULOSIN HYDROCHLORIDE 0.4 MG: 0.4 CAPSULE ORAL at 20:15

## 2022-09-07 RX ADMIN — ASPIRIN 81 MG CHEWABLE TABLET 81 MG: 81 TABLET CHEWABLE at 10:16

## 2022-09-07 RX ADMIN — METFORMIN HYDROCHLORIDE 1000 MG: 1000 TABLET ORAL at 16:40

## 2022-09-07 RX ADMIN — METOPROLOL TARTRATE 75 MG: 50 TABLET, FILM COATED ORAL at 10:16

## 2022-09-07 RX ADMIN — ENOXAPARIN SODIUM 30 MG: 100 INJECTION SUBCUTANEOUS at 20:15

## 2022-09-07 RX ADMIN — GUAIFENESIN AND DEXTROMETHORPHAN 5 ML: 100; 10 SYRUP ORAL at 09:00

## 2022-09-07 RX ADMIN — ATORVASTATIN CALCIUM 40 MG: 40 TABLET, FILM COATED ORAL at 10:16

## 2022-09-07 RX ADMIN — METOPROLOL TARTRATE 75 MG: 50 TABLET, FILM COATED ORAL at 20:15

## 2022-09-07 RX ADMIN — ISOSORBIDE MONONITRATE 30 MG: 30 TABLET, EXTENDED RELEASE ORAL at 10:16

## 2022-09-07 RX ADMIN — LISINOPRIL 10 MG: 10 TABLET ORAL at 10:16

## 2022-09-07 RX ADMIN — IBUPROFEN 400 MG: 400 TABLET, FILM COATED ORAL at 20:15

## 2022-09-07 RX ADMIN — ALBUTEROL SULFATE 2.5 MG: 2.5 SOLUTION RESPIRATORY (INHALATION) at 08:55

## 2022-09-07 RX ADMIN — ARFORMOTEROL TARTRATE 15 MCG: 15 SOLUTION RESPIRATORY (INHALATION) at 19:21

## 2022-09-07 RX ADMIN — FUROSEMIDE 20 MG: 20 TABLET ORAL at 10:16

## 2022-09-07 RX ADMIN — PRIMIDONE 50 MG: 50 TABLET ORAL at 20:18

## 2022-09-07 RX ADMIN — BUDESONIDE 500 MCG: 0.5 SUSPENSION RESPIRATORY (INHALATION) at 08:55

## 2022-09-07 RX ADMIN — ALBUTEROL SULFATE 2.5 MG: 2.5 SOLUTION RESPIRATORY (INHALATION) at 19:21

## 2022-09-07 RX ADMIN — ARFORMOTEROL TARTRATE 15 MCG: 15 SOLUTION RESPIRATORY (INHALATION) at 08:55

## 2022-09-07 RX ADMIN — INSULIN GLARGINE 50 UNITS: 100 INJECTION, SOLUTION SUBCUTANEOUS at 21:29

## 2022-09-07 RX ADMIN — ALBUTEROL SULFATE 2.5 MG: 2.5 SOLUTION RESPIRATORY (INHALATION) at 12:45

## 2022-09-07 ASSESSMENT — PAIN DESCRIPTION - LOCATION: LOCATION: ABDOMEN

## 2022-09-07 ASSESSMENT — PAIN SCALES - GENERAL: PAINLEVEL_OUTOF10: 3

## 2022-09-07 ASSESSMENT — PAIN DESCRIPTION - DESCRIPTORS: DESCRIPTORS: CRAMPING

## 2022-09-07 NOTE — PROGRESS NOTES
[facility] Progress Note    Admitting Date and Time: 9/4/2022  4:46 PM  Admit Dx: Shortness of breath [R06.02]  Acute on chronic congestive heart failure, unspecified heart failure type ([address] [I50.9]    Subjective:  Patient is being followed for Shortness of breath [R06.02]  Acute on chronic congestive heart failure, unspecified heart failure type ([address] [I50.9]     Seen at bedside  Awake alert and oriented  Getting echo   Breathing and leg swelling better   On 3 L via NC - nonproductive cough remains but feels better with use of cough medicine   For stress test tomorrow   Feels urine output is better     No acute events overnight     ROS: denies fever, chills, cp, sob, n/v, HA unless stated above.       predniSONE  40 mg Oral Daily    Arformoterol Tartrate  15 mcg Nebulization BID    budesonide  500 mcg Nebulization BID    albuterol  2.5 mg Nebulization 4x daily    atorvastatin  40 mg Oral Daily    [Held by provider] furosemide  40 mg Oral Daily    insulin glargine  50 Units SubCUTAneous Nightly    insulin lispro  12 Units SubCUTAneous TID WC    insulin lispro  0-8 Units SubCUTAneous TID WC    insulin lispro  0-4 Units SubCUTAneous Nightly    isosorbide mononitrate  30 mg Oral Daily    metFORMIN  1,000 mg Oral BID WC    primidone  50 mg Oral Nightly    sodium chloride flush  5-40 mL IntraVENous 2 times per day    enoxaparin  30 mg SubCUTAneous BID    aspirin  81 mg Oral Daily    lisinopril  10 mg Oral Daily    metoprolol tartrate  75 mg Oral BID    tamsulosin  0.4 mg Oral BID     guaiFENesin-dextromethorphan, 5 mL, Q4H PRN  glucose, 4 tablet, PRN  dextrose bolus, 125 mL, PRN   Or  dextrose bolus, 250 mL, PRN  glucagon (rDNA), 1 mg, PRN  dextrose, , Continuous PRN  sodium chloride flush, 5-40 mL, PRN  sodium chloride, , PRN  ondansetron, 4 mg, Q8H PRN   Or  ondansetron, 4 mg, Q6H PRN  polyethylene glycol, 17 g, Daily PRN  acetaminophen, 650 mg, Q6H PRN   Or  acetaminophen, 650 mg, Q6H PRN  perflutren lipid microspheres, 1.5 mL, ONCE PRN     Objective:    BP (!) 152/72   Pulse 84   Temp 97.9 °F (36.6 °C) (Axillary)   Resp 16   Ht 6' 1\" (1.854 m)   Wt 251 lb 9.6 oz (114.1 kg)   SpO2 92%   BMI 33.19 kg/m²     General Appearance: alert and oriented to person, place and time and in no acute distress + overweight  Skin: warm and dry  Head: normocephalic and atraumatic  Eyes: pupils equal, round, and reactive to light, extraocular eye movements intact, conjunctivae normal  Neck: neck supple and non tender without mass   Pulmonary/Chest: DIMINISHED to auscultation bilaterally- no wheezes, rales or rhonchi, normal air movement, no respiratory distress + 3 L via NC   Cardiovascular: normal rate, normal S1 and S2 and no carotid bruits  Abdomen: soft, non-tender, non-distended, normal bowel sounds, no masses or organomegaly  Extremities: no cyanosis, no clubbing and +nonpitting edema  Neurologic: no cranial nerve deficit and speech normal        Recent Labs     09/06/22  0320 09/06/22  1225 09/07/22  0330    134 134   K 5.6* 5.6* 4.7   CL 96* 92* 94*   CO2 33* 31* 31*   BUN 32* 32* 32*   CREATININE 1.5* 1.3* 1.3*   GLUCOSE 213* 206* 168*   CALCIUM 9.0 9.3 8.8       Recent Labs     09/06/22  0320 09/06/22  1225 09/07/22  0330   WBC 6.6 9.6 7.5   RBC 3.61* 3.89 3.63*   HGB 10.2* 10.9* 10.4*   HCT 30.7* 33.3* 31.2*   MCV 85.0 85.6 86.0   MCH 28.3 28.0 28.7   MCHC 33.2 32.7 33.3   RDW 13.7 13.9 13.7   * 140 124*   MPV 9.0 8.9 9.2       Radiology: reviewed       Assessment:    Principal Problem:    Shortness of breath  Active Problems:    Acute on chronic congestive heart failure (HCC)    Coronary artery disease involving native coronary artery of native heart with angina pectoris (HCC)    Chronic obstructive pulmonary disease with acute exacerbation (HCC)  Resolved Problems:    * No resolved hospital problems.  *      Plan:  COPD exacerbation with chronic hypoxic respiratory failure   Follows with Dr Brian Cramer - pulmonary following, shana garcia   Continue on Steroids, albuterol, brovana, budesonide. Steroids weaned to PO yesterday   Baseline oxygen 4.5 L at home. Resting comfortably on 3 L via NC with pulse ox > 95% since admission   Hx lung mass found on CT 9/1. Former smoker, worked in [employer] , not up to date on health screenings. Scheduled for outpatient PET 9/6 [facility] following   CHF/CAD  Cardiology following - shana garcia   Has been on lasix at home. Was not sure why prescribed. Continue lasix as per cardiology. STRICT I/O - 1450 mL in 24 hours   Echo  For lexiscan tomorrow   GONZALES   Cr 1.3  Monitor daily   NIDDM   Continue metformin, Lantus nightly, insulin sliding scale   On steroids, will monitor BS but would expect elevated   Hx anemia, GERD, HTN, HLD, IFTIKHAR   Continue home medications     Code status                 Full  DVT prophylaxis         Lovenox    30 minutes time spent reviewing patient chart, assessing patient, discussing plan of care with patient and family, discussing plan of care with collaborating physician, and charting.      Electronically signed by ANDIE Estevez NP on 9/7/2022 at 7:54 AM

## 2022-09-07 NOTE — PROGRESS NOTES
INPATIENT CARDIOLOGY FOLLOW-UP    Name: Staci Kanner    Age: 66 y.o. Date of Admission: 9/4/2022  4:46 PM    Date of Service: 9/7/2022    Chief Complaint: Follow-up for Shortness of breath, coronary artery disease      Interim History:  No new overnight cardiac complaints. Feeling better, able to go bath room without dyspnea, swelling of the legs is improving. He still has chest pain in bilateral chest mainly on coughing but otherwise no chest pain. Echo still pending. Currently with no complaints of palpitations, dizziness, or lightheadedness. SR on telemetry.     Review of Systems:   Cardiac: As per HPI  General: No fever, chills  Pulmonary: As per HPI  HEENT: No visual disturbances, difficult swallowing  GI: No nausea, vomiting  Endocrine: No thyroid disease or DM  Musculoskeletal: LUCERO x 4, no focal motor deficits  Skin: Intact, no rashes  Neuro/Psych: No headache or seizures    Problem List:  Patient Active Problem List   Diagnosis    Hyperkalemia    Shortness of breath    Acute on chronic congestive heart failure (HCC)    Coronary artery disease involving native coronary artery of native heart with angina pectoris (HCC)    Chronic obstructive pulmonary disease with acute exacerbation (HCC)       Allergies:  No Known Allergies    Current Medications:  Current Facility-Administered Medications   Medication Dose Route Frequency Provider Last Rate Last Admin    guaiFENesin-dextromethorphan (ROBITUSSIN DM) 100-10 MG/5ML syrup 5 mL  5 mL Oral Q4H PRN ANDIE Ahumada Ra NP   5 mL at 09/06/22 2041    predniSONE (DELTASONE) tablet 40 mg  40 mg Oral Daily ANDIE Littlejohn CNP   40 mg at 09/06/22 1448    Arformoterol Tartrate (BROVANA) nebulizer solution 15 mcg  15 mcg Nebulization BID ANDIE Quintero CNP   15 mcg at 09/06/22 2116    budesonide (PULMICORT) nebulizer suspension 500 mcg  500 mcg Nebulization BID ANDIE Quintero CNP   500 mcg at 09/06/22 2116    albuterol (PROVENTIL) nebulizer solution 2.5 mg  2.5 mg Nebulization 4x daily Bia Pope MD   2.5 mg at 09/06/22 2116    atorvastatin (LIPITOR) tablet 40 mg  40 mg Oral Daily Bia Pope MD   40 mg at 09/06/22 0840    [Held by provider] furosemide (LASIX) tablet 40 mg  40 mg Oral Daily Bia Pope MD        insulin glargine (LANTUS) injection vial 50 Units  50 Units SubCUTAneous Nightly Bia Pope MD   50 Units at 09/06/22 2041    insulin lispro (HUMALOG) injection vial 12 Units  12 Units SubCUTAneous TID  Bia Pope MD   12 Units at 09/06/22 1658    insulin lispro (HUMALOG) injection vial 0-8 Units  0-8 Units SubCUTAneous TID  Bia Pope MD   2 Units at 09/06/22 0600    insulin lispro (HUMALOG) injection vial 0-4 Units  0-4 Units SubCUTAneous Nightly Bia Pope MD        glucose chewable tablet 16 g  4 tablet Oral PRN Bia Pope MD        dextrose bolus 10% 125 mL  125 mL IntraVENous PRN Bia Pope MD        Or    dextrose bolus 10% 250 mL  250 mL IntraVENous PRN Bia Pope MD        glucagon (rDNA) injection 1 mg  1 mg SubCUTAneous PRN Bia Pope MD        dextrose 10 % infusion   IntraVENous Continuous PRN Bia Pope MD        isosorbide mononitrate (IMDUR) extended release tablet 30 mg  30 mg Oral Daily Bia Pope MD   30 mg at 09/06/22 0841    metFORMIN (GLUCOPHAGE) tablet 1,000 mg  1,000 mg Oral BID  Bia Pope MD   1,000 mg at 09/06/22 1657    primidone (MYSOLINE) tablet 50 mg  50 mg Oral Nightly Bia Pope MD   50 mg at 09/06/22 2040    sodium chloride flush 0.9 % injection 5-40 mL  5-40 mL IntraVENous 2 times per day Bia Pope MD   10 mL at 09/06/22 2042    sodium chloride flush 0.9 % injection 5-40 mL  5-40 mL IntraVENous PRN Bia Pope MD        0.9 % sodium chloride infusion   IntraVENous PRN Bia Pope MD        enoxaparin Sodium (LOVENOX) injection 30 mg  30 mg SubCUTAneous BID Bia Pope MD   30 mg at 09/06/22 2040 ondansetron (ZOFRAN-ODT) disintegrating tablet 4 mg  4 mg Oral Q8H PRN Emmett Randolph MD        Or    ondansetron [facility]) injection 4 mg  4 mg IntraVENous Q6H PRN Emmett Randolph MD        polyethylene glycol (GLYCOLAX) packet 17 g  17 g Oral Daily PRN Emmett Randolph MD        acetaminophen (TYLENOL) tablet 650 mg  650 mg Oral Q6H PRN Emmett Randolph MD   650 mg at 09/06/22 2040    Or    acetaminophen (TYLENOL) suppository 650 mg  650 mg Rectal Q6H PRN Emmett Randolph MD        perflutren lipid microspheres (DEFINITY) injection 1.65 mg  1.5 mL IntraVENous ONCE PRN Emmett Randolph MD        aspirin chewable tablet 81 mg  81 mg Oral Daily Emmett Randolph MD   81 mg at 09/06/22 0840    lisinopril (PRINIVIL;ZESTRIL) tablet 10 mg  10 mg Oral Daily Emmett Rnadolph MD   10 mg at 09/06/22 0841    metoprolol tartrate (LOPRESSOR) tablet 75 mg  75 mg Oral BID Emmett Randolph MD   75 mg at 09/06/22 2040    tamsulosin (FLOMAX) capsule 0.4 mg  0.4 mg Oral BID Emmtet Randolph MD   0.4 mg at 09/06/22 2040      dextrose      sodium chloride         Physical Exam:  BP (!) 152/72   Pulse 84   Temp 97.9 °F (36.6 °C) (Axillary)   Resp 16   Ht 6' 1\" (1.854 m)   Wt 251 lb 9.6 oz (114.1 kg)   SpO2 92%   BMI 33.19 kg/m²   Wt Readings from Last 3 Encounters:   09/06/22 251 lb 9.6 oz (114.1 kg)   03/30/21 243 lb (110.2 kg)   03/03/21 246 lb 11.2 oz (111.9 kg)     Appearance: Awake, alert, no acute respiratory distress  Skin: Intact, no rash  Head: Normocephalic, atraumatic  Eyes: EOMI, no conjunctival erythema  ENMT: No pharyngeal erythema, MMM, no rhinorrhea  Neck: Supple, no elevated JVP, no carotid bruits  Lungs: Clear to auscultation bilaterally. No wheezes, rales, or rhonchi.   Cardiac: Regular rate and rhythm, +S1S2, no murmurs apparent  Abdomen: Soft, nontender, +bowel sounds  Extremities: Moves all extremities x 4, 1+ lower extremity edema  Neurologic: No focal motor deficits apparent, normal mood and affect  Peripheral Pulses: Intact posterior tibial pulses bilaterally    Intake/Output:    Intake/Output Summary (Last 24 hours) at 2022 0855  Last data filed at 2022 0414  Gross per 24 hour   Intake 240 ml   Output 1450 ml   Net -1210 ml     No intake/output data recorded. Laboratory Tests:  Recent Labs     22  0320 22  1225 22  0330    134 134   K 5.6* 5.6* 4.7   CL 96* 92* 94*   CO2 33* 31* 31*   BUN 32* 32* 32*   CREATININE 1.5* 1.3* 1.3*   GLUCOSE 213* 206* 168*   CALCIUM 9.0 9.3 8.8     Lab Results   Component Value Date/Time    MG 1.7 2021 04:59 AM     Recent Labs     22  0616 22  1225   ALKPHOS 62 66   ALT 22 20   AST 20 19   PROT 6.5 6.8   BILITOT 0.4 0.4   LABALBU 4.0 4.2     Recent Labs     22  1225 22  0330   WBC 6.6 9.6 7.5   RBC 3.61* 3.89 3.63*   HGB 10.2* 10.9* 10.4*   HCT 30.7* 33.3* 31.2*   MCV 85.0 85.6 86.0   MCH 28.3 28.0 28.7   MCHC 33.2 32.7 33.3   RDW 13.7 13.9 13.7   * 140 124*   MPV 9.0 8.9 9.2     Lab Results   Component Value Date    TROPONINI <0.01 2021     Lab Results   Component Value Date    INR 1.1 2022    PROTIME 11.9 2022     Lab Results   Component Value Date    TSH 0.937 2021     Lab Results   Component Value Date    LABA1C 7.2 (H) 2021     Lab Results   Component Value Date     2021     Lab Results   Component Value Date    CHOL 84 2021     Lab Results   Component Value Date    TRIG 52 2021     Lab Results   Component Value Date    HDL 52 2021     Lab Results   Component Value Date    LDLCHOLESTEROL 22 2021     Lab Results   Component Value Date    VLDL 10 2021     No results found for: CHOLHDLRATIO    Cardiac Tests:  EK/4/22: NSR 70 bpm No ST/T changes     Telemetry:   SR 60s without arrhythmias      Vitals and labs were reviewed: As above     Chest X-ray:   22  Impression   1.   Cardiomegaly, small bilateral pleural effusions, and bibasilar   atelectasis or infiltrates. 2.  Right suprahilar mass. Please see recent CT chest report and with CT   recommendation for PET-CT evaluation or CT-guided tissue sampling. CT chest 9/1/22  Personally reviewed with LAD stent and coronary calcifications  Impression   1. Persistent medial right upper lobe soft tissue mass measuring 5.9 x 3.5 x   2.1 cm. Neoplasm should be excluded. Consider PET-CT examination or tissue   sampling. 2.  Mild splenomegaly. 3.  New mild inferior endplate partial compression fracture of the vertebral   body of T9. No lytic or blastic masses are visible. 4.  Chronic moderate lobular thyromegaly with calcifications. Findings may   reflect thyroid goiter. Echocardiogram:      Stress test:       Cardiac catheterization: Not available     -------------------------------------------------------------------------------------------------------------------------------------------------------------  IMPRESSION:  Severe dyspnea with exertion. Possible anginal equivalent, Improving. Probable acute heart failure, unknown ejection fraction. proBNP only 143 pg/mL. Mildly hypervolemic  Coronary artery disease prior PCI to LAD and other coronary 8 to 9 years ago. No anginal symptoms, pleuritic chest pain mainly with coughing  Minimally elevated hs-cTnT: 34-29 ng/L. Likely acute myocardial injury, not consistent with ACS  Anemia Hgb 10.9>>10.4  GONZALES creatinine 1.4 -> 1.2>>1.5>> 1.3 with hyperkalemia, hyperkalemia resolved  Hypertension, current /72  DM2 insulin  Severe COPD with exacerbation, with chronic hypoxic respiratory failure on continuous oxygen  IFTIKHAR on nocturnal BiPAP  Lung mass being followed outpatient with plans for PET evaluation     RECOMMENDATIONS:     Add low-dose Lasix 20 mg IV daily  Strict I's and O's, change diet to cardiac/low-sodium, low potassium diet.    Echo pending  Keep him n.p.o. after midnight and schedule for a Lexiscan nuclear stress test rule out ischemia. Continue medical treatment of CAD with aspirin, statin  Continue current cardiac medication otherwise  Cardiac records from Dr. Severa Maria are pending  Patient being treated for COPD exacerbation per pulmonary  Further care per primary service and consultants  Mild hyperkalemia, follow low potassium diet. Aggressive risk factor modification      Clayton Hernandez MD., [facility].   [facility]) Cardiology

## 2022-09-07 NOTE — PROGRESS NOTES
[address]    PULMONARY PROGRESS NOTE    Patient: Tammi Lowery  MRN: [id number]  : 1950    Encounter Date: 2022  Encounter Time: 10:46 AM     Date of Admission: .2022  4:46 PM    Consulting Physician:  Primary Care Physician:      James Mirza MD     [phone]     None    PROBLEM LIST:  Patient Active Problem List   Diagnosis    Hyperkalemia    Shortness of breath    Acute on chronic congestive heart failure (HCC)    Coronary artery disease involving native coronary artery of native heart with angina pectoris (HCC)    Chronic obstructive pulmonary disease with acute exacerbation (HCC)       SUBJECTIVE:  Resting in bed on 3L nc with pox 99%  Breathing is about the same  Moist cough but chest really hurts when coughing  Just completed Echo - stress ordered today      CURRENT MEDICATIONS:  Current Facility-Administered Medications: furosemide (LASIX) tablet 20 mg, 20 mg, Oral, Daily  regadenoson (LEXISCAN) injection 0.4 mg, 0.4 mg, IntraVENous, ONCE PRN  guaiFENesin-dextromethorphan (ROBITUSSIN DM) 100-10 MG/5ML syrup 5 mL, 5 mL, Oral, Q4H PRN  predniSONE (DELTASONE) tablet 40 mg, 40 mg, Oral, Daily  Arformoterol Tartrate (BROVANA) nebulizer solution 15 mcg, 15 mcg, Nebulization, BID  budesonide (PULMICORT) nebulizer suspension 500 mcg, 500 mcg, Nebulization, BID  albuterol (PROVENTIL) nebulizer solution 2.5 mg, 2.5 mg, Nebulization, 4x daily  atorvastatin (LIPITOR) tablet 40 mg, 40 mg, Oral, Daily  insulin glargine (LANTUS) injection vial 50 Units, 50 Units, SubCUTAneous, Nightly  insulin lispro (HUMALOG) injection vial 12 Units, 12 Units, SubCUTAneous, TID WC  insulin lispro (HUMALOG) injection vial 0-8 Units, 0-8 Units, SubCUTAneous, TID WC  insulin lispro (HUMALOG) injection vial 0-4 Units, 0-4 Units, SubCUTAneous, Nightly  glucose chewable tablet 16 g, 4 tablet, Oral, PRN  dextrose bolus 10% 125 mL, 125 mL, IntraVENous, PRN **OR** dextrose bolus 10% 250 mL, 250 mL, IntraVENous, PRN  glucagon (rDNA) injection 1 mg, 1 mg, SubCUTAneous, PRN  dextrose 10 % infusion, , IntraVENous, Continuous PRN  isosorbide mononitrate (IMDUR) extended release tablet 30 mg, 30 mg, Oral, Daily  metFORMIN (GLUCOPHAGE) tablet 1,000 mg, 1,000 mg, Oral, BID WC  primidone (MYSOLINE) tablet 50 mg, 50 mg, Oral, Nightly  sodium chloride flush 0.9 % injection 5-40 mL, 5-40 mL, IntraVENous, 2 times per day  sodium chloride flush 0.9 % injection 5-40 mL, 5-40 mL, IntraVENous, PRN  0.9 % sodium chloride infusion, , IntraVENous, PRN  enoxaparin Sodium (LOVENOX) injection 30 mg, 30 mg, SubCUTAneous, BID  ondansetron (ZOFRAN-ODT) disintegrating tablet 4 mg, 4 mg, Oral, Q8H PRN **OR** ondansetron (ZOFRAN) injection 4 mg, 4 mg, IntraVENous, Q6H PRN  polyethylene glycol (GLYCOLAX) packet 17 g, 17 g, Oral, Daily PRN  acetaminophen (TYLENOL) tablet 650 mg, 650 mg, Oral, Q6H PRN **OR** acetaminophen (TYLENOL) suppository 650 mg, 650 mg, Rectal, Q6H PRN  perflutren lipid microspheres (DEFINITY) injection 1.65 mg, 1.5 mL, IntraVENous, ONCE PRN  aspirin chewable tablet 81 mg, 81 mg, Oral, Daily  lisinopril (PRINIVIL;ZESTRIL) tablet 10 mg, 10 mg, Oral, Daily  metoprolol tartrate (LOPRESSOR) tablet 75 mg, 75 mg, Oral, BID  tamsulosin (FLOMAX) capsule 0.4 mg, 0.4 mg, Oral, BID    IV MEDICATIONS:   dextrose      sodium chloride         ALLERGIES:  No Known Allergies      PHYSICAL EXAMINATION:     VITAL SIGNS:  BP (!) 164/93   Pulse 82   Temp 97.5 °F (36.4 °C) (Axillary)   Resp 18   Ht 6' 1\" (1.854 m)   Wt 251 lb 9.6 oz (114.1 kg)   SpO2 99%   BMI 33.19 kg/m²   Wt Readings from Last 3 Encounters:   09/06/22 251 lb 9.6 oz (114.1 kg)   03/30/21 243 lb (110.2 kg)   03/03/21 246 lb 11.2 oz (111.9 kg)     Temp Readings from Last 3 Encounters:   09/07/22 97.5 °F (36.4 °C) (Axillary)   03/30/21 97 °F (36.1 °C)   03/03/21 98.2 °F (36.8 °C) (Temporal)     TMAX:  BP Readings from Last 3 Encounters:   09/07/22 (!) 164/93 21 124/74   21 (!) 167/84     Pulse Readings from Last 3 Encounters:   22 82   21 90   21 107       CURRENT PULSE OXIMETRY: SpO2: 99 %  24HR PULSE OXIMETRY RANGE: SpO2  Av.2 %  Min: 92 %  Max: 99 %  CVP:      ________________________________________________________________________    VENTILATOR SETTINGS (if applicable): Additional Respiratory Assessments  Heart Rate: 82  Resp: 18  SpO2: 99 %  ETCO2:  Peak Inspiratory Pressure:  End-Inspiratory Plateau Pressure:    ABG:  No results for input(s): PH, PO2, PCO2, HCO3, BE, O2SAT, METHB, O2HB, COHB, O2CON, HHB, THB in the last 72 hours. FiO2 : 40 %     ________________________________________________________________________    IV ACCESS:    NUTRITION: ADULT DIET; Regular; 4 carb choices (60 gm/meal); Low Fat/Low Chol/High Fiber/JARRED; Low Potassium (Less than 3000 mg/day)  Diet NPO  Diet NPO    INTAKE/OUTPUTS:  I/O last 3 completed shifts: In: 240 [P.O.:240]  Out: 1450 [Urine:1450]    Intake/Output Summary (Last 24 hours) at 2022 1046  Last data filed at 2022 1030  Gross per 24 hour   Intake 720 ml   Output 2250 ml   Net -1530 ml       General Appearance: NAD, appears stated age  Eyes: perrla, conjunctivae normal and sclera anicteric   Neck: neck supple   Pulmonary/Chest: decreased breath sounds, no accessory muscles of inspiration, equal, nonlabored. Diminished. Cardiovascular: normal rate, regular rhythm, normal S1 and S2, + murmur.   Abdomen: soft, non-tender, non-distended, normal bowel sounds, no masses or organomegaly, obese   Extremities: no cyanosis, no clubbing, Soft ble edema  Musculoskeletal: normal range of motion  Neurologic: AOX4, follows commands    LABS/IMAGING:    CBC:  Lab Results   Component Value Date    WBC 7.5 2022    HGB 10.4 (L) 2022    HCT 31.2 (L) 2022    MCV 86.0 2022     (L) 2022    LABLYMP 0.5 (L) 2021    LYMPHOPCT 7.4 (L) 2022    RBC 3.63 (L) 09/07/2022    MCH 28.7 09/07/2022    MCHC 33.3 09/07/2022    RDW 13.7 09/07/2022    NEUTOPHILPCT 88.1 (H) 09/06/2022    MONOPCT 2.9 09/06/2022    BASOPCT 0.1 09/06/2022    NEUTROABS 8.49 (H) 09/06/2022    LYMPHSABS 0.71 (L) 09/06/2022    MONOSABS 0.28 09/06/2022    EOSABS 0.00 (L) 09/06/2022    BASOSABS 0.01 09/06/2022       Recent Labs     09/07/22  0330 09/06/22  1225 09/06/22  0320   WBC 7.5 9.6 6.6   HGB 10.4* 10.9* 10.2*   HCT 31.2* 33.3* 30.7*   MCV 86.0 85.6 85.0   * 140 118*       BMP:   Recent Labs     09/06/22  0320 09/06/22  1225 09/07/22  0330    134 134   K 5.6* 5.6* 4.7   CL 96* 92* 94*   CO2 33* 31* 31*   BUN 32* 32* 32*   CREATININE 1.5* 1.3* 1.3*       MG:   Lab Results   Component Value Date/Time    MG 1.7 07/23/2021 04:59 AM     Ca/Phos:   Lab Results   Component Value Date    CALCIUM 8.8 09/07/2022    PHOS 5.9 (H) 07/23/2021     Amylase: No results found for: AMYLASE  Lipase: No results found for: LIPASE  LIVER PROFILE:   Recent Labs     09/05/22  0616 09/06/22  1225   AST 20 19   ALT 22 20   BILITOT 0.4 0.4   ALKPHOS 62 66       PT/INR:   Recent Labs     09/04/22  1709   PROTIME 11.9   INR 1.1     APTT: No results for input(s): APTT in the last 72 hours.     Cardiac Enzymes:  Lab Results   Component Value Date    TROPONINI <0.01 02/27/2021       Hgb A1C:   Lab Results   Component Value Date    LABA1C 7.2 (H) 07/23/2021     Lab Results   Component Value Date     07/23/2021     RAFAEL: No results found for: RAFAEL  ESR: No results found for: SEDRATE  CRP: No results found for: CRP  D Dimer:   Lab Results   Component Value Date    DDIMER <200 09/04/2022     Folate and B12:   Lab Results   Component Value Date    [id number] 274 07/23/2021   , No results found for: FOLATE    Lactic Acid:   Lab Results   Component Value Date    LACTA 0.7 02/28/2021     Ammonia:   Cortisol:  Thyroid Studies:  Lab Results   Component Value Date    TSH 0.937 07/23/2021 Toxicology:    MICROBIOLOGY:  Inf a/b and covid negative     CXR:  9/4 cxr    Cardiomegaly, small bilateral pleural effusions, and bibasilar  atelectasis or infiltrates. 2.  Right suprahilar mass. Please see recent CT chest report and with CT  recommendation for PET-CT evaluation or CT-guided tissue sampling. CT Chest:  9/1 Persistent medial right upper lobe soft tissue mass measuring 5.9 x 3.5 x  2.1 cm. Neoplasm should be excluded        ASSESSMENT/Plan:  COPD exacerbation  Continue brovana, budesonide. Albuterol  Continue prednisone taper - takes prednisone 15mg daily as an outpatient   Procal ordered yesterday - still not done  Chronic hypoxic respiratory failure  Wears 4.5 lnc at baseline - down to 3L here  Lung mass   As seen on ct 9/1, being followed by [facility]  Former smoker, worked in [employer], not up to date on health screenings, family history of cancer in parents   Patient was scheduled for outpatient PET scan  09/06. Son is to call and hopefully can coordinate to have it scheduled when discharged  IFTIKHAR  Continue NIV qhs and PRN  Faithful with NIV  Pulmonary edema  S/p dose of lasix - inaccurate I/O  Echo ordered      ANDIE Phipps - CNP  9/7/2022  10:46 AM    Seen and examined, agree with above. Await pet scan for right lung lesion. Will likely need bx as op after pet. Acute exac of copd is better and cp is mostly with coughing so continue to taper prednisone and continue nebs.

## 2022-09-07 NOTE — PROGRESS NOTES
Spoke with the office of Dr Leanne Fleming, sending over records from 2019,2020,2021 per the request of Dr Disha Vega on 9/5/22.  Arielle gurrola

## 2022-09-07 NOTE — CARE COORDINATION
Requiring O2 3lNC during day, 40% Bipap at [facility] home O2 5lNC provided by [facility] [phone]. Has nebulizer, home Bipap. For stress test tomorrow. Plan remains to return home w/ his wife on discharge- son or wife will provide transportation home.  Will follow Sherry Cheng RNcase manager

## 2022-09-07 NOTE — PROGRESS NOTES
Date: 9/6/2022    Time: 11:24 PM    Patient Placed On BIPAP/CPAP/ Non-Invasive Ventilation? Yes    If no must comment. Facial area red/color change? No           If YES are Blister/Lesion present? No   If yes must notify nursing staff  BIPAP/CPAP skin barrier?   Yes    Skin barrier type:mepilexlite       Comments:        Wenceslao Antonio RCP

## 2022-09-08 ENCOUNTER — APPOINTMENT (OUTPATIENT)
Dept: NON INVASIVE DIAGNOSTICS | Age: 72
DRG: 291 | End: 2022-09-08
Payer: MEDICARE

## 2022-09-08 ENCOUNTER — APPOINTMENT (OUTPATIENT)
Dept: NUCLEAR MEDICINE | Age: 72
DRG: 291 | End: 2022-09-08
Payer: MEDICARE

## 2022-09-08 VITALS
SYSTOLIC BLOOD PRESSURE: 120 MMHG | HEART RATE: 52 BPM | BODY MASS INDEX: 32.71 KG/M2 | RESPIRATION RATE: 16 BRPM | WEIGHT: 246.8 LBS | HEIGHT: 73 IN | TEMPERATURE: 97.8 F | DIASTOLIC BLOOD PRESSURE: 71 MMHG | OXYGEN SATURATION: 98 %

## 2022-09-08 LAB
ANION GAP SERPL CALCULATED.3IONS-SCNC: 10 MMOL/L (ref 7–16)
BUN BLDV-MCNC: 36 MG/DL (ref 6–23)
CALCIUM SERPL-MCNC: 8.6 MG/DL (ref 8.6–10.2)
CHLORIDE BLD-SCNC: 97 MMOL/L (ref 98–107)
CO2: 31 MMOL/L (ref 22–29)
CREAT SERPL-MCNC: 1.7 MG/DL (ref 0.7–1.2)
GFR AFRICAN AMERICAN: 48
GFR NON-AFRICAN AMERICAN: 40 ML/MIN/1.73
GLUCOSE BLD-MCNC: 72 MG/DL (ref 74–99)
HCT VFR BLD CALC: 30.8 % (ref 37–54)
HEMOGLOBIN: 10.1 G/DL (ref 12.5–16.5)
LV EF: 64 %
LVEF MODALITY: NORMAL
MCH RBC QN AUTO: 28.2 PG (ref 26–35)
MCHC RBC AUTO-ENTMCNC: 32.8 % (ref 32–34.5)
MCV RBC AUTO: 86 FL (ref 80–99.9)
METER GLUCOSE: 116 MG/DL (ref 74–99)
METER GLUCOSE: 80 MG/DL (ref 74–99)
PDW BLD-RTO: 14 FL (ref 11.5–15)
PLATELET # BLD: 105 E9/L (ref 130–450)
PMV BLD AUTO: 8.8 FL (ref 7–12)
POTASSIUM SERPL-SCNC: 4.5 MMOL/L (ref 3.5–5)
PRO-BNP: 103 PG/ML (ref 0–125)
RBC # BLD: 3.58 E12/L (ref 3.8–5.8)
SODIUM BLD-SCNC: 138 MMOL/L (ref 132–146)
WBC # BLD: 4.9 E9/L (ref 4.5–11.5)

## 2022-09-08 PROCEDURE — 94640 AIRWAY INHALATION TREATMENT: CPT

## 2022-09-08 PROCEDURE — 6370000000 HC RX 637 (ALT 250 FOR IP): Performed by: NURSE PRACTITIONER

## 2022-09-08 PROCEDURE — 85027 COMPLETE CBC AUTOMATED: CPT

## 2022-09-08 PROCEDURE — 2580000003 HC RX 258: Performed by: INTERNAL MEDICINE

## 2022-09-08 PROCEDURE — 99233 SBSQ HOSP IP/OBS HIGH 50: CPT | Performed by: INTERNAL MEDICINE

## 2022-09-08 PROCEDURE — 36415 COLL VENOUS BLD VENIPUNCTURE: CPT

## 2022-09-08 PROCEDURE — 94660 CPAP INITIATION&MGMT: CPT

## 2022-09-08 PROCEDURE — 82962 GLUCOSE BLOOD TEST: CPT

## 2022-09-08 PROCEDURE — 6360000002 HC RX W HCPCS: Performed by: INTERNAL MEDICINE

## 2022-09-08 PROCEDURE — 93018 CV STRESS TEST I&R ONLY: CPT | Performed by: INTERNAL MEDICINE

## 2022-09-08 PROCEDURE — 93016 CV STRESS TEST SUPVJ ONLY: CPT | Performed by: INTERNAL MEDICINE

## 2022-09-08 PROCEDURE — 6370000000 HC RX 637 (ALT 250 FOR IP): Performed by: INTERNAL MEDICINE

## 2022-09-08 PROCEDURE — 78452 HT MUSCLE IMAGE SPECT MULT: CPT | Performed by: INTERNAL MEDICINE

## 2022-09-08 PROCEDURE — 3430000000 HC RX DIAGNOSTIC RADIOPHARMACEUTICAL: Performed by: RADIOLOGY

## 2022-09-08 PROCEDURE — A9500 TC99M SESTAMIBI: HCPCS | Performed by: RADIOLOGY

## 2022-09-08 PROCEDURE — 93017 CV STRESS TEST TRACING ONLY: CPT

## 2022-09-08 PROCEDURE — 83880 ASSAY OF NATRIURETIC PEPTIDE: CPT

## 2022-09-08 PROCEDURE — 2700000000 HC OXYGEN THERAPY PER DAY

## 2022-09-08 PROCEDURE — 78452 HT MUSCLE IMAGE SPECT MULT: CPT

## 2022-09-08 PROCEDURE — 80048 BASIC METABOLIC PNL TOTAL CA: CPT

## 2022-09-08 RX ORDER — FUROSEMIDE 20 MG/1
20 TABLET ORAL DAILY
Status: DISCONTINUED | OUTPATIENT
Start: 2022-09-09 | End: 2022-09-08 | Stop reason: HOSPADM

## 2022-09-08 RX ADMIN — Medication 30 MILLICURIE: at 09:45

## 2022-09-08 RX ADMIN — PREDNISONE 40 MG: 20 TABLET ORAL at 11:51

## 2022-09-08 RX ADMIN — ALBUTEROL SULFATE 2.5 MG: 2.5 SOLUTION RESPIRATORY (INHALATION) at 12:24

## 2022-09-08 RX ADMIN — ENOXAPARIN SODIUM 30 MG: 100 INJECTION SUBCUTANEOUS at 11:50

## 2022-09-08 RX ADMIN — ASPIRIN 81 MG CHEWABLE TABLET 81 MG: 81 TABLET CHEWABLE at 11:52

## 2022-09-08 RX ADMIN — ATORVASTATIN CALCIUM 40 MG: 40 TABLET, FILM COATED ORAL at 11:51

## 2022-09-08 RX ADMIN — METFORMIN HYDROCHLORIDE 1000 MG: 1000 TABLET ORAL at 11:52

## 2022-09-08 RX ADMIN — METOPROLOL TARTRATE 75 MG: 50 TABLET, FILM COATED ORAL at 11:51

## 2022-09-08 RX ADMIN — PANTOPRAZOLE SODIUM 40 MG: 40 TABLET, DELAYED RELEASE ORAL at 11:51

## 2022-09-08 RX ADMIN — SODIUM CHLORIDE, PRESERVATIVE FREE 10 ML: 5 INJECTION INTRAVENOUS at 11:52

## 2022-09-08 RX ADMIN — FUROSEMIDE 20 MG: 20 TABLET ORAL at 11:50

## 2022-09-08 RX ADMIN — TAMSULOSIN HYDROCHLORIDE 0.4 MG: 0.4 CAPSULE ORAL at 11:51

## 2022-09-08 RX ADMIN — ISOSORBIDE MONONITRATE 30 MG: 30 TABLET, EXTENDED RELEASE ORAL at 11:52

## 2022-09-08 RX ADMIN — REGADENOSON 0.4 MG: 0.08 INJECTION, SOLUTION INTRAVENOUS at 09:55

## 2022-09-08 RX ADMIN — INSULIN LISPRO 12 UNITS: 100 INJECTION, SOLUTION INTRAVENOUS; SUBCUTANEOUS at 11:54

## 2022-09-08 RX ADMIN — Medication 10 MILLICURIE: at 08:37

## 2022-09-08 RX ADMIN — LISINOPRIL 10 MG: 10 TABLET ORAL at 11:51

## 2022-09-08 NOTE — PLAN OF CARE
Addendum: I have personally participated in the history, exam, medical decision making with Nils Hebert on the date of service and I agree with all of the pertinent clinical information unless otherwise noted. I have also reviewed and agree with the past medical, family, and social history unless otherwise noted. Patient was admitted with     PHYSICAL EXAM:  Vitals:  /71   Pulse (!) 106   Temp 97.8 °F (36.6 °C) (Oral)   Resp 18   Ht 6' 1\" (1.854 m)   Wt 246 lb 12.8 oz (111.9 kg)   SpO2 98%   BMI 32.56 kg/m²   Gen: awake, alert, NAD  Lungs: clear to auscultation bilaterally no crackles no wheezing. Heart: RRR, no murmur   Abdomen: soft nontender nondistended positive bowel sounds. Extremities: full range of motion no peripheral edema. Impression:  Principal Problem:    Shortness of breath  Active Problems:    Acute on chronic congestive heart failure (HCC)    Coronary artery disease involving native coronary artery of native heart with angina pectoris (HCC)    Chronic obstructive pulmonary disease with acute exacerbation (HCC)  Resolved Problems:    * No resolved hospital problems. *      My findings/plan include:        NOTE: This report was transcribed using voice recognition software. Every effort was made to ensure accuracy; however, inadvertent computerized transcription errors may be present.     Electronically signed by Concepción Smith DO on 9/8/2022 at 11:21 AM

## 2022-09-08 NOTE — DISCHARGE SUMMARY
[facility] Physician Discharge Summary       Sherice Simeon MD  [address] [address]  [phone]    Call in 1 day(s)  Obtain lab work Monday 9/12/22 then see PCP to determine resuming Lasix and Lisinopril      Activity level: As tolerated  Dispo: Home  Condition on discharge: Stable    Patient ID:  Alicia Fowler  [id number]  66 y.o.  1950    Admit date: 9/4/2022    Discharge date and time:  9/8/2022  12:45 PM    Admission Diagnoses: Principal Problem:    Shortness of breath  Active Problems:    Acute on chronic congestive heart failure ([address]    Coronary artery disease involving native coronary artery of native heart with angina pectoris ([address]    Chronic obstructive pulmonary disease with acute exacerbation ([address]  Resolved Problems:    * No resolved hospital problems. *      Discharge Diagnoses: Principal Problem:    Shortness of breath  Active Problems:    Acute on chronic congestive heart failure (HCC)    Coronary artery disease involving native coronary artery of native heart with angina pectoris (HCC)    Chronic obstructive pulmonary disease with acute exacerbation (HCC)  Resolved Problems:    * No resolved hospital problems. *      Consults:  IP CONSULT TO CARDIOLOGY  IP CONSULT TO PULMONOLOGY  IP CONSULT TO HEART FAILURE NURSE/COORDINATOR    Hospital Course:   Patient Alicia Fowler is a 66 y.o. presented with Shortness of breath [R06.02]  Acute on chronic congestive heart failure, unspecified heart failure type ([address] [I50.9]    COPD exacerbation with chronic hypoxic respiratory failure   Follows with Dr Chinyere Suarez on Steroids, albuterol, brovana, budesonide. Steroids weaned to PO   Baseline oxygen 4.5 L at home. Resting comfortably on 3 L via NC with pulse ox > 92% since admission - continue 3 L via NC   Hx lung mass found on CT 9/1. Former smoker, worked in [email or web address] , not up to date on health screenings.  Scheduled for outpatient PET 9/6 [facility] following CHF/CAD  Cardiology following - appreciate recs   Continue lasix as per cardiology. STRICT I/O - 2.4 L in 24 hours   Echo - EF 60-65%. No evidence of hemodynamically significant pericardial effusion. Lexiscan negative for ischemia, arrhythmias  GONZALES   Cr 1.3 > 1.7   Avoid nephrotoxins   Hold lisinopril and lasix for now - obtain BMP Monday and see PCP for recommendations   NIDDM   Continue metformin, Lantus nightly, insulin sliding scale   On steroids, will monitor BS but would expect elevated   Hx anemia, GERD, HTN, HLD, IFTIKHAR   Continue home medications      Discharge Exam:    General Appearance: alert and oriented to person, place and time and in no acute distress  Skin: warm and dry  Head: normocephalic and atraumatic  Eyes: pupils equal, round, and reactive to light, extraocular eye movements intact, conjunctivae normal  Neck: neck supple and non tender without mass   Pulmonary/Chest: diminished to auscultation bilaterally- no wheezes, rales or rhonchi, normal air movement, no respiratory distress - 3 L via NC   Cardiovascular: normal rate, normal S1 and S2 and no carotid bruits  Abdomen: soft, non-tender, non-distended, normal bowel sounds, no masses or organomegaly  Extremities: no cyanosis, no clubbing and no edema  Neurologic: no cranial nerve deficit and speech normal    I/O last 3 completed shifts:   In: 960 [P.O.:960]  Out: 3425 [SZLWL:8344]  I/O this shift:  In: -   Out: 300 [Urine:300]      LABS:  Recent Labs     09/06/22  1225 09/07/22  0330 09/08/22  0330    134 138   K 5.6* 4.7 4.5   CL 92* 94* 97*   CO2 31* 31* 31*   BUN 32* 32* 36*   CREATININE 1.3* 1.3* 1.7*   GLUCOSE 206* 168* 72*   CALCIUM 9.3 8.8 8.6       Recent Labs     09/06/22  1225 09/07/22 0330 09/08/22  0330   WBC 9.6 7.5 4.9   RBC 3.89 3.63* 3.58*   HGB 10.9* 10.4* 10.1*   HCT 33.3* 31.2* 30.8*   MCV 85.6 86.0 86.0   MCH 28.0 28.7 28.2   MCHC 32.7 33.3 32.8   RDW 13.9 13.7 14.0    124* 105*   MPV 8.9 9.2 8.8       No results for input(s): POCGLU in the last 72 hours. Imaging:  XR CHEST (2 VW)    Result Date: 9/4/2022  EXAMINATION: TWO XRAY VIEWS OF THE CHEST 9/4/2022 4:26 pm COMPARISON: CT chest 09/01/2022 and chest x-ray 02/27/2021 HISTORY: ORDERING SYSTEM PROVIDED HISTORY: chf TECHNOLOGIST PROVIDED HISTORY: Reason for exam:->chf FINDINGS: Cardiomegaly, unchanged. As correlated with the lateral view, mild blunting of the posterior costophrenic angles, worse on the left, compatible with small effusions. Bibasilar atelectasis or infiltrates. As correlated with recent CT exam, redemonstration of a right suprahilar ill-defined mass density. By CT, this lesion measures nearly 6 cm. The left upper lobe shows no focal infiltrate. No pneumothorax. 1.  Cardiomegaly, small bilateral pleural effusions, and bibasilar atelectasis or infiltrates. 2.  Right suprahilar mass. Please see recent CT chest report and with CT recommendation for PET-CT evaluation or CT-guided tissue sampling. Patient Instructions:      Medication List        CHANGE how you take these medications      tamsulosin 0.4 MG capsule  Commonly known as: FLOMAX  Take 1 capsule by mouth daily  What changed: when to take this            CONTINUE taking these medications      albuterol sulfate  (90 Base) MCG/ACT inhaler  Commonly known as: PROVENTIL;VENTOLIN;PROAIR  Inhale 2 puffs into the lungs every 6 hours as needed for Wheezing     aspirin 81 MG EC tablet     atorvastatin 40 MG tablet  Commonly known as: LIPITOR     BD Pen Needle Belkis 2nd Gen 32G X 4 MM Misc  Generic drug: Insulin Pen Needle  USE  4 TIMES DAILY     FreeStyle Precision Noel Test strip  Generic drug: blood glucose test strips  1 each by In Vitro route 3 times daily As needed. Full Kit Nebulizer Set Misc  Indications: Acute Worsening of Chronic Obstructive Pulmonary Disease Use as directed with nebulized medication.      guaiFENesin 400 MG tablet     insulin glargine 100 UNIT/ML chronic congestive heart failure (HCC)    Coronary artery disease involving native coronary artery of native heart with angina pectoris (HCC)    Chronic obstructive pulmonary disease with acute exacerbation (HCC)  Resolved Problems:    * No resolved hospital problems. *        My findings/plan include:     66 your old male presents with shortness of breath. He is normally on 4.5 L NC and is currently on 3 L. He was treated for copd exacerbation and was seen by pulmonology. Cardiology was consulted for possible CHF exacerbation and was diuresed. Renal functions did worsen. We will hold lisinopril and lasix. BMP will be ordered on Monday and sent to pcp to determine if lasix and lisinopril should be resumed. NOTE: This report was transcribed using voice recognition software. Every effort was made to ensure accuracy; however, inadvertent computerized transcription errors may be present.      Electronically signed by Elizabeth Cadena DO on 9/8/2022 at 11:21 AM

## 2022-09-08 NOTE — PROGRESS NOTES
[address]    PULMONARY PROGRESS NOTE    Patient: Keya Coy  MRN: [id number]  : 1950    Encounter Date: 2022  Encounter Time: 2:41 PM     Date of Admission: .2022  4:46 PM    Consulting Physician:  Primary Care Physician:      Xenia Flowers MD     [phone]     None    PROBLEM LIST:  Patient Active Problem List   Diagnosis    Hyperkalemia    Shortness of breath    Acute on chronic congestive heart failure (HCC)    Coronary artery disease involving native coronary artery of native heart with angina pectoris (HCC)    Chronic obstructive pulmonary disease with acute exacerbation (HCC)       SUBJECTIVE:  Resting in bed on 3L nc with pox 99%  Breathing is a little better  Moist cough but chest really hurts when coughing and when moving around  Stress test negative      CURRENT MEDICATIONS:  Current Facility-Administered Medications: [Held by provider] furosemide (LASIX) tablet 20 mg, 20 mg, Oral, Daily  pantoprazole (PROTONIX) tablet 40 mg, 40 mg, Oral, BID  calcium carbonate (TUMS) chewable tablet 500 mg, 500 mg, Oral, TID PRN  ibuprofen (ADVIL;MOTRIN) tablet 400 mg, 400 mg, Oral, Q6H PRN  guaiFENesin-dextromethorphan (ROBITUSSIN DM) 100-10 MG/5ML syrup 5 mL, 5 mL, Oral, Q4H PRN  predniSONE (DELTASONE) tablet 40 mg, 40 mg, Oral, Daily  Arformoterol Tartrate (BROVANA) nebulizer solution 15 mcg, 15 mcg, Nebulization, BID  budesonide (PULMICORT) nebulizer suspension 500 mcg, 500 mcg, Nebulization, BID  albuterol (PROVENTIL) nebulizer solution 2.5 mg, 2.5 mg, Nebulization, 4x daily  atorvastatin (LIPITOR) tablet 40 mg, 40 mg, Oral, Daily  insulin glargine (LANTUS) injection vial 50 Units, 50 Units, SubCUTAneous, Nightly  insulin lispro (HUMALOG) injection vial 12 Units, 12 Units, SubCUTAneous, TID WC  insulin lispro (HUMALOG) injection vial 0-8 Units, 0-8 Units, SubCUTAneous, TID WC  insulin lispro (HUMALOG) injection vial 0-4 Units, 0-4 Units, SubCUTAneous, Nightly  glucose chewable tablet 16 g, 4 tablet, Oral, PRN  dextrose bolus 10% 125 mL, 125 mL, IntraVENous, PRN **OR** dextrose bolus 10% 250 mL, 250 mL, IntraVENous, PRN  glucagon (rDNA) injection 1 mg, 1 mg, SubCUTAneous, PRN  dextrose 10 % infusion, , IntraVENous, Continuous PRN  isosorbide mononitrate (IMDUR) extended release tablet 30 mg, 30 mg, Oral, Daily  metFORMIN (GLUCOPHAGE) tablet 1,000 mg, 1,000 mg, Oral, BID WC  primidone (MYSOLINE) tablet 50 mg, 50 mg, Oral, Nightly  sodium chloride flush 0.9 % injection 5-40 mL, 5-40 mL, IntraVENous, 2 times per day  sodium chloride flush 0.9 % injection 5-40 mL, 5-40 mL, IntraVENous, PRN  0.9 % sodium chloride infusion, , IntraVENous, PRN  [Held by provider] enoxaparin Sodium (LOVENOX) injection 30 mg, 30 mg, SubCUTAneous, BID  ondansetron (ZOFRAN-ODT) disintegrating tablet 4 mg, 4 mg, Oral, Q8H PRN **OR** ondansetron (ZOFRAN) injection 4 mg, 4 mg, IntraVENous, Q6H PRN  polyethylene glycol (GLYCOLAX) packet 17 g, 17 g, Oral, Daily PRN  acetaminophen (TYLENOL) tablet 650 mg, 650 mg, Oral, Q6H PRN **OR** acetaminophen (TYLENOL) suppository 650 mg, 650 mg, Rectal, Q6H PRN  perflutren lipid microspheres (DEFINITY) injection 1.65 mg, 1.5 mL, IntraVENous, ONCE PRN  aspirin chewable tablet 81 mg, 81 mg, Oral, Daily  [Held by provider] lisinopril (PRINIVIL;ZESTRIL) tablet 10 mg, 10 mg, Oral, Daily  metoprolol tartrate (LOPRESSOR) tablet 75 mg, 75 mg, Oral, BID  tamsulosin (FLOMAX) capsule 0.4 mg, 0.4 mg, Oral, BID    IV MEDICATIONS:   dextrose      sodium chloride         ALLERGIES:  No Known Allergies      PHYSICAL EXAMINATION:     VITAL SIGNS:  /71   Pulse 52   Temp 97.8 °F (36.6 °C) (Oral)   Resp 16   Ht 6' 1\" (1.854 m)   Wt 246 lb 12.8 oz (111.9 kg)   SpO2 98%   BMI 32.56 kg/m²   Wt Readings from Last 3 Encounters:   09/08/22 246 lb 12.8 oz (111.9 kg)   03/30/21 243 lb (110.2 kg)   03/03/21 246 lb 11.2 oz (111.9 kg)     Temp Readings from Last 3 Encounters:   09/08/22 97.8 °F (36.6 °C) (Oral)   21 97 °F (36.1 °C)   21 98.2 °F (36.8 °C) (Temporal)     TMAX:  BP Readings from Last 3 Encounters:   22 120/71   21 124/74   21 (!) 167/84     Pulse Readings from Last 3 Encounters:   22 52   21 90   21 107       CURRENT PULSE OXIMETRY: SpO2: 98 %  24HR PULSE OXIMETRY RANGE: SpO2  Av %  Min: 94 %  Max: 99 %  CVP:      ________________________________________________________________________    VENTILATOR SETTINGS (if applicable): Additional Respiratory Assessments  Heart Rate: 52  Resp: 16  SpO2: 98 %  ETCO2:  Peak Inspiratory Pressure:  End-Inspiratory Plateau Pressure:    ABG:  No results for input(s): PH, PO2, PCO2, HCO3, BE, O2SAT, METHB, O2HB, COHB, O2CON, HHB, THB in the last 72 hours. FiO2 : 40 %     ________________________________________________________________________    IV ACCESS:    NUTRITION: ADULT DIET; Regular; 4 carb choices (60 gm/meal); Low Fat/Low Chol/High Fiber/JARRED    INTAKE/OUTPUTS:  I/O last 3 completed shifts: In: 960 [P.O.:960]  Out: 3425 [Urine:3425]    Intake/Output Summary (Last 24 hours) at 2022 1441  Last data filed at 2022 0947  Gross per 24 hour   Intake --   Output 1275 ml   Net -1275 ml       General Appearance: NAD, appears stated age  Pulmonary/Chest: decreased breath sounds, no accessory muscles of inspiration, equal, nonlabored. Diminished. Cardiovascular: normal rate, regular rhythm, normal S1 and S2, + murmur.   Abdomen: soft, non-tender, non-distended, normal bowel sounds, no masses or organomegaly, obese   Extremities: no cyanosis, no clubbing, Trace ble edema  Musculoskeletal: normal range of motion  Neurologic: AOX4, follows commands    LABS/IMAGING:    CBC:  Lab Results   Component Value Date    WBC 4.9 2022    HGB 10.1 (L) 2022    HCT 30.8 (L) 2022    MCV 86.0 2022     (L) 2022    LABLYMP 0.5 (L) 2021    LYMPHOPCT 7.4 (L) 2022 RBC 3.58 (L) 09/08/2022    MCH 28.2 09/08/2022    MCHC 32.8 09/08/2022    RDW 14.0 09/08/2022    NEUTOPHILPCT 88.1 (H) 09/06/2022    MONOPCT 2.9 09/06/2022    BASOPCT 0.1 09/06/2022    NEUTROABS 8.49 (H) 09/06/2022    LYMPHSABS 0.71 (L) 09/06/2022    MONOSABS 0.28 09/06/2022    EOSABS 0.00 (L) 09/06/2022    BASOSABS 0.01 09/06/2022       Recent Labs     09/08/22  0330 09/07/22  0330 09/06/22  1225   WBC 4.9 7.5 9.6   HGB 10.1* 10.4* 10.9*   HCT 30.8* 31.2* 33.3*   MCV 86.0 86.0 85.6   * 124* 140       BMP:   Recent Labs     09/06/22  1225 09/07/22  0330 09/08/22  0330    134 138   K 5.6* 4.7 4.5   CL 92* 94* 97*   CO2 31* 31* 31*   BUN 32* 32* 36*   CREATININE 1.3* 1.3* 1.7*       MG:   Lab Results   Component Value Date/Time    MG 1.7 07/23/2021 04:59 AM     Ca/Phos:   Lab Results   Component Value Date    CALCIUM 8.6 09/08/2022    PHOS 5.9 (H) 07/23/2021     Amylase: No results found for: AMYLASE  Lipase: No results found for: LIPASE  LIVER PROFILE:   Recent Labs     09/06/22  1225   AST 19   ALT 20   BILITOT 0.4   ALKPHOS 66       PT/INR:   No results for input(s): PROTIME, INR in the last 72 hours. APTT: No results for input(s): APTT in the last 72 hours.     Cardiac Enzymes:  Lab Results   Component Value Date    TROPONINI <0.01 02/27/2021       Hgb A1C:   Lab Results   Component Value Date    LABA1C 7.2 (H) 07/23/2021     Lab Results   Component Value Date     07/23/2021     RAFAEL: No results found for: RAFAEL  ESR: No results found for: SEDRATE  CRP: No results found for: CRP  D Dimer:   Lab Results   Component Value Date    DDIMER <200 09/04/2022     Folate and B12:   Lab Results   Component Value Date    [id number] 274 07/23/2021   , No results found for: FOLATE    Lactic Acid:   Lab Results   Component Value Date    LACTA 0.7 02/28/2021     Ammonia:   Cortisol:  Thyroid Studies:  Lab Results   Component Value Date    TSH 0.937 07/23/2021       Toxicology:    MICROBIOLOGY:  Inf a/b and covid negative     CXR:  9/4 cxr    Cardiomegaly, small bilateral pleural effusions, and bibasilar  atelectasis or infiltrates. 2.  Right suprahilar mass. Please see recent CT chest report and with CT  recommendation for PET-CT evaluation or CT-guided tissue sampling. CT Chest:  9/1 Persistent medial right upper lobe soft tissue mass measuring 5.9 x 3.5 x  2.1 cm. Neoplasm should be excluded        ASSESSMENT/Plan:  COPD exacerbation  Continue brovana, budesonide. Albuterol  Continue prednisone taper - takes prednisone 15mg daily as an outpatient   Okay to dc on taper  Chronic hypoxic respiratory failure  Wears 4.5 lnc at baseline - down to 3L here  Lung mass   As seen on ct 9/1, being followed by [facility]  Former smoker, worked in [employer], not up to date on health screenings, family history of cancer in parents   Patient was scheduled for outpatient PET scan  09/06, rescheduled for 9/12 and will see after. .    IFTIKHAR  Continue NIV qhs and PRN  Faithful with NIV  Pulmonary edema  Echo ordered- normal ef.       Angel Zamora MD  9/8/2022  2:41 PM

## 2022-09-08 NOTE — CARE COORDINATION
For stress test today. Requiring O2 3lNC during day, 40% Bipap at [facility] home O2 5lNC provided by [facility] [phone]. Has nebulizer, home Bipap. Plan remains to return home w/ his wife on discharge- son or wife will provide transportation home. Anticipating no needs.  Will follow  Cayla Contreras RNcase manager

## 2022-09-08 NOTE — PROCEDURES
[facility] Nuclear Stress Test:    Cardiologist: Dr. Ton Rocha EKG: NSR, normal EKG    Indications for study: Chest pain    1. No chest pain  2. No new arrhythmias  3. No EKG changes suggestive of stress induced ischemia  4. Nuclear images pending    August Zaidi MD., [facility] [place]    [facility]) Cardiology

## 2022-09-08 NOTE — PROGRESS NOTES
09/08/22 0000   NIV Type   $NIV $Daily Charge   Skin Protection for O2 Device   (already on)   Mode Biphasic   Mask Type Full face mask   Mask Size Medium   Settings/Measurements   IPAP 15 cmH20   CPAP/EPAP 10 cmH2O   Vt (Measured) 542 mL   Rate Ordered 15   Resp 15   FiO2  40 %   Mask Leak (lpm) 65 lpm   Comfort Level Good   Using Accessory Muscles No   Patient Observation   Observations red outlet   Alarm Settings   Alarms On Y   Low Pressure (cmH2O)   (in wall)   Oxygen Therapy/Pulse Ox   $Oxygen $Daily Charge   O2 Device Nasal cannula

## 2022-09-12 ENCOUNTER — HOSPITAL ENCOUNTER (OUTPATIENT)
Dept: PET IMAGING | Age: 72
Discharge: HOME | End: 2022-09-14
Payer: MEDICARE

## 2022-09-12 DIAGNOSIS — R91.8 LUNG MASS: ICD-10-CM

## 2022-09-12 LAB — METER GLUCOSE: 125 MG/DL (ref 74–99)

## 2022-09-12 PROCEDURE — 78815 PET IMAGE W/CT SKULL-THIGH: CPT

## 2022-09-12 PROCEDURE — A9552 F18 FDG: HCPCS | Performed by: RADIOLOGY

## 2022-09-12 PROCEDURE — 82962 GLUCOSE BLOOD TEST: CPT

## 2022-09-12 PROCEDURE — 3430000000 HC RX DIAGNOSTIC RADIOPHARMACEUTICAL: Performed by: RADIOLOGY

## 2022-09-12 RX ORDER — FLUDEOXYGLUCOSE F 18 200 MCI/ML
15 INJECTION, SOLUTION INTRAVENOUS
Status: COMPLETED | OUTPATIENT
Start: 2022-09-12 | End: 2022-09-12

## 2022-09-12 RX ADMIN — FLUDEOXYGLUCOSE F 18 15 MILLICURIE: 200 INJECTION, SOLUTION INTRAVENOUS at 08:04

## 2022-09-22 NOTE — PROGRESS NOTES
Physician Progress Note      Uziel Clark  CSN #:                  [id number]  :                       1950  ADMIT DATE:       2022 4:46 PM  Ely Poole DATE:        2022 4:09 PM  RESPONDING  PROVIDER #:        Chas Kearns DO          QUERY TEXT:    Patient admitted with CHF. If possible, please document in progress notes and   discharge summary further specificity regarding the type and acuity of CHF:    The medical record reflects the following:  Risk Factors: CHF  Clinical Indicators: Per TTE completed  \". [facility] Ejection fraction is visually   estimated at 60-65%. .. [facility] \" Per DC summary \". [facility] Acute on chronic congestive heart   failure, unspecified heart failure type. [facility] \"  Treatment: IV Lasix  Options provided:  -- Acute on Chronic Systolic CHF/HFrEF  -- Acute on Chronic Diastolic CHF/HFpEF  -- Acute on Chronic Systolic and Diastolic CHF  -- Other - I will add my own diagnosis  -- Disagree - Not applicable / Not valid  -- Disagree - Clinically unable to determine / Unknown  -- Refer to Clinical Documentation Reviewer    PROVIDER RESPONSE TEXT:    This patient is in acute on chronic diastolic CHF/HFpEF.     Query created by: Darrick Ann on 9/15/2022 2:41 PM      Electronically signed by:  Chas Kearns DO 2022 2:03 PM

## 2022-10-06 ENCOUNTER — HOSPITAL ENCOUNTER (OUTPATIENT)
Dept: GENERAL RADIOLOGY | Age: 72
Discharge: HOME | End: 2022-10-08
Payer: MEDICARE

## 2022-10-06 ENCOUNTER — HOSPITAL ENCOUNTER (OUTPATIENT)
Dept: CT IMAGING | Age: 72
Discharge: HOME | End: 2022-10-08
Payer: MEDICARE

## 2022-10-06 ENCOUNTER — HOSPITAL ENCOUNTER (OUTPATIENT)
Age: 72
Discharge: HOME | End: 2022-10-06
Payer: MEDICARE

## 2022-10-06 VITALS
OXYGEN SATURATION: 98 % | DIASTOLIC BLOOD PRESSURE: 68 MMHG | HEART RATE: 73 BPM | SYSTOLIC BLOOD PRESSURE: 115 MMHG | RESPIRATION RATE: 18 BRPM

## 2022-10-06 DIAGNOSIS — R91.8 MASS OF UPPER LOBE OF RIGHT LUNG: ICD-10-CM

## 2022-10-06 LAB
ANION GAP SERPL CALCULATED.3IONS-SCNC: 11 MMOL/L (ref 7–16)
APTT: 29.5 SEC (ref 24.5–35.1)
BUN BLDV-MCNC: 27 MG/DL (ref 6–23)
CALCIUM SERPL-MCNC: 9 MG/DL (ref 8.6–10.2)
CHLORIDE BLD-SCNC: 108 MMOL/L (ref 98–107)
CO2: 27 MMOL/L (ref 22–29)
CREAT SERPL-MCNC: 1.4 MG/DL (ref 0.7–1.2)
GFR AFRICAN AMERICAN: 60
GFR NON-AFRICAN AMERICAN: 50 ML/MIN/1.73
GLUCOSE BLD-MCNC: 114 MG/DL (ref 74–99)
HCT VFR BLD CALC: 33.7 % (ref 37–54)
HEMOGLOBIN: 11 G/DL (ref 12.5–16.5)
INR BLD: 1.1
MCH RBC QN AUTO: 29.3 PG (ref 26–35)
MCHC RBC AUTO-ENTMCNC: 32.6 % (ref 32–34.5)
MCV RBC AUTO: 89.9 FL (ref 80–99.9)
METER GLUCOSE: 117 MG/DL (ref 74–99)
PDW BLD-RTO: 14.4 FL (ref 11.5–15)
PLATELET # BLD: 121 E9/L (ref 130–450)
PMV BLD AUTO: 8.6 FL (ref 7–12)
POTASSIUM SERPL-SCNC: 4.8 MMOL/L (ref 3.5–5)
PROTHROMBIN TIME: 12.2 SEC (ref 9.3–12.4)
RBC # BLD: 3.75 E12/L (ref 3.8–5.8)
SODIUM BLD-SCNC: 146 MMOL/L (ref 132–146)
WBC # BLD: 4.7 E9/L (ref 4.5–11.5)

## 2022-10-06 PROCEDURE — 2709999900 CT NEEDLE BIOPSY LUNG PERCUTANEOUS W IMAGING GUIDANCE

## 2022-10-06 PROCEDURE — 85610 PROTHROMBIN TIME: CPT

## 2022-10-06 PROCEDURE — 71046 X-RAY EXAM CHEST 2 VIEWS: CPT

## 2022-10-06 PROCEDURE — 80048 BASIC METABOLIC PNL TOTAL CA: CPT

## 2022-10-06 PROCEDURE — 82962 GLUCOSE BLOOD TEST: CPT

## 2022-10-06 PROCEDURE — 85730 THROMBOPLASTIN TIME PARTIAL: CPT

## 2022-10-06 PROCEDURE — 2500000003 HC RX 250 WO HCPCS: Performed by: RADIOLOGY

## 2022-10-06 PROCEDURE — 7100000011 HC PHASE II RECOVERY - ADDTL 15 MIN

## 2022-10-06 PROCEDURE — 7100000010 HC PHASE II RECOVERY - FIRST 15 MIN

## 2022-10-06 PROCEDURE — 88305 TISSUE EXAM BY PATHOLOGIST: CPT

## 2022-10-06 PROCEDURE — 36415 COLL VENOUS BLD VENIPUNCTURE: CPT

## 2022-10-06 PROCEDURE — 85027 COMPLETE CBC AUTOMATED: CPT

## 2022-10-06 RX ORDER — LIDOCAINE HYDROCHLORIDE 20 MG/ML
INJECTION, SOLUTION INFILTRATION; PERINEURAL
Status: COMPLETED | OUTPATIENT
Start: 2022-10-06 | End: 2022-10-06

## 2022-10-06 RX ADMIN — LIDOCAINE HYDROCHLORIDE 10 ML: 20 INJECTION, SOLUTION INFILTRATION; PERINEURAL at 10:28

## 2022-10-06 ASSESSMENT — PAIN SCALES - GENERAL
PAINLEVEL_OUTOF10: 0
PAINLEVEL_OUTOF10: 0

## 2022-10-06 NOTE — DISCHARGE INSTRUCTIONS
[address]   [phone]      LUNG BIOPSY DISCHARGE INSTRUCTIONS      You have just had a procedure called a Lung Biopsy    The following instructions are for your safety after leaving the hospital. If any questions concerning them arise, please call the number which appears at the top of the page. You may resume your regular diet upon leaving the hospital.    Rosa Castorena may have received intravenous medications which cause weakness and sedation. Therefore, DO NOT drive, operate any potentially dangerous machinery (lawnmowers, chainsaws, etc.) or conduct any important business for the rest of the day. If you have redness or swelling at the site of the needle biopsy or where medications were given, place warm-wet washcloths over the affected area for 20 minutes at a time until the redness subsides. If the redness continues more than two or three days, call your physician. If you develop shortness of breath or chest pain (particularly when taking a deep breath) notify your physician. You may have some hemoptysis (blood spitting) for the next few days following the procedure. Do not be concerned unless this increases in quantity or is accompanied by shortness of breath or chest pain. A mild fever may occur on the evening of the procedure. If you develop a temperature over 102.5°F or are having shaking chills, call your physician. Avoid medications containing aspirin (Anacin, Bufferin, [place], Percodan) for several days. You may use Acetaminophen (Tylenol, Datril, Sinutab, Vanquis, etc.). Specific Instructions: The nurse will be calling your home tomorrow to see how you are doing. We provide this literature for patients and family members. It is intended to be educational supplement that highlights some of the important points of what we have previously discussed.

## 2022-10-06 NOTE — OR NURSING
Dr. Arslan Spann in to talk with the patient in regards to a lung biopsy, pre,intra and post procedure. Discussed risks and benefits. Checked for understanding. Procedural consent signed, emotional support given. Patient positioned prone, feet first on Cat Scan table with continuous oxygen, cardiac monitor and vitals every 5 minutes. Monitor devices attached and secured, safety measures maintained. Patient scanned and images reviewed by Dr. Arslan Spann.   Patient prepped and draped using sterile procedure. With the guidance of Cat Scan, needle inserted  and core biopsy  taken by Dr. Arslan Spann.  Patient re-scanned and images reviewed by Dr. Arslan Spann .    Puncture site cleansed and dry dressing applied. Procedure completed. [facility] [place] [facility]. Spoke with Eduardo Godfrey RN    . Liquid lung biopsy and tissue biopsy sample taken to laboratory.

## 2022-10-06 NOTE — PROGRESS NOTES
1205 admitted to rm 29 iv heplocked, pt miri to take fluids at 1230  1230 family at bedside taking fluids without difficulty

## 2022-10-07 ENCOUNTER — HOSPITAL ENCOUNTER (EMERGENCY)
Dept: HOSPITAL 83 - ED | Age: 72
Discharge: HOME | End: 2022-10-07
Payer: MEDICARE

## 2022-10-07 VITALS — WEIGHT: 245 LBS | HEIGHT: 72.99 IN | BODY MASS INDEX: 32.47 KG/M2

## 2022-10-07 DIAGNOSIS — E11.649: Primary | ICD-10-CM

## 2022-10-07 DIAGNOSIS — Z87.891: ICD-10-CM

## 2022-10-07 DIAGNOSIS — I10: ICD-10-CM

## 2022-10-07 DIAGNOSIS — Z79.899: ICD-10-CM

## 2022-10-07 DIAGNOSIS — I25.10: ICD-10-CM

## 2022-10-07 LAB
ALP SERPL-CCNC: 87 U/L (ref 45–117)
ALT SERPL W P-5'-P-CCNC: 30 U/L (ref 12–78)
APTT PPP: 30.2 SECONDS (ref 20–32.1)
AST SERPL-CCNC: 16 IU/L (ref 3–35)
BASOPHILS # BLD AUTO: 0 10*3/UL (ref 0–0.1)
BASOPHILS NFR BLD AUTO: 0.3 % (ref 0–1)
BUN SERPL-MCNC: 23 MG/DL (ref 7–24)
CHLORIDE SERPL-SCNC: 115 MMOL/L (ref 98–107)
CREAT SERPL-MCNC: 1.32 MG/DL (ref 0.7–1.3)
EOSINOPHIL # BLD AUTO: 0 10*3/UL (ref 0–0.4)
EOSINOPHIL # BLD AUTO: 0.5 % (ref 1–4)
ERYTHROCYTE [DISTWIDTH] IN BLOOD BY AUTOMATED COUNT: 14.1 % (ref 0–14.5)
HCT VFR BLD AUTO: 32.2 % (ref 42–52)
INR BLD: 1.1 (ref 2–3.5)
LIPASE SERPL-CCNC: 240 U/L (ref 73–393)
LYMPHOCYTES # BLD AUTO: 0.4 10*3/UL (ref 1.3–4.4)
LYMPHOCYTES NFR BLD AUTO: 6.9 % (ref 27–41)
MCH RBC QN AUTO: 29 PG (ref 27–31)
MCHC RBC AUTO-ENTMCNC: 32.9 G/DL (ref 33–37)
MCV RBC AUTO: 88 FL (ref 80–94)
MONOCYTES # BLD AUTO: 0.3 10*3/UL (ref 0.1–1)
MONOCYTES NFR BLD MANUAL: 5.4 % (ref 3–9)
NEUT #: 5.1 10*3/UL (ref 2.3–7.9)
NEUT %: 84.9 % (ref 47–73)
NRBC BLD QL AUTO: 0 % (ref 0–0)
PLATELET # BLD AUTO: 115 10*3/UL (ref 130–400)
PMV BLD AUTO: 9.2 FL (ref 9.6–12.3)
POTASSIUM SERPL-SCNC: 4 MMOL/L (ref 3.5–5.1)
PROT SERPL-MCNC: 7 GM/DL (ref 6.4–8.2)
RBC # BLD AUTO: 3.66 10*6/UL (ref 4.5–5.9)
SODIUM SERPL-SCNC: 147 MMOL/L (ref 136–145)
WBC NRBC COR # BLD AUTO: 6 10*3/UL (ref 4.8–10.8)

## 2023-06-24 ENCOUNTER — HOSPITAL ENCOUNTER (OUTPATIENT)
Dept: CT IMAGING | Age: 73
End: 2023-06-24
Attending: INTERNAL MEDICINE
Payer: MEDICARE

## 2023-06-24 DIAGNOSIS — R91.8 LUNG MASS: ICD-10-CM

## 2023-06-24 PROCEDURE — 71250 CT THORAX DX C-: CPT

## 2023-06-28 ENCOUNTER — HOSPITAL ENCOUNTER (OUTPATIENT)
Age: 73
Discharge: HOME | End: 2023-06-28
Payer: MEDICARE

## 2023-06-28 LAB
B.E.: 3.9 MMOL/L (ref -3–3)
COHB: 0.8 % (ref 0–1.5)
CRITICAL: ABNORMAL
DATE ANALYZED: ABNORMAL
DATE OF COLLECTION: ABNORMAL
HCO3: 29.5 MMOL/L (ref 22–26)
HHB: 6.1 % (ref 0–5)
LAB: ABNORMAL
Lab: ABNORMAL
METHB: 0.3 % (ref 0–1.5)
MODE: ABNORMAL
O2 CONTENT: 15.1 ML/DL
O2 SATURATION: 93.8 % (ref 92–98.5)
O2HB: 92.8 % (ref 94–97)
OPERATOR ID: ABNORMAL
PATIENT TEMP: 37 C
PCO2: 48.7 MMHG (ref 35–45)
PH BLOOD GAS: 7.4 (ref 7.35–7.45)
PO2: 71 MMHG (ref 75–100)
SOURCE, BLOOD GAS: ABNORMAL
THB: 11.5 G/DL (ref 11.5–16.5)
TIME ANALYZED: 1608

## 2023-06-28 PROCEDURE — 82805 BLOOD GASES W/O2 SATURATION: CPT

## 2023-11-27 ENCOUNTER — HOSPITAL ENCOUNTER (OUTPATIENT)
Age: 73
Discharge: HOME | End: 2023-11-27
Payer: MEDICARE

## 2023-11-27 LAB
B.E.: 2.2 MMOL/L (ref -3–3)
COHB: 0.6 % (ref 0–1.5)
COMMENT: ABNORMAL
CRITICAL: ABNORMAL
DATE ANALYZED: ABNORMAL
DATE OF COLLECTION: ABNORMAL
HCO3: 27.9 MMOL/L (ref 22–26)
HHB: 6.3 % (ref 0–5)
LAB: ABNORMAL
Lab: 1548
METHB: 0.3 % (ref 0–1.5)
MODE: ABNORMAL
O2 CONTENT: 14.9 ML/DL
O2 SATURATION: 93.6 % (ref 92–98.5)
O2HB: 92.8 % (ref 94–97)
OPERATOR ID: 913
PATIENT TEMP: 37 C
PCO2: 48.6 MMHG (ref 35–45)
PH BLOOD GAS: 7.38 (ref 7.35–7.45)
PO2: 71.8 MMHG (ref 75–100)
SOURCE, BLOOD GAS: ABNORMAL
THB: 11.4 G/DL (ref 11.5–16.5)
TIME ANALYZED: 1553

## 2023-11-27 PROCEDURE — 82805 BLOOD GASES W/O2 SATURATION: CPT

## 2024-01-24 ENCOUNTER — HOSPITAL ENCOUNTER (OUTPATIENT)
Dept: CT IMAGING | Age: 74
Discharge: HOME | End: 2024-01-26
Attending: INTERNAL MEDICINE
Payer: MEDICARE

## 2024-01-24 DIAGNOSIS — R91.8 LUNG MASS: ICD-10-CM

## 2024-01-24 PROCEDURE — 71250 CT THORAX DX C-: CPT

## 2024-02-07 ENCOUNTER — TELEPHONE (OUTPATIENT)
Dept: PULMONOLOGY | Age: 74
End: 2024-02-07

## 2024-02-07 DIAGNOSIS — R91.8 MULTIPLE LUNG NODULES ON CT: Primary | ICD-10-CM

## 2024-02-07 NOTE — TELEPHONE ENCOUNTER
Call to pt and advised Dr Naqvi has ordered a Chest CT that is needed specifically for the Navigational Biopsy procedure that Dr Naqvi is going to do for bisopy as requested by provider Dr Goyal. Pt verbalized understanding and advised that as soon as we are able to coordinate ryan for pt to have Bronch Biopsy Rn will call him back with info and details to give him. Appt letter for Chest CT to be mailed out today.

## 2024-02-07 NOTE — TELEPHONE ENCOUNTER
Mailed a letter to patient informing him that his CT Chest is scheduled for 2-16-24 at 2:30 pm at the [facility]. He must arrive by 2:00 pm. There is no prep for this test

## 2024-02-09 ENCOUNTER — TELEPHONE (OUTPATIENT)
Dept: PULMONOLOGY | Age: 74
End: 2024-02-09

## 2024-02-09 NOTE — TELEPHONE ENCOUNTER
Call to pt to advise on appt for Bronch Biopsy procedure and to offer pt appt to meet with Dr Naqvi prior to biopsy appt. Pt and wife do not feel an appt is needed prior to biopsy day. Advised that March 7, 0224 @ 1130am is Navigational Biopsy date and time. Pt to arrive at 930am at the [facility]. Pt advised I will mail out appt letter with info on parking and arrival time. Advised to stop ASA prior to appt and he states \"I haven't been taking it anyway\". Denies any other anticoagulant meds. Advised PAT staff will follow up with additional instructions for day of procedure. Pt to call Rn if he has any questions. Plan is to follow up with he pulmonary doctor post procedure for results.

## 2024-02-16 ENCOUNTER — HOSPITAL ENCOUNTER (OUTPATIENT)
Dept: CT IMAGING | Age: 74
End: 2024-02-16
Attending: INTERNAL MEDICINE
Payer: MEDICARE

## 2024-02-16 DIAGNOSIS — R91.8 MULTIPLE LUNG NODULES ON CT: ICD-10-CM

## 2024-02-16 PROCEDURE — 71250 CT THORAX DX C-: CPT

## 2024-02-19 NOTE — PROGRESS NOTES
I faxed the EKG requisition to patient's cardiologist, Dr. Lantigua, per their request.  They will schedule and fax to us.

## 2024-03-01 NOTE — PROGRESS NOTES
[facility]   PRE-ADMISSION TESTING GENERAL INSTRUCTIONS  PAT Phone Number: [phone]      GENERAL INSTRUCTIONS:    [x] Antibacterial Soap Shower Night before and/or AM of surgery.  [] CHG Wipes instruction sheet and wipes given.  [x] Do not wear makeup, lotions, powders, deodorant the morning of surgery.  [x] Nothing to eat or drink after midnight. This includes no gum, candy, mints or water.  [x] You may brush your teeth, gargle, but do not swallow water.   [x] No tobacco products, illegal drugs, or alcohol within 24 hours of your surgery.  [x] Jewelry or valuables should not be brought to the hospital. All body and/or tongue piercing's must be removed prior to arriving to hospital. No contact lens or hair pins.   [x] Arrange transportation with a responsible adult  to and from the hospital. Arrange for someone to be with you for the remainder of the day and for 24 hours after your procedure due to having had anesthesia.          -Who will be your  for transportation? Luiz        -Who will be staying with you for 24 hrs after your procedure? Luiz  [x] Bring insurance card and photo ID.  [] Bring copy of living will or healthcare power of  papers to be placed in your electronic record.  [] Urine Pregnancy test will be preformed the day of surgery. A specimen sample may be brought from home.  [] Transfusion (Green) Bracelet: Please bring with you to hospital, day of surgery.     PARKING INSTRUCTIONS:     [x] ARRIVAL DATE & TIME: 3/7/24 at 0930  [x] Times are subject to change. We will contact you the business day before surgery if that were to occur.  [x] Enter into the [facility] Entrance. Two people may accompany you. Masks are not required.  [x] Parking Lot \"I\" is where you will park. It is located on the corner of [address] and [address]. The entrance is on [address].   Only one vehicle - per patient, is permitted in parking lot.   Upon  area to prepare you for surgery. Please do not be discouraged if you are not greeted in the order you arrive as there are many variables that are involved in patient preparation. Your patience is greatly appreciated as you wait for your nurse.   [x] Delays may occur with surgery and staff will make a sincere effort to keep you informed of delays. If any delays occur with your procedure, we apologize ahead of time for your inconvenience as we recognize the value of your time.

## 2024-03-07 ENCOUNTER — ANESTHESIA EVENT (OUTPATIENT)
Dept: ENDOSCOPY | Age: 74
End: 2024-03-07
Payer: MEDICARE

## 2024-03-07 ENCOUNTER — APPOINTMENT (OUTPATIENT)
Dept: GENERAL RADIOLOGY | Age: 74
End: 2024-03-07
Attending: INTERNAL MEDICINE
Payer: MEDICARE

## 2024-03-07 ENCOUNTER — ANESTHESIA (OUTPATIENT)
Dept: ENDOSCOPY | Age: 74
End: 2024-03-07
Payer: MEDICARE

## 2024-03-07 ENCOUNTER — HOSPITAL ENCOUNTER (OUTPATIENT)
Age: 74
Setting detail: OUTPATIENT SURGERY
Discharge: HOME | End: 2024-03-07
Attending: INTERNAL MEDICINE | Admitting: INTERNAL MEDICINE
Payer: MEDICARE

## 2024-03-07 VITALS
HEIGHT: 73 IN | OXYGEN SATURATION: 95 % | BODY MASS INDEX: 31.14 KG/M2 | DIASTOLIC BLOOD PRESSURE: 72 MMHG | TEMPERATURE: 97.7 F | HEART RATE: 90 BPM | WEIGHT: 235 LBS | SYSTOLIC BLOOD PRESSURE: 182 MMHG | RESPIRATION RATE: 15 BRPM

## 2024-03-07 DIAGNOSIS — Z01.812 PRE-OPERATIVE LABORATORY EXAMINATION: ICD-10-CM

## 2024-03-07 DIAGNOSIS — R91.1 LUNG NODULE: ICD-10-CM

## 2024-03-07 DIAGNOSIS — Z01.818 PRE-OP TESTING: Primary | ICD-10-CM

## 2024-03-07 LAB
ANION GAP SERPL CALCULATED.3IONS-SCNC: 10 MMOL/L (ref 7–16)
BUN SERPL-MCNC: 51 MG/DL (ref 6–23)
CALCIUM SERPL-MCNC: 8.8 MG/DL (ref 8.6–10.2)
CHLORIDE SERPL-SCNC: 103 MMOL/L (ref 98–107)
CO2 SERPL-SCNC: 29 MMOL/L (ref 22–29)
CREAT SERPL-MCNC: 1.7 MG/DL (ref 0.7–1.2)
ERYTHROCYTE [DISTWIDTH] IN BLOOD BY AUTOMATED COUNT: 13.7 % (ref 11.5–15)
GFR SERPL CREATININE-BSD FRML MDRD: 42 ML/MIN/1.73M2
GLUCOSE BLD-MCNC: 185 MG/DL (ref 74–99)
GLUCOSE SERPL-MCNC: 193 MG/DL (ref 74–99)
HCT VFR BLD AUTO: 34.6 % (ref 37–54)
HGB BLD-MCNC: 11.6 G/DL (ref 12.5–16.5)
INR PPP: 1.1
MCH RBC QN AUTO: 30.5 PG (ref 26–35)
MCHC RBC AUTO-ENTMCNC: 33.5 G/DL (ref 32–34.5)
MCV RBC AUTO: 91.1 FL (ref 80–99.9)
PARTIAL THROMBOPLASTIN TIME: 30.6 SEC (ref 24.5–35.1)
PLATELET # BLD AUTO: 106 K/UL (ref 130–450)
PMV BLD AUTO: 9 FL (ref 7–12)
POTASSIUM SERPL-SCNC: 4.9 MMOL/L (ref 3.5–5)
PROTHROMBIN TIME: 11.7 SEC (ref 9.3–12.4)
RBC # BLD AUTO: 3.8 M/UL (ref 3.8–5.8)
SODIUM SERPL-SCNC: 142 MMOL/L (ref 132–146)
WBC OTHER # BLD: 8.6 K/UL (ref 4.5–11.5)

## 2024-03-07 PROCEDURE — 31629 BRONCHOSCOPY/NEEDLE BX EACH: CPT | Performed by: INTERNAL MEDICINE

## 2024-03-07 PROCEDURE — 85730 THROMBOPLASTIN TIME PARTIAL: CPT

## 2024-03-07 PROCEDURE — 85610 PROTHROMBIN TIME: CPT

## 2024-03-07 PROCEDURE — 6360000002 HC RX W HCPCS: Performed by: NURSE ANESTHETIST, CERTIFIED REGISTERED

## 2024-03-07 PROCEDURE — 7100000001 HC PACU RECOVERY - ADDTL 15 MIN: Performed by: INTERNAL MEDICINE

## 2024-03-07 PROCEDURE — 31623 DX BRONCHOSCOPE/BRUSH: CPT | Performed by: INTERNAL MEDICINE

## 2024-03-07 PROCEDURE — 82962 GLUCOSE BLOOD TEST: CPT

## 2024-03-07 PROCEDURE — 7100000000 HC PACU RECOVERY - FIRST 15 MIN: Performed by: INTERNAL MEDICINE

## 2024-03-07 PROCEDURE — 87205 SMEAR GRAM STAIN: CPT

## 2024-03-07 PROCEDURE — 71045 X-RAY EXAM CHEST 1 VIEW: CPT

## 2024-03-07 PROCEDURE — 31627 NAVIGATIONAL BRONCHOSCOPY: CPT | Performed by: INTERNAL MEDICINE

## 2024-03-07 PROCEDURE — 31624 DX BRONCHOSCOPE/LAVAGE: CPT | Performed by: INTERNAL MEDICINE

## 2024-03-07 PROCEDURE — 2709999900 HC NON-CHARGEABLE SUPPLY: Performed by: INTERNAL MEDICINE

## 2024-03-07 PROCEDURE — 2580000003 HC RX 258: Performed by: NURSE ANESTHETIST, CERTIFIED REGISTERED

## 2024-03-07 PROCEDURE — 88112 CYTOPATH CELL ENHANCE TECH: CPT

## 2024-03-07 PROCEDURE — 87015 SPECIMEN INFECT AGNT CONCNTJ: CPT

## 2024-03-07 PROCEDURE — 31628 BRONCHOSCOPY/LUNG BX EACH: CPT | Performed by: INTERNAL MEDICINE

## 2024-03-07 PROCEDURE — 7100000011 HC PHASE II RECOVERY - ADDTL 15 MIN: Performed by: INTERNAL MEDICINE

## 2024-03-07 PROCEDURE — 3609027000 HC BRONCHOSCOPY: Performed by: INTERNAL MEDICINE

## 2024-03-07 PROCEDURE — 3609011300 HC BRONCHOSCOPY BRONCHIAL/ENDOBRNCL BX 1+ SITES: Performed by: INTERNAL MEDICINE

## 2024-03-07 PROCEDURE — 87070 CULTURE OTHR SPECIMN AEROBIC: CPT

## 2024-03-07 PROCEDURE — 3609011100 HC BRONCHOSCOPY BRUSHINGS: Performed by: INTERNAL MEDICINE

## 2024-03-07 PROCEDURE — 3609010800 HC BRONCHOSCOPY ALVEOLAR LAVAGE: Performed by: INTERNAL MEDICINE

## 2024-03-07 PROCEDURE — 88173 CYTOPATH EVAL FNA REPORT: CPT

## 2024-03-07 PROCEDURE — 89051 BODY FLUID CELL COUNT: CPT

## 2024-03-07 PROCEDURE — 88305 TISSUE EXAM BY PATHOLOGIST: CPT

## 2024-03-07 PROCEDURE — 2720000010 HC SURG SUPPLY STERILE: Performed by: INTERNAL MEDICINE

## 2024-03-07 PROCEDURE — 87206 SMEAR FLUORESCENT/ACID STAI: CPT

## 2024-03-07 PROCEDURE — 3700000001 HC ADD 15 MINUTES (ANESTHESIA): Performed by: INTERNAL MEDICINE

## 2024-03-07 PROCEDURE — 87102 FUNGUS ISOLATION CULTURE: CPT

## 2024-03-07 PROCEDURE — 2500000003 HC RX 250 WO HCPCS: Performed by: NURSE ANESTHETIST, CERTIFIED REGISTERED

## 2024-03-07 PROCEDURE — 80048 BASIC METABOLIC PNL TOTAL CA: CPT

## 2024-03-07 PROCEDURE — 85027 COMPLETE CBC AUTOMATED: CPT

## 2024-03-07 PROCEDURE — 7100000010 HC PHASE II RECOVERY - FIRST 15 MIN: Performed by: INTERNAL MEDICINE

## 2024-03-07 PROCEDURE — 3700000000 HC ANESTHESIA ATTENDED CARE: Performed by: INTERNAL MEDICINE

## 2024-03-07 PROCEDURE — 85049 AUTOMATED PLATELET COUNT: CPT

## 2024-03-07 PROCEDURE — 87116 MYCOBACTERIA CULTURE: CPT

## 2024-03-07 RX ORDER — HYDROMORPHONE HYDROCHLORIDE 1 MG/ML
0.5 INJECTION, SOLUTION INTRAMUSCULAR; INTRAVENOUS; SUBCUTANEOUS EVERY 5 MIN PRN
Status: DISCONTINUED | OUTPATIENT
Start: 2024-03-07 | End: 2024-03-07 | Stop reason: HOSPADM

## 2024-03-07 RX ORDER — ROCURONIUM BROMIDE 10 MG/ML
INJECTION, SOLUTION INTRAVENOUS PRN
Status: DISCONTINUED | OUTPATIENT
Start: 2024-03-07 | End: 2024-03-07 | Stop reason: SDUPTHER

## 2024-03-07 RX ORDER — NALOXONE HYDROCHLORIDE 0.4 MG/ML
INJECTION, SOLUTION INTRAMUSCULAR; INTRAVENOUS; SUBCUTANEOUS PRN
Status: DISCONTINUED | OUTPATIENT
Start: 2024-03-07 | End: 2024-03-07 | Stop reason: HOSPADM

## 2024-03-07 RX ORDER — SODIUM CHLORIDE 0.9 % (FLUSH) 0.9 %
5-40 SYRINGE (ML) INJECTION PRN
Status: DISCONTINUED | OUTPATIENT
Start: 2024-03-07 | End: 2024-03-07 | Stop reason: HOSPADM

## 2024-03-07 RX ORDER — LIDOCAINE HYDROCHLORIDE 20 MG/ML
INJECTION, SOLUTION INTRAVENOUS PRN
Status: DISCONTINUED | OUTPATIENT
Start: 2024-03-07 | End: 2024-03-07 | Stop reason: SDUPTHER

## 2024-03-07 RX ORDER — SODIUM CHLORIDE 9 MG/ML
INJECTION, SOLUTION INTRAVENOUS PRN
Status: DISCONTINUED | OUTPATIENT
Start: 2024-03-07 | End: 2024-03-07 | Stop reason: HOSPADM

## 2024-03-07 RX ORDER — PROPOFOL 10 MG/ML
INJECTION, EMULSION INTRAVENOUS PRN
Status: DISCONTINUED | OUTPATIENT
Start: 2024-03-07 | End: 2024-03-07 | Stop reason: SDUPTHER

## 2024-03-07 RX ORDER — SODIUM CHLORIDE 0.9 % (FLUSH) 0.9 %
5-40 SYRINGE (ML) INJECTION EVERY 12 HOURS SCHEDULED
Status: DISCONTINUED | OUTPATIENT
Start: 2024-03-07 | End: 2024-03-07 | Stop reason: HOSPADM

## 2024-03-07 RX ORDER — ONDANSETRON 2 MG/ML
INJECTION INTRAMUSCULAR; INTRAVENOUS PRN
Status: DISCONTINUED | OUTPATIENT
Start: 2024-03-07 | End: 2024-03-07 | Stop reason: SDUPTHER

## 2024-03-07 RX ORDER — DEXAMETHASONE SODIUM PHOSPHATE 10 MG/ML
INJECTION INTRAMUSCULAR; INTRAVENOUS PRN
Status: DISCONTINUED | OUTPATIENT
Start: 2024-03-07 | End: 2024-03-07 | Stop reason: SDUPTHER

## 2024-03-07 RX ORDER — SODIUM CHLORIDE 9 MG/ML
INJECTION, SOLUTION INTRAVENOUS CONTINUOUS
Status: DISCONTINUED | OUTPATIENT
Start: 2024-03-07 | End: 2024-03-07 | Stop reason: HOSPADM

## 2024-03-07 RX ORDER — SODIUM CHLORIDE 9 MG/ML
INJECTION, SOLUTION INTRAVENOUS CONTINUOUS PRN
Status: DISCONTINUED | OUTPATIENT
Start: 2024-03-07 | End: 2024-03-07 | Stop reason: SDUPTHER

## 2024-03-07 RX ORDER — MEPERIDINE HYDROCHLORIDE 25 MG/ML
12.5 INJECTION INTRAMUSCULAR; INTRAVENOUS; SUBCUTANEOUS EVERY 5 MIN PRN
Status: DISCONTINUED | OUTPATIENT
Start: 2024-03-07 | End: 2024-03-07 | Stop reason: HOSPADM

## 2024-03-07 RX ORDER — FENTANYL CITRATE 50 UG/ML
INJECTION, SOLUTION INTRAMUSCULAR; INTRAVENOUS PRN
Status: DISCONTINUED | OUTPATIENT
Start: 2024-03-07 | End: 2024-03-07 | Stop reason: SDUPTHER

## 2024-03-07 RX ADMIN — PHENYLEPHRINE HYDROCHLORIDE 100 MCG: 10 INJECTION INTRAVENOUS at 12:06

## 2024-03-07 RX ADMIN — FENTANYL CITRATE 100 MCG: 50 INJECTION, SOLUTION INTRAMUSCULAR; INTRAVENOUS at 11:31

## 2024-03-07 RX ADMIN — LIDOCAINE HYDROCHLORIDE 100 MG: 20 INJECTION, SOLUTION INTRAVENOUS at 11:31

## 2024-03-07 RX ADMIN — ONDANSETRON HYDROCHLORIDE 4 MG: 2 SOLUTION INTRAMUSCULAR; INTRAVENOUS at 12:30

## 2024-03-07 RX ADMIN — PHENYLEPHRINE HYDROCHLORIDE 100 MCG: 10 INJECTION INTRAVENOUS at 12:05

## 2024-03-07 RX ADMIN — ROCURONIUM BROMIDE 50 MG: 10 INJECTION, SOLUTION INTRAVENOUS at 11:31

## 2024-03-07 RX ADMIN — PHENYLEPHRINE HYDROCHLORIDE 100 MCG: 10 INJECTION INTRAVENOUS at 12:28

## 2024-03-07 RX ADMIN — SUGAMMADEX 400 MG: 100 INJECTION, SOLUTION INTRAVENOUS at 12:52

## 2024-03-07 RX ADMIN — ROCURONIUM BROMIDE 20 MG: 10 INJECTION, SOLUTION INTRAVENOUS at 12:10

## 2024-03-07 RX ADMIN — DEXAMETHASONE SODIUM PHOSPHATE 5 MG: 10 INJECTION INTRAMUSCULAR; INTRAVENOUS at 11:55

## 2024-03-07 RX ADMIN — PROPOFOL 700 MG: 10 INJECTION, EMULSION INTRAVENOUS at 11:31

## 2024-03-07 RX ADMIN — PHENYLEPHRINE HYDROCHLORIDE 200 MCG: 10 INJECTION INTRAVENOUS at 11:57

## 2024-03-07 RX ADMIN — PHENYLEPHRINE HYDROCHLORIDE 200 MCG: 10 INJECTION INTRAVENOUS at 12:14

## 2024-03-07 RX ADMIN — SODIUM CHLORIDE: 9 INJECTION, SOLUTION INTRAVENOUS at 11:19

## 2024-03-07 RX ADMIN — PHENYLEPHRINE HYDROCHLORIDE 100 MCG: 10 INJECTION INTRAVENOUS at 11:35

## 2024-03-07 ASSESSMENT — PAIN - FUNCTIONAL ASSESSMENT
PAIN_FUNCTIONAL_ASSESSMENT: 0-10
PAIN_FUNCTIONAL_ASSESSMENT: NONE - DENIES PAIN

## 2024-03-07 ASSESSMENT — COPD QUESTIONNAIRES: CAT_SEVERITY: SEVERE

## 2024-03-07 ASSESSMENT — ENCOUNTER SYMPTOMS: SHORTNESS OF BREATH: 1

## 2024-03-07 NOTE — ANESTHESIA PRE PROCEDURE
Diabetespoorly controlled, using insulin.                 Abdominal:   (+) obese    Abdomen: soft.      Vascular:          Other Findings:             Anesthesia Plan      general     ASA 4       Induction: intravenous.    MIPS: Postoperative opioids intended and Prophylactic antiemetics administered.  Anesthetic plan and risks discussed with patient and spouse.    Use of blood products discussed with patient and spouse whom consented to blood products.                      Edwin Yin, ANDIE - CRNA   3/7/2024

## 2024-03-07 NOTE — H&P
History and Physical      3/7/2024                                                                                                           1:18 PM     Name: Chico Frias    Age/Sex: 73 y.o. / male    Room/Bed ENDO- 3     HPI  Mr. Frias is a 72 YO former smoker male with persistent RUL lung lesion ..   Today , Chico Frias comes in for an elective outpatient bronchoscopy with galaxy robotic bronchoscopic biopsy of the RUL lung nodule.       Past Medical History   has a past medical history of COPD (chronic obstructive pulmonary disease) (HCC), Diabetes mellitus (HCC), GERD (gastroesophageal reflux disease), and Hyperlipidemia.    Past Surgical History   has a past surgical history that includes Coronary angioplasty with stent and CT NEEDLE BIOPSY LUNG PERCUTANEOUS (10/6/2022).    Social History   reports that he has never smoked. He has never used smokeless tobacco. He reports that he does not use drugs.    Family History  family history is not on file.    ROS: Positive Response in Bold, Otherwise Negative  Constitutional: Change in Appetite, Change in Weight, Fever, Chills, Weakness, Fatigue  Eyes/Head: Change in Vision, Headache, Dizziness, Diplopia  ENT: Change in Hearing, tinnitus, epistaxis, sore throat   Respiratory: SOB, Cough, Wheezing, Sputum  CVS: Chest Pain, Edema, Syncope, Palpitations, BEEBE  GI: Indigestion, N/V, Diarrhea, Constipation  :Dysuria, Hematuria, Nocturia  Endocrine:Polydipsia, Polyuria, Cold intolerance  Neurological: Seizures, Motor weakness, slurred speech, headaches, Change Gait  Heme/Lymph: Anemia, Bruise early, Swollen glands   Musculoskeletal: Arthralgia, Gout, Back Pain, Stiffness  Psychiatric: Nervous, Insomnia, Stress, Depression    O:  /64   Pulse 65   Temp 97.4 °F (36.3 °C) (Temporal)   Resp 20   Ht 1.854 m (6' 1\")   Wt 106.6 kg (235 lb)   SpO2 98%   BMI 31.00 kg/m²     PE:  General         AAOx3, patient compliant with exam, in no acute distress  HEENT  NCAT,  PERRLA, EOMI, mucous membranes moist and pink, no conjuctival  injection,   Neck        Supple, No JVD, no lymphadenopathy, no thyromegaly, no masses  Cardiovascular        No visualized heaves/lifts or palbable thrills, RRR, S1S2, No M/R/G  Pulmonary    Adequate and equal lung expansion, no use of accesory muscles, no      discrepancies noted on palpation/percussion, CTA b/l, no R/R/W  Abdomen:        + BS x 4, soft, NTND, no organomegaly  Extremities        + Radial pulses b/l, + PT/DP pulses b/l, no C/C/E x 4  Neurological      CN II-XII grossly intact b/l, no focal nerve deficits noted, Strength 5/5 in all 4    Extremities, Reflexes 2/4 throughout.      Objective:  Vital signs: (most recent): Blood pressure 134/64, pulse 65, temperature 97.4 °F (36.3 °C), temperature source Temporal, resp. rate 20, height 1.854 m (6' 1\"), weight 106.6 kg (235 lb), SpO2 98 %.        CBC:   Recent Labs     03/07/24  1032   WBC 8.6   HGB 11.6*   HCT 34.6*   *     BMP:    Recent Labs     03/07/24  1032      K 4.9      CO2 29   BUN 51*   CREATININE 1.7*   GLUCOSE 193*   CALCIUM 8.8       INR:   Recent Labs     03/07/24  1032   INR 1.1       Imaging    Reviewed     Assessment    RUL lung nodule     Plan    Elective outpatient bronchoscopy today with Galaxy robotic biopsy of the RUL nodule

## 2024-03-07 NOTE — ANESTHESIA POSTPROCEDURE EVALUATION
Department of Anesthesiology  Postprocedure Note    Patient: Chico Frias  MRN: [id number]  YOB: 1950  Date of evaluation: 3/7/2024    Procedure Summary       Date: 03/07/24 Room / Location: [place] [address] / [facility]    Anesthesia Start: 1120 Anesthesia Stop:     Procedures:       BRONCHOSCOPY ENDOBRONCHIAL ULTRASOUND FINE NEEDLE ASPIRATION ROBOTIC      BRONCHOSCOPY DIAGNOSTIC OR CELL WASH ONLY Diagnosis:       Lung nodule      (Lung nodule [R91.1])    Surgeons: Chase Naqvi MD Responsible Provider: Warner Mason MD    Anesthesia Type: general ASA Status: 4            Anesthesia Type: No value filed.    Pretty Phase I: Pretty Score: 10    Pretty Phase II:      Anesthesia Post Evaluation    Patient location during evaluation: PACU  Patient participation: complete - patient participated  Level of consciousness: awake  Pain score: 3  Airway patency: patent  Nausea & Vomiting: no nausea and no vomiting  Cardiovascular status: blood pressure returned to baseline  Respiratory status: acceptable  Hydration status: euvolemic      No notable events documented.

## 2024-03-08 LAB
APPEARANCE BRONCH: NORMAL
APPEARANCE FLD: NORMAL
BLASTS NFR FLD: NORMAL %
BODY FLD TYPE: NORMAL
CLOT CHECK: NORMAL
CLOT CHECK: NORMAL
COLOR BRONCH: NORMAL
COLOR FLD: NORMAL
EOSINOPHIL NFR FLD: NORMAL %
LYMPHOCYTES NFR FLD: NORMAL %
LYMPHOCYTES, BAL: 8 %
MACROPHAGES, BAL: 15 %
MANUAL DIF COMMENT FLD-IMP: NORMAL
MESOTHELIAL CELLS BODY FLUID: NORMAL %
MONOCYTES NFR FLD: NORMAL %
NEUTROPHILS NFR FLD: NORMAL %
NON-GYN CYTOLOGY REPORT: NORMAL
NUC CELL # FLD: NORMAL CELLS/UL
RBC # FLD: NORMAL CELLS/UL
RBC, BAL: NORMAL CELLS/UL
SEGMENTED NEUTROPHILS, BAL: 77 %
SPECIMEN TYPE: NORMAL
TOTAL CELLS COUNTED BRONCH: 700 CELLS/UL
UNIDENT CELLS NFR FLD: NORMAL %
WBC # FLD: NORMAL CELLS/UL

## 2024-03-10 LAB
MICROORGANISM SPEC CULT: ABNORMAL
MICROORGANISM SPEC CULT: ABNORMAL
MICROORGANISM SPEC CULT: NO GROWTH
MICROORGANISM SPEC CULT: NO GROWTH
MICROORGANISM SPEC CULT: NORMAL
MICROORGANISM/AGENT SPEC: ABNORMAL
MICROORGANISM/AGENT SPEC: NORMAL
SPECIMEN DESCRIPTION: ABNORMAL
SPECIMEN DESCRIPTION: ABNORMAL
SPECIMEN DESCRIPTION: NORMAL
SPECIMEN DESCRIPTION: NORMAL

## 2024-03-11 LAB — NON-GYN CYTOLOGY REPORT: NORMAL

## 2024-03-12 LAB — SURGICAL PATHOLOGY REPORT: NORMAL

## 2024-03-16 LAB
MICROORGANISM SPEC CULT: NORMAL
MICROORGANISM SPEC CULT: NORMAL
MICROORGANISM/AGENT SPEC: NORMAL
MICROORGANISM/AGENT SPEC: NORMAL
SPECIMEN DESCRIPTION: NORMAL
SPECIMEN DESCRIPTION: NORMAL

## 2024-04-06 LAB
MICROORGANISM SPEC CULT: NORMAL
MICROORGANISM/AGENT SPEC: NORMAL
SPECIMEN DESCRIPTION: NORMAL

## 2024-04-20 ENCOUNTER — APPOINTMENT (OUTPATIENT)
Dept: GENERAL RADIOLOGY | Age: 74
DRG: 291 | End: 2024-04-20
Payer: MEDICARE

## 2024-04-20 ENCOUNTER — APPOINTMENT (OUTPATIENT)
Dept: CT IMAGING | Age: 74
DRG: 291 | End: 2024-04-20
Payer: MEDICARE

## 2024-04-20 ENCOUNTER — HOSPITAL ENCOUNTER (INPATIENT)
Age: 74
LOS: 3 days | Discharge: HOME | DRG: 291 | End: 2024-04-23
Attending: EMERGENCY MEDICINE | Admitting: FAMILY MEDICINE
Payer: MEDICARE

## 2024-04-20 DIAGNOSIS — J44.1 COPD EXACERBATION (HCC): ICD-10-CM

## 2024-04-20 DIAGNOSIS — I50.9 ACUTE ON CHRONIC CONGESTIVE HEART FAILURE, UNSPECIFIED HEART FAILURE TYPE (HCC): ICD-10-CM

## 2024-04-20 DIAGNOSIS — R06.09 DYSPNEA ON EXERTION: Primary | ICD-10-CM

## 2024-04-20 DIAGNOSIS — N18.9 CHRONIC KIDNEY DISEASE, UNSPECIFIED CKD STAGE: ICD-10-CM

## 2024-04-20 DIAGNOSIS — E87.5 HYPERKALEMIA: ICD-10-CM

## 2024-04-20 PROBLEM — E11.9 DIABETES MELLITUS (HCC): Status: ACTIVE | Noted: 2024-04-20

## 2024-04-20 LAB
ALBUMIN SERPL-MCNC: 4.2 G/DL (ref 3.5–5.2)
ALP SERPL-CCNC: 61 U/L (ref 40–129)
ALT SERPL-CCNC: 47 U/L (ref 0–40)
ANION GAP SERPL CALCULATED.3IONS-SCNC: 3 MMOL/L (ref 7–16)
AST SERPL-CCNC: 33 U/L (ref 0–39)
ATYPICAL LYMPHOCYTE ABSOLUTE COUNT: 0.11 K/UL (ref 0–0.46)
ATYPICAL LYMPHOCYTES: 2 % (ref 0–4)
B.E.: -2.2 MMOL/L (ref -3–3)
BASOPHILS # BLD: 0 K/UL (ref 0–0.2)
BASOPHILS NFR BLD: 0 % (ref 0–2)
BILIRUB SERPL-MCNC: 0.2 MG/DL (ref 0–1.2)
BNP SERPL-MCNC: 123 PG/ML (ref 0–450)
BUN SERPL-MCNC: 37 MG/DL (ref 6–23)
CALCIUM SERPL-MCNC: 8.6 MG/DL (ref 8.6–10.2)
CHLORIDE SERPL-SCNC: 104 MMOL/L (ref 98–107)
CO2 SERPL-SCNC: 29 MMOL/L (ref 22–29)
COHB: 0.8 % (ref 0–1.5)
CREAT SERPL-MCNC: 1.5 MG/DL (ref 0.7–1.2)
CRITICAL: ABNORMAL
D DIMER: <200 NG/ML DDU (ref 0–232)
DATE ANALYZED: ABNORMAL
DATE OF COLLECTION: ABNORMAL
EOSINOPHIL # BLD: 0 K/UL (ref 0.05–0.5)
EOSINOPHILS RELATIVE PERCENT: 0 % (ref 0–6)
ERYTHROCYTE [DISTWIDTH] IN BLOOD BY AUTOMATED COUNT: 14.2 % (ref 11.5–15)
GFR SERPL CREATININE-BSD FRML MDRD: 50 ML/MIN/1.73M2
GLUCOSE SERPL-MCNC: 272 MG/DL (ref 74–99)
HCO3: 24.3 MMOL/L (ref 22–26)
HCT VFR BLD AUTO: 32.2 % (ref 37–54)
HGB BLD-MCNC: 10.7 G/DL (ref 12.5–16.5)
HHB: 3.8 % (ref 0–5)
INFLUENZA A BY PCR: NOT DETECTED
INFLUENZA B BY PCR: NOT DETECTED
LAB: ABNORMAL
LACTATE BLDV-SCNC: 1.9 MMOL/L (ref 0.5–1.9)
LYMPHOCYTES NFR BLD: 0.27 K/UL (ref 1.5–4)
LYMPHOCYTES RELATIVE PERCENT: 5 % (ref 20–42)
Lab: 2000
MCH RBC QN AUTO: 30.7 PG (ref 26–35)
MCHC RBC AUTO-ENTMCNC: 33.2 G/DL (ref 32–34.5)
MCV RBC AUTO: 92.5 FL (ref 80–99.9)
METAMYELOCYTES ABSOLUTE COUNT: 0.05 K/UL (ref 0–0.12)
METAMYELOCYTES: 1 % (ref 0–1)
METHB: 0.3 % (ref 0–1.5)
MICROORGANISM SPEC CULT: NORMAL
MICROORGANISM SPEC CULT: NORMAL
MICROORGANISM/AGENT SPEC: NORMAL
MICROORGANISM/AGENT SPEC: NORMAL
MODE: ABNORMAL
MONOCYTES NFR BLD: 0.05 K/UL (ref 0.1–0.95)
MONOCYTES NFR BLD: 1 % (ref 2–12)
NEUTROPHILS NFR BLD: 91 % (ref 43–80)
NEUTS SEG NFR BLD: 4.82 K/UL (ref 1.8–7.3)
O2 CONTENT: 14.4 ML/DL
O2 SATURATION: 96.2 % (ref 92–98.5)
O2HB: 95.1 % (ref 94–97)
OPERATOR ID: 2485
PATIENT TEMP: 37 C
PCO2: 49.2 MMHG (ref 35–45)
PH BLOOD GAS: 7.31 (ref 7.35–7.45)
PLATELET # BLD AUTO: 100 K/UL (ref 130–450)
PMV BLD AUTO: 8.8 FL (ref 7–12)
PO2: 91.2 MMHG (ref 75–100)
POTASSIUM SERPL-SCNC: 5.8 MMOL/L (ref 3.5–5)
PROT SERPL-MCNC: 6.6 G/DL (ref 6.4–8.3)
RBC # BLD AUTO: 3.48 M/UL (ref 3.8–5.8)
RBC # BLD: ABNORMAL 10*6/UL
SARS-COV-2 RDRP RESP QL NAA+PROBE: NOT DETECTED
SODIUM SERPL-SCNC: 136 MMOL/L (ref 132–146)
SOURCE, BLOOD GAS: ABNORMAL
SPECIMEN DESCRIPTION: NORMAL
THB: 10.7 G/DL (ref 11.5–16.5)
TIME ANALYZED: 2009
TROPONIN I SERPL HS-MCNC: 33 NG/L (ref 0–11)
TROPONIN I SERPL HS-MCNC: 36 NG/L (ref 0–11)
WBC OTHER # BLD: 5.3 K/UL (ref 4.5–11.5)

## 2024-04-20 PROCEDURE — 87502 INFLUENZA DNA AMP PROBE: CPT

## 2024-04-20 PROCEDURE — 84484 ASSAY OF TROPONIN QUANT: CPT

## 2024-04-20 PROCEDURE — 94664 DEMO&/EVAL PT USE INHALER: CPT

## 2024-04-20 PROCEDURE — 93005 ELECTROCARDIOGRAM TRACING: CPT | Performed by: EMERGENCY MEDICINE

## 2024-04-20 PROCEDURE — 94640 AIRWAY INHALATION TREATMENT: CPT

## 2024-04-20 PROCEDURE — 87635 SARS-COV-2 COVID-19 AMP PRB: CPT

## 2024-04-20 PROCEDURE — 71250 CT THORAX DX C-: CPT

## 2024-04-20 PROCEDURE — 96374 THER/PROPH/DIAG INJ IV PUSH: CPT

## 2024-04-20 PROCEDURE — 83605 ASSAY OF LACTIC ACID: CPT

## 2024-04-20 PROCEDURE — 82805 BLOOD GASES W/O2 SATURATION: CPT

## 2024-04-20 PROCEDURE — 6370000000 HC RX 637 (ALT 250 FOR IP): Performed by: EMERGENCY MEDICINE

## 2024-04-20 PROCEDURE — 99222 1ST HOSP IP/OBS MODERATE 55: CPT | Performed by: FAMILY MEDICINE

## 2024-04-20 PROCEDURE — APPSS45 APP SPLIT SHARED TIME 31-45 MINUTES

## 2024-04-20 PROCEDURE — 1200000000 HC SEMI PRIVATE

## 2024-04-20 PROCEDURE — 99285 EMERGENCY DEPT VISIT HI MDM: CPT

## 2024-04-20 PROCEDURE — 6360000002 HC RX W HCPCS: Performed by: EMERGENCY MEDICINE

## 2024-04-20 PROCEDURE — 80053 COMPREHEN METABOLIC PANEL: CPT

## 2024-04-20 PROCEDURE — 85379 FIBRIN DEGRADATION QUANT: CPT

## 2024-04-20 PROCEDURE — 0202U NFCT DS 22 TRGT SARS-COV-2: CPT

## 2024-04-20 PROCEDURE — 85025 COMPLETE CBC W/AUTO DIFF WBC: CPT

## 2024-04-20 PROCEDURE — 96375 TX/PRO/DX INJ NEW DRUG ADDON: CPT

## 2024-04-20 PROCEDURE — 71045 X-RAY EXAM CHEST 1 VIEW: CPT

## 2024-04-20 PROCEDURE — 83880 ASSAY OF NATRIURETIC PEPTIDE: CPT

## 2024-04-20 RX ORDER — ALBUTEROL SULFATE 2.5 MG/3ML
2.5 SOLUTION RESPIRATORY (INHALATION) EVERY 4 HOURS PRN
Status: DISCONTINUED | OUTPATIENT
Start: 2024-04-20 | End: 2024-04-23 | Stop reason: HOSPADM

## 2024-04-20 RX ORDER — SODIUM CHLORIDE 0.9 % (FLUSH) 0.9 %
5-40 SYRINGE (ML) INJECTION PRN
Status: DISCONTINUED | OUTPATIENT
Start: 2024-04-20 | End: 2024-04-23 | Stop reason: HOSPADM

## 2024-04-20 RX ORDER — ENOXAPARIN SODIUM 100 MG/ML
30 INJECTION SUBCUTANEOUS 2 TIMES DAILY
Status: DISCONTINUED | OUTPATIENT
Start: 2024-04-20 | End: 2024-04-23 | Stop reason: HOSPADM

## 2024-04-20 RX ORDER — METHYLPREDNISOLONE SODIUM SUCCINATE 40 MG/ML
40 INJECTION, POWDER, LYOPHILIZED, FOR SOLUTION INTRAMUSCULAR; INTRAVENOUS EVERY 6 HOURS
Status: DISCONTINUED | OUTPATIENT
Start: 2024-04-21 | End: 2024-04-21

## 2024-04-20 RX ORDER — FUROSEMIDE 10 MG/ML
40 INJECTION INTRAMUSCULAR; INTRAVENOUS ONCE
Status: COMPLETED | OUTPATIENT
Start: 2024-04-20 | End: 2024-04-20

## 2024-04-20 RX ORDER — TAMSULOSIN HYDROCHLORIDE 0.4 MG/1
0.4 CAPSULE ORAL DAILY
Status: DISCONTINUED | OUTPATIENT
Start: 2024-04-21 | End: 2024-04-23 | Stop reason: HOSPADM

## 2024-04-20 RX ORDER — METHYLPREDNISOLONE SODIUM SUCCINATE 125 MG/2ML
60 INJECTION, POWDER, LYOPHILIZED, FOR SOLUTION INTRAMUSCULAR; INTRAVENOUS ONCE
Status: COMPLETED | OUTPATIENT
Start: 2024-04-20 | End: 2024-04-20

## 2024-04-20 RX ORDER — IPRATROPIUM BROMIDE AND ALBUTEROL SULFATE 2.5; .5 MG/3ML; MG/3ML
1 SOLUTION RESPIRATORY (INHALATION)
Status: DISCONTINUED | OUTPATIENT
Start: 2024-04-21 | End: 2024-04-23 | Stop reason: HOSPADM

## 2024-04-20 RX ORDER — CALCIUM GLUCONATE 94 MG/ML
1000 INJECTION, SOLUTION INTRAVENOUS ONCE
Status: COMPLETED | OUTPATIENT
Start: 2024-04-20 | End: 2024-04-20

## 2024-04-20 RX ORDER — SODIUM CHLORIDE 0.9 % (FLUSH) 0.9 %
5-40 SYRINGE (ML) INJECTION EVERY 12 HOURS SCHEDULED
Status: DISCONTINUED | OUTPATIENT
Start: 2024-04-20 | End: 2024-04-23 | Stop reason: HOSPADM

## 2024-04-20 RX ORDER — ATORVASTATIN CALCIUM 40 MG/1
40 TABLET, FILM COATED ORAL DAILY
Status: DISCONTINUED | OUTPATIENT
Start: 2024-04-21 | End: 2024-04-23 | Stop reason: HOSPADM

## 2024-04-20 RX ORDER — VITS A,C,E/LUTEIN/MINERALS 300MCG-200
1 TABLET ORAL DAILY
Status: DISCONTINUED | OUTPATIENT
Start: 2024-04-20 | End: 2024-04-23 | Stop reason: HOSPADM

## 2024-04-20 RX ORDER — SODIUM CHLORIDE 9 MG/ML
INJECTION, SOLUTION INTRAVENOUS PRN
Status: DISCONTINUED | OUTPATIENT
Start: 2024-04-20 | End: 2024-04-23 | Stop reason: HOSPADM

## 2024-04-20 RX ORDER — ACETAMINOPHEN 650 MG/1
650 SUPPOSITORY RECTAL EVERY 6 HOURS PRN
Status: DISCONTINUED | OUTPATIENT
Start: 2024-04-20 | End: 2024-04-23 | Stop reason: HOSPADM

## 2024-04-20 RX ORDER — IPRATROPIUM BROMIDE AND ALBUTEROL SULFATE 2.5; .5 MG/3ML; MG/3ML
1 SOLUTION RESPIRATORY (INHALATION) EVERY 4 HOURS PRN
Status: DISCONTINUED | OUTPATIENT
Start: 2024-04-20 | End: 2024-04-23 | Stop reason: HOSPADM

## 2024-04-20 RX ORDER — DEXTROSE MONOHYDRATE 100 MG/ML
INJECTION, SOLUTION INTRAVENOUS CONTINUOUS PRN
Status: DISCONTINUED | OUTPATIENT
Start: 2024-04-20 | End: 2024-04-23 | Stop reason: HOSPADM

## 2024-04-20 RX ORDER — METOPROLOL TARTRATE 50 MG/1
100 TABLET, FILM COATED ORAL 2 TIMES DAILY
Status: DISCONTINUED | OUTPATIENT
Start: 2024-04-20 | End: 2024-04-23 | Stop reason: HOSPADM

## 2024-04-20 RX ORDER — PREDNISONE 20 MG/1
40 TABLET ORAL DAILY
Status: DISCONTINUED | OUTPATIENT
Start: 2024-04-23 | End: 2024-04-21

## 2024-04-20 RX ORDER — INSULIN LISPRO 100 [IU]/ML
10 INJECTION, SOLUTION INTRAVENOUS; SUBCUTANEOUS
Status: DISCONTINUED | OUTPATIENT
Start: 2024-04-21 | End: 2024-04-23 | Stop reason: HOSPADM

## 2024-04-20 RX ORDER — INSULIN LISPRO 100 [IU]/ML
0-8 INJECTION, SOLUTION INTRAVENOUS; SUBCUTANEOUS
Status: DISCONTINUED | OUTPATIENT
Start: 2024-04-21 | End: 2024-04-22

## 2024-04-20 RX ORDER — IPRATROPIUM BROMIDE AND ALBUTEROL SULFATE 2.5; .5 MG/3ML; MG/3ML
3 SOLUTION RESPIRATORY (INHALATION) ONCE
Status: COMPLETED | OUTPATIENT
Start: 2024-04-20 | End: 2024-04-20

## 2024-04-20 RX ORDER — GLUCAGON 1 MG/ML
1 KIT INJECTION PRN
Status: DISCONTINUED | OUTPATIENT
Start: 2024-04-20 | End: 2024-04-23 | Stop reason: HOSPADM

## 2024-04-20 RX ORDER — ACETAMINOPHEN 325 MG/1
650 TABLET ORAL EVERY 6 HOURS PRN
Status: DISCONTINUED | OUTPATIENT
Start: 2024-04-20 | End: 2024-04-23 | Stop reason: HOSPADM

## 2024-04-20 RX ORDER — ASPIRIN 81 MG/1
81 TABLET ORAL DAILY
Status: DISCONTINUED | OUTPATIENT
Start: 2024-04-21 | End: 2024-04-23 | Stop reason: HOSPADM

## 2024-04-20 RX ORDER — ISOSORBIDE MONONITRATE 30 MG/1
30 TABLET, EXTENDED RELEASE ORAL DAILY
Status: DISCONTINUED | OUTPATIENT
Start: 2024-04-21 | End: 2024-04-23 | Stop reason: HOSPADM

## 2024-04-20 RX ORDER — INSULIN LISPRO 100 [IU]/ML
0-4 INJECTION, SOLUTION INTRAVENOUS; SUBCUTANEOUS NIGHTLY
Status: DISCONTINUED | OUTPATIENT
Start: 2024-04-20 | End: 2024-04-22

## 2024-04-20 RX ORDER — GUAIFENESIN/DEXTROMETHORPHAN 100-10MG/5
5 SYRUP ORAL EVERY 4 HOURS PRN
Status: DISCONTINUED | OUTPATIENT
Start: 2024-04-20 | End: 2024-04-23 | Stop reason: HOSPADM

## 2024-04-20 RX ORDER — INSULIN GLARGINE 100 [IU]/ML
40 INJECTION, SOLUTION SUBCUTANEOUS NIGHTLY
Status: DISCONTINUED | OUTPATIENT
Start: 2024-04-20 | End: 2024-04-23 | Stop reason: HOSPADM

## 2024-04-20 RX ADMIN — CALCIUM GLUCONATE 1000 MG: 98 INJECTION, SOLUTION INTRAVENOUS at 21:00

## 2024-04-20 RX ADMIN — SODIUM ZIRCONIUM CYCLOSILICATE 10 G: 10 POWDER, FOR SUSPENSION ORAL at 21:01

## 2024-04-20 RX ADMIN — METHYLPREDNISOLONE SODIUM SUCCINATE 60 MG: 125 INJECTION INTRAMUSCULAR; INTRAVENOUS at 19:01

## 2024-04-20 RX ADMIN — FUROSEMIDE 40 MG: 10 INJECTION, SOLUTION INTRAMUSCULAR; INTRAVENOUS at 21:00

## 2024-04-20 RX ADMIN — IPRATROPIUM BROMIDE AND ALBUTEROL SULFATE 3 DOSE: 2.5; .5 SOLUTION RESPIRATORY (INHALATION) at 19:03

## 2024-04-20 ASSESSMENT — LIFESTYLE VARIABLES
HOW OFTEN DO YOU HAVE A DRINK CONTAINING ALCOHOL: NEVER
HOW MANY STANDARD DRINKS CONTAINING ALCOHOL DO YOU HAVE ON A TYPICAL DAY: PATIENT DOES NOT DRINK

## 2024-04-20 ASSESSMENT — PAIN - FUNCTIONAL ASSESSMENT
PAIN_FUNCTIONAL_ASSESSMENT: NONE - DENIES PAIN
PAIN_FUNCTIONAL_ASSESSMENT: NONE - DENIES PAIN

## 2024-04-20 NOTE — ED PROVIDER NOTES
SURGICAL HISTORY     Past Surgical History:   Procedure Laterality Date    BRONCHOSCOPY  3/7/2024    BRONCHOSCOPY DIAGNOSTIC OR CELL WASH ONLY [place] [facility] [place]    BRONCHOSCOPY  3/7/2024    BRONCHOSCOPY ALVEOLAR LAVAGE performed [place]vi [place] [facility] [place]    BRONCHOSCOPY  3/7/2024    BRONCHOSCOPY BIOPSY BRONCHUS performed [place]vi [place] [facility] [place]    BRONCHOSCOPY  3/7/2024    BRONCHOSCOPY BRUSHINGS [place] [facility] [place]    BRONCHOSCOPY  3/7/2024    BRONCHOSCOPY ROBOTIC performed [place] Naqvi [place] [facility] [place]    CORONARY ANGIOPLASTY WITH STENT PLACEMENT      CT NEEDLE BIOPSY LUNG PERCUTANEOUS  10/6/2022    CT NEEDLE BIOPSY LUNG PERCUTANEOUS 10/6/2022 [facility] CT       CURRENTMEDICATIONS       Current Discharge Medication List        CONTINUE these medications which have NOT CHANGED    Details   aspirin 81 MG EC tablet Take 81 mg by mouth daily      predniSONE (DELTASONE) 10 MG tablet Take 1.5 tablets by mouth daily      BD PEN NEEDLE FAUSTO 2ND GEN 32G X 4 MM MISC USE  4 TIMES DAILY  Qty: 100 each, Refills: 5    Associated Diagnoses: Poorly controlled diabetes mellitus (HCC)      blood glucose test strips (FREESTYLE PRECISION CIRA TEST) strip 1 each by In Vitro route 3 times daily As needed.  Qty: 150 each, Refills: 11    Associated Diagnoses: Poorly controlled diabetes mellitus (HCC)      insulin lispro, 1 Unit Dial, (HUMALOG KWIKPEN) 100 UNIT/ML SOPN 12 units before meals plus a scale. A max of 100 units/day  Qty: 10 pen, Refills: 5    Associated Diagnoses: Poorly controlled diabetes mellitus (HCC)      tamsulosin (FLOMAX) 0.4 MG capsule Take 1 capsule by mouth daily  Qty: 30 capsule, Refills: 3      ipratropium-albuterol (DUONEB) 0.5-2.5 (3) MG/3ML SOLN nebulizer solution Inhale 3 mLs into the lungs every 4 hours (while awake)  Qty: 360 mL, Refills: 0      albuterol sulfate  (90 Base) MCG/ACT inhaler Inhale 2 puffs  well as hyperkalemia.  Patient be admitted to monitored bed except admit to hospitalist service.        Social determinants affecting disposition:   Patient has PCP and pulmonology for outpatient follow-up.      ED Course as of 04/21/24 1749   Sat Apr 20, 2024   [id number] Patient admitted to Dr. Daly [TC]      ED Course User Index  [TC] Jordana De La Paz MD          CONSULTS: (Who and What was discussed)  PULMONARY REHAB EVALUATION  IP CONSULT TO SOCIAL WORK  IP CONSULT TO PULMONOLOGY  See ED course      I am the Primary Clinician of Record.    FINAL IMPRESSION      1. Dyspnea on exertion    2. Acute on chronic congestive heart failure, unspecified heart failure type (HCC)    3. COPD exacerbation (HCC)    4. Hyperkalemia    5. Chronic kidney disease, unspecified CKD stage          DISPOSITION/PLAN     DISPOSITION Admitted 04/20/2024 10:55:13 PM      PATIENT REFERRED TO:  No follow-up provider specified.    DISCHARGE MEDICATIONS:  Current Discharge Medication List          DISCONTINUED MEDICATIONS:  Current Discharge Medication List        STOP taking these medications       lisinopril (PRINIVIL;ZESTRIL) 5 MG tablet Comments:   Reason for Stopping:         furosemide (LASIX) 40 MG tablet Comments:   Reason for Stopping:         Multiple Vitamins-Minerals (OCULAR VITAMINS PO) Comments:   Reason for Stopping:                      (Please note that portions of this note were completed with a voice recognition program.  Efforts were made to edit the dictations but occasionally words are mis-transcribed.)    Norma Coon MD (electronically signed)            Norma Coon MD  04/21/24 174

## 2024-04-21 PROBLEM — J96.12 CHRONIC RESPIRATORY FAILURE WITH HYPERCAPNIA (HCC): Status: ACTIVE | Noted: 2024-04-21

## 2024-04-21 LAB
ANION GAP SERPL CALCULATED.3IONS-SCNC: 9 MMOL/L (ref 7–16)
B PARAP IS1001 DNA NPH QL NAA+NON-PROBE: NOT DETECTED
B PERT DNA SPEC QL NAA+PROBE: NOT DETECTED
BASOPHILS # BLD: 0.02 K/UL (ref 0–0.2)
BASOPHILS NFR BLD: 0 % (ref 0–2)
BILIRUB UR QL STRIP: NEGATIVE
BUN SERPL-MCNC: 39 MG/DL (ref 6–23)
C PNEUM DNA NPH QL NAA+NON-PROBE: NOT DETECTED
CALCIUM SERPL-MCNC: 9 MG/DL (ref 8.6–10.2)
CHLORIDE SERPL-SCNC: 101 MMOL/L (ref 98–107)
CLARITY UR: CLEAR
CO2 SERPL-SCNC: 24 MMOL/L (ref 22–29)
COLOR UR: YELLOW
CREAT SERPL-MCNC: 1.4 MG/DL (ref 0.7–1.2)
EOSINOPHIL # BLD: 0 K/UL (ref 0.05–0.5)
EOSINOPHILS RELATIVE PERCENT: 0 % (ref 0–6)
ERYTHROCYTE [DISTWIDTH] IN BLOOD BY AUTOMATED COUNT: 13.7 % (ref 11.5–15)
FLUAV RNA NPH QL NAA+NON-PROBE: NOT DETECTED
FLUBV RNA NPH QL NAA+NON-PROBE: NOT DETECTED
GFR SERPL CREATININE-BSD FRML MDRD: 52 ML/MIN/1.73M2
GLUCOSE BLD-MCNC: 250 MG/DL (ref 74–99)
GLUCOSE BLD-MCNC: 258 MG/DL (ref 74–99)
GLUCOSE BLD-MCNC: 272 MG/DL (ref 74–99)
GLUCOSE BLD-MCNC: 291 MG/DL (ref 74–99)
GLUCOSE BLD-MCNC: 350 MG/DL (ref 74–99)
GLUCOSE SERPL-MCNC: 276 MG/DL (ref 74–99)
GLUCOSE UR STRIP-MCNC: 500 MG/DL
HADV DNA NPH QL NAA+NON-PROBE: NOT DETECTED
HCOV 229E RNA NPH QL NAA+NON-PROBE: NOT DETECTED
HCOV HKU1 RNA NPH QL NAA+NON-PROBE: NOT DETECTED
HCOV NL63 RNA NPH QL NAA+NON-PROBE: NOT DETECTED
HCOV OC43 RNA NPH QL NAA+NON-PROBE: NOT DETECTED
HCT VFR BLD AUTO: 32.7 % (ref 37–54)
HGB BLD-MCNC: 10.9 G/DL (ref 12.5–16.5)
HGB UR QL STRIP.AUTO: NEGATIVE
HMPV RNA NPH QL NAA+NON-PROBE: NOT DETECTED
HPIV1 RNA NPH QL NAA+NON-PROBE: NOT DETECTED
HPIV2 RNA NPH QL NAA+NON-PROBE: NOT DETECTED
HPIV3 RNA NPH QL NAA+NON-PROBE: NOT DETECTED
HPIV4 RNA NPH QL NAA+NON-PROBE: NOT DETECTED
IMM GRANULOCYTES # BLD AUTO: 0.29 K/UL (ref 0–0.58)
IMM GRANULOCYTES NFR BLD: 3 % (ref 0–5)
KETONES UR STRIP-MCNC: NEGATIVE MG/DL
LEUKOCYTE ESTERASE UR QL STRIP: NEGATIVE
LYMPHOCYTES NFR BLD: 0.48 K/UL (ref 1.5–4)
LYMPHOCYTES RELATIVE PERCENT: 5 % (ref 20–42)
M PNEUMO DNA NPH QL NAA+NON-PROBE: NOT DETECTED
MCH RBC QN AUTO: 29.6 PG (ref 26–35)
MCHC RBC AUTO-ENTMCNC: 33.3 G/DL (ref 32–34.5)
MCV RBC AUTO: 88.9 FL (ref 80–99.9)
MONOCYTES NFR BLD: 0.22 K/UL (ref 0.1–0.95)
MONOCYTES NFR BLD: 3 % (ref 2–12)
NEUTROPHILS NFR BLD: 89 % (ref 43–80)
NEUTS SEG NFR BLD: 7.84 K/UL (ref 1.8–7.3)
NITRITE UR QL STRIP: NEGATIVE
PH UR STRIP: 5.5 [PH] (ref 5–9)
PLATELET # BLD AUTO: 104 K/UL (ref 130–450)
PMV BLD AUTO: 8.9 FL (ref 7–12)
POTASSIUM SERPL-SCNC: 5.5 MMOL/L (ref 3.5–5)
PROCALCITONIN SERPL-MCNC: 0.16 NG/ML (ref 0–0.08)
PROT UR STRIP-MCNC: NEGATIVE MG/DL
RBC # BLD AUTO: 3.68 M/UL (ref 3.8–5.8)
RBC # BLD: ABNORMAL 10*6/UL
RBC #/AREA URNS HPF: ABNORMAL /HPF
RSV RNA NPH QL NAA+NON-PROBE: NOT DETECTED
RV+EV RNA NPH QL NAA+NON-PROBE: NOT DETECTED
SARS-COV-2 RNA NPH QL NAA+NON-PROBE: NOT DETECTED
SODIUM SERPL-SCNC: 134 MMOL/L (ref 132–146)
SP GR UR STRIP: 1.02 (ref 1–1.03)
SPECIMEN DESCRIPTION: NORMAL
UROBILINOGEN UR STRIP-ACNC: 0.2 EU/DL (ref 0–1)
WBC #/AREA URNS HPF: ABNORMAL /HPF
WBC OTHER # BLD: 8.9 K/UL (ref 4.5–11.5)

## 2024-04-21 PROCEDURE — 1200000000 HC SEMI PRIVATE

## 2024-04-21 PROCEDURE — 6360000002 HC RX W HCPCS

## 2024-04-21 PROCEDURE — 6370000000 HC RX 637 (ALT 250 FOR IP): Performed by: INTERNAL MEDICINE

## 2024-04-21 PROCEDURE — 2580000003 HC RX 258: Performed by: FAMILY MEDICINE

## 2024-04-21 PROCEDURE — 85025 COMPLETE CBC W/AUTO DIFF WBC: CPT

## 2024-04-21 PROCEDURE — 36415 COLL VENOUS BLD VENIPUNCTURE: CPT

## 2024-04-21 PROCEDURE — 82962 GLUCOSE BLOOD TEST: CPT

## 2024-04-21 PROCEDURE — 97161 PT EVAL LOW COMPLEX 20 MIN: CPT

## 2024-04-21 PROCEDURE — 6370000000 HC RX 637 (ALT 250 FOR IP): Performed by: FAMILY MEDICINE

## 2024-04-21 PROCEDURE — 87899 AGENT NOS ASSAY W/OPTIC: CPT

## 2024-04-21 PROCEDURE — 2700000000 HC OXYGEN THERAPY PER DAY

## 2024-04-21 PROCEDURE — 5A09357 ASSISTANCE WITH RESPIRATORY VENTILATION, LESS THAN 24 CONSECUTIVE HOURS, CONTINUOUS POSITIVE AIRWAY PRESSURE: ICD-10-PCS | Performed by: INTERNAL MEDICINE

## 2024-04-21 PROCEDURE — 81001 URINALYSIS AUTO W/SCOPE: CPT

## 2024-04-21 PROCEDURE — 6360000002 HC RX W HCPCS: Performed by: INTERNAL MEDICINE

## 2024-04-21 PROCEDURE — 94660 CPAP INITIATION&MGMT: CPT

## 2024-04-21 PROCEDURE — 6360000002 HC RX W HCPCS: Performed by: FAMILY MEDICINE

## 2024-04-21 PROCEDURE — 94640 AIRWAY INHALATION TREATMENT: CPT

## 2024-04-21 PROCEDURE — 80048 BASIC METABOLIC PNL TOTAL CA: CPT

## 2024-04-21 PROCEDURE — 87205 SMEAR GRAM STAIN: CPT

## 2024-04-21 PROCEDURE — 99233 SBSQ HOSP IP/OBS HIGH 50: CPT | Performed by: INTERNAL MEDICINE

## 2024-04-21 PROCEDURE — 84145 PROCALCITONIN (PCT): CPT

## 2024-04-21 PROCEDURE — 87449 NOS EACH ORGANISM AG IA: CPT

## 2024-04-21 PROCEDURE — 87070 CULTURE OTHR SPECIMN AEROBIC: CPT

## 2024-04-21 RX ORDER — INSULIN LISPRO 100 [IU]/ML
1 INJECTION, SOLUTION INTRAVENOUS; SUBCUTANEOUS ONCE
Status: COMPLETED | OUTPATIENT
Start: 2024-04-21 | End: 2024-04-21

## 2024-04-21 RX ORDER — INSULIN LISPRO 100 [IU]/ML
1 INJECTION, SOLUTION INTRAVENOUS; SUBCUTANEOUS ONCE
Status: CANCELLED | OUTPATIENT
Start: 2024-04-21

## 2024-04-21 RX ORDER — METHYLPREDNISOLONE SODIUM SUCCINATE 40 MG/ML
40 INJECTION, POWDER, LYOPHILIZED, FOR SOLUTION INTRAMUSCULAR; INTRAVENOUS EVERY 8 HOURS
Status: DISCONTINUED | OUTPATIENT
Start: 2024-04-21 | End: 2024-04-22

## 2024-04-21 RX ORDER — FUROSEMIDE 10 MG/ML
40 INJECTION INTRAMUSCULAR; INTRAVENOUS 2 TIMES DAILY
Status: DISCONTINUED | OUTPATIENT
Start: 2024-04-21 | End: 2024-04-23

## 2024-04-21 RX ORDER — ARFORMOTEROL TARTRATE 15 UG/2ML
15 SOLUTION RESPIRATORY (INHALATION)
Status: DISCONTINUED | OUTPATIENT
Start: 2024-04-21 | End: 2024-04-23 | Stop reason: HOSPADM

## 2024-04-21 RX ORDER — CALCIUM CARBONATE 500 MG/1
500 TABLET, CHEWABLE ORAL 3 TIMES DAILY PRN
Status: DISCONTINUED | OUTPATIENT
Start: 2024-04-21 | End: 2024-04-23 | Stop reason: HOSPADM

## 2024-04-21 RX ADMIN — METOPROLOL TARTRATE 100 MG: 50 TABLET, FILM COATED ORAL at 09:13

## 2024-04-21 RX ADMIN — METFORMIN HYDROCHLORIDE 1000 MG: 1000 TABLET ORAL at 16:40

## 2024-04-21 RX ADMIN — ISOSORBIDE MONONITRATE 30 MG: 30 TABLET, EXTENDED RELEASE ORAL at 09:14

## 2024-04-21 RX ADMIN — METOPROLOL TARTRATE 100 MG: 50 TABLET, FILM COATED ORAL at 01:14

## 2024-04-21 RX ADMIN — SODIUM CHLORIDE, PRESERVATIVE FREE 10 ML: 5 INJECTION INTRAVENOUS at 21:48

## 2024-04-21 RX ADMIN — METHYLPREDNISOLONE SODIUM SUCCINATE 40 MG: 40 INJECTION INTRAMUSCULAR; INTRAVENOUS at 01:16

## 2024-04-21 RX ADMIN — ENOXAPARIN SODIUM 30 MG: 100 INJECTION SUBCUTANEOUS at 01:15

## 2024-04-21 RX ADMIN — INSULIN LISPRO 1 UNITS: 100 INJECTION, SOLUTION INTRAVENOUS; SUBCUTANEOUS at 01:51

## 2024-04-21 RX ADMIN — INSULIN LISPRO 8 UNITS: 100 INJECTION, SOLUTION INTRAVENOUS; SUBCUTANEOUS at 12:28

## 2024-04-21 RX ADMIN — CEFTRIAXONE 1000 MG: 1 INJECTION, POWDER, FOR SOLUTION INTRAMUSCULAR; INTRAVENOUS at 23:48

## 2024-04-21 RX ADMIN — ARFORMOTEROL TARTRATE 15 MCG: 15 SOLUTION RESPIRATORY (INHALATION) at 11:59

## 2024-04-21 RX ADMIN — AZITHROMYCIN MONOHYDRATE 500 MG: 500 INJECTION, POWDER, LYOPHILIZED, FOR SOLUTION INTRAVENOUS at 04:38

## 2024-04-21 RX ADMIN — METHYLPREDNISOLONE SODIUM SUCCINATE 40 MG: 40 INJECTION INTRAMUSCULAR; INTRAVENOUS at 23:48

## 2024-04-21 RX ADMIN — IPRATROPIUM BROMIDE AND ALBUTEROL SULFATE 1 DOSE: .5; 2.5 SOLUTION RESPIRATORY (INHALATION) at 20:52

## 2024-04-21 RX ADMIN — IPRATROPIUM BROMIDE AND ALBUTEROL SULFATE 1 DOSE: .5; 2.5 SOLUTION RESPIRATORY (INHALATION) at 07:55

## 2024-04-21 RX ADMIN — METOPROLOL TARTRATE 100 MG: 50 TABLET, FILM COATED ORAL at 21:48

## 2024-04-21 RX ADMIN — METHYLPREDNISOLONE SODIUM SUCCINATE 40 MG: 40 INJECTION INTRAMUSCULAR; INTRAVENOUS at 15:48

## 2024-04-21 RX ADMIN — INSULIN LISPRO 10 UNITS: 100 INJECTION, SOLUTION INTRAVENOUS; SUBCUTANEOUS at 12:29

## 2024-04-21 RX ADMIN — FUROSEMIDE 40 MG: 10 INJECTION, SOLUTION INTRAMUSCULAR; INTRAVENOUS at 18:57

## 2024-04-21 RX ADMIN — INSULIN LISPRO 10 UNITS: 100 INJECTION, SOLUTION INTRAVENOUS; SUBCUTANEOUS at 16:41

## 2024-04-21 RX ADMIN — SODIUM CHLORIDE, PRESERVATIVE FREE 10 ML: 5 INJECTION INTRAVENOUS at 12:49

## 2024-04-21 RX ADMIN — METFORMIN HYDROCHLORIDE 1000 MG: 1000 TABLET ORAL at 09:14

## 2024-04-21 RX ADMIN — CALCIUM CARBONATE 500 MG: 500 TABLET, CHEWABLE ORAL at 21:43

## 2024-04-21 RX ADMIN — CALCIUM CARBONATE 500 MG: 500 TABLET, CHEWABLE ORAL at 16:40

## 2024-04-21 RX ADMIN — ENOXAPARIN SODIUM 30 MG: 100 INJECTION SUBCUTANEOUS at 21:43

## 2024-04-21 RX ADMIN — FUROSEMIDE 40 MG: 10 INJECTION, SOLUTION INTRAMUSCULAR; INTRAVENOUS at 12:28

## 2024-04-21 RX ADMIN — CEFTRIAXONE 1000 MG: 1 INJECTION, POWDER, FOR SOLUTION INTRAMUSCULAR; INTRAVENOUS at 01:19

## 2024-04-21 RX ADMIN — ARFORMOTEROL TARTRATE 15 MCG: 15 SOLUTION RESPIRATORY (INHALATION) at 20:52

## 2024-04-21 RX ADMIN — INSULIN LISPRO 4 UNITS: 100 INJECTION, SOLUTION INTRAVENOUS; SUBCUTANEOUS at 16:40

## 2024-04-21 RX ADMIN — SODIUM ZIRCONIUM CYCLOSILICATE 5 G: 5 POWDER, FOR SUSPENSION ORAL at 12:28

## 2024-04-21 RX ADMIN — INSULIN LISPRO 10 UNITS: 100 INJECTION, SOLUTION INTRAVENOUS; SUBCUTANEOUS at 09:15

## 2024-04-21 RX ADMIN — SODIUM CHLORIDE, PRESERVATIVE FREE 10 ML: 5 INJECTION INTRAVENOUS at 01:14

## 2024-04-21 RX ADMIN — ENOXAPARIN SODIUM 30 MG: 100 INJECTION SUBCUTANEOUS at 09:12

## 2024-04-21 RX ADMIN — Medication 1 TABLET: at 21:52

## 2024-04-21 RX ADMIN — METHYLPREDNISOLONE SODIUM SUCCINATE 40 MG: 40 INJECTION INTRAMUSCULAR; INTRAVENOUS at 07:34

## 2024-04-21 RX ADMIN — IPRATROPIUM BROMIDE AND ALBUTEROL SULFATE 1 DOSE: .5; 3 SOLUTION RESPIRATORY (INHALATION) at 05:41

## 2024-04-21 RX ADMIN — ASPIRIN 81 MG: 81 TABLET, COATED ORAL at 09:14

## 2024-04-21 RX ADMIN — ATORVASTATIN CALCIUM 40 MG: 40 TABLET, FILM COATED ORAL at 21:43

## 2024-04-21 RX ADMIN — IPRATROPIUM BROMIDE AND ALBUTEROL SULFATE 1 DOSE: .5; 2.5 SOLUTION RESPIRATORY (INHALATION) at 11:59

## 2024-04-21 RX ADMIN — INSULIN LISPRO 4 UNITS: 100 INJECTION, SOLUTION INTRAVENOUS; SUBCUTANEOUS at 09:14

## 2024-04-21 RX ADMIN — TAMSULOSIN HYDROCHLORIDE 0.4 MG: 0.4 CAPSULE ORAL at 09:14

## 2024-04-21 RX ADMIN — IPRATROPIUM BROMIDE AND ALBUTEROL SULFATE 1 DOSE: .5; 2.5 SOLUTION RESPIRATORY (INHALATION) at 16:20

## 2024-04-21 ASSESSMENT — LIFESTYLE VARIABLES: HOW OFTEN DO YOU HAVE A DRINK CONTAINING ALCOHOL: NEVER

## 2024-04-21 ASSESSMENT — PAIN SCALES - GENERAL
PAINLEVEL_OUTOF10: 5
PAINLEVEL_OUTOF10: 0

## 2024-04-21 ASSESSMENT — PAIN - FUNCTIONAL ASSESSMENT: PAIN_FUNCTIONAL_ASSESSMENT: PREVENTS OR INTERFERES SOME ACTIVE ACTIVITIES AND ADLS

## 2024-04-21 ASSESSMENT — PAIN DESCRIPTION - DESCRIPTORS: DESCRIPTORS: DISCOMFORT;SORE;TENDER

## 2024-04-21 ASSESSMENT — PAIN DESCRIPTION - PAIN TYPE: TYPE: ACUTE PAIN

## 2024-04-21 ASSESSMENT — PAIN DESCRIPTION - ONSET: ONSET: ON-GOING

## 2024-04-21 ASSESSMENT — PAIN DESCRIPTION - LOCATION: LOCATION: FOOT

## 2024-04-21 ASSESSMENT — PAIN DESCRIPTION - FREQUENCY: FREQUENCY: INTERMITTENT

## 2024-04-21 ASSESSMENT — PAIN DESCRIPTION - ORIENTATION: ORIENTATION: RIGHT;LEFT

## 2024-04-21 NOTE — CONSULTS
Pulmonary Consultation    Admit Date: 4/20/2024    Requesting Physician: Jose Kwok MD    CC:  COPD exacerbation     HPI:  Chico Frias 73 y.o. male patient of Dr. Goyal with PMH of COPD, right lung lesion s/p navigation bronch 3/7 pathology and cultures negative, IFTIKHAR, chronic hypoxic/hypercapnic respiratory failure, CHF, type 2 diabetes, hyperlipidemia, hypertension and CKD who presented to the ED 4/20 with complaints of shortness of breath that been progressively worsening and associated with increased leg swelling and dizziness     Upon arrival to the ED,  Vitals: Afebrile, HR 87, /96, 94% on 4 L nasal cannula    Labs: ABG 7.31/49.2/91.2/24.3 on 4 L  K5.8, , BUN/creatinine 37/1.5, proBNP 123, platelets 100, WBC 5.3, D-dimer<200  Respiratory panel negative    Imaging: CT chest unchanged right upper lung mass, emphysema and atelectasis   CXR bibasilar atelectasis    Patient was started on IV lasix x1, IV Zithromax, Rocephin Solu-Medrol, DuoNebs and AVAPS 15/480/40%/8  Pulmonary consulted for COPD exacerbation    Patient seen on AVAPS as he was getting ready for a nap. He still has shortness of breath with minimal exertion that is unchanged and now with a moist cough that is not productive that began yesterday; denies wheezing. He is on Pulmicort, Performist, Prednisone 15 mg daily, azithromycin MWF 4LNC and NIV for which he is very compliant with.  He denies fever, chills, hemoptysis, known sick contacts.      PMH:    Past Medical History:   Diagnosis Date    COPD (chronic obstructive pulmonary disease) (HCC)     Diabetes mellitus (HCC)     GERD (gastroesophageal reflux disease)     Hyperlipidemia      PSH:    Past Surgical History:   Procedure Laterality Date    BRONCHOSCOPY  3/7/2024    BRONCHOSCOPY DIAGNOSTIC OR CELL WASH ONLY performed by Chase Naqvi MD at [facility] [place] [facility]    BRONCHOSCOPY  3/7/2024    BRONCHOSCOPY ALVEOLAR LAVAGE performed by Chase Naqvi MD at [facility] [place] [facility]     abdomen is otherwise unremarkable. Soft Tissues/Bones: Stable thoracic spine compression fractures.  No new fracture.     1. Unchanged right lung consolidations likely representing treatment changes. No new cardiopulmonary disease. 2. Unchanged thyroid nodules.     XR CHEST PORTABLE    Result Date: 4/20/2024  EXAMINATION: ONE XRAY VIEW OF THE CHEST 4/20/2024 7:01 pm COMPARISON: 03/07/2024 HISTORY: ORDERING SYSTEM PROVIDED HISTORY: Shortness of breath TECHNOLOGIST PROVIDED HISTORY: Reason for exam:->Shortness of breath FINDINGS: Hyperinflation, bronchial wall thickening, chronic interstitial coarsening. Bibasilar subsegmental atelectasis or scarring. Atherosclerotic vascular calcifications.  Enlarged cardiac silhouette.     Chronic findings without appreciable acute process.       Assessment/Plan  Acute on chronic hypercapnic/hypoxic respiratory failure  ABG on admit 7.31/49.2  Baseline 4 L/NIV at home  Continue NIV at night and prn for naps   Maintain pox > 90%  COPD exacerbation   Budesonide, Perforomist, prednisone 15 mg daily and azithromycin MWF  Continue Duonebs. Start Brovana hold Pulmicort while on IV steroids  IV solumedrol 40 mg q6h wean to Q8  IV Rocephin/azithromycin  Check sputum culture, Gram stain, PCT and urine antigens   IS   Right lung mass   S/p navigational bronchoscopy 3/7 cultures and path negative  DVT prophylaxis on Lovenox       Thank you for allowing us to see this patient in consultation.  Please contact us with any questions.      Electronically signed by ANDIE Almaraz CNP on 4/21/2024 at 9:41 AM    Seen and examined, agree with above.  Old records, meds, labs and imaging reviewed independently.  Acute on chronic hypoxic/ hypercapnic resp failure clinically better so continue AVAPS at hs and with naps. Can defer another abg as clinically better.  Acute exac of copd agree with lowering steroids.  Chronic right sided consolidation negative galdino bronch recently and negative prior ct

## 2024-04-21 NOTE — ED NOTES
Patient dangled at bedside SpO2 100% patient stated he is constantly dizzy denies pain denies sob. States he is fine to walk with no assistance. Ambulated apx 40 feet with no assistance on 4 liters o2, SpO2 dropped to 91% patient stated \"I am getting sob and I would like to go back to the bedside.\"  He returned to the bedside SpO2 continued to be 91% continued to climb as he recovered. After apx 1 minute patient is 100% SpO2 he is currently resting in bed, wife at his side, call light within reach.

## 2024-04-21 NOTE — PROGRESS NOTES
Progress  Note  Chief Complaint   Patient presents with    Shortness of Breath     Increased SOB, Hx COPD, wears 4L O2 at home. 96% on 4L at triage      Historical Issues:  Current Facility-Administered Medications   Medication Dose Route Frequency Provider Last Rate Last Admin    methylPREDNISolone sodium succ (SOLU-MEDROL) injection 40 mg  40 mg IntraVENous Q8H Cheli Pardo APRN - CNP        arformoterol tartrate (BROVANA) nebulizer solution 15 mcg  15 mcg Nebulization BID RT Cheli Pardo APRN - CNP   15 mcg at 04/21/24 1159    furosemide (LASIX) injection 40 mg  40 mg IntraVENous BID Bart Camargo MD   40 mg at 04/21/24 1228    sodium zirconium cyclosilicate (LOKELMA) oral suspension 5 g  5 g Oral Daily Bart Camargo MD   5 g at 04/21/24 1228    sodium chloride flush 0.9 % injection 5-40 mL  5-40 mL IntraVENous 2 times per day Lucila Daly, DO   10 mL at 04/21/24 1249    sodium chloride flush 0.9 % injection 5-40 mL  5-40 mL IntraVENous PRN Lucila Daly, DO        0.9 % sodium chloride infusion   IntraVENous PRN Lucila Daly H, DO        enoxaparin Sodium (LOVENOX) injection 30 mg  30 mg SubCUTAneous BID Lucila Daly, DO   30 mg at 04/21/24 0912    acetaminophen (TYLENOL) tablet 650 mg  650 mg Oral Q6H PRN Lucila Daly H, DO        Or    acetaminophen (TYLENOL) suppository 650 mg  650 mg Rectal Q6H PRN Lucila Daly, DO        cefTRIAXone (ROCEPHIN) 1,000 mg in sterile water 10 mL IV syringe  1,000 mg IntraVENous Q24H Lucila Daly, DO   1,000 mg at 04/21/24 0119    azithromycin (ZITHROMAX) 500 mg in 250 mL addavial  500 mg IntraVENous Q24H Lucila Daly, DO   Stopped at 04/21/24 0607    ipratropium 0.5 mg-albuterol 2.5 mg (DUONEB) nebulizer solution 1 Dose  1 Dose Inhalation Q4H WA RT Lucila Daly, DO   1 Dose at 04/21/24 1159    albuterol (PROVENTIL) (2.5 MG/3ML) 0.083% nebulizer solution 2.5 mg  2.5 mg Nebulization Q4H PRN Lucila Daly, DO

## 2024-04-21 NOTE — PROGRESS NOTES
Initial Eval      Attending Provider:  Bart Camargo MD    Evaluating PT:  Nabeel Phillips PT    Room #:  0735/0735-A  Diagnosis:  COPD with acute exacerbation   Pertinent PMHx/PSHx:  See PMH  Procedure/Surgery:  NA  Precautions:  General,  Equipment Needs:  oxygen 4L AAT    SUBJECTIVE:    Pt lives with wife  in a 2 story home with 1 stairs and 0 rail to enter.  There are 12 steps and 1 rail to 2nd floor bed and bath.  Pt ambulated with no AD PTA.    OBJECTIVE:   Initial Evaluation  Date: 4/21/24 Treatment Short Term/ Long Term   Goals   Was pt agreeable to Eval/treatment? yes     Does pt have pain? No     Bed Mobility  Rolling: Indep  Supine to sit: Indep  Sit to supine: Indep  Scooting: Indep     Transfers Sit to stand: Indep  Stand to sit: Indep  Stand pivot: Indep     Ambulation   30 feet with no AD indep      Stair negotiation: ascended and descended  NA-predict indep      ROM WFL     MMT WNL     AM-PAC 6 Clicks 20/24, steps NA-predict 24/24       Pt is A & O x 4   Sensation:  Pt denies numbness and tingling to extremities  Edema:  NA  Balance: sitting:Indep and standing: indep   Endurance: Functional, on 4L AAT      ASSESSMENT:    Comments:  In bed on arrival.  Indep with all mobility w/o need for AD.  No SOB pre-during-post 30' walk in room.  Return to EOB to eat lunch @ walk.      Treatment:  Patient practiced and was instructed in the following treatment:    Eval    Pt was left + with call light left by patient.    Chair/bed alarm: +    Pt's/ family goals   1. Home    Patient and or family understand(s) diagnosis, prognosis, and plan of care.    PLAN:     No PT warranted    Total Treatment Time  0 minutes     Evaluation Time includes thorough review of current medical information, gathering information on past medical history/social history and prior level of function, completion of standardized testing/informal observation of tasks, assessment of data and education on plan of care and goals.    CPT

## 2024-04-21 NOTE — PLAN OF CARE
Problem: Discharge Planning  Goal: Discharge to home or other facility with appropriate resources  Outcome: Progressing  Flowsheets (Taken 4/21/2024 0041)  Discharge to home or other facility with appropriate resources: Identify barriers to discharge with patient and caregiver     Problem: Safety - Adult  Goal: Free from fall injury  Outcome: Progressing     Problem: Pain  Goal: Verbalizes/displays adequate comfort level or baseline comfort level  Outcome: Progressing

## 2024-04-21 NOTE — ED NOTES
ED to Inpatient Handoff Report    Notified RNthat electronic handoff available and patient ready for transport to room 735.    Safety Risks: Risk of falls    Patient in Restraints: no    Constant Observer or Patient : no    Telemetry Monitoring Ordered :Yes           Order to transfer to unit without monitor:YES    Last MEWS:  Time completed: 2245    Deterioration Index Score:   Predictive Model Details          49  Factor Value    Calculated 4/20/2024 23:52 24% Age 73 years old    Deterioration Index Model 23% Respiratory rate 25     22% Supplemental oxygen Nasal cannula     15% Potassium abnormal (5.8 mmol/L)     6% Systolic 157     2% Blood pH abnormal (7.311)     2% BUN abnormal (37 mg/dL)     1% Sodium 136 mmol/L     1% Hematocrit abnormal (32.2 %)     1% Platelet count abnormal (100 k/uL)     1% Pulse oximetry 94 %     1% Pulse 87     0% WBC count 5.3 k/uL     0% Temperature 97.7 °F (36.5 °C)        Vitals:    04/20/24 2044 04/20/24 2100 04/20/24 2115 04/20/24 2245   BP: (!) 140/86 (!) 148/92 (!) 152/91 (!) 157/96   Pulse: 80  85 87   Resp: 18  19 25   Temp:       SpO2: 100%  98% 94%   Weight:       Height:             Opportunity for questions and clarification was provided.

## 2024-04-21 NOTE — H&P
Base) MCG/ACT inhaler Inhale 2 puffs into the lungs every 6 hours as needed for Wheezing 3/3/21   Bonita Weaver MD   Respiratory Therapy Supplies (FULL KIT NEBULIZER SET) MISC Indications: Acute Worsening of Chronic Obstructive Pulmonary Disease Use as directed with nebulized medication. 3/3/21   Bonita Weaver MD   atorvastatin (LIPITOR) 40 MG tablet Take 1 tablet by mouth daily    Irma Hermosillo MD   metFORMIN (GLUCOPHAGE) 1000 MG tablet Take 1 tablet by mouth 2 times daily (with meals)    Irma Hermosillo MD   metoprolol tartrate (LOPRESSOR) 25 MG tablet Take 4 tablets by mouth 2 times daily    Irma Hermosillo MD   isosorbide mononitrate (IMDUR) 30 MG extended release tablet Take 1 tablet by mouth daily    Irma Hermosillo MD   insulin glargine (LANTUS) 100 UNIT/ML injection vial Inject 40 Units into the skin Daily Take in am    Irma Hermosillo MD   lisinopril (PRINIVIL;ZESTRIL) 5 MG tablet Take 10 mg by mouth daily  9/8/22  Irma Hermosillo MD   furosemide (LASIX) 40 MG tablet Take 40 mg by mouth daily  9/8/22  Irma Hermosillo MD       Allergies:    Patient has no known allergies.    Social History:    reports that he has never smoked. He has never used smokeless tobacco. He reports that he does not drink alcohol and does not use drugs.    Family History:   family history includes Cancer in his father and mother.       PHYSICAL EXAM:  Vitals:  BP (!) 157/96   Pulse 87   Temp 97.7 °F (36.5 °C)   Resp 25   Ht 1.854 m (6' 1\")   Wt 102.1 kg (225 lb)   SpO2 94%   BMI 29.69 kg/m²     General Appearance: alert and oriented to person, place and time and in no acute distress  Skin: warm and dry  Head: normocephalic and atraumatic  Eyes: pupils equal, round, and reactive to light, conjunctivae normal  Pulmonary/Chest: clear to auscultation bilaterally- no wheezes, rales or rhonchi, normal air movement, no respiratory distress  Cardiovascular: normal  2022 showed an EF of 60 to 65% with indeterminate diastolic function.  Complains of increased extremity swelling.  Patient states he takes his Lasix every 2 to 3 days because his kidney function is decreased.  BNP was only 123 on admission, no pulmonary edema noted on imaging.  Continue daily Lasix, Imdur, Lopressor.  Low-sodium diet, daily weights, I's and O's.  Diabetes mellitus: Continue metformin. Sliding scale insulin coverage. Carb control diet. Accuchecks AC& HS. Hypoglycemic protocol.  CAD: Most recent stress test was in 2022 and was normal.  Continue aspirin and Lipitor.    Code Status: Full  DVT prophylaxis: Lovenox    45 minutes or more spent reviewing patient chart, assessing patient, discussing plan of care with patient and family, discussing plan of care with collaborating physician, and documentation.    NOTE: This report was transcribed using voice recognition software. Every effort was made to ensure accuracy; however, inadvertent computerized transcription errors may be present.  Electronically signed by ANDIE William CNP on 4/21/2024 at 12:39 AM

## 2024-04-21 NOTE — PROGRESS NOTES
4 Eyes Skin Assessment     NAME:  Chico Frias  YOB: 1950  MEDICAL RECORD NUMBER:  [id number]    The patient is being assessed for  Admission    I agree that at least one RN has performed a thorough Head to Toe Skin Assessment on the patient. ALL assessment sites listed below have been assessed.      Areas assessed by both nurses:    Head, Face, Ears, Shoulders, Back, Chest, Arms, Elbows, Hands, Sacrum. Buttock, Coccyx, Ischium, and Legs. Feet and Heels        Does the Patient have a Wound? No noted wound(s)       Bon Prevention initiated by RN: Yes  Wound Care Orders initiated by RN: No    Pressure Injury (Stage 3,4, Unstageable, DTI, NWPT, and Complex wounds) if present, place Wound referral order by RN under : No    New Ostomies, if present place, Ostomy referral order under : No     Nurse 1 eSignature: Electronically signed by Selina Bean RN on 4/21/24 at 2:09 AM EDT    **SHARE this note so that the co-signing nurse can place an eSignature**    Nurse 2 eSignature: Electronically signed by Lucila Burr RN on 4/21/24 at 2:15 AM EDT

## 2024-04-21 NOTE — PROGRESS NOTES
Date: 4/21/2024    Time: 5:56 AM    Patient Placed On BIPAP/CPAP/ Non-Invasive Ventilation?  Yes    If no must comment.  Facial area red/color change? No           If YES are Blister/Lesion present?No   If yes must notify nursing staff  BIPAP/CPAP skin barrier?  No    Skin barrier type: unwanted, self on and off        Comments:     04/21/24 0208   NIV Type   $NIV $Daily Charge   NIV Started/Stopped On   Equipment Type V60   Mode AVAPS   Mask Type Full face mask   Mask Size Medium   Assessment   Pulse 80   Respirations 18   SpO2 99 %   Level of Consciousness 0   Comfort Level Good   Using Accessory Muscles No   Mask Compliance Good   Skin Assessment Clean, dry, & intact   Settings/Measurements   PIP Observed 12 cm H20   CPAP/EPAP 8 cmH2O   IPAP Min 10 cmH2O   IPAP Max 30 cmH2O   Vt (Set, mL) 480 mL   Vt (Measured) 535 mL   Rate Ordered 15   Insp Rise Time (%) 2 %   FiO2  40 %   I Time/ I Time % 0.85 s   Minute Volume (L/min) 12.2 Liters   Mask Leak (lpm) 22 lpm           FANY RUSH RCP

## 2024-04-22 LAB
ANION GAP SERPL CALCULATED.3IONS-SCNC: 11 MMOL/L (ref 7–16)
BUN SERPL-MCNC: 49 MG/DL (ref 6–23)
CALCIUM SERPL-MCNC: 9.3 MG/DL (ref 8.6–10.2)
CHLORIDE SERPL-SCNC: 99 MMOL/L (ref 98–107)
CO2 SERPL-SCNC: 27 MMOL/L (ref 22–29)
CREAT SERPL-MCNC: 1.5 MG/DL (ref 0.7–1.2)
EKG ATRIAL RATE: 73 BPM
EKG P AXIS: 61 DEGREES
EKG P-R INTERVAL: 144 MS
EKG Q-T INTERVAL: 366 MS
EKG QRS DURATION: 80 MS
EKG QTC CALCULATION (BAZETT): 403 MS
EKG R AXIS: 33 DEGREES
EKG T AXIS: 62 DEGREES
EKG VENTRICULAR RATE: 73 BPM
GFR SERPL CREATININE-BSD FRML MDRD: 47 ML/MIN/1.73M2
GLUCOSE BLD-MCNC: 150 MG/DL (ref 74–99)
GLUCOSE BLD-MCNC: 253 MG/DL (ref 74–99)
GLUCOSE BLD-MCNC: 266 MG/DL (ref 74–99)
GLUCOSE BLD-MCNC: 358 MG/DL (ref 74–99)
GLUCOSE BLD-MCNC: 442 MG/DL (ref 74–99)
GLUCOSE SERPL-MCNC: 280 MG/DL (ref 74–99)
L PNEUMO1 AG UR QL IA.RAPID: NEGATIVE
MICROORGANISM SPEC CULT: NORMAL
MICROORGANISM/AGENT SPEC: NORMAL
POTASSIUM SERPL-SCNC: 5.2 MMOL/L (ref 3.5–5)
S PNEUM AG SPEC QL: NEGATIVE
SODIUM SERPL-SCNC: 137 MMOL/L (ref 132–146)
SPECIMEN DESCRIPTION: NORMAL
SPECIMEN SOURCE: NORMAL

## 2024-04-22 PROCEDURE — 6360000002 HC RX W HCPCS: Performed by: NURSE PRACTITIONER

## 2024-04-22 PROCEDURE — 6360000002 HC RX W HCPCS

## 2024-04-22 PROCEDURE — 2700000000 HC OXYGEN THERAPY PER DAY

## 2024-04-22 PROCEDURE — 99232 SBSQ HOSP IP/OBS MODERATE 35: CPT | Performed by: INTERNAL MEDICINE

## 2024-04-22 PROCEDURE — 82962 GLUCOSE BLOOD TEST: CPT

## 2024-04-22 PROCEDURE — 6370000000 HC RX 637 (ALT 250 FOR IP): Performed by: INTERNAL MEDICINE

## 2024-04-22 PROCEDURE — 1200000000 HC SEMI PRIVATE

## 2024-04-22 PROCEDURE — 6360000002 HC RX W HCPCS: Performed by: FAMILY MEDICINE

## 2024-04-22 PROCEDURE — 6370000000 HC RX 637 (ALT 250 FOR IP): Performed by: FAMILY MEDICINE

## 2024-04-22 PROCEDURE — 93010 ELECTROCARDIOGRAM REPORT: CPT | Performed by: INTERNAL MEDICINE

## 2024-04-22 PROCEDURE — 36415 COLL VENOUS BLD VENIPUNCTURE: CPT

## 2024-04-22 PROCEDURE — 6360000002 HC RX W HCPCS: Performed by: INTERNAL MEDICINE

## 2024-04-22 PROCEDURE — 94640 AIRWAY INHALATION TREATMENT: CPT

## 2024-04-22 PROCEDURE — 80048 BASIC METABOLIC PNL TOTAL CA: CPT

## 2024-04-22 PROCEDURE — 94660 CPAP INITIATION&MGMT: CPT

## 2024-04-22 PROCEDURE — 2580000003 HC RX 258: Performed by: FAMILY MEDICINE

## 2024-04-22 RX ORDER — INSULIN LISPRO 100 [IU]/ML
0-4 INJECTION, SOLUTION INTRAVENOUS; SUBCUTANEOUS NIGHTLY
Status: DISCONTINUED | OUTPATIENT
Start: 2024-04-22 | End: 2024-04-23 | Stop reason: HOSPADM

## 2024-04-22 RX ORDER — INSULIN LISPRO 100 [IU]/ML
0-16 INJECTION, SOLUTION INTRAVENOUS; SUBCUTANEOUS
Status: DISCONTINUED | OUTPATIENT
Start: 2024-04-22 | End: 2024-04-23 | Stop reason: HOSPADM

## 2024-04-22 RX ORDER — METHYLPREDNISOLONE SODIUM SUCCINATE 40 MG/ML
40 INJECTION, POWDER, LYOPHILIZED, FOR SOLUTION INTRAMUSCULAR; INTRAVENOUS EVERY 12 HOURS
Status: DISCONTINUED | OUTPATIENT
Start: 2024-04-22 | End: 2024-04-23

## 2024-04-22 RX ADMIN — INSULIN LISPRO 16 UNITS: 100 INJECTION, SOLUTION INTRAVENOUS; SUBCUTANEOUS at 12:01

## 2024-04-22 RX ADMIN — TAMSULOSIN HYDROCHLORIDE 0.4 MG: 0.4 CAPSULE ORAL at 09:01

## 2024-04-22 RX ADMIN — ACETAMINOPHEN 650 MG: 325 TABLET ORAL at 19:27

## 2024-04-22 RX ADMIN — ENOXAPARIN SODIUM 30 MG: 100 INJECTION SUBCUTANEOUS at 09:01

## 2024-04-22 RX ADMIN — CALCIUM CARBONATE 500 MG: 500 TABLET, CHEWABLE ORAL at 21:31

## 2024-04-22 RX ADMIN — SODIUM CHLORIDE, PRESERVATIVE FREE 10 ML: 5 INJECTION INTRAVENOUS at 22:01

## 2024-04-22 RX ADMIN — AZITHROMYCIN MONOHYDRATE 500 MG: 500 INJECTION, POWDER, LYOPHILIZED, FOR SOLUTION INTRAVENOUS at 00:37

## 2024-04-22 RX ADMIN — SODIUM CHLORIDE, PRESERVATIVE FREE 10 ML: 5 INJECTION INTRAVENOUS at 09:03

## 2024-04-22 RX ADMIN — SODIUM ZIRCONIUM CYCLOSILICATE 5 G: 5 POWDER, FOR SUSPENSION ORAL at 09:09

## 2024-04-22 RX ADMIN — IPRATROPIUM BROMIDE AND ALBUTEROL SULFATE 1 DOSE: .5; 2.5 SOLUTION RESPIRATORY (INHALATION) at 12:23

## 2024-04-22 RX ADMIN — FUROSEMIDE 40 MG: 10 INJECTION, SOLUTION INTRAMUSCULAR; INTRAVENOUS at 17:11

## 2024-04-22 RX ADMIN — ATORVASTATIN CALCIUM 40 MG: 40 TABLET, FILM COATED ORAL at 21:31

## 2024-04-22 RX ADMIN — IPRATROPIUM BROMIDE AND ALBUTEROL SULFATE 1 DOSE: .5; 2.5 SOLUTION RESPIRATORY (INHALATION) at 16:01

## 2024-04-22 RX ADMIN — ARFORMOTEROL TARTRATE 15 MCG: 15 SOLUTION RESPIRATORY (INHALATION) at 21:36

## 2024-04-22 RX ADMIN — Medication 1 TABLET: at 22:26

## 2024-04-22 RX ADMIN — METFORMIN HYDROCHLORIDE 1000 MG: 1000 TABLET ORAL at 09:01

## 2024-04-22 RX ADMIN — IPRATROPIUM BROMIDE AND ALBUTEROL SULFATE 1 DOSE: .5; 2.5 SOLUTION RESPIRATORY (INHALATION) at 21:36

## 2024-04-22 RX ADMIN — ARFORMOTEROL TARTRATE 15 MCG: 15 SOLUTION RESPIRATORY (INHALATION) at 09:51

## 2024-04-22 RX ADMIN — INSULIN LISPRO 10 UNITS: 100 INJECTION, SOLUTION INTRAVENOUS; SUBCUTANEOUS at 08:57

## 2024-04-22 RX ADMIN — INSULIN LISPRO 10 UNITS: 100 INJECTION, SOLUTION INTRAVENOUS; SUBCUTANEOUS at 17:10

## 2024-04-22 RX ADMIN — INSULIN LISPRO 10 UNITS: 100 INJECTION, SOLUTION INTRAVENOUS; SUBCUTANEOUS at 12:02

## 2024-04-22 RX ADMIN — ASPIRIN 81 MG: 81 TABLET, COATED ORAL at 09:00

## 2024-04-22 RX ADMIN — METOPROLOL TARTRATE 100 MG: 50 TABLET, FILM COATED ORAL at 09:11

## 2024-04-22 RX ADMIN — METFORMIN HYDROCHLORIDE 1000 MG: 1000 TABLET ORAL at 17:10

## 2024-04-22 RX ADMIN — METOPROLOL TARTRATE 100 MG: 50 TABLET, FILM COATED ORAL at 21:30

## 2024-04-22 RX ADMIN — INSULIN LISPRO 4 UNITS: 100 INJECTION, SOLUTION INTRAVENOUS; SUBCUTANEOUS at 08:58

## 2024-04-22 RX ADMIN — METHYLPREDNISOLONE SODIUM SUCCINATE 40 MG: 40 INJECTION INTRAMUSCULAR; INTRAVENOUS at 09:01

## 2024-04-22 RX ADMIN — FUROSEMIDE 40 MG: 10 INJECTION, SOLUTION INTRAMUSCULAR; INTRAVENOUS at 09:11

## 2024-04-22 RX ADMIN — METHYLPREDNISOLONE SODIUM SUCCINATE 40 MG: 40 INJECTION INTRAMUSCULAR; INTRAVENOUS at 21:31

## 2024-04-22 RX ADMIN — CALCIUM CARBONATE 500 MG: 500 TABLET, CHEWABLE ORAL at 09:00

## 2024-04-22 RX ADMIN — INSULIN LISPRO 8 UNITS: 100 INJECTION, SOLUTION INTRAVENOUS; SUBCUTANEOUS at 17:11

## 2024-04-22 RX ADMIN — IPRATROPIUM BROMIDE AND ALBUTEROL SULFATE 1 DOSE: .5; 2.5 SOLUTION RESPIRATORY (INHALATION) at 09:51

## 2024-04-22 RX ADMIN — ISOSORBIDE MONONITRATE 30 MG: 30 TABLET, EXTENDED RELEASE ORAL at 09:11

## 2024-04-22 ASSESSMENT — PAIN DESCRIPTION - DESCRIPTORS: DESCRIPTORS: THROBBING

## 2024-04-22 ASSESSMENT — PAIN DESCRIPTION - LOCATION: LOCATION: HEAD

## 2024-04-22 ASSESSMENT — PAIN SCALES - GENERAL: PAINLEVEL_OUTOF10: 5

## 2024-04-22 NOTE — PROGRESS NOTES
SPIRITUAL HEALTH SERVICES - [facility]  PROGRESS NOTE    Name: Chico URBINA Rodriguez                Sikhism: Hindu   Anointed (Last Rites): NA    Referral: Routine Visit    Assessment:  Upon entering the room  observes patient sitting on side of bed.  Patient was calm and approachable and welcoming to the . Patient seemed comfortable sharing concerns about their medical situation.      Intervention:   provided active listening, prayer, and nurtured hope.  left devotional material and information about  services.    Outcome:  Patient seemed encouraged, engaged in conversation, and expressed gratitude.    Plan:  Chaplains will remain available to offer spiritual and emotional support as needed.      Electronically signed by Chaplain Regina, on 4/22/2024 at 7:53 PM.  Spiritual Care Department  [facility]  [phone]

## 2024-04-22 NOTE — PLAN OF CARE
Patient's chart updated to reflect:      .    - HF care plan, HF education points and HF discharge instructions.  -Orders: 2 gram sodium diet, daily weights, I/O.  -PCP and/or Cardiologist appointment to be scheduled within 7 days of hospital discharge.  -History of HF, not primary admission Dx.  Patient admitted for treatment of COPD.    Mary Gage RN BSN  Heart Failure Navigator

## 2024-04-22 NOTE — PROGRESS NOTES
Max: 100 %  CVP:    VENT SETTINGS:      Additional Respiratory Assessments  Pulse: 85  Respirations: 16  SpO2: 95 %      EXAM:  General: Alert, in NAD  ENT: No discharge. Pharynx clear. membranes moist   Neck: Supple, Trachea midline.  Resp: No accessory muscle use. Non-labored.  Lungs clear with No crackles. No wheezing. No rhonchi. Diminished.   CV: Regular rate. Regular rhythm. No murmur . 2+ble edema.   ABD: Non-tender. Non-distended. Soft, round . Normal bowel sounds.   Skin: Warm and dry.   M/S: No cyanosis. No joint deformity. No clubbing.   Neuro: Awake. Follows commands. O x 3, LUCERO  Psych:  calm and interactive     I/O: No intake/output data recorded.  No intake/output data recorded.     Results:  CBC:   Recent Labs     04/20/24 1852 04/21/24  0807   WBC 5.3 8.9   HGB 10.7* 10.9*   HCT 32.2* 32.7*   MCV 92.5 88.9   * 104*     BMP:   Recent Labs     04/20/24 1852 04/21/24  0807    134   K 5.8* 5.5*    101   CO2 29 24   BUN 37* 39*   CREATININE 1.5* 1.4*     LFT:   Recent Labs     04/20/24 1852   ALKPHOS 61   ALT 47*   AST 33   PROT 6.6   BILITOT 0.2     PT/INR: No results for input(s): \"PROTIME\", \"INR\" in the last 72 hours.  Procalcitonin:   Recent Labs     04/21/24  0807   PROCAL 0.16*     Cultures:  No results for input(s): \"CULTRESP\" in the last 72 hours.      ABG:   Recent Labs     04/20/24 2000   PH 7.311*   PO2 91.2   PCO2 49.2*   HCO3 24.3   BE -2.2   O2SAT 96.2       Films:  CT CHEST WO CONTRAST  Result Date: 4/20/2024  1. Unchanged right lung consolidations likely representing treatment changes. No new cardiopulmonary disease. 2. Unchanged thyroid nodules.     XR CHEST PORTABLE  Result Date: 4/20/2024  Chronic findings without appreciable acute process.     Constitutional:  73 y.o. male patient of Dr. Goyal with PMH of COPD, right lung lesion s/p navigation bronch 3/7/24 pathology and cultures negative, IFTIKHAR, chronic hypoxic/hypercapnic respiratory failure, CHF, type 2  diabetes, hyperlipidemia, hypertension and CKD     4/20 presents with complaints of shortness of breath that been progressively worsening and associated with increased leg swelling and dizziness   Upon arrival to the ED,  Vitals: Afebrile, HR 87, /96, 94% on 4 L nasal cannula  Labs: ABG 7.31/49.2/91.2/24.3 on 4 L  K5.8, , BUN/creatinine 37/1.5, proBNP 123, platelets 100, WBC 5.3, D-dimer<200  Respiratory panel negative  Imaging: CT chest unchanged right upper lung mass, emphysema and atelectasis   CXR bibasilar atelectasis  4/21 pulm consulted on avaps   4/22 3lnc pox 95%, AVAPS over night         Assessment/Plan  Acute on chronic hypercapnic/hypoxic respiratory failure  ABG on admit 7.31/49.2  Baseline 4 L/NIV at home  Continue NIV at night and prn for naps   Maintain pox > 90%  COPD exacerbation   sputum culture, Gram stain, urine antigens pending,  PCT 0.16  Budesonide, Perforomist, prednisone 15 mg daily and azithromycin MWF  Continue Danyelle Ewing hold Pulmicort while on IV steroids  Wean steroids on IV solumedrol 40 mg Q8h, will wean to q12h  IV Rocephin/azithromycin  IS   Right lung mass   S/p navigational bronchoscopy 3/7 cultures and path negative  DVT prophylaxis on Lovenox       ANDIE Ferrera - NP       Seen and evaluated, and agree with above assessment and plan.  Clinical improvement.  Weaning IV steroids to every 12 hours today.  Hopeful to switch to oral corticosteroids in a.m.  History of severe COPD.  Right upper lobe mass, with negative pathology.

## 2024-04-22 NOTE — PROGRESS NOTES
[facility] Hospitalist Progress Note    Admitting Date and Time: 4/20/2024  5:49 PM  Admit Dx: COPD with acute exacerbation (HCC) [J44.1]    Subjective:  Patient is being followed for COPD with acute exacerbation (HCC) [J44.1]   Pt seen and examined this morning  No acute events overnight  Lying in bed comfortably, on BiPAP  Denies any acute complaints    ROS: denies fever, chills, cp, sob, n/v, HA unless stated above.      insulin lispro  0-16 Units SubCUTAneous TID WC    insulin lispro  0-4 Units SubCUTAneous Nightly    methylPREDNISolone  40 mg IntraVENous Q12H    arformoterol tartrate  15 mcg Nebulization BID RT    furosemide  40 mg IntraVENous BID    sodium zirconium cyclosilicate  5 g Oral Daily    sodium chloride flush  5-40 mL IntraVENous 2 times per day    enoxaparin  30 mg SubCUTAneous BID    azithromycin  500 mg IntraVENous Q24H    ipratropium 0.5 mg-albuterol 2.5 mg  1 Dose Inhalation Q4H WA RT    aspirin  81 mg Oral Daily    atorvastatin  40 mg Oral Daily    insulin glargine  40 Units SubCUTAneous Nightly    insulin lispro  10 Units SubCUTAneous TID WC    isosorbide mononitrate  30 mg Oral Daily    metFORMIN  1,000 mg Oral BID WC    metoprolol tartrate  100 mg Oral BID    ocuvite-lutein  1 tablet Oral Daily    tamsulosin  0.4 mg Oral Daily     calcium carbonate, 500 mg, TID PRN  sodium chloride flush, 5-40 mL, PRN  sodium chloride, , PRN  acetaminophen, 650 mg, Q6H PRN   Or  acetaminophen, 650 mg, Q6H PRN  albuterol, 2.5 mg, Q4H PRN  guaiFENesin-dextromethorphan, 5 mL, Q4H PRN  ipratropium 0.5 mg-albuterol 2.5 mg, 1 Dose, Q4H PRN  dextrose bolus, 125 mL, PRN   Or  dextrose bolus, 250 mL, PRN  glucagon (rDNA), 1 mg, PRN  dextrose, , Continuous PRN  Glucose, 15 g, PRN         Objective:    BP (!) 158/75   Pulse 88   Temp 97.9 °F (36.6 °C) (Oral)   Resp 16   Ht 1.854 m (6' 1\")   Wt 102.1 kg (225 lb)   SpO2 95%   BMI 29.69 kg/m²     General Appearance: alert and oriented to person, place and  time and in no acute distress  Skin: warm and dry  Head: normocephalic and atraumatic  Eyes: pupils equal, round, and reactive to light, extraocular eye movements intact, conjunctivae normal  Neck: neck supple and non tender without mass   Pulmonary/Chest: clear to auscultation bilaterally- no wheezes, rales or rhonchi, normal air movement, no respiratory distress  Cardiovascular: normal rate, normal S1 and S2 and no carotid bruits  Abdomen: soft, non-tender, non-distended, normal bowel sounds, no masses or organomegaly  Extremities: no cyanosis, no clubbing and no edema  Neurologic: no cranial nerve deficit and speech normal        Recent Labs     04/20/24 1852 04/21/24  0807 04/22/24  0715    134 137   K 5.8* 5.5* 5.2*    101 99   CO2 29 24 27   BUN 37* 39* 49*   CREATININE 1.5* 1.4* 1.5*   GLUCOSE 272* 276* 280*   CALCIUM 8.6 9.0 9.3       Recent Labs     04/20/24 1852 04/21/24  0807   WBC 5.3 8.9   RBC 3.48* 3.68*   HGB 10.7* 10.9*   HCT 32.2* 32.7*   MCV 92.5 88.9   MCH 30.7 29.6   MCHC 33.2 33.3   RDW 14.2 13.7   * 104*   MPV 8.8 8.9         Assessment and Plan:     Principal Problem:    COPD with acute exacerbation (HCC)  Active Problems:    CHF (congestive heart failure) (HCC)    Coronary artery disease involving native coronary artery of native heart with angina pectoris (HCC)    Hyperkalemia    Diabetes mellitus (HCC)    CKD (chronic kidney disease)    Chronic respiratory failure with hypercapnia (HCC)  Resolved Problems:    * No resolved hospital problems. *    Acute on chronic hypoxic and hypercapnic respiratory failure.  Initially requiring BiPAP.  Baseline 4 L NC.  Continue NIV at night and as needed.  Pulmonology following  COPD exacerbation.  Continue breathing treatments, IV steroids.  Will complete IV azithromycin x 3 today.  Pulmonology following.  Wean steroids and likely switch to p.o. tomorrow.  CHF.  Continue Lasix 40 mg IV twice daily.  Switch to p.o.

## 2024-04-22 NOTE — PROGRESS NOTES
Occupational Therapy      Occupational Therapy referral received.   No acute OT needs at this time ,  OT order discontinued

## 2024-04-22 NOTE — PROGRESS NOTES
P Quality Flow/Interdisciplinary Rounds Progress Note        Quality Flow Rounds held on April 22, 2024    Disciplines Attending:  Bedside Nurse, , , and Nursing Unit Leadership    Chico Frias was admitted on 4/20/2024  5:49 PM    Anticipated Discharge Date:       Disposition:    Bon Score:  Bon Scale Score: 21    Readmission Risk              Risk of Unplanned Readmission:  21           Discussed patient goal for the day, patient clinical progression, and barriers to discharge.  The following Goal(s) of the Day/Commitment(s) have been identified:   IV Lasix BID, discharge planning      Jonah Schmitz, RN  April 22, 2024

## 2024-04-22 NOTE — DISCHARGE INSTRUCTIONS
Take prednisone taper starting tomorrow as instructed  Restart taking Lasix 40 mg daily.                          HEART FAILURE  / CONGESTIVE HEART FAILURE  DISCHARGE INSTRUCTIONS:  GUIDELINES TO FOLLOW AT HOME    Self- Managed Care:     MEDICATIONS:  Take your medication as directed. If you are experiencing any side effects, inform your doctor, Do not stop taking any of your medications without letting your doctor know.   Check with your doctor before taking any over-the-counter medications / herbal / or dietary supplements. They may interfere with your other medications.  Do not take ibuprofen (Advil or Motrin) and naproxen (Aleve) without talking to your doctor first. They could make your heart failure worse.         WEIGHT MONITORING:   Weigh yourself everyday (with the same scale) around the same time of the day and write it down. (you can chart them on a calendar or keep track of them on paper.   Notify your doctor of a weight gain of 3 pounds or more in 1 day   OR a total of 5 pounds or more in 1 week    Take your weight record to your doctor visits  Also, the same goes if you loose more than 3# in one day, let your heart doctor know.         DIET:   Cardiac heart healthy diet- Low saturated / low trans fat, no added salt, caffeine restricted, Low sodium diet-   No more than 2,000mg (2 grams) of salt / sodium per day (which equals to a little less than  a teaspoon of salt)  If your doctor wants you on a fluid restriction...it is usually recommended a fluid limit of 2,000cc -  Fluid restriction- 2,000 ml (milliliters) = 64 ounces = you can have 8 glasses of fluid per day (each glass 8 ounces)    Follow a low salt diet - avoid using salt at the table, avoid / limit use of canned soups, processed / packaged foods, salted snacks, olives and pickles.  Do not use a salt substitute without checking with your doctor, they may contain a high amount of potassioum. (Mrs. Dash is safe to use).    Limit the use of

## 2024-04-22 NOTE — PROGRESS NOTES
04/22/24 0005   NIV Type   $NIV $Daily Charge   Equipment Type V60   Mode AVAPS   Mask Type Full face mask   Mask Size Medium   Assessment   Pulse 76   Respirations 17   SpO2 99 %   Level of Consciousness 0   Comfort Level Good   Using Accessory Muscles No   Mask Compliance Good   Settings/Measurements   PIP Observed 11 cm H20   CPAP/EPAP 8 cmH2O   IPAP Min 10 cmH2O   IPAP Max 30 cmH2O   Vt (Set, mL) 480 mL   Vt (Measured) 673 mL   Rate Ordered 15   Insp Rise Time (%) 2 %   FiO2  40 %   I Time/ I Time % 0.85 s   Minute Volume (L/min) 12.6 Liters   Mask Leak (lpm) 28 lpm

## 2024-04-23 VITALS
SYSTOLIC BLOOD PRESSURE: 163 MMHG | HEIGHT: 73 IN | DIASTOLIC BLOOD PRESSURE: 82 MMHG | HEART RATE: 73 BPM | BODY MASS INDEX: 31.75 KG/M2 | RESPIRATION RATE: 18 BRPM | OXYGEN SATURATION: 99 % | WEIGHT: 239.6 LBS | TEMPERATURE: 97.8 F

## 2024-04-23 LAB
ANION GAP SERPL CALCULATED.3IONS-SCNC: 10 MMOL/L (ref 7–16)
BUN SERPL-MCNC: 55 MG/DL (ref 6–23)
CALCIUM SERPL-MCNC: 9.2 MG/DL (ref 8.6–10.2)
CHLORIDE SERPL-SCNC: 98 MMOL/L (ref 98–107)
CO2 SERPL-SCNC: 28 MMOL/L (ref 22–29)
CREAT SERPL-MCNC: 1.6 MG/DL (ref 0.7–1.2)
GFR SERPL CREATININE-BSD FRML MDRD: 44 ML/MIN/1.73M2
GLUCOSE BLD-MCNC: 148 MG/DL (ref 74–99)
GLUCOSE BLD-MCNC: 293 MG/DL (ref 74–99)
GLUCOSE SERPL-MCNC: 292 MG/DL (ref 74–99)
POTASSIUM SERPL-SCNC: 5 MMOL/L (ref 3.5–5)
SODIUM SERPL-SCNC: 136 MMOL/L (ref 132–146)

## 2024-04-23 PROCEDURE — 6360000002 HC RX W HCPCS: Performed by: INTERNAL MEDICINE

## 2024-04-23 PROCEDURE — 80048 BASIC METABOLIC PNL TOTAL CA: CPT

## 2024-04-23 PROCEDURE — 6360000002 HC RX W HCPCS

## 2024-04-23 PROCEDURE — 6370000000 HC RX 637 (ALT 250 FOR IP): Performed by: INTERNAL MEDICINE

## 2024-04-23 PROCEDURE — 94640 AIRWAY INHALATION TREATMENT: CPT

## 2024-04-23 PROCEDURE — 99239 HOSP IP/OBS DSCHRG MGMT >30: CPT | Performed by: INTERNAL MEDICINE

## 2024-04-23 PROCEDURE — 6370000000 HC RX 637 (ALT 250 FOR IP): Performed by: FAMILY MEDICINE

## 2024-04-23 PROCEDURE — 6360000002 HC RX W HCPCS: Performed by: FAMILY MEDICINE

## 2024-04-23 PROCEDURE — 2700000000 HC OXYGEN THERAPY PER DAY

## 2024-04-23 PROCEDURE — 2580000003 HC RX 258: Performed by: FAMILY MEDICINE

## 2024-04-23 PROCEDURE — 82962 GLUCOSE BLOOD TEST: CPT

## 2024-04-23 PROCEDURE — 36415 COLL VENOUS BLD VENIPUNCTURE: CPT

## 2024-04-23 PROCEDURE — 6360000002 HC RX W HCPCS: Performed by: NURSE PRACTITIONER

## 2024-04-23 PROCEDURE — 94660 CPAP INITIATION&MGMT: CPT

## 2024-04-23 RX ORDER — PREDNISONE 20 MG/1
40 TABLET ORAL DAILY
Status: DISCONTINUED | OUTPATIENT
Start: 2024-04-24 | End: 2024-04-23 | Stop reason: HOSPADM

## 2024-04-23 RX ORDER — PREDNISONE 20 MG/1
20 TABLET ORAL DAILY
Status: DISCONTINUED | OUTPATIENT
Start: 2024-04-30 | End: 2024-04-23 | Stop reason: HOSPADM

## 2024-04-23 RX ORDER — PREDNISONE 20 MG/1
TABLET ORAL
Qty: 10 TABLET | Refills: 0 | Status: SHIPPED | OUTPATIENT
Start: 2024-04-24 | End: 2024-05-03

## 2024-04-23 RX ORDER — METHYLPREDNISOLONE SODIUM SUCCINATE 40 MG/ML
40 INJECTION, POWDER, LYOPHILIZED, FOR SOLUTION INTRAMUSCULAR; INTRAVENOUS ONCE
Status: COMPLETED | OUTPATIENT
Start: 2024-04-23 | End: 2024-04-23

## 2024-04-23 RX ORDER — FUROSEMIDE 40 MG/1
40 TABLET ORAL DAILY
Qty: 30 TABLET | Refills: 1 | Status: SHIPPED | OUTPATIENT
Start: 2024-04-23

## 2024-04-23 RX ADMIN — ASPIRIN 81 MG: 81 TABLET, COATED ORAL at 08:03

## 2024-04-23 RX ADMIN — INSULIN LISPRO 10 UNITS: 100 INJECTION, SOLUTION INTRAVENOUS; SUBCUTANEOUS at 08:00

## 2024-04-23 RX ADMIN — METHYLPREDNISOLONE SODIUM SUCCINATE 40 MG: 40 INJECTION INTRAMUSCULAR; INTRAVENOUS at 08:04

## 2024-04-23 RX ADMIN — METFORMIN HYDROCHLORIDE 1000 MG: 1000 TABLET ORAL at 08:03

## 2024-04-23 RX ADMIN — INSULIN LISPRO 8 UNITS: 100 INJECTION, SOLUTION INTRAVENOUS; SUBCUTANEOUS at 08:01

## 2024-04-23 RX ADMIN — METOPROLOL TARTRATE 100 MG: 50 TABLET, FILM COATED ORAL at 08:03

## 2024-04-23 RX ADMIN — AZITHROMYCIN MONOHYDRATE 500 MG: 500 INJECTION, POWDER, LYOPHILIZED, FOR SOLUTION INTRAVENOUS at 02:01

## 2024-04-23 RX ADMIN — SODIUM ZIRCONIUM CYCLOSILICATE 5 G: 5 POWDER, FOR SUSPENSION ORAL at 08:03

## 2024-04-23 RX ADMIN — IPRATROPIUM BROMIDE AND ALBUTEROL SULFATE 1 DOSE: .5; 2.5 SOLUTION RESPIRATORY (INHALATION) at 12:11

## 2024-04-23 RX ADMIN — SODIUM CHLORIDE, PRESERVATIVE FREE 10 ML: 5 INJECTION INTRAVENOUS at 08:05

## 2024-04-23 RX ADMIN — INSULIN LISPRO 10 UNITS: 100 INJECTION, SOLUTION INTRAVENOUS; SUBCUTANEOUS at 12:20

## 2024-04-23 RX ADMIN — ISOSORBIDE MONONITRATE 30 MG: 30 TABLET, EXTENDED RELEASE ORAL at 08:03

## 2024-04-23 RX ADMIN — IPRATROPIUM BROMIDE AND ALBUTEROL SULFATE 1 DOSE: .5; 2.5 SOLUTION RESPIRATORY (INHALATION) at 14:53

## 2024-04-23 RX ADMIN — FUROSEMIDE 40 MG: 10 INJECTION, SOLUTION INTRAMUSCULAR; INTRAVENOUS at 08:07

## 2024-04-23 RX ADMIN — TAMSULOSIN HYDROCHLORIDE 0.4 MG: 0.4 CAPSULE ORAL at 08:03

## 2024-04-23 RX ADMIN — METHYLPREDNISOLONE SODIUM SUCCINATE 40 MG: 40 INJECTION INTRAMUSCULAR; INTRAVENOUS at 15:40

## 2024-04-23 RX ADMIN — ARFORMOTEROL TARTRATE 15 MCG: 15 SOLUTION RESPIRATORY (INHALATION) at 08:37

## 2024-04-23 RX ADMIN — IPRATROPIUM BROMIDE AND ALBUTEROL SULFATE 1 DOSE: .5; 2.5 SOLUTION RESPIRATORY (INHALATION) at 08:37

## 2024-04-23 RX ADMIN — ENOXAPARIN SODIUM 30 MG: 100 INJECTION SUBCUTANEOUS at 08:10

## 2024-04-23 NOTE — PLAN OF CARE
Problem: Discharge Planning  Goal: Discharge to home or other facility with appropriate resources  4/23/2024 1544 by Sofie Sadler, RN  Outcome: Completed     Problem: Safety - Adult  Goal: Free from fall injury  4/23/2024 1544 by Sofie Sadler, RN  Outcome: Completed     Problem: Pain  Goal: Verbalizes/displays adequate comfort level or baseline comfort level  4/23/2024 1544 by Sofie Sadler, RN  Outcome: Completed     Problem: Chronic Conditions and Co-morbidities  Goal: Patient's chronic conditions and co-morbidity symptoms are monitored and maintained or improved  4/23/2024 1544 by Sofie Sadler, RN  Outcome: Completed

## 2024-04-23 NOTE — PROGRESS NOTES
Pulmonary Progress Note    Admit Date: 2024                            PCP: Jose Kwok MD  Principal Problem:    COPD with acute exacerbation (HCC)  Active Problems:    CHF (congestive heart failure) (HCC)    Coronary artery disease involving native coronary artery of native heart with angina pectoris (HCC)    Hyperkalemia    Diabetes mellitus (HCC)    CKD (chronic kidney disease)    Chronic respiratory failure with hypercapnia (HCC)  Resolved Problems:    * No resolved hospital problems. *      Subjective:  Walking around the room on 4L NC pox 99%  He denies SOB or wz; baseline occasional dry cough   Wore NIV over night    Medications:   sodium chloride      dextrose          insulin lispro  0-16 Units SubCUTAneous TID WC    insulin lispro  0-4 Units SubCUTAneous Nightly    methylPREDNISolone  40 mg IntraVENous Q12H    arformoterol tartrate  15 mcg Nebulization BID RT    sodium zirconium cyclosilicate  5 g Oral Daily    sodium chloride flush  5-40 mL IntraVENous 2 times per day    enoxaparin  30 mg SubCUTAneous BID    ipratropium 0.5 mg-albuterol 2.5 mg  1 Dose Inhalation Q4H WA RT    aspirin  81 mg Oral Daily    atorvastatin  40 mg Oral Daily    insulin glargine  40 Units SubCUTAneous Nightly    insulin lispro  10 Units SubCUTAneous TID WC    isosorbide mononitrate  30 mg Oral Daily    metFORMIN  1,000 mg Oral BID WC    metoprolol tartrate  100 mg Oral BID    ocuvite-lutein  1 tablet Oral Daily    tamsulosin  0.4 mg Oral Daily       Vitals:  VITALS:  BP (!) 163/82   Pulse 73   Temp 97.8 °F (36.6 °C) (Oral)   Resp 18   Ht 1.854 m (6' 1\")   Wt 108.7 kg (239 lb 9.6 oz)   SpO2 99%   BMI 31.61 kg/m²   24HR INTAKE/OUTPUT:    Intake/Output Summary (Last 24 hours) at 2024 1032  Last data filed at 2024 0813  Gross per 24 hour   Intake --   Output 3250 ml   Net -3250 ml     CURRENT PULSE OXIMETRY:  SpO2: 99 %  24HR PULSE OXIMETRY RANGE:  SpO2  Av %  Min: 98 %  Max: 100 %  CVP:    VENT

## 2024-04-23 NOTE — PROGRESS NOTES
CLINICAL PHARMACY NOTE: MEDS TO BEDS    Total # of Prescriptions Filled: 2   The following medications were delivered to the patient:  Prednisone 20 mg   Furosemide 40 mg     Additional Documentation:

## 2024-04-23 NOTE — PLAN OF CARE
Problem: Discharge Planning  Goal: Discharge to home or other facility with appropriate resources  4/22/2024 2336 by Xenia Small RN  Outcome: Progressing  4/22/2024 1040 by Sofie Sadler RN  Outcome: Progressing     Problem: Safety - Adult  Goal: Free from fall injury  4/22/2024 2336 by Xenia Small RN  Outcome: Progressing  4/22/2024 1040 by Sofie Sadler RN  Outcome: Progressing     Problem: Pain  Goal: Verbalizes/displays adequate comfort level or baseline comfort level  4/22/2024 2336 by Xenia Small RN  Outcome: Progressing  4/22/2024 1040 by Sofie Sadler RN  Outcome: Progressing     Problem: Chronic Conditions and Co-morbidities  Goal: Patient's chronic conditions and co-morbidity symptoms are monitored and maintained or improved  4/22/2024 2336 by Xenia Small RN  Outcome: Progressing  4/22/2024 1040 by Sofie Sadler RN  Outcome: Progressing

## 2024-04-23 NOTE — ACP (ADVANCE CARE PLANNING)
Advance Care Planning   Healthcare Decision Maker:    Primary Decision Maker: LEIDA THOMPSON - [facility] - [phone]    Click here to complete Healthcare Decision Makers including selection of the Healthcare Decision Maker Relationship (ie \"Primary\").

## 2024-04-23 NOTE — DISCHARGE SUMMARY
[facility] Hospitalist Physician Discharge Summary       Dawson Jackson MD  [address]  [address]  [place] [address]  [phone]    Follow up in 2 week(s)  hospital follow up      Activity level: As tolerated     Dispo: Home      Condition on discharge: Stable     Patient ID:  Chico Frias  [id number]  73 y.o.  1950    Admit date: 4/20/2024    Discharge date and time:  4/23/2024  2:53 PM    Admission Diagnoses: Principal Problem:    COPD with acute exacerbation (HCC)  Active Problems:    CHF (congestive heart failure) (HCC)    Coronary artery disease involving native coronary artery of native heart with angina pectoris (HCC)    Hyperkalemia    Diabetes mellitus (HCC)    CKD (chronic kidney disease)    Chronic respiratory failure with hypercapnia (HCC)  Resolved Problems:    * No resolved hospital problems. *      Discharge Diagnoses: Principal Problem:    COPD with acute exacerbation (HCC)  Active Problems:    CHF (congestive heart failure) (HCC)    Coronary artery disease involving native coronary artery of native heart with angina pectoris (HCC)    Hyperkalemia    Diabetes mellitus (HCC)    CKD (chronic kidney disease)    Chronic respiratory failure with hypercapnia (HCC)  Resolved Problems:    * No resolved hospital problems. *      Consults:  PULMONARY REHAB EVALUATION  IP CONSULT TO SOCIAL WORK  IP CONSULT TO PULMONOLOGY    Hospital Course:   Patient hCico Frias is a 73 y.o. presented with COPD with acute exacerbation (HCC) [J44.1]    Patient is 73-year-old male was admitted due to acute on chronic hypoxic and hypercapnic respiratory failure and COPD exacerbation.  Initially requiring BiPAP.  Baseline is 4 L NC.  Pulmonology consulted, he was started on breathing treatments, IV Solu-Medrol.  Completed 3 days of azithromycin.  There was also concern for CHF exacerbation, managed with Lasix IV 40 mg twice daily.  Symptoms improved and patient will be discharged home back on his home          Note that more than 30 minutes was spent in preparing discharge papers, discussing discharge with patient, medication review, etc.    Signed:  Electronically signed by Josh Max MD on 4/23/2024 at 2:53 PM

## 2024-04-23 NOTE — PLAN OF CARE
Problem: Discharge Planning  Goal: Discharge to home or other facility with appropriate resources  4/23/2024 0819 by Sofie Sadler, RN  Outcome: Progressing     Problem: Safety - Adult  Goal: Free from fall injury  4/23/2024 0819 by Sofie Sadler, RN  Outcome: Progressing     Problem: Pain  Goal: Verbalizes/displays adequate comfort level or baseline comfort level  4/23/2024 0819 by Sofie Sadler, RN  Outcome: Progressing     Problem: Chronic Conditions and Co-morbidities  Goal: Patient's chronic conditions and co-morbidity symptoms are monitored and maintained or improved  4/23/2024 0819 by Sofie Sadler, RN  Outcome: Progressing

## 2024-04-23 NOTE — CARE COORDINATION
Met with patient about diagnosis and discharge plan of care. Pt admit with COPD and CHF. Iv lasix, iv steroids aerosol treatments. Pt has 4L/NC continuous at home and also NIV. Pt has nebulizer. Lives with spouse in 2 story home. Multiple steps up and down. Pt uses no assistive devices. PCP is Dr Kwok in [facility]. Receptive to home health services. Used [facility] in past-referral called. Await call back-billy

## 2024-04-23 NOTE — PROGRESS NOTES
Date: 4/22/2024    Time: 10:49 PM    Patient Placed On BIPAP/CPAP/ Non-Invasive Ventilation?  Yes    If no must comment.  Facial area red/color change? No           If YES are Blister/Lesion present?No   If yes must notify nursing staff  BIPAP/CPAP skin barrier?  Yes    Skin barrier type:mepilexlite       Comments:        Cris Tellez RCP

## 2024-07-29 ENCOUNTER — APPOINTMENT (OUTPATIENT)
Dept: GENERAL RADIOLOGY | Age: 74
End: 2024-07-29
Payer: MEDICARE

## 2024-07-29 ENCOUNTER — HOSPITAL ENCOUNTER (INPATIENT)
Age: 74
LOS: 3 days | Discharge: HOME | End: 2024-08-01
Attending: STUDENT IN AN ORGANIZED HEALTH CARE EDUCATION/TRAINING PROGRAM
Payer: MEDICARE

## 2024-07-29 DIAGNOSIS — J44.1 COPD EXACERBATION (HCC): Primary | ICD-10-CM

## 2024-07-29 DIAGNOSIS — I50.9 CONGESTIVE HEART FAILURE, UNSPECIFIED HF CHRONICITY, UNSPECIFIED HEART FAILURE TYPE (HCC): ICD-10-CM

## 2024-07-29 DIAGNOSIS — R06.02 SHORTNESS OF BREATH: ICD-10-CM

## 2024-07-29 DIAGNOSIS — R07.9 CHEST PAIN, UNSPECIFIED TYPE: ICD-10-CM

## 2024-07-29 PROBLEM — N18.9 ACUTE KIDNEY INJURY SUPERIMPOSED ON CKD (HCC): Status: ACTIVE | Noted: 2024-07-29

## 2024-07-29 PROBLEM — N17.9 ACUTE KIDNEY INJURY SUPERIMPOSED ON CKD (HCC): Status: ACTIVE | Noted: 2024-07-29

## 2024-07-29 LAB
ALBUMIN SERPL-MCNC: 4.6 G/DL (ref 3.5–5.2)
ALP SERPL-CCNC: 73 U/L (ref 40–129)
ALT SERPL-CCNC: 33 U/L (ref 0–40)
ANION GAP SERPL CALCULATED.3IONS-SCNC: 12 MMOL/L (ref 7–16)
AST SERPL-CCNC: 27 U/L (ref 0–39)
BASOPHILS # BLD: 0.03 K/UL (ref 0–0.2)
BASOPHILS NFR BLD: 0 % (ref 0–2)
BILIRUB DIRECT SERPL-MCNC: <0.2 MG/DL (ref 0–0.3)
BILIRUB INDIRECT SERPL-MCNC: NORMAL MG/DL (ref 0–1)
BILIRUB SERPL-MCNC: 0.5 MG/DL (ref 0–1.2)
BNP SERPL-MCNC: 73 PG/ML (ref 0–450)
BUN SERPL-MCNC: 49 MG/DL (ref 6–23)
CALCIUM SERPL-MCNC: 9.2 MG/DL (ref 8.6–10.2)
CHLORIDE SERPL-SCNC: 96 MMOL/L (ref 98–107)
CO2 SERPL-SCNC: 31 MMOL/L (ref 22–29)
CREAT SERPL-MCNC: 1.9 MG/DL (ref 0.7–1.2)
EOSINOPHIL # BLD: 0.02 K/UL (ref 0.05–0.5)
EOSINOPHILS RELATIVE PERCENT: 0 % (ref 0–6)
ERYTHROCYTE [DISTWIDTH] IN BLOOD BY AUTOMATED COUNT: 13.2 % (ref 11.5–15)
GFR, ESTIMATED: 36 ML/MIN/1.73M2
GLUCOSE BLD-MCNC: 304 MG/DL (ref 74–99)
GLUCOSE BLD-MCNC: 377 MG/DL (ref 74–99)
GLUCOSE BLD-MCNC: 478 MG/DL (ref 74–99)
GLUCOSE SERPL-MCNC: 259 MG/DL (ref 74–99)
HCT VFR BLD AUTO: 36.5 % (ref 37–54)
HGB BLD-MCNC: 12.2 G/DL (ref 12.5–16.5)
IMM GRANULOCYTES # BLD AUTO: 0.38 K/UL (ref 0–0.58)
IMM GRANULOCYTES NFR BLD: 4 % (ref 0–5)
LYMPHOCYTES NFR BLD: 0.81 K/UL (ref 1.5–4)
LYMPHOCYTES RELATIVE PERCENT: 9 % (ref 20–42)
MCH RBC QN AUTO: 29.2 PG (ref 26–35)
MCHC RBC AUTO-ENTMCNC: 33.4 G/DL (ref 32–34.5)
MCV RBC AUTO: 87.3 FL (ref 80–99.9)
MONOCYTES NFR BLD: 0.29 K/UL (ref 0.1–0.95)
MONOCYTES NFR BLD: 3 % (ref 2–12)
NEUTROPHILS NFR BLD: 83 % (ref 43–80)
NEUTS SEG NFR BLD: 7.14 K/UL (ref 1.8–7.3)
PLATELET # BLD AUTO: 129 K/UL (ref 130–450)
PMV BLD AUTO: 9 FL (ref 7–12)
POTASSIUM SERPL-SCNC: 5 MMOL/L (ref 3.5–5)
PROT SERPL-MCNC: 7.3 G/DL (ref 6.4–8.3)
RBC # BLD AUTO: 4.18 M/UL (ref 3.8–5.8)
SODIUM SERPL-SCNC: 139 MMOL/L (ref 132–146)
TROPONIN I SERPL HS-MCNC: 44 NG/L (ref 0–11)
WBC OTHER # BLD: 8.7 K/UL (ref 4.5–11.5)

## 2024-07-29 PROCEDURE — 82248 BILIRUBIN DIRECT: CPT

## 2024-07-29 PROCEDURE — 84484 ASSAY OF TROPONIN QUANT: CPT

## 2024-07-29 PROCEDURE — 71045 X-RAY EXAM CHEST 1 VIEW: CPT

## 2024-07-29 PROCEDURE — 80053 COMPREHEN METABOLIC PANEL: CPT

## 2024-07-29 PROCEDURE — 99222 1ST HOSP IP/OBS MODERATE 55: CPT

## 2024-07-29 PROCEDURE — 6360000002 HC RX W HCPCS

## 2024-07-29 PROCEDURE — 6370000000 HC RX 637 (ALT 250 FOR IP)

## 2024-07-29 PROCEDURE — 94640 AIRWAY INHALATION TREATMENT: CPT

## 2024-07-29 PROCEDURE — 94664 DEMO&/EVAL PT USE INHALER: CPT

## 2024-07-29 PROCEDURE — 94660 CPAP INITIATION&MGMT: CPT

## 2024-07-29 PROCEDURE — 83880 ASSAY OF NATRIURETIC PEPTIDE: CPT

## 2024-07-29 PROCEDURE — 6370000000 HC RX 637 (ALT 250 FOR IP): Performed by: STUDENT IN AN ORGANIZED HEALTH CARE EDUCATION/TRAINING PROGRAM

## 2024-07-29 PROCEDURE — 2580000003 HC RX 258

## 2024-07-29 PROCEDURE — 99285 EMERGENCY DEPT VISIT HI MDM: CPT

## 2024-07-29 PROCEDURE — 85025 COMPLETE CBC W/AUTO DIFF WBC: CPT

## 2024-07-29 PROCEDURE — 93005 ELECTROCARDIOGRAM TRACING: CPT | Performed by: STUDENT IN AN ORGANIZED HEALTH CARE EDUCATION/TRAINING PROGRAM

## 2024-07-29 PROCEDURE — APPSS45 APP SPLIT SHARED TIME 31-45 MINUTES

## 2024-07-29 PROCEDURE — 5A09357 ASSISTANCE WITH RESPIRATORY VENTILATION, LESS THAN 24 CONSECUTIVE HOURS, CONTINUOUS POSITIVE AIRWAY PRESSURE: ICD-10-PCS

## 2024-07-29 PROCEDURE — 82962 GLUCOSE BLOOD TEST: CPT

## 2024-07-29 PROCEDURE — 2060000000 HC ICU INTERMEDIATE R&B

## 2024-07-29 RX ORDER — METOPROLOL TARTRATE 100 MG/1
100 TABLET ORAL 2 TIMES DAILY
COMMUNITY
Start: 2024-07-01

## 2024-07-29 RX ORDER — INSULIN LISPRO 100 [IU]/ML
6 INJECTION, SOLUTION INTRAVENOUS; SUBCUTANEOUS ONCE
Status: COMPLETED | OUTPATIENT
Start: 2024-07-29 | End: 2024-07-29

## 2024-07-29 RX ORDER — INSULIN LISPRO 100 [IU]/ML
12 INJECTION, SOLUTION INTRAVENOUS; SUBCUTANEOUS
COMMUNITY

## 2024-07-29 RX ORDER — BUDESONIDE 0.5 MG/2ML
500 INHALANT ORAL
Status: DISCONTINUED | OUTPATIENT
Start: 2024-07-29 | End: 2024-08-01 | Stop reason: HOSPADM

## 2024-07-29 RX ORDER — POTASSIUM CHLORIDE 20 MEQ/1
40 TABLET, EXTENDED RELEASE ORAL PRN
Status: DISCONTINUED | OUTPATIENT
Start: 2024-07-29 | End: 2024-08-01 | Stop reason: HOSPADM

## 2024-07-29 RX ORDER — POLYETHYLENE GLYCOL 3350 17 G/17G
17 POWDER, FOR SOLUTION ORAL DAILY PRN
Status: DISCONTINUED | OUTPATIENT
Start: 2024-07-29 | End: 2024-08-01 | Stop reason: HOSPADM

## 2024-07-29 RX ORDER — ALPRAZOLAM 0.25 MG/1
0.25 TABLET ORAL 2 TIMES DAILY PRN
COMMUNITY
Start: 2023-07-03

## 2024-07-29 RX ORDER — TAMSULOSIN HYDROCHLORIDE 0.4 MG/1
0.8 CAPSULE ORAL DAILY
COMMUNITY

## 2024-07-29 RX ORDER — GLUCAGON 1 MG/ML
1 KIT INJECTION PRN
Status: DISCONTINUED | OUTPATIENT
Start: 2024-07-29 | End: 2024-08-01 | Stop reason: HOSPADM

## 2024-07-29 RX ORDER — METHYLPREDNISOLONE SODIUM SUCCINATE 40 MG/ML
40 INJECTION, POWDER, LYOPHILIZED, FOR SOLUTION INTRAMUSCULAR; INTRAVENOUS EVERY 8 HOURS
Status: DISCONTINUED | OUTPATIENT
Start: 2024-07-29 | End: 2024-07-31

## 2024-07-29 RX ORDER — SODIUM CHLORIDE 0.9 % (FLUSH) 0.9 %
5-40 SYRINGE (ML) INJECTION PRN
Status: DISCONTINUED | OUTPATIENT
Start: 2024-07-29 | End: 2024-08-01 | Stop reason: HOSPADM

## 2024-07-29 RX ORDER — SODIUM CHLORIDE 0.9 % (FLUSH) 0.9 %
5-40 SYRINGE (ML) INJECTION EVERY 12 HOURS SCHEDULED
Status: DISCONTINUED | OUTPATIENT
Start: 2024-07-29 | End: 2024-08-01 | Stop reason: HOSPADM

## 2024-07-29 RX ORDER — PREDNISONE 10 MG/1
10 TABLET ORAL 3 TIMES DAILY
COMMUNITY
Start: 2024-07-01

## 2024-07-29 RX ORDER — LISINOPRIL 20 MG/1
20 TABLET ORAL DAILY
COMMUNITY
Start: 2024-07-02

## 2024-07-29 RX ORDER — TAMSULOSIN HYDROCHLORIDE 0.4 MG/1
0.8 CAPSULE ORAL DAILY
Status: DISCONTINUED | OUTPATIENT
Start: 2024-07-29 | End: 2024-08-01 | Stop reason: HOSPADM

## 2024-07-29 RX ORDER — PREDNISONE 20 MG/1
60 TABLET ORAL ONCE
Status: COMPLETED | OUTPATIENT
Start: 2024-07-29 | End: 2024-07-29

## 2024-07-29 RX ORDER — FINASTERIDE 5 MG/1
5 TABLET, FILM COATED ORAL DAILY
COMMUNITY
Start: 2024-07-03

## 2024-07-29 RX ORDER — GUAIFENESIN 400 MG/1
400 TABLET ORAL 2 TIMES DAILY PRN
COMMUNITY

## 2024-07-29 RX ORDER — ROFLUMILAST 500 UG/1
500 TABLET ORAL DAILY
Status: DISCONTINUED | OUTPATIENT
Start: 2024-07-29 | End: 2024-08-01 | Stop reason: HOSPADM

## 2024-07-29 RX ORDER — AZITHROMYCIN 250 MG/1
250 TABLET, FILM COATED ORAL
COMMUNITY
Start: 2023-11-29

## 2024-07-29 RX ORDER — IPRATROPIUM BROMIDE AND ALBUTEROL SULFATE 2.5; .5 MG/3ML; MG/3ML
1 SOLUTION RESPIRATORY (INHALATION)
Status: DISCONTINUED | OUTPATIENT
Start: 2024-07-29 | End: 2024-08-01 | Stop reason: HOSPADM

## 2024-07-29 RX ORDER — FORMOTEROL FUMARATE DIHYDRATE 20 UG/2ML
20 SOLUTION RESPIRATORY (INHALATION) EVERY 12 HOURS SCHEDULED
COMMUNITY
Start: 2023-07-06 | End: 2025-07-10

## 2024-07-29 RX ORDER — INSULIN LISPRO 100 [IU]/ML
0-4 INJECTION, SOLUTION INTRAVENOUS; SUBCUTANEOUS NIGHTLY
Status: DISCONTINUED | OUTPATIENT
Start: 2024-07-29 | End: 2024-08-01 | Stop reason: HOSPADM

## 2024-07-29 RX ORDER — ACETAMINOPHEN 325 MG/1
650 TABLET ORAL EVERY 6 HOURS PRN
Status: DISCONTINUED | OUTPATIENT
Start: 2024-07-29 | End: 2024-08-01 | Stop reason: HOSPADM

## 2024-07-29 RX ORDER — ALPRAZOLAM 0.25 MG/1
0.25 TABLET ORAL 2 TIMES DAILY PRN
Status: DISCONTINUED | OUTPATIENT
Start: 2024-07-29 | End: 2024-08-01 | Stop reason: HOSPADM

## 2024-07-29 RX ORDER — DEXTROSE MONOHYDRATE 100 MG/ML
INJECTION, SOLUTION INTRAVENOUS CONTINUOUS PRN
Status: DISCONTINUED | OUTPATIENT
Start: 2024-07-29 | End: 2024-08-01 | Stop reason: HOSPADM

## 2024-07-29 RX ORDER — GUAIFENESIN 400 MG/1
400 TABLET ORAL 2 TIMES DAILY PRN
Status: DISCONTINUED | OUTPATIENT
Start: 2024-07-29 | End: 2024-08-01 | Stop reason: HOSPADM

## 2024-07-29 RX ORDER — POTASSIUM CHLORIDE 7.45 MG/ML
10 INJECTION INTRAVENOUS PRN
Status: DISCONTINUED | OUTPATIENT
Start: 2024-07-29 | End: 2024-08-01 | Stop reason: HOSPADM

## 2024-07-29 RX ORDER — ASPIRIN 81 MG/1
81 TABLET ORAL DAILY
Status: DISCONTINUED | OUTPATIENT
Start: 2024-07-29 | End: 2024-08-01 | Stop reason: HOSPADM

## 2024-07-29 RX ORDER — AMLODIPINE BESYLATE 5 MG/1
5 TABLET ORAL DAILY
Status: DISCONTINUED | OUTPATIENT
Start: 2024-07-29 | End: 2024-08-01 | Stop reason: HOSPADM

## 2024-07-29 RX ORDER — INSULIN LISPRO 100 [IU]/ML
0-16 INJECTION, SOLUTION INTRAVENOUS; SUBCUTANEOUS
Status: DISCONTINUED | OUTPATIENT
Start: 2024-07-29 | End: 2024-08-01 | Stop reason: HOSPADM

## 2024-07-29 RX ORDER — ONDANSETRON 2 MG/ML
4 INJECTION INTRAMUSCULAR; INTRAVENOUS EVERY 6 HOURS PRN
Status: DISCONTINUED | OUTPATIENT
Start: 2024-07-29 | End: 2024-08-01 | Stop reason: HOSPADM

## 2024-07-29 RX ORDER — BUDESONIDE 0.5 MG/2ML
1 INHALANT ORAL 2 TIMES DAILY
COMMUNITY
Start: 2024-07-24 | End: 2025-07-24

## 2024-07-29 RX ORDER — ROSUVASTATIN CALCIUM 10 MG/1
10 TABLET, COATED ORAL DAILY
Status: DISCONTINUED | OUTPATIENT
Start: 2024-07-29 | End: 2024-08-01 | Stop reason: HOSPADM

## 2024-07-29 RX ORDER — INSULIN GLARGINE 100 [IU]/ML
40 INJECTION, SOLUTION SUBCUTANEOUS DAILY
Status: DISCONTINUED | OUTPATIENT
Start: 2024-07-29 | End: 2024-08-01 | Stop reason: HOSPADM

## 2024-07-29 RX ORDER — MAGNESIUM SULFATE IN WATER 40 MG/ML
2000 INJECTION, SOLUTION INTRAVENOUS PRN
Status: DISCONTINUED | OUTPATIENT
Start: 2024-07-29 | End: 2024-08-01 | Stop reason: HOSPADM

## 2024-07-29 RX ORDER — ROFLUMILAST 500 UG/1
500 TABLET ORAL DAILY
COMMUNITY
Start: 2024-07-17

## 2024-07-29 RX ORDER — ROSUVASTATIN CALCIUM 10 MG/1
10 TABLET, COATED ORAL DAILY
COMMUNITY

## 2024-07-29 RX ORDER — DAPAGLIFLOZIN 10 MG/1
10 TABLET, FILM COATED ORAL EVERY MORNING
COMMUNITY
Start: 2024-06-12 | End: 2024-07-29 | Stop reason: ALTCHOICE

## 2024-07-29 RX ORDER — AZITHROMYCIN 250 MG/1
250 TABLET, FILM COATED ORAL
Status: DISCONTINUED | OUTPATIENT
Start: 2024-07-29 | End: 2024-08-01 | Stop reason: HOSPADM

## 2024-07-29 RX ORDER — METOPROLOL TARTRATE 50 MG/1
100 TABLET, FILM COATED ORAL 2 TIMES DAILY
Status: DISCONTINUED | OUTPATIENT
Start: 2024-07-29 | End: 2024-08-01 | Stop reason: HOSPADM

## 2024-07-29 RX ORDER — ARFORMOTEROL TARTRATE 15 UG/2ML
15 SOLUTION RESPIRATORY (INHALATION)
Status: DISCONTINUED | OUTPATIENT
Start: 2024-07-29 | End: 2024-08-01 | Stop reason: HOSPADM

## 2024-07-29 RX ORDER — SODIUM CHLORIDE 9 MG/ML
INJECTION, SOLUTION INTRAVENOUS PRN
Status: DISCONTINUED | OUTPATIENT
Start: 2024-07-29 | End: 2024-08-01 | Stop reason: HOSPADM

## 2024-07-29 RX ORDER — ENOXAPARIN SODIUM 100 MG/ML
30 INJECTION SUBCUTANEOUS 2 TIMES DAILY
Status: DISCONTINUED | OUTPATIENT
Start: 2024-07-29 | End: 2024-08-01 | Stop reason: HOSPADM

## 2024-07-29 RX ORDER — AMLODIPINE BESYLATE 5 MG/1
5 TABLET ORAL DAILY
COMMUNITY
Start: 2024-07-22

## 2024-07-29 RX ORDER — ONDANSETRON 4 MG/1
4 TABLET, ORALLY DISINTEGRATING ORAL EVERY 8 HOURS PRN
Status: DISCONTINUED | OUTPATIENT
Start: 2024-07-29 | End: 2024-08-01 | Stop reason: HOSPADM

## 2024-07-29 RX ORDER — ACETAMINOPHEN 650 MG/1
650 SUPPOSITORY RECTAL EVERY 6 HOURS PRN
Status: DISCONTINUED | OUTPATIENT
Start: 2024-07-29 | End: 2024-08-01 | Stop reason: HOSPADM

## 2024-07-29 RX ADMIN — ARFORMOTEROL TARTRATE 15 MCG: 15 SOLUTION RESPIRATORY (INHALATION) at 19:35

## 2024-07-29 RX ADMIN — INSULIN LISPRO 6 UNITS: 100 INJECTION, SOLUTION INTRAVENOUS; SUBCUTANEOUS at 18:18

## 2024-07-29 RX ADMIN — INSULIN LISPRO 16 UNITS: 100 INJECTION, SOLUTION INTRAVENOUS; SUBCUTANEOUS at 17:00

## 2024-07-29 RX ADMIN — IPRATROPIUM BROMIDE AND ALBUTEROL SULFATE 1 DOSE: 2.5; .5 SOLUTION RESPIRATORY (INHALATION) at 19:35

## 2024-07-29 RX ADMIN — ENOXAPARIN SODIUM 30 MG: 100 INJECTION SUBCUTANEOUS at 20:35

## 2024-07-29 RX ADMIN — METOPROLOL TARTRATE 100 MG: 50 TABLET, FILM COATED ORAL at 20:34

## 2024-07-29 RX ADMIN — IPRATROPIUM BROMIDE AND ALBUTEROL SULFATE 1 DOSE: 2.5; .5 SOLUTION RESPIRATORY (INHALATION) at 15:54

## 2024-07-29 RX ADMIN — INSULIN LISPRO 4 UNITS: 100 INJECTION, SOLUTION INTRAVENOUS; SUBCUTANEOUS at 20:53

## 2024-07-29 RX ADMIN — SODIUM CHLORIDE, PRESERVATIVE FREE 10 ML: 5 INJECTION INTRAVENOUS at 20:42

## 2024-07-29 RX ADMIN — ALPRAZOLAM 0.25 MG: 0.25 TABLET ORAL at 20:37

## 2024-07-29 RX ADMIN — BUDESONIDE 500 MCG: 0.5 SUSPENSION RESPIRATORY (INHALATION) at 19:35

## 2024-07-29 RX ADMIN — ENOXAPARIN SODIUM 30 MG: 100 INJECTION SUBCUTANEOUS at 16:23

## 2024-07-29 RX ADMIN — PREDNISONE 60 MG: 20 TABLET ORAL at 12:15

## 2024-07-29 RX ADMIN — IPRATROPIUM BROMIDE AND ALBUTEROL SULFATE 1 DOSE: 2.5; .5 SOLUTION RESPIRATORY (INHALATION) at 12:14

## 2024-07-29 RX ADMIN — ASPIRIN 81 MG: 81 TABLET, COATED ORAL at 16:20

## 2024-07-29 RX ADMIN — ONDANSETRON 4 MG: 2 INJECTION INTRAMUSCULAR; INTRAVENOUS at 22:56

## 2024-07-29 RX ADMIN — ROSUVASTATIN CALCIUM 10 MG: 10 TABLET, FILM COATED ORAL at 16:19

## 2024-07-29 RX ADMIN — METHYLPREDNISOLONE SODIUM SUCCINATE 40 MG: 40 INJECTION INTRAMUSCULAR; INTRAVENOUS at 16:22

## 2024-07-29 ASSESSMENT — PAIN - FUNCTIONAL ASSESSMENT
PAIN_FUNCTIONAL_ASSESSMENT: NONE - DENIES PAIN
PAIN_FUNCTIONAL_ASSESSMENT: NONE - DENIES PAIN

## 2024-07-29 ASSESSMENT — PAIN SCALES - GENERAL
PAINLEVEL_OUTOF10: 0
PAINLEVEL_OUTOF10: 0

## 2024-07-29 ASSESSMENT — LIFESTYLE VARIABLES
HOW MANY STANDARD DRINKS CONTAINING ALCOHOL DO YOU HAVE ON A TYPICAL DAY: PATIENT DOES NOT DRINK
HOW OFTEN DO YOU HAVE A DRINK CONTAINING ALCOHOL: NEVER
HOW OFTEN DO YOU HAVE A DRINK CONTAINING ALCOHOL: NEVER

## 2024-07-29 NOTE — ED NOTES
ED to Inpatient Handoff Report    Attempted to notify 5s x2, without success, that electronic handoff available and patient ready for transport to room 543  .    Safety Risks: None identified    Patient in Restraints: no    Constant Observer or Patient : no    Telemetry Monitoring Ordered :Yes      Cardiac Rhythm: Sinus arrythmia    Order to transfer to unit without monitor:YES    Last MEWS: 1425   Time completed: 1    Deterioration Index Score:   Predictive Model Details          37 (Caution)  Factor Value    Calculated 7/29/2024 14:27 32% Age 73 years old    Deterioration Index Model 30% Supplemental oxygen Nasal cannula     12% Potassium 5.0 mmol/L     11% Cardiac rhythm Sinus arrythmia     6% Respiratory rate 18     3% Systolic 128     3% BUN abnormal (49 mg/dL)     2% Pulse oximetry 99 %     1% Platelet count abnormal (129 k/uL)     1% WBC count 8.7 k/uL     0% Hematocrit abnormal (36.5 %)     0% Pulse 70     0% Sodium 139 mmol/L     0% Temperature 98.3 °F (36.8 °C)        Vitals:    07/29/24 1111 07/29/24 1214 07/29/24 1425   BP: 113/67  128/76   Pulse: 65 64 70   Resp: 16 25 18   Temp: 98.1 °F (36.7 °C)  98.3 °F (36.8 °C)   TempSrc: Oral  Oral   SpO2: 100% 97% 99%   Weight: 102.5 kg (226 lb)     Height: 1.854 m (6' 1\")           Opportunity for questions and clarification was provided.

## 2024-07-29 NOTE — H&P
[facility] Hospitalist Group   History and Physical      CHIEF COMPLAINT:  Shortness of Breath    History of Present Illness:  73 y.o. male with a history of COPD, DM, GERD, HLD, HF presents with SOB. Pt sent to ED by pulmonologist Dr. Goyal for COPD exacerbation. Pt wears 3.5-4L NC baseline and NIV at home. Pt says he is coughing up scant amount of white sputum and reports increased dyspnea with exertion within the past few months. Pt was given prednisone 30mg and duonebs by pulmonologist with no improvement. Patient does use chest vest at home. Patient reports an increased in chest tightness. CXR in ED showed cardiomegaly and bibasilar atelectasis. Pt Creat slightly elevated at 1.9 (baseline 1.4-1.6). Troponin elevated at 44. ED course included 60mg prednisone and duoneb treatments.     Informant(s) for H&P:Patient and EMR     REVIEW OF SYSTEMS:  SOB, chest tightness. no fevers, chills, cp n/v, ha, vision/hearing changes, wt changes, hot/cold flashes, other open skin lesions, diarrhea, constipation, dysuria/hematuria unless noted in HPI. Complete ROS performed with the patient and is otherwise negative.      PMH:  Past Medical History:   Diagnosis Date    COPD (chronic obstructive pulmonary disease) (HCC)     Diabetes mellitus (HCC)     GERD (gastroesophageal reflux disease)     Hyperlipidemia        Surgical History:  Past Surgical History:   Procedure Laterality Date    BRONCHOSCOPY  3/7/2024    BRONCHOSCOPY DIAGNOSTIC OR CELL WASH ONLY performed by Chase Naqvi MD [place] [facility]    BRONCHOSCOPY  3/7/2024    BRONCHOSCOPY ALVEOLAR LAVAGE performed by Chase Naqvi MD [place] [facility]    BRONCHOSCOPY  3/7/2024    BRONCHOSCOPY BIOPSY BRONCHUS performed by Chase Naqvi MD at [facility]    BRONCHOSCOPY  3/7/2024    BRONCHOSCOPY BRUSHINGS performed by Chase Naqvi MD at [facility]    BRONCHOSCOPY  3/7/2024    BRONCHOSCOPY ROBOTIC [place] by [place] [facility]  normal    LABS:  Recent Labs     07/29/24  1221      K 5.0   CL 96*   CO2 31*   BUN 49*   CREATININE 1.9*   GLUCOSE 259*   CALCIUM 9.2       Recent Labs     07/29/24  1221   WBC 8.7   RBC 4.18   HGB 12.2*   HCT 36.5*   MCV 87.3   MCH 29.2   MCHC 33.4   RDW 13.2   *   MPV 9.0       No results for input(s): \"POCGLU\" in the last 72 hours.        Radiology: XR CHEST PORTABLE    Result Date: 7/29/2024  EXAMINATION: ONE XRAY VIEW OF THE CHEST 7/29/2024 11:59 am COMPARISON: Chest x-ray dated 04/20/2024 HISTORY: ORDERING SYSTEM PROVIDED HISTORY: sob TECHNOLOGIST PROVIDED HISTORY: Reason for exam:->sob FINDINGS: Cardiac size enlarged.  Bibasilar atelectasis.  No consolidation.  No pneumothorax or pleural effusion.     Cardiomegaly. Bibasilar atelectasis.       EKG:     ASSESSMENT:      Principal Problem:    COPD exacerbation (HCC)  Resolved Problems:    * No resolved hospital problems. *      PLAN:    COPD exacerbation- CXR showed bibasilar atelectasis. Pt wears 3.5-4L NC at home and NIV. Pt on 6L NC now. Wean O2 as tolerated. Continue chest vest, NIV. Continue azithromycin weekly. Continue solumedrol and duoneb treatments. Consult to pulmonology, appreciate their input.   GONZALES- creat elevated at 1.9 (baseline 1.4-1.6). monitor on daily BMP.   DM- continue lantus. SS insulin. ACHS BS checks. Hypoglycemic protocol.   HLD- continue statin  HTN- stable at 113/67. continue amlodipine, lisinopril, metoprolol.   HF- continue amlodipine, aspirin, lasix, imdur, lisinopril, metoprolol     Code Status: Full  DVT prophylaxis: loveox    NOTE: This report was transcribed using voice recognition software. Every effort was made to ensure accuracy; however, inadvertent computerized transcription errors may be present.     Electronically signed by ANDIE Bernal CNP on 7/29/2024 at 2:25 PM

## 2024-07-29 NOTE — PROGRESS NOTES
4 Eyes Skin Assessment     NAME:  Chico Frias  YOB: 1950  MEDICAL RECORD NUMBER:  [id number]    The patient is being assessed for  Admission    I agree that at least one RN has performed a thorough Head to Toe Skin Assessment on the patient. ALL assessment sites listed below have been assessed.      Areas assessed by both nurses:    Head, Face, Ears, Shoulders, Back, Chest, Arms, Elbows, Hands, Sacrum. Buttock, Coccyx, Ischium, and Legs. Feet and Heels        Does the Patient have a Wound? No noted wound(s)       Bon Prevention initiated by RN: Yes  Wound Care Orders initiated by RN: No    Pressure Injury (Stage 3,4, Unstageable, DTI, NWPT, and Complex wounds) if present, place Wound referral order by RN under : No    New Ostomies, if present place, Ostomy referral order under : No     Nurse 1 eSignature: Electronically signed by Melanie Aldana RN on 7/29/24 at 3:37 PM EDT    **SHARE this note so that the co-signing nurse can place an eSignature**    Nurse 2 eSignature: Electronically signed by Alexa Palencia RN on 7/29/24 at 3:44 PM EDT

## 2024-07-29 NOTE — PROGRESS NOTES
Dr Arzate aware of high glucose reading per pt's cgm. BSG checked with glucometer 478. New order received

## 2024-07-29 NOTE — ED PROVIDER NOTES
[facility] MED SURG/TELE  EMERGENCY DEPARTMENT ENCOUNTER        Pt Name: Chico Frias  MRN: [id number]  Birthdate 1950  Date of evaluation: 7/29/2024  Provider: Ricardo Tatum MD  PCP: Jose Kwok MD  Note Started: 11:28 AM EDT 7/29/24    CHIEF COMPLAINT       Chief Complaint   Patient presents with    Shortness of Breath       HISTORY OF PRESENT ILLNESS: 1 or more Elements        Limitations to history : None    Chico Frias is a 73 y.o. male who presents to the emergency room for shortness of breath.  Was sent in by his pulmonologist for admission due to failure responded home to normal treatments.  Does also have a history of heart failure.  He denies any pain besides his chest feels tight.  Denies any fevers, chills, significant coughing.  He is at his normal dry weight.  His last treatment was this morning around 6 AM.  Feels as if he needs another.    Nursing Notes were all reviewed and agreed with or any disagreements were addressed in the HPI.      REVIEW OF EXTERNAL NOTE :       As below    REVIEW OF SYSTEMS :      Positives and Pertinent negatives as per HPI.     SURGICAL HISTORY     Past Surgical History:   Procedure Laterality Date    BRONCHOSCOPY  3/7/2024    BRONCHOSCOPY DIAGNOSTIC OR CELL WASH ONLY performed by Chase Naqvi MD at [facility] ENDOSCOPY    BRONCHOSCOPY  3/7/2024    BRONCHOSCOPY ALVEOLAR LAVAGE performed by Chase Naqvi MD at [facility] ENDOSCOPY    BRONCHOSCOPY  3/7/2024    BRONCHOSCOPY BIOPSY BRONCHUS performed by Chase Naqvi MD at [facility] ENDOSCOPY    BRONCHOSCOPY  3/7/2024    BRONCHOSCOPY BRUSHINGS performed by Chase Naqvi MD at [facility] ENDOSCOPY    BRONCHOSCOPY  3/7/2024    BRONCHOSCOPY ROBOTIC performed by Chase Naqvi MD at [facility] ENDOSCOPY    CORONARY ANGIOPLASTY WITH STENT PLACEMENT      CT NEEDLE BIOPSY LUNG PERCUTANEOUS  10/6/2022    CT NEEDLE BIOPSY LUNG PERCUTANEOUS 10/6/2022 [facility] CT       CURRENTMEDICATIONS       Current Discharge Medication List     Patient: Scotty Oliveira    Procedure(s):  Creation of brachial artery to cephalic vein fistula - Wound Class: I-Clean    Diagnosis:End Stage Renal Disease   Diagnosis Additional Information: No value filed.    Anesthesia Type:  General    Note:  Anesthesia Post Evaluation    Patient location during evaluation: PACU  Patient participation: Able to fully participate in evaluation  Level of consciousness: awake  Pain management: adequate  Airway patency: patent  Cardiovascular status: acceptable  Respiratory status: acceptable  Hydration status: acceptable  PONV: none     Anesthetic complications: None          Last vitals:  Vitals:    01/08/18 1245 01/08/18 1300 01/08/18 1315   BP: 132/73 126/71 128/66   Pulse:      Resp: 12 12 12   Temp: 36.4  C (97.5  F)     SpO2: 98% 98%          Electronically Signed By: Boaz Tillman MD  January 8, 2018  1:25 PM   suspension 500 mcg (has no administration in time range)   arformoterol tartrate (BROVANA) nebulizer solution 15 mcg (has no administration in time range)   guaiFENesin tablet 400 mg (has no administration in time range)   insulin glargine (LANTUS) injection vial 40 Units (40 Units SubCUTAneous Not Given 7/29/24 1505)   metoprolol tartrate (LOPRESSOR) tablet 100 mg (100 mg Oral Not Given 7/29/24 1628)   Roflumilast (DALIRESP) tablet 500 mcg (has no administration in time range)   rosuvastatin (CRESTOR) tablet 10 mg (10 mg Oral Given 7/29/24 1619)   tamsulosin (FLOMAX) capsule 0.8 mg (0.8 mg Oral Not Given 7/29/24 1628)   dextrose bolus 10% 125 mL (has no administration in time range)     Or   dextrose bolus 10% 250 mL (has no administration in time range)   glucagon injection 1 mg (has no administration in time range)   dextrose 10 % infusion (has no administration in time range)   insulin lispro (HUMALOG,ADMELOG) injection vial 0-16 Units (16 Units SubCUTAneous Given 7/29/24 1700)   insulin lispro (HUMALOG,ADMELOG) injection vial 0-4 Units (has no administration in time range)   Glucose (TRUEPLUS) oral gel 15 g (has no administration in time range)   predniSONE (DELTASONE) tablet 60 mg (60 mg Oral Given 7/29/24 1215)         Is this patient to be included in the SEP-1 Core Measure due to severe sepsis or septic shock?   No Exclusion criteria - the patient is NOT to be included for SEP-1 Core Measure due to: Infection is not suspected          Medical Decision Making/Differential Diagnosis:    CC/HPI Summary, Social Determinants of health, Records Reviewed, DDx, testing done/not done, ED Course, Reassessment, disposition considerations/shared decision making with patient, consults, disposition:        ED Course as of 07/29/24 1738   Mon Jul 29, 2024   1128 Office visit with pulmonology today, sent in for admission. [JG]   1323 Dr. Red will admit [JG]   1324 On my interpretation of patient's chest x-ray no focal

## 2024-07-30 ENCOUNTER — APPOINTMENT (OUTPATIENT)
Dept: CT IMAGING | Age: 74
End: 2024-07-30
Payer: MEDICARE

## 2024-07-30 LAB
ANION GAP SERPL CALCULATED.3IONS-SCNC: 11 MMOL/L (ref 7–16)
B.E.: 1.4 MMOL/L (ref -3–3)
BASOPHILS # BLD: 0.06 K/UL (ref 0–0.2)
BASOPHILS NFR BLD: 1 % (ref 0–2)
BUN SERPL-MCNC: 48 MG/DL (ref 6–23)
CALCIUM SERPL-MCNC: 8.4 MG/DL (ref 8.6–10.2)
CHLORIDE SERPL-SCNC: 96 MMOL/L (ref 98–107)
CO2 SERPL-SCNC: 29 MMOL/L (ref 22–29)
COHB: 0.9 % (ref 0–1.5)
CREAT SERPL-MCNC: 1.8 MG/DL (ref 0.7–1.2)
CRITICAL: ABNORMAL
DATE ANALYZED: ABNORMAL
DATE OF COLLECTION: ABNORMAL
EKG ATRIAL RATE: 66 BPM
EKG P AXIS: 11 DEGREES
EKG P-R INTERVAL: 146 MS
EKG Q-T INTERVAL: 398 MS
EKG QRS DURATION: 86 MS
EKG QTC CALCULATION (BAZETT): 417 MS
EKG R AXIS: 31 DEGREES
EKG T AXIS: 94 DEGREES
EKG VENTRICULAR RATE: 66 BPM
EOSINOPHIL # BLD: 0 K/UL (ref 0.05–0.5)
EOSINOPHILS RELATIVE PERCENT: 0 % (ref 0–6)
ERYTHROCYTE [DISTWIDTH] IN BLOOD BY AUTOMATED COUNT: 13 % (ref 11.5–15)
GFR, ESTIMATED: 39 ML/MIN/1.73M2
GLUCOSE BLD-MCNC: 176 MG/DL (ref 74–99)
GLUCOSE BLD-MCNC: 255 MG/DL (ref 74–99)
GLUCOSE BLD-MCNC: 299 MG/DL (ref 74–99)
GLUCOSE BLD-MCNC: 305 MG/DL (ref 74–99)
GLUCOSE SERPL-MCNC: 165 MG/DL (ref 74–99)
HBA1C MFR BLD: 7.5 % (ref 4–5.6)
HCO3: 26.8 MMOL/L (ref 22–26)
HCT VFR BLD AUTO: 33 % (ref 37–54)
HGB BLD-MCNC: 11.4 G/DL (ref 12.5–16.5)
HHB: 2.5 % (ref 0–5)
LAB: ABNORMAL
LYMPHOCYTES NFR BLD: 0.25 K/UL (ref 1.5–4)
LYMPHOCYTES RELATIVE PERCENT: 3 % (ref 20–42)
Lab: 1510
MCH RBC QN AUTO: 29.2 PG (ref 26–35)
MCHC RBC AUTO-ENTMCNC: 34.5 G/DL (ref 32–34.5)
MCV RBC AUTO: 84.4 FL (ref 80–99.9)
METAMYELOCYTES ABSOLUTE COUNT: 0.06 K/UL (ref 0–0.12)
METAMYELOCYTES: 1 % (ref 0–1)
METHB: 0.3 % (ref 0–1.5)
MODE: ABNORMAL
MONOCYTES NFR BLD: 0.06 K/UL (ref 0.1–0.95)
MONOCYTES NFR BLD: 1 % (ref 2–12)
NEUTROPHILS NFR BLD: 93 % (ref 43–80)
NEUTS SEG NFR BLD: 6.71 K/UL (ref 1.8–7.3)
O2 CONTENT: 17.9 ML/DL
O2 SATURATION: 97.5 % (ref 92–98.5)
O2HB: 96.3 % (ref 94–97)
OPERATOR ID: 3114
PATIENT TEMP: 37 C
PCO2: 45.5 MMHG (ref 35–45)
PH BLOOD GAS: 7.39 (ref 7.35–7.45)
PLATELET # BLD AUTO: 98 K/UL (ref 130–450)
PLATELET CONFIRMATION: NORMAL
PMV BLD AUTO: 9 FL (ref 7–12)
PO2: 100.6 MMHG (ref 75–100)
POTASSIUM SERPL-SCNC: 4.5 MMOL/L (ref 3.5–5)
PROMYELOCYTES ABSOLUTE COUNT: 0.06 K/UL
PROMYELOCYTES: 1 %
RBC # BLD AUTO: 3.91 M/UL (ref 3.8–5.8)
RBC # BLD: ABNORMAL 10*6/UL
RBC # BLD: ABNORMAL 10*6/UL
SODIUM SERPL-SCNC: 136 MMOL/L (ref 132–146)
SOURCE, BLOOD GAS: ABNORMAL
THB: 13.1 G/DL (ref 11.5–16.5)
TIME ANALYZED: 1521
WBC OTHER # BLD: 7.2 K/UL (ref 4.5–11.5)

## 2024-07-30 PROCEDURE — 82962 GLUCOSE BLOOD TEST: CPT

## 2024-07-30 PROCEDURE — 2580000003 HC RX 258: Performed by: INTERNAL MEDICINE

## 2024-07-30 PROCEDURE — 36600 WITHDRAWAL OF ARTERIAL BLOOD: CPT

## 2024-07-30 PROCEDURE — 6360000002 HC RX W HCPCS

## 2024-07-30 PROCEDURE — 2580000003 HC RX 258

## 2024-07-30 PROCEDURE — 93010 ELECTROCARDIOGRAM REPORT: CPT | Performed by: INTERNAL MEDICINE

## 2024-07-30 PROCEDURE — 80048 BASIC METABOLIC PNL TOTAL CA: CPT

## 2024-07-30 PROCEDURE — 83036 HEMOGLOBIN GLYCOSYLATED A1C: CPT

## 2024-07-30 PROCEDURE — 94640 AIRWAY INHALATION TREATMENT: CPT

## 2024-07-30 PROCEDURE — 6370000000 HC RX 637 (ALT 250 FOR IP): Performed by: STUDENT IN AN ORGANIZED HEALTH CARE EDUCATION/TRAINING PROGRAM

## 2024-07-30 PROCEDURE — 2060000000 HC ICU INTERMEDIATE R&B

## 2024-07-30 PROCEDURE — 2700000000 HC OXYGEN THERAPY PER DAY

## 2024-07-30 PROCEDURE — 71250 CT THORAX DX C-: CPT

## 2024-07-30 PROCEDURE — 94669 MECHANICAL CHEST WALL OSCILL: CPT

## 2024-07-30 PROCEDURE — 36415 COLL VENOUS BLD VENIPUNCTURE: CPT

## 2024-07-30 PROCEDURE — 6370000000 HC RX 637 (ALT 250 FOR IP)

## 2024-07-30 PROCEDURE — 94660 CPAP INITIATION&MGMT: CPT

## 2024-07-30 PROCEDURE — 99232 SBSQ HOSP IP/OBS MODERATE 35: CPT

## 2024-07-30 PROCEDURE — 82805 BLOOD GASES W/O2 SATURATION: CPT

## 2024-07-30 PROCEDURE — 85025 COMPLETE CBC W/AUTO DIFF WBC: CPT

## 2024-07-30 RX ORDER — SODIUM CHLORIDE 9 MG/ML
INJECTION, SOLUTION INTRAVENOUS CONTINUOUS
Status: ACTIVE | OUTPATIENT
Start: 2024-07-30 | End: 2024-07-31

## 2024-07-30 RX ADMIN — ASPIRIN 81 MG: 81 TABLET, COATED ORAL at 08:18

## 2024-07-30 RX ADMIN — TAMSULOSIN HYDROCHLORIDE 0.8 MG: 0.4 CAPSULE ORAL at 08:18

## 2024-07-30 RX ADMIN — ROSUVASTATIN CALCIUM 10 MG: 10 TABLET, FILM COATED ORAL at 08:18

## 2024-07-30 RX ADMIN — METHYLPREDNISOLONE SODIUM SUCCINATE 40 MG: 40 INJECTION INTRAMUSCULAR; INTRAVENOUS at 08:17

## 2024-07-30 RX ADMIN — INSULIN LISPRO 8 UNITS: 100 INJECTION, SOLUTION INTRAVENOUS; SUBCUTANEOUS at 11:26

## 2024-07-30 RX ADMIN — METOPROLOL TARTRATE 100 MG: 50 TABLET, FILM COATED ORAL at 08:18

## 2024-07-30 RX ADMIN — ARFORMOTEROL TARTRATE 15 MCG: 15 SOLUTION RESPIRATORY (INHALATION) at 08:45

## 2024-07-30 RX ADMIN — INSULIN GLARGINE 40 UNITS: 100 INJECTION, SOLUTION SUBCUTANEOUS at 06:45

## 2024-07-30 RX ADMIN — SODIUM CHLORIDE, PRESERVATIVE FREE 10 ML: 5 INJECTION INTRAVENOUS at 08:19

## 2024-07-30 RX ADMIN — SODIUM CHLORIDE, PRESERVATIVE FREE 10 ML: 5 INJECTION INTRAVENOUS at 21:27

## 2024-07-30 RX ADMIN — METHYLPREDNISOLONE SODIUM SUCCINATE 40 MG: 40 INJECTION INTRAMUSCULAR; INTRAVENOUS at 17:37

## 2024-07-30 RX ADMIN — INSULIN LISPRO 12 UNITS: 100 INJECTION, SOLUTION INTRAVENOUS; SUBCUTANEOUS at 16:32

## 2024-07-30 RX ADMIN — METOPROLOL TARTRATE 100 MG: 50 TABLET, FILM COATED ORAL at 21:27

## 2024-07-30 RX ADMIN — METHYLPREDNISOLONE SODIUM SUCCINATE 40 MG: 40 INJECTION INTRAMUSCULAR; INTRAVENOUS at 01:26

## 2024-07-30 RX ADMIN — ROFLUMILAST 500 MCG: 500 TABLET ORAL at 08:18

## 2024-07-30 RX ADMIN — ENOXAPARIN SODIUM 30 MG: 100 INJECTION SUBCUTANEOUS at 21:26

## 2024-07-30 RX ADMIN — IPRATROPIUM BROMIDE AND ALBUTEROL SULFATE 1 DOSE: 2.5; .5 SOLUTION RESPIRATORY (INHALATION) at 19:54

## 2024-07-30 RX ADMIN — SODIUM CHLORIDE: 9 INJECTION, SOLUTION INTRAVENOUS at 16:30

## 2024-07-30 RX ADMIN — IPRATROPIUM BROMIDE AND ALBUTEROL SULFATE 1 DOSE: 2.5; .5 SOLUTION RESPIRATORY (INHALATION) at 08:45

## 2024-07-30 RX ADMIN — AMLODIPINE BESYLATE 5 MG: 5 TABLET ORAL at 08:18

## 2024-07-30 RX ADMIN — ARFORMOTEROL TARTRATE 15 MCG: 15 SOLUTION RESPIRATORY (INHALATION) at 19:54

## 2024-07-30 RX ADMIN — IPRATROPIUM BROMIDE AND ALBUTEROL SULFATE 1 DOSE: 2.5; .5 SOLUTION RESPIRATORY (INHALATION) at 12:00

## 2024-07-30 RX ADMIN — IPRATROPIUM BROMIDE AND ALBUTEROL SULFATE 1 DOSE: 2.5; .5 SOLUTION RESPIRATORY (INHALATION) at 15:59

## 2024-07-30 RX ADMIN — ALPRAZOLAM 0.25 MG: 0.25 TABLET ORAL at 18:44

## 2024-07-30 RX ADMIN — BUDESONIDE 500 MCG: 0.5 SUSPENSION RESPIRATORY (INHALATION) at 08:44

## 2024-07-30 RX ADMIN — ENOXAPARIN SODIUM 30 MG: 100 INJECTION SUBCUTANEOUS at 08:19

## 2024-07-30 NOTE — ACP (ADVANCE CARE PLANNING)
7/30/2024  Advance Care Planning   Healthcare Decision Maker:    Primary Decision Maker: JAYLEIDA - [facility] - [phone]    Click here to complete Healthcare Decision Makers including selection of the Healthcare Decision Maker Relationship (ie \"Primary\").

## 2024-07-30 NOTE — CARE COORDINATION
7/30/2024  Social Work Discharge Planning:COPD. This worker met with Pt to discuss  role and transition of care/discharge planning.Pt is independent from home with his spouse and resides in a two story home. Pt is on 4l o2 here and uses this at home. Pt also has a nebulizer but no other DME.  Pharmacy is [facility] in [place] and PCP is Dr. DANA Kwok. Electronically signed by ELIZABETH Blackmon on 7/30/2024 at 1:59 PM

## 2024-07-30 NOTE — PLAN OF CARE
Problem: Discharge Planning  Goal: Discharge to home or other facility with appropriate resources  Outcome: Progressing     Problem: Pain  Goal: Verbalizes/displays adequate comfort level or baseline comfort level  7/30/2024 0052 by Cheli Mckinley RN  Outcome: Progressing  7/29/2024 1521 by Melanie Aldana RN  Outcome: Progressing     Problem: Discharge Planning  Goal: Discharge to home or other facility with appropriate resources  Outcome: Progressing     Problem: Pain  Goal: Verbalizes/displays adequate comfort level or baseline comfort level  7/30/2024 0052 by Cheli Mckinley RN  Outcome: Progressing  7/29/2024 1521 by Melanie Aldana RN  Outcome: Progressing

## 2024-07-30 NOTE — PLAN OF CARE
Problem: Pain  Goal: Verbalizes/displays adequate comfort level or baseline comfort level  7/30/2024 1044 by Yue Galvin RN  Outcome: Progressing  7/30/2024 0052 by Cheli Mckinley RN  Outcome: Progressing     Problem: Discharge Planning  Goal: Discharge to home or other facility with appropriate resources  7/30/2024 1044 by Yue Galvin RN  Outcome: Progressing  7/30/2024 0052 by Cheli Mckinley RN  Outcome: Progressing

## 2024-07-30 NOTE — PROGRESS NOTES
[facility] Hospitalist Progress Note    Admitting Date and Time: 7/29/2024 11:14 AM  Admit Dx: COPD exacerbation (HCC) [J44.1]    Subjective:  Patient is being followed for COPD exacerbation (HCC) [J44.1]     Patient was seen at bedside.  He still complaining of some chest tightness and shortness of breath on exertion.  Denies any fever, chills, cough or phlegm production.  Patient currently on 4.5 L oxygen and use NIV at night    ROS: denies fever, chills, sob, n/v, HA unless stated above.      ipratropium 0.5 mg-albuterol 2.5 mg  1 Dose Inhalation Q4H WA RT    sodium chloride flush  5-40 mL IntraVENous 2 times per day    enoxaparin  30 mg SubCUTAneous BID    methylPREDNISolone  40 mg IntraVENous Q8H    amLODIPine  5 mg Oral Daily    aspirin  81 mg Oral Daily    azithromycin  250 mg Oral Once per day on Mon Wed Fri    [Held by provider] budesonide  500 mcg Nebulization BID RT    arformoterol tartrate  15 mcg Nebulization BID RT    insulin glargine  40 Units SubCUTAneous Daily    metoprolol  100 mg Oral BID    Roflumilast  500 mcg Oral Daily    rosuvastatin  10 mg Oral Daily    tamsulosin  0.8 mg Oral Daily    insulin lispro  0-16 Units SubCUTAneous TID WC    insulin lispro  0-4 Units SubCUTAneous Nightly     sodium chloride flush, 5-40 mL, PRN  sodium chloride, , PRN  potassium chloride, 40 mEq, PRN   Or  potassium alternative oral replacement, 40 mEq, PRN   Or  potassium chloride, 10 mEq, PRN  magnesium sulfate, 2,000 mg, PRN  ondansetron, 4 mg, Q8H PRN   Or  ondansetron, 4 mg, Q6H PRN  polyethylene glycol, 17 g, Daily PRN  acetaminophen, 650 mg, Q6H PRN   Or  acetaminophen, 650 mg, Q6H PRN  ALPRAZolam, 0.25 mg, BID PRN  guaiFENesin, 400 mg, BID PRN  dextrose bolus, 125 mL, PRN   Or  dextrose bolus, 250 mL, PRN  glucagon (rDNA), 1 mg, PRN  dextrose, , Continuous PRN  Glucose, 15 g, Once PRN         Objective:    BP (!) 148/73   Pulse 78   Temp 97.8 °F (36.6 °C) (Oral)   Resp 18   Ht 1.854 m (6' 1\")

## 2024-07-30 NOTE — PROGRESS NOTES
Pulmonary Progress Note    Admit Date: 7/29/2024                            PCP: Jose Kwok MD  Principal Problem:    COPD exacerbation (HCC)  Active Problems:    Acute kidney injury superimposed on CKD (HCC)  Resolved Problems:    * No resolved hospital problems. *      Subjective:  73-year-old patient of Dr. Goyal with PMH of diabetes mellitus, GERD, hyperlipidemia, former tobacco abuse, severe COPD, chronic hypoxic and hypercapnic respiratory failure on 4L/NIV at baseline, right lower lobe nodular density s/p needle biopsy and navigation bronchoscopy 3/2024 negative workup who was sent in to the ED by Dr. Goyal on 7/29 for failed outpatient therapy and acute exacerbation of COPD with plans to obtain ABG and HRCT to further evaluate his emphysema. Patient is currently on nebulized budesonide, arformoterol, duo-nebs and chronic prednisone 15 mg daily that was increased to 30 mg daily for past 2 weeks due to increased shortness of breath without any relief. ED workup revealed, normal VS, 98% on 4L NC, Cl 96, CO2 31, BUN/Cr 49/1.9, Pro-BNP 73, troponin 44, WBC 8.7, H/H 12.2/36.5, platelets 129  XR chest cardiomegaly, bibasilar atelectasis   He was started on IV azithromycin, Solumedrol 40 mg q8h.     Patient still having SOB with minimal activity, chest tightness/wheezing at rest, cough at baseline - occasional with clear mucous production. He does use a vest at home that helps excrete secretions. He wears his NIV for about 12 hrs/day     Medications:   sodium chloride      dextrose          ipratropium 0.5 mg-albuterol 2.5 mg  1 Dose Inhalation Q4H WA RT    sodium chloride flush  5-40 mL IntraVENous 2 times per day    enoxaparin  30 mg SubCUTAneous BID    methylPREDNISolone  40 mg IntraVENous Q8H    amLODIPine  5 mg Oral Daily    aspirin  81 mg Oral Daily    azithromycin  250 mg Oral Once per day on Mon Wed Fri    budesonide  500 mcg Nebulization BID RT    arformoterol tartrate  15 mcg Nebulization  \"PROTIME\", \"INR\" in the last 72 hours.  Cultures:  No results for input(s): \"CULTRESP\" in the last 72 hours.    ABG:   No results for input(s): \"PH\", \"PO2\", \"PCO2\", \"HCO3\", \"BE\", \"O2SAT\" in the last 72 hours.    Films:  XR CHEST PORTABLE  Result Date: 7/29/2024  Cardiomegaly. Bibasilar atelectasis.     High-resolution CT scan showing mild mid lung bronchiectasis mediastinal lipomatosis right midlung opacity most likely scar      Summary of Events:   74 yo M with PMH of DM, GERD, HLD, former tobacco abuse, severe COPD, chronic hypoxic and hypercapnic respiratory failure on 4L/NIV at baseline, RLL nodular density s/p needle biopsy and navigation bronchoscopy 3/2024 negative workup currently on nebulized budesonide, arformoterol, duo-nebs and chronic prednisone 15 mg daily     4/20/2024- 4/23/2024 was treated for fluid overload was given diuretics and lost 17 pounds of fluid weight.      7/29 ED by Dr. Goyal for failed outpatient therapy-prednisone was increased to 30 mg daily for past 2 weeks due to increased shortness of breath without any relief  Admitted for  acute exacerbation of COPD with plans to obtain ABG and HRCT to further evaluate his emphysema.   normal VS, 98% on 4L NC, Cl 96, CO2 31, BUN/Cr 49/1.9,   Pro-BNP 73, troponin 44,   WBC 8.7, H/H 12.2/36.5, platelets 129  XR chest cardiomegaly, bibasilar atelectasis   He was started on IV azithromycin, Solumedrol 40 mg q8h    Assessment/Plan:  Chronic hypoxic/hypercapnic respiratory failure  Continue NIV at HS and prn for naps, currently on 15/480/40%/8  Has home NIV through [facility]   ABG showing pH 7.38 46/100/26/97.  On baseline 4L NC, maintain pox 90-95%  Severe COPD with acute exacerbation   At home: Performist, Pulmicort, Duo-nebs, prednisone 15 mg daily increased to 30 mg daily recently, azithromycin MWF Daliresp  Continue Brovana and duo-nebs. Hold Pulmicort while on IV steroids  Continue IV Solumedrol 40 mg q8h   Check HRCT   PFT

## 2024-07-31 LAB
ANION GAP SERPL CALCULATED.3IONS-SCNC: 11 MMOL/L (ref 7–16)
BASOPHILS # BLD: 0.02 K/UL (ref 0–0.2)
BASOPHILS NFR BLD: 0 % (ref 0–2)
BUN SERPL-MCNC: 45 MG/DL (ref 6–23)
CALCIUM SERPL-MCNC: 8.2 MG/DL (ref 8.6–10.2)
CHLORIDE SERPL-SCNC: 97 MMOL/L (ref 98–107)
CO2 SERPL-SCNC: 26 MMOL/L (ref 22–29)
CREAT SERPL-MCNC: 1.6 MG/DL (ref 0.7–1.2)
EOSINOPHIL # BLD: 0 K/UL (ref 0.05–0.5)
EOSINOPHILS RELATIVE PERCENT: 0 % (ref 0–6)
ERYTHROCYTE [DISTWIDTH] IN BLOOD BY AUTOMATED COUNT: 13.1 % (ref 11.5–15)
GFR, ESTIMATED: 46 ML/MIN/1.73M2
GLUCOSE BLD-MCNC: 245 MG/DL (ref 74–99)
GLUCOSE BLD-MCNC: 265 MG/DL (ref 74–99)
GLUCOSE BLD-MCNC: 291 MG/DL (ref 74–99)
GLUCOSE BLD-MCNC: 426 MG/DL (ref 74–99)
GLUCOSE BLD-MCNC: 453 MG/DL (ref 74–99)
GLUCOSE SERPL-MCNC: 264 MG/DL (ref 74–99)
HCT VFR BLD AUTO: 33.8 % (ref 37–54)
HGB BLD-MCNC: 11.6 G/DL (ref 12.5–16.5)
IMM GRANULOCYTES # BLD AUTO: 0.22 K/UL (ref 0–0.58)
IMM GRANULOCYTES NFR BLD: 3 % (ref 0–5)
LYMPHOCYTES NFR BLD: 0.49 K/UL (ref 1.5–4)
LYMPHOCYTES RELATIVE PERCENT: 6 % (ref 20–42)
MCH RBC QN AUTO: 29.1 PG (ref 26–35)
MCHC RBC AUTO-ENTMCNC: 34.3 G/DL (ref 32–34.5)
MCV RBC AUTO: 84.7 FL (ref 80–99.9)
MONOCYTES NFR BLD: 0.34 K/UL (ref 0.1–0.95)
MONOCYTES NFR BLD: 4 % (ref 2–12)
NEUTROPHILS NFR BLD: 88 % (ref 43–80)
NEUTS SEG NFR BLD: 7.68 K/UL (ref 1.8–7.3)
PLATELET # BLD AUTO: 99 K/UL (ref 130–450)
PLATELET CONFIRMATION: NORMAL
PMV BLD AUTO: 9.2 FL (ref 7–12)
POTASSIUM SERPL-SCNC: 4.5 MMOL/L (ref 3.5–5)
RBC # BLD AUTO: 3.99 M/UL (ref 3.8–5.8)
RBC # BLD: ABNORMAL 10*6/UL
RBC # BLD: ABNORMAL 10*6/UL
SODIUM SERPL-SCNC: 134 MMOL/L (ref 132–146)
WBC OTHER # BLD: 8.8 K/UL (ref 4.5–11.5)

## 2024-07-31 PROCEDURE — 85025 COMPLETE CBC W/AUTO DIFF WBC: CPT

## 2024-07-31 PROCEDURE — 6370000000 HC RX 637 (ALT 250 FOR IP): Performed by: NURSE PRACTITIONER

## 2024-07-31 PROCEDURE — 94640 AIRWAY INHALATION TREATMENT: CPT

## 2024-07-31 PROCEDURE — 82962 GLUCOSE BLOOD TEST: CPT

## 2024-07-31 PROCEDURE — 94660 CPAP INITIATION&MGMT: CPT

## 2024-07-31 PROCEDURE — 6360000002 HC RX W HCPCS

## 2024-07-31 PROCEDURE — 36415 COLL VENOUS BLD VENIPUNCTURE: CPT

## 2024-07-31 PROCEDURE — 80048 BASIC METABOLIC PNL TOTAL CA: CPT

## 2024-07-31 PROCEDURE — 2700000000 HC OXYGEN THERAPY PER DAY

## 2024-07-31 PROCEDURE — 2060000000 HC ICU INTERMEDIATE R&B

## 2024-07-31 PROCEDURE — 6370000000 HC RX 637 (ALT 250 FOR IP): Performed by: STUDENT IN AN ORGANIZED HEALTH CARE EDUCATION/TRAINING PROGRAM

## 2024-07-31 PROCEDURE — 2580000003 HC RX 258

## 2024-07-31 PROCEDURE — 2580000003 HC RX 258: Performed by: INTERNAL MEDICINE

## 2024-07-31 PROCEDURE — 99232 SBSQ HOSP IP/OBS MODERATE 35: CPT

## 2024-07-31 PROCEDURE — 94669 MECHANICAL CHEST WALL OSCILL: CPT

## 2024-07-31 PROCEDURE — 6370000000 HC RX 637 (ALT 250 FOR IP)

## 2024-07-31 PROCEDURE — 97161 PT EVAL LOW COMPLEX 20 MIN: CPT

## 2024-07-31 RX ORDER — SODIUM CHLORIDE 9 MG/ML
INJECTION, SOLUTION INTRAVENOUS CONTINUOUS
Status: DISCONTINUED | OUTPATIENT
Start: 2024-07-31 | End: 2024-08-01

## 2024-07-31 RX ORDER — METHYLPREDNISOLONE SODIUM SUCCINATE 40 MG/ML
40 INJECTION, POWDER, LYOPHILIZED, FOR SOLUTION INTRAMUSCULAR; INTRAVENOUS EVERY 12 HOURS
Status: DISCONTINUED | OUTPATIENT
Start: 2024-07-31 | End: 2024-08-01

## 2024-07-31 RX ORDER — INSULIN LISPRO 100 [IU]/ML
15 INJECTION, SOLUTION INTRAVENOUS; SUBCUTANEOUS ONCE
Status: COMPLETED | OUTPATIENT
Start: 2024-07-31 | End: 2024-07-31

## 2024-07-31 RX ORDER — INSULIN LISPRO 100 [IU]/ML
8 INJECTION, SOLUTION INTRAVENOUS; SUBCUTANEOUS ONCE
Status: COMPLETED | OUTPATIENT
Start: 2024-07-31 | End: 2024-07-31

## 2024-07-31 RX ADMIN — ROFLUMILAST 500 MCG: 500 TABLET ORAL at 08:10

## 2024-07-31 RX ADMIN — METHYLPREDNISOLONE SODIUM SUCCINATE 40 MG: 40 INJECTION INTRAMUSCULAR; INTRAVENOUS at 16:06

## 2024-07-31 RX ADMIN — METHYLPREDNISOLONE SODIUM SUCCINATE 40 MG: 40 INJECTION INTRAMUSCULAR; INTRAVENOUS at 08:10

## 2024-07-31 RX ADMIN — INSULIN GLARGINE 40 UNITS: 100 INJECTION, SOLUTION SUBCUTANEOUS at 06:37

## 2024-07-31 RX ADMIN — ALPRAZOLAM 0.25 MG: 0.25 TABLET ORAL at 20:42

## 2024-07-31 RX ADMIN — AMLODIPINE BESYLATE 5 MG: 5 TABLET ORAL at 08:10

## 2024-07-31 RX ADMIN — INSULIN LISPRO 8 UNITS: 100 INJECTION, SOLUTION INTRAVENOUS; SUBCUTANEOUS at 10:53

## 2024-07-31 RX ADMIN — ARFORMOTEROL TARTRATE 15 MCG: 15 SOLUTION RESPIRATORY (INHALATION) at 08:39

## 2024-07-31 RX ADMIN — INSULIN LISPRO 8 UNITS: 100 INJECTION, SOLUTION INTRAVENOUS; SUBCUTANEOUS at 19:14

## 2024-07-31 RX ADMIN — ARFORMOTEROL TARTRATE 15 MCG: 15 SOLUTION RESPIRATORY (INHALATION) at 19:30

## 2024-07-31 RX ADMIN — METHYLPREDNISOLONE SODIUM SUCCINATE 40 MG: 40 INJECTION INTRAMUSCULAR; INTRAVENOUS at 01:50

## 2024-07-31 RX ADMIN — INSULIN LISPRO 4 UNITS: 100 INJECTION, SOLUTION INTRAVENOUS; SUBCUTANEOUS at 16:06

## 2024-07-31 RX ADMIN — INSULIN LISPRO 15 UNITS: 100 INJECTION, SOLUTION INTRAVENOUS; SUBCUTANEOUS at 20:37

## 2024-07-31 RX ADMIN — TAMSULOSIN HYDROCHLORIDE 0.8 MG: 0.4 CAPSULE ORAL at 08:09

## 2024-07-31 RX ADMIN — SODIUM CHLORIDE: 9 INJECTION, SOLUTION INTRAVENOUS at 16:25

## 2024-07-31 RX ADMIN — ALPRAZOLAM 0.25 MG: 0.25 TABLET ORAL at 12:26

## 2024-07-31 RX ADMIN — ASPIRIN 81 MG: 81 TABLET, COATED ORAL at 08:10

## 2024-07-31 RX ADMIN — METOPROLOL TARTRATE 100 MG: 50 TABLET, FILM COATED ORAL at 20:36

## 2024-07-31 RX ADMIN — SODIUM CHLORIDE, PRESERVATIVE FREE 10 ML: 5 INJECTION INTRAVENOUS at 08:11

## 2024-07-31 RX ADMIN — INSULIN LISPRO 8 UNITS: 100 INJECTION, SOLUTION INTRAVENOUS; SUBCUTANEOUS at 06:40

## 2024-07-31 RX ADMIN — IPRATROPIUM BROMIDE AND ALBUTEROL SULFATE 1 DOSE: 2.5; .5 SOLUTION RESPIRATORY (INHALATION) at 08:39

## 2024-07-31 RX ADMIN — METOPROLOL TARTRATE 100 MG: 50 TABLET, FILM COATED ORAL at 08:09

## 2024-07-31 RX ADMIN — IPRATROPIUM BROMIDE AND ALBUTEROL SULFATE 1 DOSE: 2.5; .5 SOLUTION RESPIRATORY (INHALATION) at 19:30

## 2024-07-31 RX ADMIN — ROSUVASTATIN CALCIUM 10 MG: 10 TABLET, FILM COATED ORAL at 08:10

## 2024-07-31 RX ADMIN — IPRATROPIUM BROMIDE AND ALBUTEROL SULFATE 1 DOSE: 2.5; .5 SOLUTION RESPIRATORY (INHALATION) at 15:43

## 2024-07-31 RX ADMIN — AZITHROMYCIN 250 MG: 250 TABLET, FILM COATED ORAL at 08:09

## 2024-07-31 RX ADMIN — SODIUM CHLORIDE: 9 INJECTION, SOLUTION INTRAVENOUS at 03:40

## 2024-07-31 RX ADMIN — IPRATROPIUM BROMIDE AND ALBUTEROL SULFATE 1 DOSE: 2.5; .5 SOLUTION RESPIRATORY (INHALATION) at 13:07

## 2024-07-31 NOTE — PLAN OF CARE
Problem: Discharge Planning  Goal: Discharge to home or other facility with appropriate resources  7/31/2024 0935 by Yue Galvin, RN  Outcome: Progressing  7/31/2024 0139 by Lucila Cline RN  Outcome: Progressing     Problem: Pain  Goal: Verbalizes/displays adequate comfort level or baseline comfort level  7/31/2024 0935 by Yue Galvin, RN  Outcome: Progressing  7/31/2024 0139 by Lucila Cline, RN  Outcome: Progressing

## 2024-07-31 NOTE — PROGRESS NOTES
Physical Therapy  Facility/Department: [address] MED SURG/TELE  Physical Therapy Initial Assessment    Name: Chico Frias  : 1950  MRN: [id number]  Date of Service: 2024    Attending Provider:  Randy Arzate MD    Evaluating PT:  Guzman Corona Jr., P.T.    Room #:  0543/0543-A  Diagnosis:  COPD exacerbation (HCC) [J44.1]  Pertinent PMHx/PSHx:  COPD  Precautions:  O2 sat drops with activity    SUBJECTIVE:    Pt lives with his wife in a 2 story home with 1 step to enter.  There are 14 steps and 1 rail to 2nd floor bed and bath.  Pt ambulated with no AD PTA.  He uses 4L home O2.    OBJECTIVE:   Initial Evaluation  Date: 24 Treatment Short Term/ Long Term   Goals   Was pt agreeable to Eval/treatment? yes     Does pt have pain? No c/o pain     Bed Mobility  NA, pt was found and left sitting on EOB.  Independent    Transfers Sit to stand: Independent  Stand to sit: Independent  Stand pivot: supervision  Independent    Ambulation   75 feet with no AD supervision  200 feet with no AD Independent    Stair negotiation: ascended and descended NA, O2 sat dropped with amb  14 steps with 1 rail Independent    AM-PAC 6 Clicks        BLE ROM is WFL.   BLE strength is grossly 4/5 to 4+/5.   Sensation:  Pt denies numbness and tingling to extremities  Balance: sitting is Independent and standing with no AD is supervision  Endurance: fair    Vitals:  Pt was on 4L O2 and at rest sitting EOB pulse ox was 93%, after amb was 81%, and after 1 min rest with proper breathing technique was 95%.     Patient education  Pt educated on breathing technique with NC in place.    Patient response to education:   Pt verbalized understanding Pt demonstrated skill Pt requires further education in this area   yes yes yes     ASSESSMENT:    Conditions Requiring Skilled Therapeutic Intervention:    [x]Decreased strength     []Decreased ROM  [x]Decreased functional mobility  []Decreased balance   [x]Decreased endurance    []Decreased posture  []Decreased sensation  []Decreased coordination   []Decreased vision  []Decreased safety awareness   []Increased pain       Comments:  Pt was found sitting on EOB and was agreeable to PT. He walked in the room back and forth and reported feeling light headed and had only mild SOB. He sat on EOB and pulse ox was as noted above which was a significant drop.  He was left sitting on EOB with no c/o light headedness and O2 sat back to normal range.     Pt was left sitting on EOB as found with call light left by patient.    Pt's/ family goals   1. To go home.     Patient and or family understand(s) diagnosis, prognosis, and plan of care.    PHYSICAL THERAPY PLAN OF CARE:    PT POC is established based on physician order and patient diagnosis     Referring provider/PT Order:  PT eval and treat  Diagnosis:  COPD exacerbation (HCC) [J44.1]  Specific instructions for next treatment:  to increase amb distance as pt is able.     Current Treatment Recommendations:     [x] Strengthening to improve independence with functional mobility   [] ROM to improve ROM and decrease spasm and pain which will help promote independence with functional mobility   [] Balance Training to improve static/dynamic balance and to reduce fall risk  [x] Endurance Training to improve activity tolerance during functional mobility   [x] Transfer Training to improve safety and independence with all functional transfers   [x] Gait Training to improve gait mechanics, endurance and assess need for appropriate assistive device  [x] Stair Training in preparation for safe discharge home and/or into the community   [] Positioning to prevent skin breakdown and contractures  [] Safety and Education Training   [x] Patient/Caregiver Education   [] HEP  [] Other     PT long term treatment goals are located in above grid    Frequency of treatments: 2-5x/week x 1-2 weeks.    Time in  13:25  Time out  13:40    Evaluation Time includes thorough

## 2024-07-31 NOTE — PROGRESS NOTES
[facility] Hospitalist Progress Note    Admitting Date and Time: 7/29/2024 11:14 AM  Admit Dx: COPD exacerbation (HCC) [J44.1]    Subjective:  Patient is being followed for COPD exacerbation (HCC) [J44.1]     Patient was seen at bedside.  He mentioned he feels a little better as compared to yesterday.  But still have some chest tightness.  Denies any shortness of breath.  Palpitation.  Fever, vomiting, nausea  Currently on 4 L oxygen and uses NIV at night  ROS: denies fever, chills, sob, n/v, HA unless stated above.      methylPREDNISolone  40 mg IntraVENous Q12H    ipratropium 0.5 mg-albuterol 2.5 mg  1 Dose Inhalation Q4H WA RT    sodium chloride flush  5-40 mL IntraVENous 2 times per day    enoxaparin  30 mg SubCUTAneous BID    amLODIPine  5 mg Oral Daily    aspirin  81 mg Oral Daily    azithromycin  250 mg Oral Once per day on Mon Wed Fri    [Held by provider] budesonide  500 mcg Nebulization BID RT    arformoterol tartrate  15 mcg Nebulization BID RT    insulin glargine  40 Units SubCUTAneous Daily    metoprolol  100 mg Oral BID    Roflumilast  500 mcg Oral Daily    rosuvastatin  10 mg Oral Daily    tamsulosin  0.8 mg Oral Daily    insulin lispro  0-16 Units SubCUTAneous TID WC    insulin lispro  0-4 Units SubCUTAneous Nightly     sodium chloride flush, 5-40 mL, PRN  sodium chloride, , PRN  potassium chloride, 40 mEq, PRN   Or  potassium alternative oral replacement, 40 mEq, PRN   Or  potassium chloride, 10 mEq, PRN  magnesium sulfate, 2,000 mg, PRN  ondansetron, 4 mg, Q8H PRN   Or  ondansetron, 4 mg, Q6H PRN  polyethylene glycol, 17 g, Daily PRN  acetaminophen, 650 mg, Q6H PRN   Or  acetaminophen, 650 mg, Q6H PRN  ALPRAZolam, 0.25 mg, BID PRN  guaiFENesin, 400 mg, BID PRN  dextrose bolus, 125 mL, PRN   Or  dextrose bolus, 250 mL, PRN  glucagon (rDNA), 1 mg, PRN  dextrose, , Continuous PRN         Objective:    BP (!) 148/68   Pulse 80   Temp 98 °F (36.7 °C) (Oral)   Resp 17   Ht 1.854 m (6' 1\")

## 2024-07-31 NOTE — CARE COORDINATION
7/31/2024  Social Work Discharge Planning:Echo today. Plan is home with spouse . Pt is independent with a nebulizer and home o2-baseline of 4l which he is on here. . Electronically signed by ELIZABETH Blackmon on 7/31/2024 at 10:54 AM

## 2024-07-31 NOTE — PROGRESS NOTES
Occupational Therapy  OT consult received to eval/treat and chart review complete. Patient reports he is functioning at baseline level. Patient reports he has been up independently since admission and has no concerns related to functional abilities, reports practicing energy conservation techniques/task segmentation at home even prior to hospital admission. Patient politely declines need for OT evaluation at this time. No OT evaluation complete per patient preference.      Marzena Mendoza OTR/L  License Number: [id number]

## 2024-07-31 NOTE — PROGRESS NOTES
Spoke with Dr Arzate regarding patient elevated blood glucose, new order for 8 units of humalog now.

## 2024-07-31 NOTE — PROGRESS NOTES
Pulmonary Progress Note    Admit Date: 2024                            PCP: Jose Kwok MD  Principal Problem:    COPD exacerbation (HCC)  Active Problems:    Acute kidney injury superimposed on CKD (HCC)  Resolved Problems:    * No resolved hospital problems. *      Subjective:  Remains on 4L pox 99% he denies SOB but notes he has not been walking around much. No dyspnea when walking to the bathroom  No cough and notes tightness to be mildly improved  Echo pending     Medications:   sodium chloride 100 mL/hr at 24 0340    sodium chloride      dextrose          ipratropium 0.5 mg-albuterol 2.5 mg  1 Dose Inhalation Q4H WA RT    sodium chloride flush  5-40 mL IntraVENous 2 times per day    enoxaparin  30 mg SubCUTAneous BID    methylPREDNISolone  40 mg IntraVENous Q8H    amLODIPine  5 mg Oral Daily    aspirin  81 mg Oral Daily    azithromycin  250 mg Oral Once per day on     [Held by provider] budesonide  500 mcg Nebulization BID RT    arformoterol tartrate  15 mcg Nebulization BID RT    insulin glargine  40 Units SubCUTAneous Daily    metoprolol  100 mg Oral BID    Roflumilast  500 mcg Oral Daily    rosuvastatin  10 mg Oral Daily    tamsulosin  0.8 mg Oral Daily    insulin lispro  0-16 Units SubCUTAneous TID WC    insulin lispro  0-4 Units SubCUTAneous Nightly       Vitals:  VITALS:  BP (!) 148/68   Pulse 66   Temp 98 °F (36.7 °C) (Oral)   Resp 17   Ht 1.854 m (6' 1\")   Wt 102.5 kg (226 lb)   SpO2 99%   BMI 29.82 kg/m²   24HR INTAKE/OUTPUT:    Intake/Output Summary (Last 24 hours) at 2024 1058  Last data filed at 2024 1230  Gross per 24 hour   Intake 240 ml   Output --   Net 240 ml     CURRENT PULSE OXIMETRY:  SpO2: 99 %  24HR PULSE OXIMETRY RANGE:  SpO2  Av.3 %  Min: 99 %  Max: 100 %  CVP:    VENT SETTINGS:      Additional Respiratory Assessments  Pulse: 66  Respirations: 17  SpO2: 99 %      EXAM:  General: No distress. Alert. 4L   Eyes:  No sclera icterus. No  conjunctival injection.  Kyphosis flushed face  ENT: No discharge. Pharynx clear. No thrush   Neck: Trachea midline.   Resp: No accessory muscle use. No crackles. No wheezing. No rhonchi. Diminished bilaterally   CV: Regular rate. Regular rhythm. No mumur or rub. +1 pitting BLE   ABD: Non-tender. Non-distended.  Normal bowel sounds.   Skin: Warm and dry. No nodule on exposed extremities. No rash on exposed extremities.  M/S: LUCERO  Neuro: Awake. Follows commands. A&Ox 4    I/O: I/O last 3 completed shifts:  In: 720 [P.O.:720]  Out: -   No intake/output data recorded.     Results:  CBC:   Recent Labs     07/29/24  1221 07/30/24  0550 07/31/24  0320   WBC 8.7 7.2 8.8   HGB 12.2* 11.4* 11.6*   HCT 36.5* 33.0* 33.8*   MCV 87.3 84.4 84.7   * 98* 99*     BMP:   Recent Labs     07/29/24  1221 07/30/24  0550 07/31/24  0320    136 134   K 5.0 4.5 4.5   CL 96* 96* 97*   CO2 31* 29 26   BUN 49* 48* 45*   CREATININE 1.9* 1.8* 1.6*     LFT:   Recent Labs     07/29/24  1221   ALKPHOS 73   ALT 33   AST 27   BILITOT 0.5   BILIDIR <0.2     ABG:   Recent Labs     07/30/24  1510   PH 7.388   PO2 100.6*   PCO2 45.5*   HCO3 26.8*   BE 1.4   O2SAT 97.5       Films:  XR CHEST PORTABLE  Result Date: 7/29/2024  Cardiomegaly. Bibasilar atelectasis.     High-resolution CT scan showing mild mid lung bronchiectasis mediastinal lipomatosis right midlung opacity most likely scar      Summary of Events:   74 yo M with PMH of DM, GERD, HLD, former tobacco abuse, severe COPD, chronic hypoxic and hypercapnic respiratory failure on 4L/NIV at baseline, RLL nodular density s/p needle biopsy and navigation bronchoscopy 3/2024 negative workup currently on nebulized budesonide, arformoterol, duo-nebs and chronic prednisone 15 mg daily     4/20/2024- 4/23/2024 was treated for fluid overload was given diuretics and lost 17 pounds of fluid weight.      7/29 ED by Dr. Goyal for failed outpatient therapy-prednisone was increased to 30 mg daily

## 2024-08-01 ENCOUNTER — APPOINTMENT (OUTPATIENT)
Age: 74
End: 2024-08-01
Payer: MEDICARE

## 2024-08-01 VITALS
TEMPERATURE: 97.5 F | OXYGEN SATURATION: 99 % | BODY MASS INDEX: 29.95 KG/M2 | RESPIRATION RATE: 20 BRPM | HEIGHT: 73 IN | DIASTOLIC BLOOD PRESSURE: 72 MMHG | SYSTOLIC BLOOD PRESSURE: 142 MMHG | HEART RATE: 67 BPM | WEIGHT: 226 LBS

## 2024-08-01 LAB
ANION GAP SERPL CALCULATED.3IONS-SCNC: 8 MMOL/L (ref 7–16)
BASOPHILS # BLD: 0.02 K/UL (ref 0–0.2)
BASOPHILS NFR BLD: 0 % (ref 0–2)
BUN SERPL-MCNC: 34 MG/DL (ref 6–23)
CALCIUM SERPL-MCNC: 8.2 MG/DL (ref 8.6–10.2)
CHLORIDE SERPL-SCNC: 104 MMOL/L (ref 98–107)
CO2 SERPL-SCNC: 27 MMOL/L (ref 22–29)
CREAT SERPL-MCNC: 1.2 MG/DL (ref 0.7–1.2)
EOSINOPHIL # BLD: 0.01 K/UL (ref 0.05–0.5)
EOSINOPHILS RELATIVE PERCENT: 0 % (ref 0–6)
ERYTHROCYTE [DISTWIDTH] IN BLOOD BY AUTOMATED COUNT: 13.2 % (ref 11.5–15)
GFR, ESTIMATED: 62 ML/MIN/1.73M2
GLUCOSE BLD-MCNC: 113 MG/DL (ref 74–99)
GLUCOSE BLD-MCNC: 241 MG/DL (ref 74–99)
GLUCOSE BLD-MCNC: 251 MG/DL (ref 74–99)
GLUCOSE SERPL-MCNC: 106 MG/DL (ref 74–99)
HCT VFR BLD AUTO: 34.8 % (ref 37–54)
HGB BLD-MCNC: 11.8 G/DL (ref 12.5–16.5)
IMM GRANULOCYTES # BLD AUTO: 0.25 K/UL (ref 0–0.58)
IMM GRANULOCYTES NFR BLD: 3 % (ref 0–5)
LYMPHOCYTES NFR BLD: 0.81 K/UL (ref 1.5–4)
LYMPHOCYTES RELATIVE PERCENT: 9 % (ref 20–42)
MCH RBC QN AUTO: 29.3 PG (ref 26–35)
MCHC RBC AUTO-ENTMCNC: 33.9 G/DL (ref 32–34.5)
MCV RBC AUTO: 86.4 FL (ref 80–99.9)
MONOCYTES NFR BLD: 0.49 K/UL (ref 0.1–0.95)
MONOCYTES NFR BLD: 5 % (ref 2–12)
NEUTROPHILS NFR BLD: 84 % (ref 43–80)
NEUTS SEG NFR BLD: 7.99 K/UL (ref 1.8–7.3)
PLATELET # BLD AUTO: 102 K/UL (ref 130–450)
PMV BLD AUTO: 9.2 FL (ref 7–12)
POTASSIUM SERPL-SCNC: 4.8 MMOL/L (ref 3.5–5)
RBC # BLD AUTO: 4.03 M/UL (ref 3.8–5.8)
SODIUM SERPL-SCNC: 139 MMOL/L (ref 132–146)
WBC OTHER # BLD: 9.6 K/UL (ref 4.5–11.5)

## 2024-08-01 PROCEDURE — 94640 AIRWAY INHALATION TREATMENT: CPT

## 2024-08-01 PROCEDURE — 82962 GLUCOSE BLOOD TEST: CPT

## 2024-08-01 PROCEDURE — 80048 BASIC METABOLIC PNL TOTAL CA: CPT

## 2024-08-01 PROCEDURE — 94660 CPAP INITIATION&MGMT: CPT

## 2024-08-01 PROCEDURE — 2580000003 HC RX 258

## 2024-08-01 PROCEDURE — 85025 COMPLETE CBC W/AUTO DIFF WBC: CPT

## 2024-08-01 PROCEDURE — 6360000002 HC RX W HCPCS

## 2024-08-01 PROCEDURE — 93306 TTE W/DOPPLER COMPLETE: CPT

## 2024-08-01 PROCEDURE — 6370000000 HC RX 637 (ALT 250 FOR IP)

## 2024-08-01 PROCEDURE — 2700000000 HC OXYGEN THERAPY PER DAY

## 2024-08-01 PROCEDURE — 36415 COLL VENOUS BLD VENIPUNCTURE: CPT

## 2024-08-01 PROCEDURE — 99239 HOSP IP/OBS DSCHRG MGMT >30: CPT | Performed by: STUDENT IN AN ORGANIZED HEALTH CARE EDUCATION/TRAINING PROGRAM

## 2024-08-01 PROCEDURE — 6370000000 HC RX 637 (ALT 250 FOR IP): Performed by: STUDENT IN AN ORGANIZED HEALTH CARE EDUCATION/TRAINING PROGRAM

## 2024-08-01 PROCEDURE — 93306 TTE W/DOPPLER COMPLETE: CPT | Performed by: INTERNAL MEDICINE

## 2024-08-01 RX ORDER — PREDNISONE 20 MG/1
40 TABLET ORAL DAILY
Status: DISCONTINUED | OUTPATIENT
Start: 2024-08-01 | End: 2024-08-01

## 2024-08-01 RX ORDER — PREDNISONE 20 MG/1
40 TABLET ORAL DAILY
Qty: 8 TABLET | Refills: 0 | Status: SHIPPED | OUTPATIENT
Start: 2024-08-02 | End: 2024-08-06

## 2024-08-01 RX ORDER — PREDNISONE 20 MG/1
40 TABLET ORAL DAILY
Status: DISCONTINUED | OUTPATIENT
Start: 2024-08-02 | End: 2024-08-01 | Stop reason: HOSPADM

## 2024-08-01 RX ORDER — PREDNISONE 20 MG/1
40 TABLET ORAL DAILY
Qty: 8 TABLET | Refills: 0 | Status: CANCELLED | OUTPATIENT
Start: 2024-08-01 | End: 2024-08-05

## 2024-08-01 RX ORDER — FUROSEMIDE 40 MG/1
40 TABLET ORAL DAILY
Qty: 30 TABLET | Refills: 1 | COMMUNITY
Start: 2024-08-01

## 2024-08-01 RX ADMIN — AMLODIPINE BESYLATE 5 MG: 5 TABLET ORAL at 09:04

## 2024-08-01 RX ADMIN — ALPRAZOLAM 0.25 MG: 0.25 TABLET ORAL at 10:57

## 2024-08-01 RX ADMIN — METOPROLOL TARTRATE 100 MG: 50 TABLET, FILM COATED ORAL at 09:04

## 2024-08-01 RX ADMIN — ASPIRIN 81 MG: 81 TABLET, COATED ORAL at 09:03

## 2024-08-01 RX ADMIN — IPRATROPIUM BROMIDE AND ALBUTEROL SULFATE 1 DOSE: 2.5; .5 SOLUTION RESPIRATORY (INHALATION) at 12:25

## 2024-08-01 RX ADMIN — ROSUVASTATIN CALCIUM 10 MG: 10 TABLET, FILM COATED ORAL at 09:04

## 2024-08-01 RX ADMIN — METHYLPREDNISOLONE SODIUM SUCCINATE 40 MG: 40 INJECTION INTRAMUSCULAR; INTRAVENOUS at 09:02

## 2024-08-01 RX ADMIN — TAMSULOSIN HYDROCHLORIDE 0.8 MG: 0.4 CAPSULE ORAL at 09:04

## 2024-08-01 RX ADMIN — ROFLUMILAST 500 MCG: 500 TABLET ORAL at 09:04

## 2024-08-01 RX ADMIN — INSULIN GLARGINE 40 UNITS: 100 INJECTION, SOLUTION SUBCUTANEOUS at 07:05

## 2024-08-01 RX ADMIN — ARFORMOTEROL TARTRATE 15 MCG: 15 SOLUTION RESPIRATORY (INHALATION) at 06:26

## 2024-08-01 RX ADMIN — ACETAMINOPHEN 650 MG: 325 TABLET ORAL at 10:57

## 2024-08-01 RX ADMIN — SODIUM CHLORIDE, PRESERVATIVE FREE 10 ML: 5 INJECTION INTRAVENOUS at 09:04

## 2024-08-01 RX ADMIN — IPRATROPIUM BROMIDE AND ALBUTEROL SULFATE 1 DOSE: 2.5; .5 SOLUTION RESPIRATORY (INHALATION) at 06:26

## 2024-08-01 RX ADMIN — INSULIN LISPRO 4 UNITS: 100 INJECTION, SOLUTION INTRAVENOUS; SUBCUTANEOUS at 12:01

## 2024-08-01 ASSESSMENT — PAIN SCALES - GENERAL: PAINLEVEL_OUTOF10: 4

## 2024-08-01 ASSESSMENT — PAIN - FUNCTIONAL ASSESSMENT: PAIN_FUNCTIONAL_ASSESSMENT: ACTIVITIES ARE NOT PREVENTED

## 2024-08-01 NOTE — CARE COORDINATION
8/1/2024  Social Work Discharge Planning: Solumedrol q12. Plan is home with spouse . Pt is independent with a nebulizer and home o2-baseline of 4l which he is on here. Electronically signed by ELIZABETH Blackmon on 8/1/2024 at 10:11 AM

## 2024-08-01 NOTE — DISCHARGE SUMMARY
septal thickening.  No dominant nodule or mass with area of opacification right midlung atelectasis or scarring most likely.  This could represent post treatment changes of scarring versus fibrosis as appears similar to prior in dimensions and configuration right mid lung..  No subpleural reticulation or honeycombing to suggest fibrotic change.  No consolidation.  No pleural process or pleural effusion.  Prone and expiration imaging sequences without mosaicism or volume loss to suggest air trapping Upper Abdomen: Partially visualized portions of the upper abdomen without acute findings Soft Tissues/Bones: No acute osseous or soft tissue body wall findings.     No evidence of nodularity or bronchovascular bundle or septal thickening. No dominant nodule or mass with area of opacification right midlung most likely atelectasis or scarring. This could represent post treatment changes of scarring versus fibrosis as appears similar to prior in dimensions and configuration right mid lung. No subpleural reticulation or honeycombing to suggest fibrotic change.  Follow-up based upon risk stratification no significant emphysematous change or COPD findings other than potential mild hyperinflation.     XR CHEST PORTABLE    Result Date: 7/29/2024  EXAMINATION: ONE XRAY VIEW OF THE CHEST 7/29/2024 11:59 am COMPARISON: Chest x-ray dated 04/20/2024 HISTORY: ORDERING SYSTEM PROVIDED HISTORY: sob TECHNOLOGIST PROVIDED HISTORY: Reason for exam:->sob FINDINGS: Cardiac size enlarged.  Bibasilar atelectasis.  No consolidation.  No pneumothorax or pleural effusion.     Cardiomegaly. Bibasilar atelectasis.       Patient Instructions:      Medication List        CHANGE how you take these medications      furosemide 40 MG tablet  Commonly known as: LASIX  What changed: additional instructions     lisinopril 20 MG tablet  Commonly known as: PRINIVIL;ZESTRIL  What changed: Another medication with the same name was removed. Continue taking this  medication, and follow the directions you see here.     * predniSONE 10 MG tablet  Commonly known as: DELTASONE  What changed: Another medication with the same name was added. Make sure you understand how and when to take each.     * predniSONE 20 MG tablet  Commonly known as: DELTASONE  Take 2 tablets by mouth daily for 4 doses  Start taking on: August 2, 2024  What changed: You were already taking a medication with the same name, and this prescription was added. Make sure you understand how and when to take each.           * This list has 2 medication(s) that are the same as other medications prescribed for you. Read the directions carefully, and ask your doctor or other care provider to review them with you.                CONTINUE taking these medications      albuterol sulfate  (90 Base) MCG/ACT inhaler  Commonly known as: PROVENTIL;VENTOLIN;PROAIR  Inhale 2 puffs into the lungs every 6 hours as needed for Wheezing     ALPRAZolam 0.25 MG tablet  Commonly known as: XANAX     amLODIPine 5 MG tablet  Commonly known as: NORVASC     aspirin 81 MG EC tablet     azithromycin 250 MG tablet  Commonly known as: ZITHROMAX     budesonide 0.5 MG/2ML nebulizer suspension  Commonly known as: PULMICORT     finasteride 5 MG tablet  Commonly known as: PROSCAR     formoterol 20 MCG/2ML nebulizer solution  Commonly known as: PERFOROMIST     guaiFENesin 400 MG tablet     insulin glargine 100 UNIT/ML injection vial  Commonly known as: LANTUS     insulin lispro 100 UNIT/ML Soln injection vial  Commonly known as: HUMALOG,ADMELOG     isosorbide mononitrate 30 MG extended release tablet  Commonly known as: IMDUR     metFORMIN 1000 MG tablet  Commonly known as: GLUCOPHAGE     metoprolol 100 MG tablet  Commonly known as: LOPRESSOR     Roflumilast 500 MCG tablet  Commonly known as: DALIRESP     rosuvastatin 10 MG tablet  Commonly known as: CRESTOR     tamsulosin 0.4 MG capsule  Commonly known as: FLOMAX               Where to Get

## 2024-08-01 NOTE — PLAN OF CARE
Problem: Discharge Planning  Goal: Discharge to home or other facility with appropriate resources  7/31/2024 2256 by Lucila Cline, RN  Outcome: Progressing  7/31/2024 0935 by Yue Galvin RN  Outcome: Progressing     Problem: Pain  Goal: Verbalizes/displays adequate comfort level or baseline comfort level  7/31/2024 2256 by Lucila Cline RN  Outcome: Progressing  7/31/2024 0935 by Yue Galvin RN  Outcome: Progressing

## 2024-08-01 NOTE — PROGRESS NOTES
discharge. Pharynx clear. No thrush   Neck: Trachea midline.   Resp: No accessory muscle use. No crackles. No wheezing. No rhonchi. Diminished bilaterally   CV: Regular rate. Regular rhythm. No mumur or rub. +1 pitting BLE   ABD: Non-tender. Non-distended.  Normal bowel sounds.   Skin: Warm and dry. No nodule on exposed extremities. No rash on exposed extremities.  M/S: LUCERO  Neuro: Awake. Follows commands. A&Ox 4    I/O: No intake/output data recorded.  I/O this shift:  In: 240 [P.O.:240]  Out: -      Results:  CBC:   Recent Labs     07/30/24  0550 07/31/24  0320 08/01/24 0724   WBC 7.2 8.8 9.6   HGB 11.4* 11.6* 11.8*   HCT 33.0* 33.8* 34.8*   MCV 84.4 84.7 86.4   PLT 98* 99* 102*     BMP:   Recent Labs     07/30/24  0550 07/31/24  0320 08/01/24 0724    134 139   K 4.5 4.5 4.8   CL 96* 97* 104   CO2 29 26 27   BUN 48* 45* 34*   CREATININE 1.8* 1.6* 1.2     LFT:   No results for input(s): \"ALKPHOS\", \"ALT\", \"AST\", \"BILITOT\", \"BILIDIR\", \"LABALBU\" in the last 72 hours.    Invalid input(s): \"PROT\"    ABG:   Recent Labs     07/30/24  1510   PH 7.388   PO2 100.6*   PCO2 45.5*   HCO3 26.8*   BE 1.4   O2SAT 97.5       Films:  XR CHEST PORTABLE  Result Date: 7/29/2024  Cardiomegaly. Bibasilar atelectasis.     High-resolution CT scan showing mild mid lung bronchiectasis mediastinal lipomatosis right midlung opacity most likely scar      Summary of Events:   74 yo M with PMH of DM, GERD, HLD, former tobacco abuse, severe COPD, chronic hypoxic and hypercapnic respiratory failure on 4L/NIV at baseline, RLL nodular density s/p needle biopsy and navigation bronchoscopy 3/2024 negative workup currently on nebulized budesonide, arformoterol, duo-nebs and chronic prednisone 15 mg daily     4/20/2024- 4/23/2024 was treated for fluid overload was given diuretics and lost 17 pounds of fluid weight.      7/29 ED by Dr. Goyal for failed outpatient therapy-prednisone was increased to 30 mg daily for past 2 weeks due to increased  shortness of breath without any relief  Admitted for  acute exacerbation of COPD with plans to obtain ABG and HRCT to further evaluate his emphysema.   normal VS, 98% on 4L NC, Cl 96, CO2 31, BUN/Cr 49/1.9,   Pro-BNP 73, troponin 44,   WBC 8.7, H/H 12.2/36.5, platelets 129  XR chest cardiomegaly, bibasilar atelectasis   He was started on IV azithromycin, Solumedrol 40 mg q8h  ABG 7.388/45.5/100/26.8/97% on 4L  7/30 high-resolution CT scan no change no nodularity septal thickening nodule mass area of scarring right midlung  7/31: 4L pox 99% NIV all night   8/1 on 4 L saturating 99%    Assessment/Plan:  Chronic hypoxic/hypercapnic respiratory failure  Continue NIV at HS and prn for naps, currently on 15/480/40%/8  Has home NIV through [facility]   ABG showing pH 7.38 46/100/26/97.  On baseline 4L NC, maintain pox 90-95%  Severe COPD with acute exacerbation   At home: Performist, Pulmicort, Duo-nebs, prednisone 15 mg daily increased to 30 mg daily recently, azithromycin MWF Daliresp  Continue Brovana and duo-nebs. Hold Pulmicort while on IV steroids  Wean IV Solumedrol 40 mg q12h  Discussed with nursing/patient to walk in bran to assess symptoms   PFT as outpatient vs prior to d/c to assess for valves   Bronchiectasis  Has vest at home, using TID  Continue flutter/vest with albuterol    Right lower lobe nodular density s/p needle biopsy 10/6/2022, and navigation bronchoscopy 3/ 7/2024 negative workup  9/27/2022 echo EF 60%, negative stress test 9/8/2022  IFTIKHAR  8/17/2018 HST AHI19 supine was 24.  At that time he weighed 235 pounds.  22 minutes were with saturations below 90% patient required CPAP of 12   acute on chronic CKD baseline 1.4 with hypochloremia   NS infusion x 2 L   Diuretics on hold   Anxiety on Xanax  Deconditioning  PT/OT evals pending   DVT prophylaxis on Lovenox       He is to use the vest twice a day which he does have at home.  Flutter valve at bedside  Refused OT assessment  Will

## 2024-08-01 NOTE — DISCHARGE INSTRUCTIONS
Please make sure to take prednisone 40 mg daily for the next 4 days and then go back to your usual 30 mg daily.    Make sure to take Lasix 40 mg in the morning and then 20 mg in the afternoon, do not take both doses at the same time since this will cause kidney damage again.    Please resume taking your other home medications.    Please make sure to follow-up with your primary care doctor within 1 week and pulmonology.

## 2024-08-01 NOTE — PROGRESS NOTES
Physician Progress Note      PATIENT:               SALAS THOMPSON  [facility] #:                  [id number]  :                       1950  ADMIT DATE:       2024 11:14 AM  DISCH DATE:  RESPONDING  PROVIDER #:        Randy Arzate MD          QUERY TEXT:    Patient admitted with COPD exacerbation. Noted documentation of Acute Kidney   Injury in [facility] on .  In order to support the diagnosis of GONZALES, please   include additional clinical indicators in your documentation.? Or please   document if the diagnosis of GONZALES has been ruled out after further study.    The medical record reflects the following:    Risk Factors: CKD, Hypertension,  Clinical Indicators: IMPN on  GONZALES on CKD: Creatinine elevated to 1.9 on   admission, baseline is around 1.4-1.6.  This morning he is creatinine was 1.6   and back to baseline, will resume his lisinopril.  On  Cr 1.9, BUN 49, GFR 36,  On  Cr 1.8, BUN 45, GFR 39,  On  Cr 1.6, BUN 48, GFR 45,    Treatment: IVF, Lab monitoring,    Thank you,  ANA MARÍA Hillman CDS      Defined by Kidney Disease Improving Global Outcomes (KDIGO) clinical practice   guideline for acute kidney injury:  -Increase in SCr by greater than or equal to 0.3 mg/dl within 48 hours; or  -Increase or decrease in SCr to greater than or equal to 1.5 times baseline,   which is known or presumed to have occurred within the prior 7 days; or  -Urine volume < 0.5ml/kg/h for 6 hours.  Options provided:  -- Acute kidney injury evidenced by, Please document evidence as well as a   numerical baseline creatinine, if known.  -- CKD without GONZALES  -- Other - I will add my own diagnosis  -- Disagree - Not applicable / Not valid  -- Disagree - Clinically unable to determine / Unknown  -- Refer to Clinical Documentation Reviewer    PROVIDER RESPONSE TEXT:    GONZALES due to diuretics and lisinopril, now resolved    Query created by: Gamaliel Alejandro on 2024 3:12 AM      Electronically signed by:  Randy Arzate MD

## 2024-08-01 NOTE — PROGRESS NOTES
Date: 8/1/2024    Time: 12:50 AM    Patient Placed On BIPAP/CPAP/ Non-Invasive Ventilation?  Patient continues on bipap    If no must comment.  Facial area red/color change? No           If YES are Blister/Lesion present?No   If yes must notify nursing staff  BIPAP/CPAP skin barrier?  No    Skin barrier type: patient declined        Comments:        Hilary Queen RCP

## 2024-08-01 NOTE — PLAN OF CARE
Problem: Discharge Planning  Goal: Discharge to home or other facility with appropriate resources  8/1/2024 1017 by Yue Galvin, RN  Outcome: Progressing  7/31/2024 2256 by Lucila Cline, RN  Outcome: Progressing     Problem: Pain  Goal: Verbalizes/displays adequate comfort level or baseline comfort level  8/1/2024 1017 by Yue Galvin, RN  Outcome: Progressing  7/31/2024 2256 by Lucila Cline, RN  Outcome: Progressing

## 2024-08-02 LAB
ECHO AO ASC DIAM: 2.9 CM
ECHO AO ASCENDING AORTA INDEX: 1.28 CM/M2
ECHO AV AREA PEAK VELOCITY: 1.6 CM2
ECHO AV AREA VTI: 1.6 CM2
ECHO AV AREA/BSA PEAK VELOCITY: 0.7 CM2/M2
ECHO AV AREA/BSA VTI: 0.7 CM2/M2
ECHO AV CUSP MM: 1.4 CM
ECHO AV MEAN GRADIENT: 14 MMHG
ECHO AV MEAN VELOCITY: 1.8 M/S
ECHO AV PEAK GRADIENT: 25 MMHG
ECHO AV PEAK VELOCITY: 2.5 M/S
ECHO AV VELOCITY RATIO: 0.48
ECHO AV VTI: 61.1 CM
ECHO BSA: 2.3 M2
ECHO EST RA PRESSURE: 3 MMHG
ECHO LA DIAMETER INDEX: 1.98 CM/M2
ECHO LA DIAMETER: 4.5 CM
ECHO LA VOL A-L A2C: 62 ML (ref 18–58)
ECHO LA VOL A-L A4C: 69 ML (ref 18–58)
ECHO LA VOL MOD A2C: 61 ML (ref 18–58)
ECHO LA VOL MOD A4C: 64 ML (ref 18–58)
ECHO LA VOLUME AREA LENGTH: 67 ML
ECHO LA VOLUME INDEX A-L A2C: 27 ML/M2 (ref 16–34)
ECHO LA VOLUME INDEX A-L A4C: 30 ML/M2 (ref 16–34)
ECHO LA VOLUME INDEX AREA LENGTH: 30 ML/M2 (ref 16–34)
ECHO LA VOLUME INDEX MOD A2C: 27 ML/M2 (ref 16–34)
ECHO LA VOLUME INDEX MOD A4C: 28 ML/M2 (ref 16–34)
ECHO LV E' LATERAL VELOCITY: 9 CM/S
ECHO LV E' SEPTAL VELOCITY: 7 CM/S
ECHO LV FRACTIONAL SHORTENING: 40 % (ref 28–44)
ECHO LV INTERNAL DIMENSION DIASTOLE INDEX: 2.33 CM/M2
ECHO LV INTERNAL DIMENSION DIASTOLIC: 5.3 CM (ref 4.2–5.9)
ECHO LV INTERNAL DIMENSION SYSTOLIC INDEX: 1.41 CM/M2
ECHO LV INTERNAL DIMENSION SYSTOLIC: 3.2 CM
ECHO LV IVSD: 1 CM (ref 0.6–1)
ECHO LV IVSS: 1.5 CM
ECHO LV MASS 2D: 227.7 G (ref 88–224)
ECHO LV MASS INDEX 2D: 100.3 G/M2 (ref 49–115)
ECHO LV POSTERIOR WALL DIASTOLIC: 1.2 CM (ref 0.6–1)
ECHO LV POSTERIOR WALL SYSTOLIC: 1.5 CM
ECHO LV RELATIVE WALL THICKNESS RATIO: 0.45
ECHO LVOT AREA: 3.5 CM2
ECHO LVOT AV VTI INDEX: 0.46
ECHO LVOT DIAM: 2.1 CM
ECHO LVOT MEAN GRADIENT: 2 MMHG
ECHO LVOT PEAK GRADIENT: 6 MMHG
ECHO LVOT PEAK VELOCITY: 1.2 M/S
ECHO LVOT STROKE VOLUME INDEX: 42.7 ML/M2
ECHO LVOT SV: 96.9 ML
ECHO LVOT VTI: 28 CM
ECHO MV "A" WAVE DURATION: 133.8 MSEC
ECHO MV A VELOCITY: 0.94 M/S
ECHO MV AREA PHT: 3.8 CM2
ECHO MV AREA VTI: 3.6 CM2
ECHO MV E DECELERATION TIME (DT): 236.2 MS
ECHO MV E VELOCITY: 1.02 M/S
ECHO MV E/A RATIO: 1.09
ECHO MV E/E' LATERAL: 11.33
ECHO MV E/E' RATIO (AVERAGED): 12.95
ECHO MV E/E' SEPTAL: 14.57
ECHO MV LVOT VTI INDEX: 0.95
ECHO MV MAX VELOCITY: 1 M/S
ECHO MV MEAN GRADIENT: 2 MMHG
ECHO MV MEAN VELOCITY: 0.6 M/S
ECHO MV PEAK GRADIENT: 4 MMHG
ECHO MV PRESSURE HALF TIME (PHT): 58.6 MS
ECHO MV VTI: 26.7 CM
ECHO PV MAX VELOCITY: 1 M/S
ECHO PV MEAN GRADIENT: 2 MMHG
ECHO PV MEAN VELOCITY: 0.7 M/S
ECHO PV PEAK GRADIENT: 4 MMHG
ECHO PV VTI: 22.1 CM
ECHO PVEIN A DURATION: 110.7 MS
ECHO PVEIN A VELOCITY: 0.3 M/S
ECHO PVEIN PEAK D VELOCITY: 0.5 M/S
ECHO PVEIN PEAK S VELOCITY: 0.8 M/S
ECHO PVEIN S/D RATIO: 1.6
ECHO RIGHT VENTRICULAR SYSTOLIC PRESSURE (RVSP): 34 MMHG
ECHO RV INTERNAL DIMENSION: 3.5 CM
ECHO RV TAPSE: 2.8 CM (ref 1.7–?)
ECHO TV REGURGITANT MAX VELOCITY: 2.78 M/S
ECHO TV REGURGITANT PEAK GRADIENT: 31 MMHG

## 2024-08-29 ENCOUNTER — TELEPHONE (OUTPATIENT)
Dept: PALLATIVE CARE | Age: 74
End: 2024-08-29

## 2024-08-29 NOTE — TELEPHONE ENCOUNTER
Called and spoke with Chico regarding referral for Palliative care from Dr. Goyal. Chico would like an appointment and one was scheduled 9/23/24. Chico asked that this nurse call his wife on her cell phone ( she was at work) to give her directions to clinic. Called and spoke with Luiz, Chico' wife, directions to clinic provided. Answered questions she had about Palliative Care and Hospice. Support provided through active listening. Patient was started on MS Contin 15 mg BID by Dr. Goyal for dyspnea.

## 2024-09-12 ENCOUNTER — APPOINTMENT (OUTPATIENT)
Dept: GENERAL RADIOLOGY | Age: 74
DRG: 190 | End: 2024-09-12
Payer: MEDICARE

## 2024-09-12 ENCOUNTER — HOSPITAL ENCOUNTER (INPATIENT)
Age: 74
LOS: 5 days | Discharge: HOME HEALTH | DRG: 190 | End: 2024-09-17
Attending: EMERGENCY MEDICINE | Admitting: HOSPITALIST
Payer: MEDICARE

## 2024-09-12 DIAGNOSIS — J44.1 CHRONIC OBSTRUCTIVE PULMONARY DISEASE WITH ACUTE EXACERBATION (HCC): ICD-10-CM

## 2024-09-12 DIAGNOSIS — J44.1 COPD EXACERBATION (HCC): Primary | ICD-10-CM

## 2024-09-12 DIAGNOSIS — Z51.5 PALLIATIVE CARE ENCOUNTER: ICD-10-CM

## 2024-09-12 DIAGNOSIS — N17.9 AKI (ACUTE KIDNEY INJURY) (HCC): ICD-10-CM

## 2024-09-12 DIAGNOSIS — J44.9 CHRONIC OBSTRUCTIVE PULMONARY DISEASE, UNSPECIFIED COPD TYPE (HCC): ICD-10-CM

## 2024-09-12 DIAGNOSIS — R79.89 ELEVATED D-DIMER: ICD-10-CM

## 2024-09-12 LAB
ALBUMIN SERPL-MCNC: 3.9 G/DL (ref 3.5–5.2)
ALP SERPL-CCNC: 76 U/L (ref 40–129)
ALT SERPL-CCNC: 48 U/L (ref 0–40)
ANION GAP SERPL CALCULATED.3IONS-SCNC: 11 MMOL/L (ref 7–16)
AST SERPL-CCNC: 33 U/L (ref 0–39)
ATYPICAL LYMPHOCYTE ABSOLUTE COUNT: 0.06 K/UL (ref 0–0.46)
ATYPICAL LYMPHOCYTES: 1 % (ref 0–4)
B PARAP IS1001 DNA NPH QL NAA+NON-PROBE: NOT DETECTED
B PERT DNA SPEC QL NAA+PROBE: NOT DETECTED
B-OH-BUTYR SERPL-MCNC: 0.24 MMOL/L (ref 0.02–0.27)
B.E.: 2.4 MMOL/L (ref -3–3)
BASOPHILS # BLD: 0 K/UL (ref 0–0.2)
BASOPHILS NFR BLD: 0 % (ref 0–2)
BILIRUB SERPL-MCNC: 0.5 MG/DL (ref 0–1.2)
BNP SERPL-MCNC: 292 PG/ML (ref 0–450)
BUN SERPL-MCNC: 42 MG/DL (ref 6–23)
C PNEUM DNA NPH QL NAA+NON-PROBE: NOT DETECTED
CALCIUM SERPL-MCNC: 9 MG/DL (ref 8.6–10.2)
CHLORIDE SERPL-SCNC: 99 MMOL/L (ref 98–107)
CO2 SERPL-SCNC: 29 MMOL/L (ref 22–29)
COHB: 1.4 % (ref 0–1.5)
CREAT SERPL-MCNC: 1.9 MG/DL (ref 0.7–1.2)
CRITICAL: ABNORMAL
D-DIMER QUANTITATIVE: 269 NG/ML DDU (ref 0–230)
DATE ANALYZED: ABNORMAL
DATE OF COLLECTION: ABNORMAL
EOSINOPHIL # BLD: 0 K/UL (ref 0.05–0.5)
EOSINOPHILS RELATIVE PERCENT: 0 % (ref 0–6)
ERYTHROCYTE [DISTWIDTH] IN BLOOD BY AUTOMATED COUNT: 15.1 % (ref 11.5–15)
FLUAV RNA NPH QL NAA+NON-PROBE: NOT DETECTED
FLUBV RNA NPH QL NAA+NON-PROBE: NOT DETECTED
GFR, ESTIMATED: 37 ML/MIN/1.73M2
GLUCOSE SERPL-MCNC: 293 MG/DL (ref 74–99)
HADV DNA NPH QL NAA+NON-PROBE: NOT DETECTED
HCO3: 27.5 MMOL/L (ref 22–26)
HCOV 229E RNA NPH QL NAA+NON-PROBE: NOT DETECTED
HCOV HKU1 RNA NPH QL NAA+NON-PROBE: NOT DETECTED
HCOV NL63 RNA NPH QL NAA+NON-PROBE: NOT DETECTED
HCOV OC43 RNA NPH QL NAA+NON-PROBE: NOT DETECTED
HCT VFR BLD AUTO: 32.7 % (ref 37–54)
HGB BLD-MCNC: 11 G/DL (ref 12.5–16.5)
HHB: 1.9 % (ref 0–5)
HMPV RNA NPH QL NAA+NON-PROBE: NOT DETECTED
HPIV1 RNA NPH QL NAA+NON-PROBE: NOT DETECTED
HPIV2 RNA NPH QL NAA+NON-PROBE: NOT DETECTED
HPIV3 RNA NPH QL NAA+NON-PROBE: NOT DETECTED
HPIV4 RNA NPH QL NAA+NON-PROBE: NOT DETECTED
LAB: ABNORMAL
LACTATE BLDV-SCNC: 1.6 MMOL/L (ref 0.5–2.2)
LACTATE BLDV-SCNC: 3.3 MMOL/L (ref 0.5–1.9)
LYMPHOCYTES NFR BLD: 0.37 K/UL (ref 1.5–4)
LYMPHOCYTES RELATIVE PERCENT: 5 % (ref 20–42)
Lab: 1637
M PNEUMO DNA NPH QL NAA+NON-PROBE: NOT DETECTED
MCH RBC QN AUTO: 28.9 PG (ref 26–35)
MCHC RBC AUTO-ENTMCNC: 33.6 G/DL (ref 32–34.5)
MCV RBC AUTO: 85.8 FL (ref 80–99.9)
METAMYELOCYTES ABSOLUTE COUNT: 0.31 K/UL (ref 0–0.12)
METAMYELOCYTES: 4 % (ref 0–1)
METHB: 0.3 % (ref 0–1.5)
MODE: ABNORMAL
MONOCYTES NFR BLD: 0.12 K/UL (ref 0.1–0.95)
MONOCYTES NFR BLD: 2 % (ref 2–12)
NEUTROPHILS NFR BLD: 88 % (ref 43–80)
NEUTS SEG NFR BLD: 6.33 K/UL (ref 1.8–7.3)
O2 CONTENT: 19.5 ML/DL
O2 SATURATION: 98.1 % (ref 92–98.5)
O2HB: 96.4 % (ref 94–97)
OPERATOR ID: 316
PATIENT TEMP: 37 C
PCO2: 43.9 MMHG (ref 35–45)
PH BLOOD GAS: 7.41 (ref 7.35–7.45)
PH VENOUS: 7.43 (ref 7.35–7.45)
PLATELET # BLD AUTO: 109 K/UL (ref 130–450)
PMV BLD AUTO: 8.6 FL (ref 7–12)
PO2: 112.6 MMHG (ref 75–100)
POTASSIUM SERPL-SCNC: 4.5 MMOL/L (ref 3.5–5)
PROT SERPL-MCNC: 6.3 G/DL (ref 6.4–8.3)
RBC # BLD AUTO: 3.81 M/UL (ref 3.8–5.8)
RBC # BLD: ABNORMAL 10*6/UL
RSV RNA NPH QL NAA+NON-PROBE: NOT DETECTED
RV+EV RNA NPH QL NAA+NON-PROBE: NOT DETECTED
SARS-COV-2 RDRP RESP QL NAA+PROBE: NOT DETECTED
SARS-COV-2 RNA NPH QL NAA+NON-PROBE: NOT DETECTED
SODIUM SERPL-SCNC: 139 MMOL/L (ref 132–146)
SOURCE, BLOOD GAS: ABNORMAL
SPECIMEN DESCRIPTION: NORMAL
SPECIMEN DESCRIPTION: NORMAL
THB: 14.3 G/DL (ref 11.5–16.5)
TIME ANALYZED: 1644
TROPONIN I SERPL HS-MCNC: 42 NG/L (ref 0–11)
TROPONIN I SERPL HS-MCNC: 47 NG/L (ref 0–11)
WBC OTHER # BLD: 7.2 K/UL (ref 4.5–11.5)

## 2024-09-12 PROCEDURE — 6360000002 HC RX W HCPCS: Performed by: EMERGENCY MEDICINE

## 2024-09-12 PROCEDURE — 99285 EMERGENCY DEPT VISIT HI MDM: CPT

## 2024-09-12 PROCEDURE — 93005 ELECTROCARDIOGRAM TRACING: CPT | Performed by: EMERGENCY MEDICINE

## 2024-09-12 PROCEDURE — 6370000000 HC RX 637 (ALT 250 FOR IP): Performed by: EMERGENCY MEDICINE

## 2024-09-12 PROCEDURE — 83605 ASSAY OF LACTIC ACID: CPT

## 2024-09-12 PROCEDURE — 71045 X-RAY EXAM CHEST 1 VIEW: CPT

## 2024-09-12 PROCEDURE — 80053 COMPREHEN METABOLIC PANEL: CPT

## 2024-09-12 PROCEDURE — 84484 ASSAY OF TROPONIN QUANT: CPT

## 2024-09-12 PROCEDURE — 96365 THER/PROPH/DIAG IV INF INIT: CPT

## 2024-09-12 PROCEDURE — 82010 KETONE BODYS QUAN: CPT

## 2024-09-12 PROCEDURE — 82800 BLOOD PH: CPT

## 2024-09-12 PROCEDURE — 96375 TX/PRO/DX INJ NEW DRUG ADDON: CPT

## 2024-09-12 PROCEDURE — 87635 SARS-COV-2 COVID-19 AMP PRB: CPT

## 2024-09-12 PROCEDURE — 94640 AIRWAY INHALATION TREATMENT: CPT

## 2024-09-12 PROCEDURE — 85025 COMPLETE CBC W/AUTO DIFF WBC: CPT

## 2024-09-12 PROCEDURE — 0202U NFCT DS 22 TRGT SARS-COV-2: CPT

## 2024-09-12 PROCEDURE — 2060000000 HC ICU INTERMEDIATE R&B

## 2024-09-12 PROCEDURE — 82805 BLOOD GASES W/O2 SATURATION: CPT

## 2024-09-12 PROCEDURE — 94664 DEMO&/EVAL PT USE INHALER: CPT

## 2024-09-12 PROCEDURE — 83880 ASSAY OF NATRIURETIC PEPTIDE: CPT

## 2024-09-12 PROCEDURE — 99223 1ST HOSP IP/OBS HIGH 75: CPT | Performed by: HOSPITALIST

## 2024-09-12 PROCEDURE — 85379 FIBRIN DEGRADATION QUANT: CPT

## 2024-09-12 RX ORDER — IPRATROPIUM BROMIDE AND ALBUTEROL SULFATE 2.5; .5 MG/3ML; MG/3ML
3 SOLUTION RESPIRATORY (INHALATION) ONCE
Status: COMPLETED | OUTPATIENT
Start: 2024-09-12 | End: 2024-09-12

## 2024-09-12 RX ORDER — 0.9 % SODIUM CHLORIDE 0.9 %
250 INTRAVENOUS SOLUTION INTRAVENOUS ONCE
Status: DISCONTINUED | OUTPATIENT
Start: 2024-09-12 | End: 2024-09-12

## 2024-09-12 RX ORDER — 0.9 % SODIUM CHLORIDE 0.9 %
500 INTRAVENOUS SOLUTION INTRAVENOUS ONCE
Status: COMPLETED | OUTPATIENT
Start: 2024-09-13 | End: 2024-09-13

## 2024-09-12 RX ORDER — MAGNESIUM SULFATE IN WATER 40 MG/ML
2000 INJECTION, SOLUTION INTRAVENOUS ONCE
Status: COMPLETED | OUTPATIENT
Start: 2024-09-12 | End: 2024-09-13

## 2024-09-12 RX ORDER — METHYLPREDNISOLONE SODIUM SUCCINATE 125 MG/2ML
125 INJECTION, POWDER, LYOPHILIZED, FOR SOLUTION INTRAMUSCULAR; INTRAVENOUS ONCE
Status: COMPLETED | OUTPATIENT
Start: 2024-09-12 | End: 2024-09-12

## 2024-09-12 RX ADMIN — IPRATROPIUM BROMIDE AND ALBUTEROL SULFATE 3 DOSE: 2.5; .5 SOLUTION RESPIRATORY (INHALATION) at 15:55

## 2024-09-12 RX ADMIN — IPRATROPIUM BROMIDE AND ALBUTEROL SULFATE 3 DOSE: 2.5; .5 SOLUTION RESPIRATORY (INHALATION) at 23:53

## 2024-09-12 RX ADMIN — METHYLPREDNISOLONE SODIUM SUCCINATE 125 MG: 125 INJECTION INTRAMUSCULAR; INTRAVENOUS at 16:04

## 2024-09-12 RX ADMIN — MAGNESIUM SULFATE HEPTAHYDRATE 2000 MG: 40 INJECTION, SOLUTION INTRAVENOUS at 22:49

## 2024-09-13 LAB
ANION GAP SERPL CALCULATED.3IONS-SCNC: 13 MMOL/L (ref 7–16)
BASOPHILS # BLD: 0.01 K/UL (ref 0–0.2)
BASOPHILS NFR BLD: 0 % (ref 0–2)
BUN SERPL-MCNC: 38 MG/DL (ref 6–23)
CALCIUM SERPL-MCNC: 9 MG/DL (ref 8.6–10.2)
CHLORIDE SERPL-SCNC: 100 MMOL/L (ref 98–107)
CO2 SERPL-SCNC: 25 MMOL/L (ref 22–29)
CREAT SERPL-MCNC: 1.5 MG/DL (ref 0.7–1.2)
EKG ATRIAL RATE: 72 BPM
EKG P AXIS: 98 DEGREES
EKG P-R INTERVAL: 150 MS
EKG Q-T INTERVAL: 358 MS
EKG QRS DURATION: 70 MS
EKG QTC CALCULATION (BAZETT): 392 MS
EKG R AXIS: 22 DEGREES
EKG T AXIS: 85 DEGREES
EKG VENTRICULAR RATE: 72 BPM
EOSINOPHIL # BLD: 0 K/UL (ref 0.05–0.5)
EOSINOPHILS RELATIVE PERCENT: 0 % (ref 0–6)
ERYTHROCYTE [DISTWIDTH] IN BLOOD BY AUTOMATED COUNT: 15 % (ref 11.5–15)
GFR, ESTIMATED: 49 ML/MIN/1.73M2
GLUCOSE BLD-MCNC: 230 MG/DL (ref 74–99)
GLUCOSE BLD-MCNC: 321 MG/DL (ref 74–99)
GLUCOSE BLD-MCNC: 377 MG/DL (ref 74–99)
GLUCOSE SERPL-MCNC: 200 MG/DL (ref 74–99)
HCT VFR BLD AUTO: 30.6 % (ref 37–54)
HGB BLD-MCNC: 10.1 G/DL (ref 12.5–16.5)
IMM GRANULOCYTES # BLD AUTO: 0.27 K/UL (ref 0–0.58)
IMM GRANULOCYTES NFR BLD: 3 % (ref 0–5)
LYMPHOCYTES NFR BLD: 0.71 K/UL (ref 1.5–4)
LYMPHOCYTES RELATIVE PERCENT: 8 % (ref 20–42)
MCH RBC QN AUTO: 28.9 PG (ref 26–35)
MCHC RBC AUTO-ENTMCNC: 33 G/DL (ref 32–34.5)
MCV RBC AUTO: 87.7 FL (ref 80–99.9)
MONOCYTES NFR BLD: 0.38 K/UL (ref 0.1–0.95)
MONOCYTES NFR BLD: 4 % (ref 2–12)
NEUTROPHILS NFR BLD: 84 % (ref 43–80)
NEUTS SEG NFR BLD: 7.18 K/UL (ref 1.8–7.3)
PLATELET # BLD AUTO: 103 K/UL (ref 130–450)
PMV BLD AUTO: 9 FL (ref 7–12)
POTASSIUM SERPL-SCNC: 4.7 MMOL/L (ref 3.5–5)
PROCALCITONIN SERPL-MCNC: 0.13 NG/ML (ref 0–0.08)
RBC # BLD AUTO: 3.49 M/UL (ref 3.8–5.8)
SODIUM SERPL-SCNC: 138 MMOL/L (ref 132–146)
WBC OTHER # BLD: 8.6 K/UL (ref 4.5–11.5)

## 2024-09-13 PROCEDURE — 2500000003 HC RX 250 WO HCPCS: Performed by: HOSPITALIST

## 2024-09-13 PROCEDURE — 2580000003 HC RX 258: Performed by: HOSPITALIST

## 2024-09-13 PROCEDURE — 80048 BASIC METABOLIC PNL TOTAL CA: CPT

## 2024-09-13 PROCEDURE — 6370000000 HC RX 637 (ALT 250 FOR IP): Performed by: NURSE PRACTITIONER

## 2024-09-13 PROCEDURE — 6370000000 HC RX 637 (ALT 250 FOR IP): Performed by: HOSPITALIST

## 2024-09-13 PROCEDURE — 2580000003 HC RX 258: Performed by: EMERGENCY MEDICINE

## 2024-09-13 PROCEDURE — 2060000000 HC ICU INTERMEDIATE R&B

## 2024-09-13 PROCEDURE — 6360000002 HC RX W HCPCS: Performed by: INTERNAL MEDICINE

## 2024-09-13 PROCEDURE — 82962 GLUCOSE BLOOD TEST: CPT

## 2024-09-13 PROCEDURE — 93010 ELECTROCARDIOGRAM REPORT: CPT | Performed by: INTERNAL MEDICINE

## 2024-09-13 PROCEDURE — 94660 CPAP INITIATION&MGMT: CPT

## 2024-09-13 PROCEDURE — 99223 1ST HOSP IP/OBS HIGH 75: CPT | Performed by: NURSE PRACTITIONER

## 2024-09-13 PROCEDURE — 99233 SBSQ HOSP IP/OBS HIGH 50: CPT | Performed by: INTERNAL MEDICINE

## 2024-09-13 PROCEDURE — 2700000000 HC OXYGEN THERAPY PER DAY

## 2024-09-13 PROCEDURE — 84145 PROCALCITONIN (PCT): CPT

## 2024-09-13 PROCEDURE — 6370000000 HC RX 637 (ALT 250 FOR IP): Performed by: EMERGENCY MEDICINE

## 2024-09-13 PROCEDURE — 94640 AIRWAY INHALATION TREATMENT: CPT

## 2024-09-13 PROCEDURE — 6360000002 HC RX W HCPCS: Performed by: HOSPITALIST

## 2024-09-13 PROCEDURE — 36415 COLL VENOUS BLD VENIPUNCTURE: CPT

## 2024-09-13 PROCEDURE — 6370000000 HC RX 637 (ALT 250 FOR IP): Performed by: INTERNAL MEDICINE

## 2024-09-13 PROCEDURE — 6360000002 HC RX W HCPCS: Performed by: NURSE PRACTITIONER

## 2024-09-13 PROCEDURE — 85025 COMPLETE CBC W/AUTO DIFF WBC: CPT

## 2024-09-13 RX ORDER — IPRATROPIUM BROMIDE AND ALBUTEROL SULFATE 2.5; .5 MG/3ML; MG/3ML
1 SOLUTION RESPIRATORY (INHALATION)
Status: DISCONTINUED | OUTPATIENT
Start: 2024-09-13 | End: 2024-09-17 | Stop reason: HOSPADM

## 2024-09-13 RX ORDER — ROFLUMILAST 500 UG/1
500 TABLET ORAL DAILY
Status: DISCONTINUED | OUTPATIENT
Start: 2024-09-13 | End: 2024-09-17 | Stop reason: HOSPADM

## 2024-09-13 RX ORDER — SENNOSIDES A AND B 8.6 MG/1
1 TABLET, FILM COATED ORAL DAILY PRN
Status: DISCONTINUED | OUTPATIENT
Start: 2024-09-13 | End: 2024-09-17 | Stop reason: HOSPADM

## 2024-09-13 RX ORDER — ACETAMINOPHEN 650 MG/1
650 SUPPOSITORY RECTAL EVERY 6 HOURS PRN
Status: DISCONTINUED | OUTPATIENT
Start: 2024-09-13 | End: 2024-09-17 | Stop reason: HOSPADM

## 2024-09-13 RX ORDER — INSULIN LISPRO 100 [IU]/ML
12 INJECTION, SOLUTION INTRAVENOUS; SUBCUTANEOUS
Status: DISCONTINUED | OUTPATIENT
Start: 2024-09-13 | End: 2024-09-17 | Stop reason: HOSPADM

## 2024-09-13 RX ORDER — PREDNISONE 20 MG/1
40 TABLET ORAL DAILY
Status: DISCONTINUED | OUTPATIENT
Start: 2024-09-15 | End: 2024-09-13

## 2024-09-13 RX ORDER — ACETAMINOPHEN 325 MG/1
650 TABLET ORAL EVERY 6 HOURS PRN
Status: DISCONTINUED | OUTPATIENT
Start: 2024-09-13 | End: 2024-09-17 | Stop reason: HOSPADM

## 2024-09-13 RX ORDER — SODIUM CHLORIDE 0.9 % (FLUSH) 0.9 %
5-40 SYRINGE (ML) INJECTION EVERY 12 HOURS SCHEDULED
Status: DISCONTINUED | OUTPATIENT
Start: 2024-09-13 | End: 2024-09-17 | Stop reason: HOSPADM

## 2024-09-13 RX ORDER — ONDANSETRON 2 MG/ML
4 INJECTION INTRAMUSCULAR; INTRAVENOUS EVERY 6 HOURS PRN
Status: DISCONTINUED | OUTPATIENT
Start: 2024-09-13 | End: 2024-09-17 | Stop reason: HOSPADM

## 2024-09-13 RX ORDER — SODIUM CHLORIDE 9 MG/ML
INJECTION, SOLUTION INTRAVENOUS CONTINUOUS
Status: DISCONTINUED | OUTPATIENT
Start: 2024-09-13 | End: 2024-09-13

## 2024-09-13 RX ORDER — MAGNESIUM HYDROXIDE/ALUMINUM HYDROXICE/SIMETHICONE 120; 1200; 1200 MG/30ML; MG/30ML; MG/30ML
30 SUSPENSION ORAL EVERY 6 HOURS PRN
Status: DISCONTINUED | OUTPATIENT
Start: 2024-09-13 | End: 2024-09-17 | Stop reason: HOSPADM

## 2024-09-13 RX ORDER — BENZONATATE 100 MG/1
100 CAPSULE ORAL 3 TIMES DAILY PRN
Status: DISCONTINUED | OUTPATIENT
Start: 2024-09-13 | End: 2024-09-17 | Stop reason: HOSPADM

## 2024-09-13 RX ORDER — INSULIN LISPRO 100 [IU]/ML
0-16 INJECTION, SOLUTION INTRAVENOUS; SUBCUTANEOUS
Status: DISCONTINUED | OUTPATIENT
Start: 2024-09-13 | End: 2024-09-17 | Stop reason: HOSPADM

## 2024-09-13 RX ORDER — METHYLPREDNISOLONE SODIUM SUCCINATE 40 MG/ML
40 INJECTION, POWDER, LYOPHILIZED, FOR SOLUTION INTRAMUSCULAR; INTRAVENOUS EVERY 12 HOURS
Status: DISCONTINUED | OUTPATIENT
Start: 2024-09-13 | End: 2024-09-16

## 2024-09-13 RX ORDER — WATER 10 ML/10ML
INJECTION INTRAMUSCULAR; INTRAVENOUS; SUBCUTANEOUS
Status: DISPENSED
Start: 2024-09-13 | End: 2024-09-13

## 2024-09-13 RX ORDER — FINASTERIDE 5 MG/1
5 TABLET, FILM COATED ORAL DAILY
Status: DISCONTINUED | OUTPATIENT
Start: 2024-09-13 | End: 2024-09-17 | Stop reason: HOSPADM

## 2024-09-13 RX ORDER — DEXTROSE MONOHYDRATE 100 MG/ML
INJECTION, SOLUTION INTRAVENOUS CONTINUOUS PRN
Status: DISCONTINUED | OUTPATIENT
Start: 2024-09-13 | End: 2024-09-17 | Stop reason: HOSPADM

## 2024-09-13 RX ORDER — SODIUM CHLORIDE 0.9 % (FLUSH) 0.9 %
5-40 SYRINGE (ML) INJECTION PRN
Status: DISCONTINUED | OUTPATIENT
Start: 2024-09-13 | End: 2024-09-17 | Stop reason: HOSPADM

## 2024-09-13 RX ORDER — ISOSORBIDE MONONITRATE 30 MG/1
30 TABLET, EXTENDED RELEASE ORAL DAILY
Status: DISCONTINUED | OUTPATIENT
Start: 2024-09-13 | End: 2024-09-17 | Stop reason: HOSPADM

## 2024-09-13 RX ORDER — INSULIN GLARGINE 100 [IU]/ML
40 INJECTION, SOLUTION SUBCUTANEOUS DAILY
Status: DISCONTINUED | OUTPATIENT
Start: 2024-09-13 | End: 2024-09-14

## 2024-09-13 RX ORDER — SODIUM CHLORIDE 9 MG/ML
INJECTION, SOLUTION INTRAVENOUS PRN
Status: DISCONTINUED | OUTPATIENT
Start: 2024-09-13 | End: 2024-09-17 | Stop reason: HOSPADM

## 2024-09-13 RX ORDER — ROSUVASTATIN CALCIUM 10 MG/1
10 TABLET, COATED ORAL DAILY
Status: DISCONTINUED | OUTPATIENT
Start: 2024-09-13 | End: 2024-09-17 | Stop reason: HOSPADM

## 2024-09-13 RX ORDER — GUAIFENESIN 400 MG/1
400 TABLET ORAL 3 TIMES DAILY
Status: DISCONTINUED | OUTPATIENT
Start: 2024-09-13 | End: 2024-09-17 | Stop reason: HOSPADM

## 2024-09-13 RX ORDER — ONDANSETRON 4 MG/1
4 TABLET, ORALLY DISINTEGRATING ORAL EVERY 8 HOURS PRN
Status: DISCONTINUED | OUTPATIENT
Start: 2024-09-13 | End: 2024-09-17 | Stop reason: HOSPADM

## 2024-09-13 RX ORDER — INSULIN LISPRO 100 [IU]/ML
0-4 INJECTION, SOLUTION INTRAVENOUS; SUBCUTANEOUS NIGHTLY
Status: DISCONTINUED | OUTPATIENT
Start: 2024-09-13 | End: 2024-09-17 | Stop reason: HOSPADM

## 2024-09-13 RX ORDER — ARFORMOTEROL TARTRATE 15 UG/2ML
15 SOLUTION RESPIRATORY (INHALATION)
Status: DISCONTINUED | OUTPATIENT
Start: 2024-09-13 | End: 2024-09-17 | Stop reason: HOSPADM

## 2024-09-13 RX ORDER — ALPRAZOLAM 0.25 MG
0.25 TABLET ORAL 2 TIMES DAILY PRN
Status: DISCONTINUED | OUTPATIENT
Start: 2024-09-13 | End: 2024-09-17 | Stop reason: HOSPADM

## 2024-09-13 RX ORDER — INSULIN LISPRO 100 [IU]/ML
0-16 INJECTION, SOLUTION INTRAVENOUS; SUBCUTANEOUS EVERY 4 HOURS
Status: DISCONTINUED | OUTPATIENT
Start: 2024-09-13 | End: 2024-09-13 | Stop reason: CLARIF

## 2024-09-13 RX ORDER — MORPHINE SULFATE 10 MG/5ML
5 SOLUTION ORAL EVERY 4 HOURS PRN
Status: DISCONTINUED | OUTPATIENT
Start: 2024-09-13 | End: 2024-09-17 | Stop reason: HOSPADM

## 2024-09-13 RX ORDER — FLUTICASONE PROPIONATE 50 MCG
2 SPRAY, SUSPENSION (ML) NASAL DAILY
Status: DISCONTINUED | OUTPATIENT
Start: 2024-09-13 | End: 2024-09-17 | Stop reason: HOSPADM

## 2024-09-13 RX ORDER — ASPIRIN 81 MG/1
81 TABLET ORAL DAILY
Status: DISCONTINUED | OUTPATIENT
Start: 2024-09-13 | End: 2024-09-17 | Stop reason: HOSPADM

## 2024-09-13 RX ORDER — TAMSULOSIN HYDROCHLORIDE 0.4 MG/1
0.8 CAPSULE ORAL DAILY
Status: DISCONTINUED | OUTPATIENT
Start: 2024-09-13 | End: 2024-09-17 | Stop reason: HOSPADM

## 2024-09-13 RX ORDER — METOPROLOL TARTRATE 50 MG
100 TABLET ORAL 2 TIMES DAILY
Status: DISCONTINUED | OUTPATIENT
Start: 2024-09-13 | End: 2024-09-17 | Stop reason: HOSPADM

## 2024-09-13 RX ORDER — GLUCAGON 1 MG/ML
1 KIT INJECTION PRN
Status: DISCONTINUED | OUTPATIENT
Start: 2024-09-13 | End: 2024-09-17 | Stop reason: HOSPADM

## 2024-09-13 RX ORDER — POLYETHYLENE GLYCOL 3350 17 G/17G
17 POWDER, FOR SOLUTION ORAL DAILY PRN
Status: DISCONTINUED | OUTPATIENT
Start: 2024-09-13 | End: 2024-09-17 | Stop reason: HOSPADM

## 2024-09-13 RX ORDER — METHYLPREDNISOLONE SODIUM SUCCINATE 40 MG/ML
40 INJECTION, POWDER, LYOPHILIZED, FOR SOLUTION INTRAMUSCULAR; INTRAVENOUS EVERY 6 HOURS
Status: DISCONTINUED | OUTPATIENT
Start: 2024-09-13 | End: 2024-09-13

## 2024-09-13 RX ADMIN — TAMSULOSIN HYDROCHLORIDE 0.8 MG: 0.4 CAPSULE ORAL at 09:09

## 2024-09-13 RX ADMIN — FLUTICASONE PROPIONATE 2 SPRAY: 50 SPRAY, METERED NASAL at 14:56

## 2024-09-13 RX ADMIN — ISOSORBIDE MONONITRATE 30 MG: 30 TABLET, EXTENDED RELEASE ORAL at 09:10

## 2024-09-13 RX ADMIN — GUAIFENESIN 400 MG: 400 TABLET ORAL at 14:56

## 2024-09-13 RX ADMIN — INSULIN LISPRO 12 UNITS: 100 INJECTION, SOLUTION INTRAVENOUS; SUBCUTANEOUS at 08:53

## 2024-09-13 RX ADMIN — METHYLPREDNISOLONE SODIUM SUCCINATE 40 MG: 40 INJECTION INTRAMUSCULAR; INTRAVENOUS at 03:58

## 2024-09-13 RX ADMIN — SODIUM CHLORIDE, PRESERVATIVE FREE 10 ML: 5 INJECTION INTRAVENOUS at 08:52

## 2024-09-13 RX ADMIN — ARFORMOTEROL TARTRATE 15 MCG: 15 SOLUTION RESPIRATORY (INHALATION) at 20:41

## 2024-09-13 RX ADMIN — SODIUM CHLORIDE: 9 INJECTION, SOLUTION INTRAVENOUS at 02:46

## 2024-09-13 RX ADMIN — ARFORMOTEROL TARTRATE 15 MCG: 15 SOLUTION RESPIRATORY (INHALATION) at 13:17

## 2024-09-13 RX ADMIN — SODIUM CHLORIDE, PRESERVATIVE FREE 10 ML: 5 INJECTION INTRAVENOUS at 20:37

## 2024-09-13 RX ADMIN — INSULIN LISPRO 12 UNITS: 100 INJECTION, SOLUTION INTRAVENOUS; SUBCUTANEOUS at 08:48

## 2024-09-13 RX ADMIN — GUAIFENESIN 400 MG: 400 TABLET ORAL at 09:09

## 2024-09-13 RX ADMIN — ALPRAZOLAM 0.25 MG: 0.25 TABLET ORAL at 05:03

## 2024-09-13 RX ADMIN — INSULIN GLARGINE 40 UNITS: 100 INJECTION, SOLUTION SUBCUTANEOUS at 09:07

## 2024-09-13 RX ADMIN — GUAIFENESIN 400 MG: 400 TABLET ORAL at 20:30

## 2024-09-13 RX ADMIN — MORPHINE SULFATE 5 MG: 10 SOLUTION ORAL at 15:04

## 2024-09-13 RX ADMIN — INSULIN LISPRO 12 UNITS: 100 INJECTION, SOLUTION INTRAVENOUS; SUBCUTANEOUS at 11:35

## 2024-09-13 RX ADMIN — DOXYCYCLINE 100 MG: 100 INJECTION, POWDER, LYOPHILIZED, FOR SOLUTION INTRAVENOUS at 02:47

## 2024-09-13 RX ADMIN — SODIUM CHLORIDE 500 ML: 9 INJECTION, SOLUTION INTRAVENOUS at 00:42

## 2024-09-13 RX ADMIN — IPRATROPIUM BROMIDE AND ALBUTEROL SULFATE 1 DOSE: 2.5; .5 SOLUTION RESPIRATORY (INHALATION) at 13:18

## 2024-09-13 RX ADMIN — METOPROLOL TARTRATE 100 MG: 50 TABLET, FILM COATED ORAL at 09:10

## 2024-09-13 RX ADMIN — IPRATROPIUM BROMIDE AND ALBUTEROL SULFATE 1 DOSE: 2.5; .5 SOLUTION RESPIRATORY (INHALATION) at 20:41

## 2024-09-13 RX ADMIN — IPRATROPIUM BROMIDE AND ALBUTEROL SULFATE 1 DOSE: 2.5; .5 SOLUTION RESPIRATORY (INHALATION) at 16:34

## 2024-09-13 RX ADMIN — INSULIN LISPRO 4 UNITS: 100 INJECTION, SOLUTION INTRAVENOUS; SUBCUTANEOUS at 11:35

## 2024-09-13 RX ADMIN — ROFLUMILAST 500 MCG: 500 TABLET ORAL at 09:10

## 2024-09-13 RX ADMIN — METHYLPREDNISOLONE SODIUM SUCCINATE 40 MG: 40 INJECTION, POWDER, LYOPHILIZED, FOR SOLUTION INTRAMUSCULAR; INTRAVENOUS at 20:36

## 2024-09-13 RX ADMIN — INSULIN LISPRO 12 UNITS: 100 INJECTION, SOLUTION INTRAVENOUS; SUBCUTANEOUS at 17:30

## 2024-09-13 RX ADMIN — INSULIN HUMAN 4 UNITS: 100 INJECTION, SOLUTION PARENTERAL at 00:53

## 2024-09-13 RX ADMIN — ASPIRIN 81 MG: 81 TABLET, COATED ORAL at 09:10

## 2024-09-13 RX ADMIN — ROSUVASTATIN CALCIUM 10 MG: 10 TABLET, FILM COATED ORAL at 09:10

## 2024-09-13 RX ADMIN — METOPROLOL TARTRATE 100 MG: 50 TABLET, FILM COATED ORAL at 20:30

## 2024-09-13 RX ADMIN — DOXYCYCLINE 100 MG: 100 INJECTION, POWDER, LYOPHILIZED, FOR SOLUTION INTRAVENOUS at 14:59

## 2024-09-13 RX ADMIN — ALPRAZOLAM 0.25 MG: 0.25 TABLET ORAL at 20:31

## 2024-09-13 RX ADMIN — WATER 1000 MG: 1 INJECTION INTRAMUSCULAR; INTRAVENOUS; SUBCUTANEOUS at 03:55

## 2024-09-13 RX ADMIN — FINASTERIDE 5 MG: 5 TABLET, FILM COATED ORAL at 09:10

## 2024-09-13 RX ADMIN — IPRATROPIUM BROMIDE AND ALBUTEROL SULFATE 1 DOSE: 2.5; .5 SOLUTION RESPIRATORY (INHALATION) at 09:22

## 2024-09-13 ASSESSMENT — PAIN - FUNCTIONAL ASSESSMENT
PAIN_FUNCTIONAL_ASSESSMENT: ACTIVITIES ARE NOT PREVENTED
PAIN_FUNCTIONAL_ASSESSMENT: ACTIVITIES ARE NOT PREVENTED

## 2024-09-13 ASSESSMENT — PAIN DESCRIPTION - PAIN TYPE
TYPE: ACUTE PAIN
TYPE: ACUTE PAIN

## 2024-09-13 ASSESSMENT — PAIN DESCRIPTION - ORIENTATION
ORIENTATION: MID
ORIENTATION: MID

## 2024-09-13 ASSESSMENT — PAIN DESCRIPTION - ONSET
ONSET: ON-GOING
ONSET: ON-GOING

## 2024-09-13 ASSESSMENT — PAIN DESCRIPTION - DESCRIPTORS
DESCRIPTORS: HEAVINESS
DESCRIPTORS: HEAVINESS

## 2024-09-13 ASSESSMENT — PAIN DESCRIPTION - FREQUENCY
FREQUENCY: CONTINUOUS
FREQUENCY: CONTINUOUS

## 2024-09-13 ASSESSMENT — PAIN DESCRIPTION - LOCATION
LOCATION: CHEST
LOCATION: CHEST

## 2024-09-13 ASSESSMENT — PAIN SCALES - GENERAL
PAINLEVEL_OUTOF10: 4
PAINLEVEL_OUTOF10: 2
PAINLEVEL_OUTOF10: 6

## 2024-09-14 LAB
ANION GAP SERPL CALCULATED.3IONS-SCNC: 9 MMOL/L (ref 7–16)
BUN SERPL-MCNC: 32 MG/DL (ref 6–23)
CALCIUM SERPL-MCNC: 8.8 MG/DL (ref 8.6–10.2)
CHLORIDE SERPL-SCNC: 103 MMOL/L (ref 98–107)
CO2 SERPL-SCNC: 28 MMOL/L (ref 22–29)
CREAT SERPL-MCNC: 1.3 MG/DL (ref 0.7–1.2)
ERYTHROCYTE [DISTWIDTH] IN BLOOD BY AUTOMATED COUNT: 15.3 % (ref 11.5–15)
GFR, ESTIMATED: 58 ML/MIN/1.73M2
GLUCOSE SERPL-MCNC: 271 MG/DL (ref 74–99)
HCT VFR BLD AUTO: 30.3 % (ref 37–54)
HGB BLD-MCNC: 10.2 G/DL (ref 12.5–16.5)
MCH RBC QN AUTO: 29.4 PG (ref 26–35)
MCHC RBC AUTO-ENTMCNC: 33.7 G/DL (ref 32–34.5)
MCV RBC AUTO: 87.3 FL (ref 80–99.9)
PLATELET # BLD AUTO: 99 K/UL (ref 130–450)
PLATELET CONFIRMATION: NORMAL
PMV BLD AUTO: 8.9 FL (ref 7–12)
POTASSIUM SERPL-SCNC: 4.4 MMOL/L (ref 3.5–5)
RBC # BLD AUTO: 3.47 M/UL (ref 3.8–5.8)
SODIUM SERPL-SCNC: 140 MMOL/L (ref 132–146)
WBC OTHER # BLD: 6.5 K/UL (ref 4.5–11.5)

## 2024-09-14 PROCEDURE — 2500000003 HC RX 250 WO HCPCS: Performed by: HOSPITALIST

## 2024-09-14 PROCEDURE — 2580000003 HC RX 258: Performed by: HOSPITALIST

## 2024-09-14 PROCEDURE — 6360000002 HC RX W HCPCS: Performed by: NURSE PRACTITIONER

## 2024-09-14 PROCEDURE — 6370000000 HC RX 637 (ALT 250 FOR IP): Performed by: HOSPITALIST

## 2024-09-14 PROCEDURE — 80048 BASIC METABOLIC PNL TOTAL CA: CPT

## 2024-09-14 PROCEDURE — 6370000000 HC RX 637 (ALT 250 FOR IP): Performed by: NURSE PRACTITIONER

## 2024-09-14 PROCEDURE — 6360000002 HC RX W HCPCS: Performed by: INTERNAL MEDICINE

## 2024-09-14 PROCEDURE — 94660 CPAP INITIATION&MGMT: CPT

## 2024-09-14 PROCEDURE — 94667 MNPJ CHEST WALL 1ST: CPT

## 2024-09-14 PROCEDURE — 2700000000 HC OXYGEN THERAPY PER DAY

## 2024-09-14 PROCEDURE — 93005 ELECTROCARDIOGRAM TRACING: CPT | Performed by: INTERNAL MEDICINE

## 2024-09-14 PROCEDURE — 2060000000 HC ICU INTERMEDIATE R&B

## 2024-09-14 PROCEDURE — 94640 AIRWAY INHALATION TREATMENT: CPT

## 2024-09-14 PROCEDURE — 85027 COMPLETE CBC AUTOMATED: CPT

## 2024-09-14 PROCEDURE — 99232 SBSQ HOSP IP/OBS MODERATE 35: CPT | Performed by: INTERNAL MEDICINE

## 2024-09-14 RX ORDER — INSULIN GLARGINE 100 [IU]/ML
25 INJECTION, SOLUTION SUBCUTANEOUS DAILY
Status: DISCONTINUED | OUTPATIENT
Start: 2024-09-15 | End: 2024-09-17 | Stop reason: HOSPADM

## 2024-09-14 RX ADMIN — DOXYCYCLINE 100 MG: 100 INJECTION, POWDER, LYOPHILIZED, FOR SOLUTION INTRAVENOUS at 02:00

## 2024-09-14 RX ADMIN — ISOSORBIDE MONONITRATE 30 MG: 30 TABLET, EXTENDED RELEASE ORAL at 08:25

## 2024-09-14 RX ADMIN — GUAIFENESIN 400 MG: 400 TABLET ORAL at 20:47

## 2024-09-14 RX ADMIN — INSULIN GLARGINE 25 UNITS: 100 INJECTION, SOLUTION SUBCUTANEOUS at 08:26

## 2024-09-14 RX ADMIN — FLUTICASONE PROPIONATE 2 SPRAY: 50 SPRAY, METERED NASAL at 08:24

## 2024-09-14 RX ADMIN — POLYETHYLENE GLYCOL 3350 17 G: 17 POWDER, FOR SOLUTION ORAL at 12:46

## 2024-09-14 RX ADMIN — IPRATROPIUM BROMIDE AND ALBUTEROL SULFATE 1 DOSE: 2.5; .5 SOLUTION RESPIRATORY (INHALATION) at 12:13

## 2024-09-14 RX ADMIN — GUAIFENESIN 400 MG: 400 TABLET ORAL at 08:25

## 2024-09-14 RX ADMIN — INSULIN LISPRO 12 UNITS: 100 INJECTION, SOLUTION INTRAVENOUS; SUBCUTANEOUS at 17:32

## 2024-09-14 RX ADMIN — ROSUVASTATIN CALCIUM 10 MG: 10 TABLET, FILM COATED ORAL at 08:25

## 2024-09-14 RX ADMIN — ALPRAZOLAM 0.25 MG: 0.25 TABLET ORAL at 08:42

## 2024-09-14 RX ADMIN — METHYLPREDNISOLONE SODIUM SUCCINATE 40 MG: 40 INJECTION, POWDER, LYOPHILIZED, FOR SOLUTION INTRAMUSCULAR; INTRAVENOUS at 08:25

## 2024-09-14 RX ADMIN — ROFLUMILAST 500 MCG: 500 TABLET ORAL at 08:33

## 2024-09-14 RX ADMIN — IPRATROPIUM BROMIDE AND ALBUTEROL SULFATE 1 DOSE: 2.5; .5 SOLUTION RESPIRATORY (INHALATION) at 20:26

## 2024-09-14 RX ADMIN — SODIUM CHLORIDE, PRESERVATIVE FREE 10 ML: 5 INJECTION INTRAVENOUS at 20:47

## 2024-09-14 RX ADMIN — METOPROLOL TARTRATE 100 MG: 50 TABLET, FILM COATED ORAL at 08:25

## 2024-09-14 RX ADMIN — IPRATROPIUM BROMIDE AND ALBUTEROL SULFATE 1 DOSE: 2.5; .5 SOLUTION RESPIRATORY (INHALATION) at 15:36

## 2024-09-14 RX ADMIN — GUAIFENESIN 400 MG: 400 TABLET ORAL at 14:30

## 2024-09-14 RX ADMIN — ALPRAZOLAM 0.25 MG: 0.25 TABLET ORAL at 21:34

## 2024-09-14 RX ADMIN — FINASTERIDE 5 MG: 5 TABLET, FILM COATED ORAL at 08:33

## 2024-09-14 RX ADMIN — METOPROLOL TARTRATE 100 MG: 50 TABLET, FILM COATED ORAL at 20:47

## 2024-09-14 RX ADMIN — SODIUM CHLORIDE, PRESERVATIVE FREE 10 ML: 5 INJECTION INTRAVENOUS at 08:25

## 2024-09-14 RX ADMIN — ASPIRIN 81 MG: 81 TABLET, COATED ORAL at 08:25

## 2024-09-14 RX ADMIN — ARFORMOTEROL TARTRATE 15 MCG: 15 SOLUTION RESPIRATORY (INHALATION) at 08:30

## 2024-09-14 RX ADMIN — IPRATROPIUM BROMIDE AND ALBUTEROL SULFATE 1 DOSE: 2.5; .5 SOLUTION RESPIRATORY (INHALATION) at 08:29

## 2024-09-14 RX ADMIN — INSULIN LISPRO 12 UNITS: 100 INJECTION, SOLUTION INTRAVENOUS; SUBCUTANEOUS at 12:47

## 2024-09-14 RX ADMIN — INSULIN LISPRO 12 UNITS: 100 INJECTION, SOLUTION INTRAVENOUS; SUBCUTANEOUS at 08:27

## 2024-09-14 RX ADMIN — DOXYCYCLINE 100 MG: 100 INJECTION, POWDER, LYOPHILIZED, FOR SOLUTION INTRAVENOUS at 14:36

## 2024-09-14 RX ADMIN — ARFORMOTEROL TARTRATE 15 MCG: 15 SOLUTION RESPIRATORY (INHALATION) at 20:26

## 2024-09-14 RX ADMIN — TAMSULOSIN HYDROCHLORIDE 0.8 MG: 0.4 CAPSULE ORAL at 08:25

## 2024-09-14 RX ADMIN — MORPHINE SULFATE 5 MG: 10 SOLUTION ORAL at 12:53

## 2024-09-14 RX ADMIN — METHYLPREDNISOLONE SODIUM SUCCINATE 40 MG: 40 INJECTION, POWDER, LYOPHILIZED, FOR SOLUTION INTRAMUSCULAR; INTRAVENOUS at 20:47

## 2024-09-15 LAB
ANION GAP SERPL CALCULATED.3IONS-SCNC: 9 MMOL/L (ref 7–16)
BUN SERPL-MCNC: 39 MG/DL (ref 6–23)
CALCIUM SERPL-MCNC: 8.1 MG/DL (ref 8.6–10.2)
CHLORIDE SERPL-SCNC: 105 MMOL/L (ref 98–107)
CHP ED QC CHECK: NORMAL
CO2 SERPL-SCNC: 25 MMOL/L (ref 22–29)
CREAT SERPL-MCNC: 1.4 MG/DL (ref 0.7–1.2)
ERYTHROCYTE [DISTWIDTH] IN BLOOD BY AUTOMATED COUNT: 15.1 % (ref 11.5–15)
GFR, ESTIMATED: 55 ML/MIN/1.73M2
GLUCOSE BLD-MCNC: 237 MG/DL
GLUCOSE SERPL-MCNC: 178 MG/DL (ref 74–99)
HCT VFR BLD AUTO: 28.8 % (ref 37–54)
HGB BLD-MCNC: 9.5 G/DL (ref 12.5–16.5)
MCH RBC QN AUTO: 29.7 PG (ref 26–35)
MCHC RBC AUTO-ENTMCNC: 33 G/DL (ref 32–34.5)
MCV RBC AUTO: 90 FL (ref 80–99.9)
PLATELET # BLD AUTO: 93 K/UL (ref 130–450)
PLATELET CONFIRMATION: NORMAL
PMV BLD AUTO: 9.2 FL (ref 7–12)
POTASSIUM SERPL-SCNC: 4.7 MMOL/L (ref 3.5–5)
RBC # BLD AUTO: 3.2 M/UL (ref 3.8–5.8)
SODIUM SERPL-SCNC: 139 MMOL/L (ref 132–146)
WBC OTHER # BLD: 5 K/UL (ref 4.5–11.5)

## 2024-09-15 PROCEDURE — 6370000000 HC RX 637 (ALT 250 FOR IP): Performed by: HOSPITALIST

## 2024-09-15 PROCEDURE — 6370000000 HC RX 637 (ALT 250 FOR IP): Performed by: NURSE PRACTITIONER

## 2024-09-15 PROCEDURE — 2580000003 HC RX 258: Performed by: HOSPITALIST

## 2024-09-15 PROCEDURE — 99232 SBSQ HOSP IP/OBS MODERATE 35: CPT | Performed by: INTERNAL MEDICINE

## 2024-09-15 PROCEDURE — 80048 BASIC METABOLIC PNL TOTAL CA: CPT

## 2024-09-15 PROCEDURE — 2500000003 HC RX 250 WO HCPCS: Performed by: HOSPITALIST

## 2024-09-15 PROCEDURE — 94669 MECHANICAL CHEST WALL OSCILL: CPT

## 2024-09-15 PROCEDURE — 97161 PT EVAL LOW COMPLEX 20 MIN: CPT

## 2024-09-15 PROCEDURE — 85027 COMPLETE CBC AUTOMATED: CPT

## 2024-09-15 PROCEDURE — 94640 AIRWAY INHALATION TREATMENT: CPT

## 2024-09-15 PROCEDURE — 6360000002 HC RX W HCPCS: Performed by: INTERNAL MEDICINE

## 2024-09-15 PROCEDURE — 6360000002 HC RX W HCPCS: Performed by: NURSE PRACTITIONER

## 2024-09-15 PROCEDURE — 6370000000 HC RX 637 (ALT 250 FOR IP): Performed by: INTERNAL MEDICINE

## 2024-09-15 PROCEDURE — 2060000000 HC ICU INTERMEDIATE R&B

## 2024-09-15 PROCEDURE — 94660 CPAP INITIATION&MGMT: CPT

## 2024-09-15 PROCEDURE — 2700000000 HC OXYGEN THERAPY PER DAY

## 2024-09-15 RX ORDER — FUROSEMIDE 10 MG/ML
40 INJECTION INTRAMUSCULAR; INTRAVENOUS ONCE
Status: COMPLETED | OUTPATIENT
Start: 2024-09-15 | End: 2024-09-15

## 2024-09-15 RX ADMIN — ISOSORBIDE MONONITRATE 30 MG: 30 TABLET, EXTENDED RELEASE ORAL at 08:27

## 2024-09-15 RX ADMIN — GUAIFENESIN 400 MG: 400 TABLET ORAL at 20:05

## 2024-09-15 RX ADMIN — IPRATROPIUM BROMIDE AND ALBUTEROL SULFATE 1 DOSE: 2.5; .5 SOLUTION RESPIRATORY (INHALATION) at 15:45

## 2024-09-15 RX ADMIN — INSULIN LISPRO 12 UNITS: 100 INJECTION, SOLUTION INTRAVENOUS; SUBCUTANEOUS at 12:09

## 2024-09-15 RX ADMIN — ASPIRIN 81 MG: 81 TABLET, COATED ORAL at 08:27

## 2024-09-15 RX ADMIN — IPRATROPIUM BROMIDE AND ALBUTEROL SULFATE 1 DOSE: 2.5; .5 SOLUTION RESPIRATORY (INHALATION) at 12:03

## 2024-09-15 RX ADMIN — MORPHINE SULFATE 5 MG: 10 SOLUTION ORAL at 03:59

## 2024-09-15 RX ADMIN — ARFORMOTEROL TARTRATE 15 MCG: 15 SOLUTION RESPIRATORY (INHALATION) at 09:05

## 2024-09-15 RX ADMIN — FUROSEMIDE 40 MG: 10 INJECTION, SOLUTION INTRAMUSCULAR; INTRAVENOUS at 12:09

## 2024-09-15 RX ADMIN — SODIUM CHLORIDE, PRESERVATIVE FREE 10 ML: 5 INJECTION INTRAVENOUS at 20:09

## 2024-09-15 RX ADMIN — INSULIN GLARGINE 25 UNITS: 100 INJECTION, SOLUTION SUBCUTANEOUS at 08:28

## 2024-09-15 RX ADMIN — DOXYCYCLINE 100 MG: 100 INJECTION, POWDER, LYOPHILIZED, FOR SOLUTION INTRAVENOUS at 13:58

## 2024-09-15 RX ADMIN — TAMSULOSIN HYDROCHLORIDE 0.8 MG: 0.4 CAPSULE ORAL at 08:27

## 2024-09-15 RX ADMIN — ROFLUMILAST 500 MCG: 500 TABLET ORAL at 08:32

## 2024-09-15 RX ADMIN — IPRATROPIUM BROMIDE AND ALBUTEROL SULFATE 1 DOSE: 2.5; .5 SOLUTION RESPIRATORY (INHALATION) at 09:05

## 2024-09-15 RX ADMIN — FLUTICASONE PROPIONATE 2 SPRAY: 50 SPRAY, METERED NASAL at 08:26

## 2024-09-15 RX ADMIN — SODIUM CHLORIDE, PRESERVATIVE FREE 10 ML: 5 INJECTION INTRAVENOUS at 08:28

## 2024-09-15 RX ADMIN — METOPROLOL TARTRATE 100 MG: 50 TABLET, FILM COATED ORAL at 20:05

## 2024-09-15 RX ADMIN — GUAIFENESIN 400 MG: 400 TABLET ORAL at 08:27

## 2024-09-15 RX ADMIN — ALPRAZOLAM 0.25 MG: 0.25 TABLET ORAL at 20:05

## 2024-09-15 RX ADMIN — ROSUVASTATIN CALCIUM 10 MG: 10 TABLET, FILM COATED ORAL at 08:27

## 2024-09-15 RX ADMIN — ARFORMOTEROL TARTRATE 15 MCG: 15 SOLUTION RESPIRATORY (INHALATION) at 19:37

## 2024-09-15 RX ADMIN — DOXYCYCLINE 100 MG: 100 INJECTION, POWDER, LYOPHILIZED, FOR SOLUTION INTRAVENOUS at 01:06

## 2024-09-15 RX ADMIN — INSULIN LISPRO 12 UNITS: 100 INJECTION, SOLUTION INTRAVENOUS; SUBCUTANEOUS at 08:29

## 2024-09-15 RX ADMIN — MORPHINE SULFATE 5 MG: 10 SOLUTION ORAL at 23:12

## 2024-09-15 RX ADMIN — IPRATROPIUM BROMIDE AND ALBUTEROL SULFATE 1 DOSE: 2.5; .5 SOLUTION RESPIRATORY (INHALATION) at 19:37

## 2024-09-15 RX ADMIN — GUAIFENESIN 400 MG: 400 TABLET ORAL at 13:56

## 2024-09-15 RX ADMIN — METHYLPREDNISOLONE SODIUM SUCCINATE 40 MG: 40 INJECTION, POWDER, LYOPHILIZED, FOR SOLUTION INTRAMUSCULAR; INTRAVENOUS at 20:05

## 2024-09-15 RX ADMIN — METHYLPREDNISOLONE SODIUM SUCCINATE 40 MG: 40 INJECTION, POWDER, LYOPHILIZED, FOR SOLUTION INTRAMUSCULAR; INTRAVENOUS at 08:27

## 2024-09-15 RX ADMIN — METOPROLOL TARTRATE 100 MG: 50 TABLET, FILM COATED ORAL at 08:27

## 2024-09-15 RX ADMIN — FINASTERIDE 5 MG: 5 TABLET, FILM COATED ORAL at 08:32

## 2024-09-15 RX ADMIN — MORPHINE SULFATE 5 MG: 10 SOLUTION ORAL at 13:56

## 2024-09-15 ASSESSMENT — PAIN SCALES - GENERAL: PAINLEVEL_OUTOF10: 0

## 2024-09-16 LAB
ANION GAP SERPL CALCULATED.3IONS-SCNC: 8 MMOL/L (ref 7–16)
BUN SERPL-MCNC: 38 MG/DL (ref 6–23)
CALCIUM SERPL-MCNC: 8.9 MG/DL (ref 8.6–10.2)
CHLORIDE SERPL-SCNC: 102 MMOL/L (ref 98–107)
CHP ED QC CHECK: 194
CHP ED QC CHECK: NORMAL
CO2 SERPL-SCNC: 28 MMOL/L (ref 22–29)
CREAT SERPL-MCNC: 1.3 MG/DL (ref 0.7–1.2)
EKG ATRIAL RATE: 79 BPM
EKG P AXIS: 118 DEGREES
EKG P-R INTERVAL: 144 MS
EKG Q-T INTERVAL: 356 MS
EKG QRS DURATION: 78 MS
EKG QTC CALCULATION (BAZETT): 408 MS
EKG R AXIS: 171 DEGREES
EKG T AXIS: 87 DEGREES
EKG VENTRICULAR RATE: 79 BPM
ERYTHROCYTE [DISTWIDTH] IN BLOOD BY AUTOMATED COUNT: 15 % (ref 11.5–15)
GFR, ESTIMATED: 57 ML/MIN/1.73M2
GLUCOSE BLD-MCNC: 176 MG/DL
GLUCOSE BLD-MCNC: NORMAL MG/DL
GLUCOSE SERPL-MCNC: 204 MG/DL (ref 74–99)
HCT VFR BLD AUTO: 29.4 % (ref 37–54)
HGB BLD-MCNC: 9.8 G/DL (ref 12.5–16.5)
MCH RBC QN AUTO: 29.3 PG (ref 26–35)
MCHC RBC AUTO-ENTMCNC: 33.3 G/DL (ref 32–34.5)
MCV RBC AUTO: 88 FL (ref 80–99.9)
PLATELET # BLD AUTO: 70 K/UL (ref 130–450)
PLATELET CONFIRMATION: NORMAL
PMV BLD AUTO: 8.6 FL (ref 7–12)
POTASSIUM SERPL-SCNC: 4.7 MMOL/L (ref 3.5–5)
RBC # BLD AUTO: 3.34 M/UL (ref 3.8–5.8)
SODIUM SERPL-SCNC: 138 MMOL/L (ref 132–146)
WBC OTHER # BLD: 4 K/UL (ref 4.5–11.5)

## 2024-09-16 PROCEDURE — 2060000000 HC ICU INTERMEDIATE R&B

## 2024-09-16 PROCEDURE — 99232 SBSQ HOSP IP/OBS MODERATE 35: CPT | Performed by: NURSE PRACTITIONER

## 2024-09-16 PROCEDURE — 2500000003 HC RX 250 WO HCPCS: Performed by: HOSPITALIST

## 2024-09-16 PROCEDURE — 6360000002 HC RX W HCPCS

## 2024-09-16 PROCEDURE — 2700000000 HC OXYGEN THERAPY PER DAY

## 2024-09-16 PROCEDURE — 6370000000 HC RX 637 (ALT 250 FOR IP): Performed by: INTERNAL MEDICINE

## 2024-09-16 PROCEDURE — 6370000000 HC RX 637 (ALT 250 FOR IP): Performed by: HOSPITALIST

## 2024-09-16 PROCEDURE — 6360000002 HC RX W HCPCS: Performed by: INTERNAL MEDICINE

## 2024-09-16 PROCEDURE — 94726 PLETHYSMOGRAPHY LUNG VOLUMES: CPT

## 2024-09-16 PROCEDURE — 94640 AIRWAY INHALATION TREATMENT: CPT

## 2024-09-16 PROCEDURE — 94375 RESPIRATORY FLOW VOLUME LOOP: CPT

## 2024-09-16 PROCEDURE — 94660 CPAP INITIATION&MGMT: CPT

## 2024-09-16 PROCEDURE — 85027 COMPLETE CBC AUTOMATED: CPT

## 2024-09-16 PROCEDURE — 2580000003 HC RX 258: Performed by: HOSPITALIST

## 2024-09-16 PROCEDURE — 6360000002 HC RX W HCPCS: Performed by: NURSE PRACTITIONER

## 2024-09-16 PROCEDURE — 93010 ELECTROCARDIOGRAM REPORT: CPT | Performed by: INTERNAL MEDICINE

## 2024-09-16 PROCEDURE — 99232 SBSQ HOSP IP/OBS MODERATE 35: CPT | Performed by: STUDENT IN AN ORGANIZED HEALTH CARE EDUCATION/TRAINING PROGRAM

## 2024-09-16 PROCEDURE — 94669 MECHANICAL CHEST WALL OSCILL: CPT

## 2024-09-16 PROCEDURE — 6370000000 HC RX 637 (ALT 250 FOR IP): Performed by: NURSE PRACTITIONER

## 2024-09-16 PROCEDURE — 80048 BASIC METABOLIC PNL TOTAL CA: CPT

## 2024-09-16 PROCEDURE — 94729 DIFFUSING CAPACITY: CPT

## 2024-09-16 RX ORDER — METHYLPREDNISOLONE SODIUM SUCCINATE 40 MG/ML
40 INJECTION, POWDER, LYOPHILIZED, FOR SOLUTION INTRAMUSCULAR; INTRAVENOUS EVERY 12 HOURS
Status: COMPLETED | OUTPATIENT
Start: 2024-09-16 | End: 2024-09-16

## 2024-09-16 RX ORDER — PREDNISONE 20 MG/1
60 TABLET ORAL DAILY
Status: DISCONTINUED | OUTPATIENT
Start: 2024-09-17 | End: 2024-09-17 | Stop reason: HOSPADM

## 2024-09-16 RX ORDER — PREDNISONE 20 MG/1
40 TABLET ORAL DAILY
Status: DISCONTINUED | OUTPATIENT
Start: 2024-10-01 | End: 2024-09-17 | Stop reason: HOSPADM

## 2024-09-16 RX ADMIN — INSULIN LISPRO 12 UNITS: 100 INJECTION, SOLUTION INTRAVENOUS; SUBCUTANEOUS at 17:03

## 2024-09-16 RX ADMIN — SODIUM CHLORIDE, PRESERVATIVE FREE 10 ML: 5 INJECTION INTRAVENOUS at 20:29

## 2024-09-16 RX ADMIN — ROSUVASTATIN CALCIUM 10 MG: 10 TABLET, FILM COATED ORAL at 08:42

## 2024-09-16 RX ADMIN — FINASTERIDE 5 MG: 5 TABLET, FILM COATED ORAL at 08:43

## 2024-09-16 RX ADMIN — GUAIFENESIN 400 MG: 400 TABLET ORAL at 14:35

## 2024-09-16 RX ADMIN — IPRATROPIUM BROMIDE AND ALBUTEROL SULFATE 1 DOSE: 2.5; .5 SOLUTION RESPIRATORY (INHALATION) at 15:55

## 2024-09-16 RX ADMIN — GUAIFENESIN 400 MG: 400 TABLET ORAL at 20:28

## 2024-09-16 RX ADMIN — INSULIN LISPRO 12 UNITS: 100 INJECTION, SOLUTION INTRAVENOUS; SUBCUTANEOUS at 08:43

## 2024-09-16 RX ADMIN — ALPRAZOLAM 0.25 MG: 0.25 TABLET ORAL at 18:55

## 2024-09-16 RX ADMIN — FLUTICASONE PROPIONATE 2 SPRAY: 50 SPRAY, METERED NASAL at 08:43

## 2024-09-16 RX ADMIN — IPRATROPIUM BROMIDE AND ALBUTEROL SULFATE 1 DOSE: 2.5; .5 SOLUTION RESPIRATORY (INHALATION) at 08:40

## 2024-09-16 RX ADMIN — IPRATROPIUM BROMIDE AND ALBUTEROL SULFATE 1 DOSE: 2.5; .5 SOLUTION RESPIRATORY (INHALATION) at 12:58

## 2024-09-16 RX ADMIN — SODIUM CHLORIDE, PRESERVATIVE FREE 10 ML: 5 INJECTION INTRAVENOUS at 08:43

## 2024-09-16 RX ADMIN — METOPROLOL TARTRATE 100 MG: 50 TABLET, FILM COATED ORAL at 20:28

## 2024-09-16 RX ADMIN — MORPHINE SULFATE 5 MG: 10 SOLUTION ORAL at 14:55

## 2024-09-16 RX ADMIN — GUAIFENESIN 400 MG: 400 TABLET ORAL at 08:43

## 2024-09-16 RX ADMIN — DOXYCYCLINE 100 MG: 100 INJECTION, POWDER, LYOPHILIZED, FOR SOLUTION INTRAVENOUS at 14:38

## 2024-09-16 RX ADMIN — INSULIN LISPRO 12 UNITS: 100 INJECTION, SOLUTION INTRAVENOUS; SUBCUTANEOUS at 12:46

## 2024-09-16 RX ADMIN — ISOSORBIDE MONONITRATE 30 MG: 30 TABLET, EXTENDED RELEASE ORAL at 08:43

## 2024-09-16 RX ADMIN — INSULIN GLARGINE 25 UNITS: 100 INJECTION, SOLUTION SUBCUTANEOUS at 10:01

## 2024-09-16 RX ADMIN — TAMSULOSIN HYDROCHLORIDE 0.8 MG: 0.4 CAPSULE ORAL at 08:42

## 2024-09-16 RX ADMIN — ROFLUMILAST 500 MCG: 500 TABLET ORAL at 08:43

## 2024-09-16 RX ADMIN — METOPROLOL TARTRATE 100 MG: 50 TABLET, FILM COATED ORAL at 08:43

## 2024-09-16 RX ADMIN — IPRATROPIUM BROMIDE AND ALBUTEROL SULFATE 1 DOSE: 2.5; .5 SOLUTION RESPIRATORY (INHALATION) at 21:13

## 2024-09-16 RX ADMIN — MORPHINE SULFATE 5 MG: 10 SOLUTION ORAL at 22:25

## 2024-09-16 RX ADMIN — ARFORMOTEROL TARTRATE 15 MCG: 15 SOLUTION RESPIRATORY (INHALATION) at 08:40

## 2024-09-16 RX ADMIN — DOXYCYCLINE 100 MG: 100 INJECTION, POWDER, LYOPHILIZED, FOR SOLUTION INTRAVENOUS at 01:43

## 2024-09-16 RX ADMIN — ARFORMOTEROL TARTRATE 15 MCG: 15 SOLUTION RESPIRATORY (INHALATION) at 21:13

## 2024-09-16 RX ADMIN — METHYLPREDNISOLONE SODIUM SUCCINATE 40 MG: 40 INJECTION, POWDER, LYOPHILIZED, FOR SOLUTION INTRAMUSCULAR; INTRAVENOUS at 08:42

## 2024-09-16 RX ADMIN — METHYLPREDNISOLONE SODIUM SUCCINATE 40 MG: 40 INJECTION INTRAMUSCULAR; INTRAVENOUS at 20:28

## 2024-09-16 RX ADMIN — ASPIRIN 81 MG: 81 TABLET, COATED ORAL at 08:43

## 2024-09-17 VITALS
OXYGEN SATURATION: 98 % | SYSTOLIC BLOOD PRESSURE: 134 MMHG | HEART RATE: 91 BPM | WEIGHT: 222.38 LBS | RESPIRATION RATE: 18 BRPM | DIASTOLIC BLOOD PRESSURE: 72 MMHG | HEIGHT: 73 IN | BODY MASS INDEX: 29.47 KG/M2 | TEMPERATURE: 97.8 F

## 2024-09-17 PROCEDURE — 6370000000 HC RX 637 (ALT 250 FOR IP): Performed by: HOSPITALIST

## 2024-09-17 PROCEDURE — 2580000003 HC RX 258: Performed by: HOSPITALIST

## 2024-09-17 PROCEDURE — 99239 HOSP IP/OBS DSCHRG MGMT >30: CPT | Performed by: STUDENT IN AN ORGANIZED HEALTH CARE EDUCATION/TRAINING PROGRAM

## 2024-09-17 PROCEDURE — 6370000000 HC RX 637 (ALT 250 FOR IP): Performed by: NURSE PRACTITIONER

## 2024-09-17 PROCEDURE — 94640 AIRWAY INHALATION TREATMENT: CPT

## 2024-09-17 PROCEDURE — 2700000000 HC OXYGEN THERAPY PER DAY

## 2024-09-17 PROCEDURE — 6370000000 HC RX 637 (ALT 250 FOR IP)

## 2024-09-17 PROCEDURE — 2500000003 HC RX 250 WO HCPCS: Performed by: HOSPITALIST

## 2024-09-17 PROCEDURE — 99232 SBSQ HOSP IP/OBS MODERATE 35: CPT | Performed by: NURSE PRACTITIONER

## 2024-09-17 PROCEDURE — 94660 CPAP INITIATION&MGMT: CPT

## 2024-09-17 PROCEDURE — 6360000002 HC RX W HCPCS: Performed by: NURSE PRACTITIONER

## 2024-09-17 PROCEDURE — 6370000000 HC RX 637 (ALT 250 FOR IP): Performed by: INTERNAL MEDICINE

## 2024-09-17 RX ORDER — FLUTICASONE PROPIONATE 50 MCG
2 SPRAY, SUSPENSION (ML) NASAL DAILY
Qty: 16 G | Refills: 3 | Status: SHIPPED | OUTPATIENT
Start: 2024-09-18

## 2024-09-17 RX ORDER — MORPHINE SULFATE 100 MG/5ML
10 SOLUTION ORAL EVERY 4 HOURS PRN
Qty: 15 ML | Refills: 0 | Status: SHIPPED | OUTPATIENT
Start: 2024-09-17 | End: 2024-09-22

## 2024-09-17 RX ORDER — PREDNISONE 20 MG/1
TABLET ORAL
Qty: 50 TABLET | Refills: 0 | Status: SHIPPED | OUTPATIENT
Start: 2024-09-18 | End: 2024-10-07

## 2024-09-17 RX ORDER — BENZONATATE 100 MG/1
100 CAPSULE ORAL 3 TIMES DAILY PRN
Qty: 20 CAPSULE | Refills: 0 | Status: SHIPPED | OUTPATIENT
Start: 2024-09-17 | End: 2024-09-24

## 2024-09-17 RX ADMIN — ASPIRIN 81 MG: 81 TABLET, COATED ORAL at 08:04

## 2024-09-17 RX ADMIN — IPRATROPIUM BROMIDE AND ALBUTEROL SULFATE 1 DOSE: 2.5; .5 SOLUTION RESPIRATORY (INHALATION) at 13:53

## 2024-09-17 RX ADMIN — DOXYCYCLINE 100 MG: 100 INJECTION, POWDER, LYOPHILIZED, FOR SOLUTION INTRAVENOUS at 14:30

## 2024-09-17 RX ADMIN — IPRATROPIUM BROMIDE AND ALBUTEROL SULFATE 1 DOSE: 2.5; .5 SOLUTION RESPIRATORY (INHALATION) at 16:03

## 2024-09-17 RX ADMIN — ROFLUMILAST 500 MCG: 500 TABLET ORAL at 08:03

## 2024-09-17 RX ADMIN — FINASTERIDE 5 MG: 5 TABLET, FILM COATED ORAL at 08:04

## 2024-09-17 RX ADMIN — INSULIN LISPRO 12 UNITS: 100 INJECTION, SOLUTION INTRAVENOUS; SUBCUTANEOUS at 08:04

## 2024-09-17 RX ADMIN — PREDNISONE 60 MG: 20 TABLET ORAL at 08:03

## 2024-09-17 RX ADMIN — ROSUVASTATIN CALCIUM 10 MG: 10 TABLET, FILM COATED ORAL at 08:04

## 2024-09-17 RX ADMIN — INSULIN LISPRO 12 UNITS: 100 INJECTION, SOLUTION INTRAVENOUS; SUBCUTANEOUS at 13:05

## 2024-09-17 RX ADMIN — FLUTICASONE PROPIONATE 2 SPRAY: 50 SPRAY, METERED NASAL at 08:05

## 2024-09-17 RX ADMIN — DOXYCYCLINE 100 MG: 100 INJECTION, POWDER, LYOPHILIZED, FOR SOLUTION INTRAVENOUS at 02:20

## 2024-09-17 RX ADMIN — IPRATROPIUM BROMIDE AND ALBUTEROL SULFATE 1 DOSE: 2.5; .5 SOLUTION RESPIRATORY (INHALATION) at 08:37

## 2024-09-17 RX ADMIN — MORPHINE SULFATE 5 MG: 10 SOLUTION ORAL at 05:12

## 2024-09-17 RX ADMIN — INSULIN GLARGINE 25 UNITS: 100 INJECTION, SOLUTION SUBCUTANEOUS at 08:09

## 2024-09-17 RX ADMIN — TAMSULOSIN HYDROCHLORIDE 0.8 MG: 0.4 CAPSULE ORAL at 08:04

## 2024-09-17 RX ADMIN — ALPRAZOLAM 0.25 MG: 0.25 TABLET ORAL at 12:18

## 2024-09-17 RX ADMIN — ARFORMOTEROL TARTRATE 15 MCG: 15 SOLUTION RESPIRATORY (INHALATION) at 08:37

## 2024-09-17 RX ADMIN — INSULIN LISPRO 4 UNITS: 100 INJECTION, SOLUTION INTRAVENOUS; SUBCUTANEOUS at 07:21

## 2024-09-17 RX ADMIN — GUAIFENESIN 400 MG: 400 TABLET ORAL at 08:04

## 2024-09-17 RX ADMIN — METOPROLOL TARTRATE 100 MG: 50 TABLET, FILM COATED ORAL at 08:04

## 2024-09-17 RX ADMIN — GUAIFENESIN 400 MG: 400 TABLET ORAL at 14:32

## 2024-09-17 RX ADMIN — SODIUM CHLORIDE, PRESERVATIVE FREE 10 ML: 5 INJECTION INTRAVENOUS at 08:05

## 2024-09-17 RX ADMIN — ISOSORBIDE MONONITRATE 30 MG: 30 TABLET, EXTENDED RELEASE ORAL at 08:04

## 2024-09-17 ASSESSMENT — PAIN SCALES - GENERAL: PAINLEVEL_OUTOF10: 0

## 2024-09-26 ENCOUNTER — OFFICE VISIT (OUTPATIENT)
Dept: PALLATIVE CARE | Age: 74
End: 2024-09-26
Payer: MEDICARE

## 2024-09-26 DIAGNOSIS — J44.1 CHRONIC OBSTRUCTIVE PULMONARY DISEASE WITH ACUTE EXACERBATION (HCC): ICD-10-CM

## 2024-09-26 DIAGNOSIS — Z51.5 PALLIATIVE CARE ENCOUNTER: ICD-10-CM

## 2024-09-26 PROCEDURE — 99350 HOME/RES VST EST HIGH MDM 60: CPT | Performed by: NURSE PRACTITIONER

## 2024-09-26 PROCEDURE — 1124F ACP DISCUSS-NO DSCNMKR DOCD: CPT | Performed by: NURSE PRACTITIONER

## 2024-09-26 PROCEDURE — 3017F COLORECTAL CA SCREEN DOC REV: CPT | Performed by: NURSE PRACTITIONER

## 2024-09-26 PROCEDURE — G8419 CALC BMI OUT NRM PARAM NOF/U: HCPCS | Performed by: NURSE PRACTITIONER

## 2024-09-26 PROCEDURE — 1036F TOBACCO NON-USER: CPT | Performed by: NURSE PRACTITIONER

## 2024-09-26 RX ORDER — MORPHINE SULFATE 100 MG/5ML
10 SOLUTION ORAL EVERY 4 HOURS PRN
Qty: 30 ML | Refills: 0 | Status: SHIPPED | OUTPATIENT
Start: 2024-09-26 | End: 2024-10-06

## 2024-09-26 NOTE — PROGRESS NOTES
constipation, .  GENITOURINARY:  Burning, frequency, urgency, incontinence, discharge  INTEGUMENTARY: rash, wound, pruritis  HEMATOLOGIC/LYMPHATIC:  Swelling, sores, gum bleeding, easy bruising, pica.  MUSCULOSKELETAL:  pain, edema, joint swelling or redness  NEUROLOGICAL:  light headed, dizziness, loss of consciousness, weakness, change in memory, seizures, tremors    Objective:     Physical Exam  There were no vitals taken for this visit.    Gen:  Alert, appears stated age, well nourished, in no acute distress  HEENT:  Normocephalic, conjunctiva pink, mucosa moist  Neck:  Supple  Lungs:  Unlabored, diminished  Heart:  RRR  Abd:  Soft, non tender, non distended, BS+  M/S/Ext:  Moving all extremities, no edema, pulses present  Skin:  Warm and dry  Neuro:  PERRL, Alert, oriented x 3; following commands    Current Medications:  Medications reviewed: yes    Controlled Substances Monitoring: [place] reviewed 9/26/24.  RX Monitoring Periodic Controlled Substance Monitoring   9/26/2024   8:45 AM Possible medication side effects, risk of tolerance/dependence & alternative treatments discussed.;No signs of potential drug abuse or diversion identified.;Assessed functional status (ability to engage in work or other purposeful activities, the pain intensity and its interference with activities of daily living, quality of family life and social activities, and the physical activity)     ANDIE Miguel - CNP  Palliative Medicine    MDM/Time:  The total encounter time on this service date was 60 minutes, which was spent performing a face-to-face encounter and personally completing the provider-level activities documented in the note.  This includes time spent prior to the visit and after the visit in direct care of the patient.  This time does not include time spent in any separately reportable services.        Thank you for allowing Palliative Medicine to participate in the care of Chico Frias.    Note: This report was

## 2024-10-11 RX ORDER — PREDNISONE 20 MG/1
40 TABLET ORAL DAILY
Qty: 60 TABLET | Refills: 1 | Status: SHIPPED | OUTPATIENT
Start: 2024-10-11 | End: 2024-12-10

## 2024-10-29 DIAGNOSIS — J44.1 CHRONIC OBSTRUCTIVE PULMONARY DISEASE WITH ACUTE EXACERBATION (HCC): ICD-10-CM

## 2024-10-29 DIAGNOSIS — Z51.5 PALLIATIVE CARE ENCOUNTER: ICD-10-CM

## 2024-10-29 RX ORDER — MORPHINE SULFATE 100 MG/5ML
10 SOLUTION ORAL EVERY 4 HOURS PRN
Qty: 30 ML | Refills: 0 | Status: SHIPPED | OUTPATIENT
Start: 2024-10-29 | End: 2024-11-08

## 2024-10-29 NOTE — TELEPHONE ENCOUNTER
Call from Chico requesting refill for Morphine concentrate 20 mg/ml. Chico states he is not steady with his hands and wanted a tablet form. Explained due to the low dose the oral concentrate worked best. Explained that he could get extra syringes and have some one pre-draw up the medication for him. [facility] in [place] is unable to get the extra oral syringes but they are available from [employer]. Chico verbalizes understanding. Next yong with [facility].

## 2024-11-01 DIAGNOSIS — Z51.5 PALLIATIVE CARE ENCOUNTER: Primary | ICD-10-CM

## 2024-11-01 RX ORDER — ALPRAZOLAM 0.25 MG/1
0.25 TABLET ORAL 2 TIMES DAILY PRN
Qty: 60 TABLET | Refills: 0 | Status: SHIPPED | OUTPATIENT
Start: 2024-11-01 | End: 2024-12-01

## 2024-11-01 NOTE — TELEPHONE ENCOUNTER
Call from Chico requesting a refill for Xanax 0.25 mg BID. Chico has been seen by [facility] and previously got Xanax from PCP. Pharmacy is [facility] in [place]. Next yong to be scheduled with [facility].

## 2024-11-05 ENCOUNTER — TELEPHONE (OUTPATIENT)
Dept: PALLATIVE CARE | Age: 74
End: 2024-11-05

## 2024-11-05 NOTE — TELEPHONE ENCOUNTER
Placed call to pt wife Luiz to schedule pt for Palliative care follow up home visit with WENDY Molina. Pt scheduled to be seen on 11/22 at 8:30 am, agreed to appt time and date.      Gina YU,LSW  Palliative Medicine

## 2024-11-22 ENCOUNTER — OFFICE VISIT (OUTPATIENT)
Dept: PALLATIVE CARE | Age: 74
End: 2024-11-22
Payer: MEDICARE

## 2024-11-22 DIAGNOSIS — Z51.5 PALLIATIVE CARE ENCOUNTER: ICD-10-CM

## 2024-11-22 DIAGNOSIS — F41.9 ANXIETY: Primary | ICD-10-CM

## 2024-11-22 DIAGNOSIS — R06.02 SHORTNESS OF BREATH: ICD-10-CM

## 2024-11-22 DIAGNOSIS — J44.1 CHRONIC OBSTRUCTIVE PULMONARY DISEASE WITH ACUTE EXACERBATION (HCC): ICD-10-CM

## 2024-11-22 PROCEDURE — 99349 HOME/RES VST EST MOD MDM 40: CPT | Performed by: NURSE PRACTITIONER

## 2024-11-22 PROCEDURE — G8484 FLU IMMUNIZE NO ADMIN: HCPCS | Performed by: NURSE PRACTITIONER

## 2024-11-22 PROCEDURE — 1124F ACP DISCUSS-NO DSCNMKR DOCD: CPT | Performed by: NURSE PRACTITIONER

## 2024-11-22 PROCEDURE — G8419 CALC BMI OUT NRM PARAM NOF/U: HCPCS | Performed by: NURSE PRACTITIONER

## 2024-11-22 PROCEDURE — 3017F COLORECTAL CA SCREEN DOC REV: CPT | Performed by: NURSE PRACTITIONER

## 2024-11-22 PROCEDURE — 1036F TOBACCO NON-USER: CPT | Performed by: NURSE PRACTITIONER

## 2024-11-22 RX ORDER — ALPRAZOLAM 0.5 MG
0.5 TABLET ORAL 3 TIMES DAILY PRN
Qty: 90 TABLET | Refills: 0 | Status: SHIPPED | OUTPATIENT
Start: 2024-11-22 | End: 2024-12-22

## 2024-11-22 RX ORDER — MORPHINE SULFATE 100 MG/5ML
10-15 SOLUTION ORAL EVERY 4 HOURS PRN
Qty: 30 ML | Refills: 0 | Status: SHIPPED | OUTPATIENT
Start: 2024-11-22 | End: 2024-12-02

## 2024-11-22 NOTE — PROGRESS NOTES
Palliative Care Department  Provider: ANDIE Miguel - CNP    Location of Service:   [facility]    Chief Complaint: Chico Frias is a 73 y.o. male with chief complaint of shortness of breath    Assessment/Plan      Advanced COPD:   -Known to Dr. Goyal   -On chronic supplemental oxygen   -On chronic prednisone   -Utilizes noninvasive ventilator, recently got a new one and he feels it is much better    Dyspnea:   -Morphine concentrate 10 mg every 4 as needed, often using at night, on a bad 2-3 times   -Continue as needed use, do not recommend MS Cont at this time as  he typically only uses a dose at night    Anxiety:   -Xanax increased to 0.5 mg TID PRN    Follow Up:  6 weeks.  They were encouraged to call with any questions, concerns, needs, or changes in symptoms.    Subjective:     HPI:  Chico Frias is a 73 y.o. male with a history of smoking, beginning at the age of 17-18, smoking 1 pack/day, reporting that he quit 15 years ago.  He now has end stage COPD, on supplemental O2, chronic prednisone, and using a NIV, and is known to Dr. Goyal.  He also has DM.  He was referred to [facility] Palliative Medicine support.      Subjective/Events/Discussions:  Taiwo is seen in his home today, along with his wife, and I am accompanied by the palliative license   He continues to be very SOB, which has been worse recently  He was started on treatment for exacerbation yesterday by Pulm.  He also is having increased BLE edema  Encouraged increased use of his chest vest  If worsening he may need to present to the ED  He is using morphine most evening, and this is helpful  He has needed it about 2-3 times per day when he is having a bad day  He is having more anxiety and xanax has been only mildly helpful, increased dose discussed and agreed on  Much time also spent providing support, as mobility in his home is getting more difficult, mostly due to the need to

## 2024-11-26 ENCOUNTER — HOSPITAL ENCOUNTER (INPATIENT)
Age: 74
LOS: 9 days | Discharge: HOSPICE | DRG: 682 | End: 2024-12-05
Attending: EMERGENCY MEDICINE | Admitting: STUDENT IN AN ORGANIZED HEALTH CARE EDUCATION/TRAINING PROGRAM
Payer: MEDICARE

## 2024-11-26 ENCOUNTER — APPOINTMENT (OUTPATIENT)
Dept: CT IMAGING | Age: 74
DRG: 682 | End: 2024-11-26
Payer: MEDICARE

## 2024-11-26 DIAGNOSIS — J44.1 COPD WITH ACUTE EXACERBATION (HCC): ICD-10-CM

## 2024-11-26 DIAGNOSIS — J44.1 COPD EXACERBATION (HCC): ICD-10-CM

## 2024-11-26 DIAGNOSIS — Z51.5 END OF LIFE CARE: ICD-10-CM

## 2024-11-26 DIAGNOSIS — S22.000A COMPRESSION FRACTURE OF THORACIC VERTEBRA, UNSPECIFIED THORACIC VERTEBRAL LEVEL, INITIAL ENCOUNTER (HCC): ICD-10-CM

## 2024-11-26 DIAGNOSIS — N17.9 AKI (ACUTE KIDNEY INJURY) (HCC): Primary | ICD-10-CM

## 2024-11-26 LAB
ALBUMIN SERPL-MCNC: 3.9 G/DL (ref 3.5–5.2)
ALP SERPL-CCNC: 105 U/L (ref 40–129)
ALT SERPL-CCNC: 20 U/L (ref 0–40)
ANION GAP SERPL CALCULATED.3IONS-SCNC: 18 MMOL/L (ref 7–16)
AST SERPL-CCNC: 18 U/L (ref 0–39)
BASOPHILS # BLD: 0 K/UL (ref 0–0.2)
BASOPHILS NFR BLD: 0 % (ref 0–2)
BILIRUB SERPL-MCNC: 0.3 MG/DL (ref 0–1.2)
BNP SERPL-MCNC: 285 PG/ML (ref 0–450)
BUN SERPL-MCNC: 109 MG/DL (ref 6–23)
CALCIUM SERPL-MCNC: 9.3 MG/DL (ref 8.6–10.2)
CHLORIDE SERPL-SCNC: 98 MMOL/L (ref 98–107)
CO2 SERPL-SCNC: 27 MMOL/L (ref 22–29)
CREAT SERPL-MCNC: 2.6 MG/DL (ref 0.7–1.2)
EKG ATRIAL RATE: 78 BPM
EKG P AXIS: 61 DEGREES
EKG P-R INTERVAL: 146 MS
EKG Q-T INTERVAL: 372 MS
EKG QRS DURATION: 80 MS
EKG QTC CALCULATION (BAZETT): 424 MS
EKG R AXIS: 22 DEGREES
EKG T AXIS: 91 DEGREES
EKG VENTRICULAR RATE: 78 BPM
EOSINOPHIL # BLD: 0 K/UL (ref 0.05–0.5)
EOSINOPHILS RELATIVE PERCENT: 0 % (ref 0–6)
ERYTHROCYTE [DISTWIDTH] IN BLOOD BY AUTOMATED COUNT: 14.7 % (ref 11.5–15)
GFR, ESTIMATED: 25 ML/MIN/1.73M2
GLUCOSE BLD-MCNC: 172 MG/DL (ref 74–99)
GLUCOSE SERPL-MCNC: 264 MG/DL (ref 74–99)
HCT VFR BLD AUTO: 38.9 % (ref 37–54)
HGB BLD-MCNC: 12.2 G/DL (ref 12.5–16.5)
LYMPHOCYTES NFR BLD: 0.22 K/UL (ref 1.5–4)
LYMPHOCYTES RELATIVE PERCENT: 4 % (ref 20–42)
MCH RBC QN AUTO: 28.8 PG (ref 26–35)
MCHC RBC AUTO-ENTMCNC: 31.4 G/DL (ref 32–34.5)
MCV RBC AUTO: 92 FL (ref 80–99.9)
METAMYELOCYTES ABSOLUTE COUNT: 0.11 K/UL (ref 0–0.12)
METAMYELOCYTES: 2 % (ref 0–1)
MONOCYTES NFR BLD: 0.11 K/UL (ref 0.1–0.95)
MONOCYTES NFR BLD: 2 % (ref 2–12)
MYELOCYTES ABSOLUTE COUNT: 0.06 K/UL
MYELOCYTES: 1 %
NEUTROPHILS NFR BLD: 92 % (ref 43–80)
NEUTS SEG NFR BLD: 5.9 K/UL (ref 1.8–7.3)
NUCLEATED RED BLOOD CELLS: 1 PER 100 WBC
PLATELET # BLD AUTO: 86 K/UL (ref 130–450)
PLATELET CONFIRMATION: NORMAL
PMV BLD AUTO: 9.6 FL (ref 7–12)
POTASSIUM SERPL-SCNC: 5.4 MMOL/L (ref 3.5–5)
PROT SERPL-MCNC: 6.4 G/DL (ref 6.4–8.3)
RBC # BLD AUTO: 4.23 M/UL (ref 3.8–5.8)
RBC # BLD: ABNORMAL 10*6/UL
SODIUM SERPL-SCNC: 143 MMOL/L (ref 132–146)
TROPONIN I SERPL HS-MCNC: 44 NG/L (ref 0–11)
WBC OTHER # BLD: 6.4 K/UL (ref 4.5–11.5)

## 2024-11-26 PROCEDURE — 80053 COMPREHEN METABOLIC PANEL: CPT

## 2024-11-26 PROCEDURE — 94640 AIRWAY INHALATION TREATMENT: CPT

## 2024-11-26 PROCEDURE — 6370000000 HC RX 637 (ALT 250 FOR IP): Performed by: EMERGENCY MEDICINE

## 2024-11-26 PROCEDURE — 84484 ASSAY OF TROPONIN QUANT: CPT

## 2024-11-26 PROCEDURE — 2580000003 HC RX 258: Performed by: EMERGENCY MEDICINE

## 2024-11-26 PROCEDURE — 96374 THER/PROPH/DIAG INJ IV PUSH: CPT

## 2024-11-26 PROCEDURE — 6360000002 HC RX W HCPCS: Performed by: STUDENT IN AN ORGANIZED HEALTH CARE EDUCATION/TRAINING PROGRAM

## 2024-11-26 PROCEDURE — 74176 CT ABD & PELVIS W/O CONTRAST: CPT

## 2024-11-26 PROCEDURE — 72125 CT NECK SPINE W/O DYE: CPT

## 2024-11-26 PROCEDURE — 82947 ASSAY GLUCOSE BLOOD QUANT: CPT

## 2024-11-26 PROCEDURE — 71250 CT THORAX DX C-: CPT

## 2024-11-26 PROCEDURE — 99223 1ST HOSP IP/OBS HIGH 75: CPT | Performed by: STUDENT IN AN ORGANIZED HEALTH CARE EDUCATION/TRAINING PROGRAM

## 2024-11-26 PROCEDURE — 96372 THER/PROPH/DIAG INJ SC/IM: CPT

## 2024-11-26 PROCEDURE — 1200000000 HC SEMI PRIVATE

## 2024-11-26 PROCEDURE — 6360000002 HC RX W HCPCS: Performed by: EMERGENCY MEDICINE

## 2024-11-26 PROCEDURE — 94660 CPAP INITIATION&MGMT: CPT

## 2024-11-26 PROCEDURE — 99285 EMERGENCY DEPT VISIT HI MDM: CPT

## 2024-11-26 PROCEDURE — 70450 CT HEAD/BRAIN W/O DYE: CPT

## 2024-11-26 PROCEDURE — 6370000000 HC RX 637 (ALT 250 FOR IP): Performed by: STUDENT IN AN ORGANIZED HEALTH CARE EDUCATION/TRAINING PROGRAM

## 2024-11-26 PROCEDURE — 85025 COMPLETE CBC W/AUTO DIFF WBC: CPT

## 2024-11-26 PROCEDURE — 83880 ASSAY OF NATRIURETIC PEPTIDE: CPT

## 2024-11-26 PROCEDURE — 93005 ELECTROCARDIOGRAM TRACING: CPT | Performed by: EMERGENCY MEDICINE

## 2024-11-26 PROCEDURE — 93010 ELECTROCARDIOGRAM REPORT: CPT | Performed by: INTERNAL MEDICINE

## 2024-11-26 RX ORDER — SODIUM CHLORIDE 9 MG/ML
INJECTION, SOLUTION INTRAVENOUS CONTINUOUS
Status: ACTIVE | OUTPATIENT
Start: 2024-11-26 | End: 2024-11-27

## 2024-11-26 RX ORDER — TAMSULOSIN HYDROCHLORIDE 0.4 MG/1
0.8 CAPSULE ORAL DAILY
Status: DISCONTINUED | OUTPATIENT
Start: 2024-11-27 | End: 2024-12-05 | Stop reason: HOSPADM

## 2024-11-26 RX ORDER — MORPHINE SULFATE 2 MG/ML
2 INJECTION, SOLUTION INTRAMUSCULAR; INTRAVENOUS EVERY 4 HOURS PRN
Status: DISCONTINUED | OUTPATIENT
Start: 2024-11-26 | End: 2024-11-28

## 2024-11-26 RX ORDER — ACETAMINOPHEN 650 MG/1
650 SUPPOSITORY RECTAL EVERY 6 HOURS PRN
Status: DISCONTINUED | OUTPATIENT
Start: 2024-11-26 | End: 2024-12-05 | Stop reason: HOSPADM

## 2024-11-26 RX ORDER — ASPIRIN 81 MG/1
81 TABLET ORAL DAILY
Status: DISCONTINUED | OUTPATIENT
Start: 2024-11-27 | End: 2024-12-05 | Stop reason: HOSPADM

## 2024-11-26 RX ORDER — POTASSIUM CHLORIDE 1500 MG/1
40 TABLET, EXTENDED RELEASE ORAL PRN
Status: DISCONTINUED | OUTPATIENT
Start: 2024-11-26 | End: 2024-12-05 | Stop reason: HOSPADM

## 2024-11-26 RX ORDER — METOPROLOL TARTRATE 50 MG
100 TABLET ORAL 2 TIMES DAILY
Status: DISCONTINUED | OUTPATIENT
Start: 2024-11-26 | End: 2024-12-05 | Stop reason: HOSPADM

## 2024-11-26 RX ORDER — ONDANSETRON 2 MG/ML
4 INJECTION INTRAMUSCULAR; INTRAVENOUS EVERY 6 HOURS PRN
Status: DISCONTINUED | OUTPATIENT
Start: 2024-11-26 | End: 2024-12-05 | Stop reason: HOSPADM

## 2024-11-26 RX ORDER — POLYETHYLENE GLYCOL 3350 17 G/17G
17 POWDER, FOR SOLUTION ORAL DAILY PRN
Status: DISCONTINUED | OUTPATIENT
Start: 2024-11-26 | End: 2024-12-04

## 2024-11-26 RX ORDER — ALBUTEROL SULFATE 0.83 MG/ML
2.5 SOLUTION RESPIRATORY (INHALATION) EVERY 6 HOURS PRN
Status: DISCONTINUED | OUTPATIENT
Start: 2024-11-26 | End: 2024-12-05 | Stop reason: HOSPADM

## 2024-11-26 RX ORDER — FENTANYL CITRATE 50 UG/ML
50 INJECTION, SOLUTION INTRAMUSCULAR; INTRAVENOUS ONCE
Status: COMPLETED | OUTPATIENT
Start: 2024-11-26 | End: 2024-11-26

## 2024-11-26 RX ORDER — FAMOTIDINE 20 MG/1
20 TABLET, FILM COATED ORAL DAILY
Status: DISCONTINUED | OUTPATIENT
Start: 2024-11-27 | End: 2024-11-30

## 2024-11-26 RX ORDER — FENTANYL CITRATE 50 UG/ML
50 INJECTION, SOLUTION INTRAMUSCULAR; INTRAVENOUS ONCE
Status: DISCONTINUED | OUTPATIENT
Start: 2024-11-26 | End: 2024-11-26

## 2024-11-26 RX ORDER — SODIUM CHLORIDE 9 MG/ML
INJECTION, SOLUTION INTRAVENOUS PRN
Status: DISCONTINUED | OUTPATIENT
Start: 2024-11-26 | End: 2024-12-05 | Stop reason: HOSPADM

## 2024-11-26 RX ORDER — SODIUM CHLORIDE 0.9 % (FLUSH) 0.9 %
5-40 SYRINGE (ML) INJECTION PRN
Status: DISCONTINUED | OUTPATIENT
Start: 2024-11-26 | End: 2024-12-05 | Stop reason: HOSPADM

## 2024-11-26 RX ORDER — FINASTERIDE 5 MG/1
5 TABLET, FILM COATED ORAL DAILY
Status: DISCONTINUED | OUTPATIENT
Start: 2024-11-27 | End: 2024-12-05 | Stop reason: HOSPADM

## 2024-11-26 RX ORDER — ROSUVASTATIN CALCIUM 10 MG/1
10 TABLET, COATED ORAL DAILY
Status: DISCONTINUED | OUTPATIENT
Start: 2024-11-27 | End: 2024-12-05 | Stop reason: HOSPADM

## 2024-11-26 RX ORDER — ISOSORBIDE MONONITRATE 30 MG/1
30 TABLET, EXTENDED RELEASE ORAL DAILY
Status: DISCONTINUED | OUTPATIENT
Start: 2024-11-27 | End: 2024-12-05 | Stop reason: HOSPADM

## 2024-11-26 RX ORDER — ALPRAZOLAM 0.25 MG/1
0.5 TABLET ORAL 3 TIMES DAILY PRN
Status: DISCONTINUED | OUTPATIENT
Start: 2024-11-26 | End: 2024-12-05 | Stop reason: HOSPADM

## 2024-11-26 RX ORDER — ENOXAPARIN SODIUM 100 MG/ML
30 INJECTION SUBCUTANEOUS 2 TIMES DAILY
Status: DISCONTINUED | OUTPATIENT
Start: 2024-11-26 | End: 2024-12-01 | Stop reason: DRUGHIGH

## 2024-11-26 RX ORDER — ACETAMINOPHEN 325 MG/1
650 TABLET ORAL EVERY 6 HOURS PRN
Status: DISCONTINUED | OUTPATIENT
Start: 2024-11-26 | End: 2024-12-05 | Stop reason: HOSPADM

## 2024-11-26 RX ORDER — INSULIN LISPRO 100 [IU]/ML
0-4 INJECTION, SOLUTION INTRAVENOUS; SUBCUTANEOUS
Status: DISCONTINUED | OUTPATIENT
Start: 2024-11-26 | End: 2024-11-27

## 2024-11-26 RX ORDER — IPRATROPIUM BROMIDE AND ALBUTEROL SULFATE 2.5; .5 MG/3ML; MG/3ML
3 SOLUTION RESPIRATORY (INHALATION) ONCE
Status: COMPLETED | OUTPATIENT
Start: 2024-11-26 | End: 2024-11-26

## 2024-11-26 RX ORDER — POTASSIUM CHLORIDE 7.45 MG/ML
10 INJECTION INTRAVENOUS PRN
Status: DISCONTINUED | OUTPATIENT
Start: 2024-11-26 | End: 2024-11-26 | Stop reason: RX

## 2024-11-26 RX ORDER — ONDANSETRON 4 MG/1
4 TABLET, ORALLY DISINTEGRATING ORAL EVERY 8 HOURS PRN
Status: DISCONTINUED | OUTPATIENT
Start: 2024-11-26 | End: 2024-12-05 | Stop reason: HOSPADM

## 2024-11-26 RX ORDER — INSULIN GLARGINE 100 [IU]/ML
40 INJECTION, SOLUTION SUBCUTANEOUS DAILY
Status: DISCONTINUED | OUTPATIENT
Start: 2024-11-27 | End: 2024-11-29

## 2024-11-26 RX ORDER — ROFLUMILAST 500 UG/1
500 TABLET ORAL DAILY
Status: DISCONTINUED | OUTPATIENT
Start: 2024-11-27 | End: 2024-12-05 | Stop reason: HOSPADM

## 2024-11-26 RX ORDER — METHYLPREDNISOLONE SODIUM SUCCINATE 125 MG/2ML
125 INJECTION INTRAMUSCULAR; INTRAVENOUS ONCE
Status: DISCONTINUED | OUTPATIENT
Start: 2024-11-26 | End: 2024-11-28

## 2024-11-26 RX ORDER — MORPHINE SULFATE 4 MG/ML
4 INJECTION, SOLUTION INTRAMUSCULAR; INTRAVENOUS ONCE
Status: COMPLETED | OUTPATIENT
Start: 2024-11-26 | End: 2024-11-26

## 2024-11-26 RX ORDER — METHYLPREDNISOLONE SODIUM SUCCINATE 40 MG/ML
40 INJECTION INTRAMUSCULAR; INTRAVENOUS DAILY
Status: DISCONTINUED | OUTPATIENT
Start: 2024-11-27 | End: 2024-11-28

## 2024-11-26 RX ORDER — IPRATROPIUM BROMIDE AND ALBUTEROL SULFATE 2.5; .5 MG/3ML; MG/3ML
1 SOLUTION RESPIRATORY (INHALATION)
Status: DISCONTINUED | OUTPATIENT
Start: 2024-11-26 | End: 2024-12-05 | Stop reason: HOSPADM

## 2024-11-26 RX ORDER — 0.9 % SODIUM CHLORIDE 0.9 %
500 INTRAVENOUS SOLUTION INTRAVENOUS ONCE
Status: COMPLETED | OUTPATIENT
Start: 2024-11-26 | End: 2024-11-26

## 2024-11-26 RX ORDER — MAGNESIUM SULFATE IN WATER 40 MG/ML
2000 INJECTION, SOLUTION INTRAVENOUS PRN
Status: DISCONTINUED | OUTPATIENT
Start: 2024-11-26 | End: 2024-12-05 | Stop reason: HOSPADM

## 2024-11-26 RX ORDER — SODIUM CHLORIDE 0.9 % (FLUSH) 0.9 %
5-40 SYRINGE (ML) INJECTION EVERY 12 HOURS SCHEDULED
Status: DISCONTINUED | OUTPATIENT
Start: 2024-11-26 | End: 2024-12-05 | Stop reason: HOSPADM

## 2024-11-26 RX ADMIN — IPRATROPIUM BROMIDE AND ALBUTEROL SULFATE 3 DOSE: .5; 2.5 SOLUTION RESPIRATORY (INHALATION) at 14:12

## 2024-11-26 RX ADMIN — MORPHINE SULFATE 2 MG: 2 INJECTION, SOLUTION INTRAMUSCULAR; INTRAVENOUS at 23:49

## 2024-11-26 RX ADMIN — SODIUM CHLORIDE 500 ML: 9 INJECTION, SOLUTION INTRAVENOUS at 16:46

## 2024-11-26 RX ADMIN — IPRATROPIUM BROMIDE AND ALBUTEROL SULFATE 1 DOSE: 2.5; .5 SOLUTION RESPIRATORY (INHALATION) at 22:27

## 2024-11-26 RX ADMIN — FENTANYL CITRATE 50 MCG: 50 INJECTION INTRAMUSCULAR; INTRAVENOUS at 14:03

## 2024-11-26 RX ADMIN — MORPHINE SULFATE 4 MG: 4 INJECTION, SOLUTION INTRAMUSCULAR; INTRAVENOUS at 18:41

## 2024-11-26 ASSESSMENT — PAIN DESCRIPTION - LOCATION
LOCATION: CHEST
LOCATION: RIB CAGE

## 2024-11-26 ASSESSMENT — PAIN DESCRIPTION - ORIENTATION: ORIENTATION: LEFT

## 2024-11-26 ASSESSMENT — PAIN SCALES - GENERAL: PAINLEVEL_OUTOF10: 6

## 2024-11-26 ASSESSMENT — PAIN - FUNCTIONAL ASSESSMENT
PAIN_FUNCTIONAL_ASSESSMENT: 0-10
PAIN_FUNCTIONAL_ASSESSMENT: ACTIVITIES ARE NOT PREVENTED

## 2024-11-26 ASSESSMENT — LIFESTYLE VARIABLES
HOW MANY STANDARD DRINKS CONTAINING ALCOHOL DO YOU HAVE ON A TYPICAL DAY: PATIENT DOES NOT DRINK
HOW OFTEN DO YOU HAVE A DRINK CONTAINING ALCOHOL: NEVER

## 2024-11-26 ASSESSMENT — PAIN DESCRIPTION - DESCRIPTORS: DESCRIPTORS: ACHING

## 2024-11-26 NOTE — ED NOTES
Notifieid Dr. Powers that there are not any available rooms in the back, ok to move to results waiting at this time until room becomes available.

## 2024-11-26 NOTE — ED PROVIDER NOTES
[facility] MED SURG/TELE  EMERGENCY DEPARTMENT ENCOUNTER        Pt Name: Chico Frias  MRN: [id number]  Birthdate 1950  Date of evaluation: 11/26/2024  Provider: Rebeka Powers DO  PCP: Jose Kwok MD  Note Started: 1:14 PM EST 11/26/24    CHIEF COMPLAINT       Chief Complaint   Patient presents with    Shortness of Breath     End stages of COPD, palliative care prescribed an ATB last week that is completed due to a \"rattle\" in his chest    Fall     Has fallen twice in the past two days    Rib Pain (injury)     Left side       HISTORY OF PRESENT ILLNESS: 1 or more Elements       Chico Frias is a 73 y.o. male who  presents with a chief complaint of rib pain and difficulty breathing after a fall. The patient reports falling while turning off the fireplace, leaning on the back of a chair, which subsequently fell over, causing the patient to fall as well. The patient's condition has been progressively worsening since the fall.    The patient has a history of COPD and has been prescribed an antibiotic (Levaquin) by his regular doctor for a suspected chest infection. The patient completed a 5-day course of the antibiotic but did not experience significant improvement.  The patient does complain of persistent pain in the left lower lateral ribs.  Worse with inspiration.  Moderate in severity.  Nothing Makes it better.  This is worse with movement as well as exertion    The patient denies experiencing fever, chills, nausea, or vomiting. However, he reports recent onset of bleeding and swelling in his feet. The patient is unsure of the date of his last tetanus shot and whether it was more or less than 5 years ago.      Nursing Notes were all reviewed and agreed with or any disagreements were addressed in the HPI.    REVIEW OF SYSTEMS :      Review of Systems   Constitutional:  Negative for fever.   HENT:  Negative for congestion.    Eyes:  Negative for redness.   Respiratory:  Positive for cough and  tablet 40 mEq (has no administration in time range)     Or   potassium bicarb-citric acid (EFFER-K) effervescent tablet 40 mEq (has no administration in time range)   magnesium sulfate 2000 mg in 50 mL IVPB premix (has no administration in time range)   enoxaparin Sodium (LOVENOX) injection 30 mg (30 mg SubCUTAneous Given 11/27/24 0805)   ondansetron (ZOFRAN-ODT) disintegrating tablet 4 mg (has no administration in time range)     Or   ondansetron (ZOFRAN) injection 4 mg (has no administration in time range)   polyethylene glycol (GLYCOLAX) packet 17 g (has no administration in time range)   acetaminophen (TYLENOL) tablet 650 mg (has no administration in time range)     Or   acetaminophen (TYLENOL) suppository 650 mg (has no administration in time range)   0.9 % sodium chloride infusion ( IntraVENous New Bag 11/27/24 0439)   famotidine (PEPCID) tablet 20 mg (20 mg Oral Given 11/27/24 0805)   albuterol (PROVENTIL) (2.5 MG/3ML) 0.083% nebulizer solution 2.5 mg (has no administration in time range)   ALPRAZolam (XANAX) tablet 0.5 mg (0.5 mg Oral Given 11/27/24 0240)   aspirin EC tablet 81 mg (81 mg Oral Given 11/27/24 0805)   finasteride (PROSCAR) tablet 5 mg (5 mg Oral Given 11/27/24 0805)   insulin glargine (LANTUS) injection vial 40 Units (has no administration in time range)   isosorbide mononitrate (IMDUR) extended release tablet 30 mg (30 mg Oral Given 11/27/24 0805)   metoprolol tartrate (LOPRESSOR) tablet 100 mg (100 mg Oral Given 11/27/24 0806)   Roflumilast (DALIRESP) tablet 500 mcg (500 mcg Oral Given 11/27/24 0806)   rosuvastatin (CRESTOR) tablet 10 mg (10 mg Oral Given 11/27/24 0806)   tamsulosin (FLOMAX) capsule 0.8 mg (0.8 mg Oral Given 11/27/24 0806)   insulin lispro (HUMALOG,ADMELOG) injection vial 0-4 Units (1 Units SubCUTAneous Given 11/27/24 0805)   ipratropium 0.5 mg-albuterol 2.5 mg (DUONEB) nebulizer solution 3 Dose (3 Doses Inhalation Given 11/26/24 1412)   fentaNYL (SUBLIMAZE) injection 50 mcg

## 2024-11-27 PROBLEM — E11.65 POORLY CONTROLLED TYPE 2 DIABETES MELLITUS (HCC): Status: ACTIVE | Noted: 2024-04-20

## 2024-11-27 PROBLEM — Z91.119 DIETARY NONCOMPLIANCE: Status: ACTIVE | Noted: 2024-11-27

## 2024-11-27 PROBLEM — E04.2 MULTINODULAR GOITER (NONTOXIC): Status: ACTIVE | Noted: 2024-11-27

## 2024-11-27 LAB
ALBUMIN SERPL-MCNC: 3.5 G/DL (ref 3.5–5.2)
ALP SERPL-CCNC: 101 U/L (ref 40–129)
ALT SERPL-CCNC: 19 U/L (ref 0–40)
ANION GAP SERPL CALCULATED.3IONS-SCNC: 19 MMOL/L (ref 7–16)
AST SERPL-CCNC: 18 U/L (ref 0–39)
B-OH-BUTYR SERPL-MCNC: 0.28 MMOL/L (ref 0.02–0.27)
BASOPHILS # BLD: 0 K/UL (ref 0–0.2)
BASOPHILS NFR BLD: 0 % (ref 0–2)
BILIRUB SERPL-MCNC: 0.4 MG/DL (ref 0–1.2)
BUN SERPL-MCNC: 116 MG/DL (ref 6–23)
CALCIUM SERPL-MCNC: 9.3 MG/DL (ref 8.6–10.2)
CHLORIDE SERPL-SCNC: 101 MMOL/L (ref 98–107)
CO2 SERPL-SCNC: 21 MMOL/L (ref 22–29)
CREAT SERPL-MCNC: 2.7 MG/DL (ref 0.7–1.2)
CREAT UR-MCNC: 70.3 MG/DL (ref 40–278)
CREAT UR-MCNC: 72 MG/DL (ref 40–278)
EOSINOPHIL # BLD: 0 K/UL (ref 0.05–0.5)
EOSINOPHILS RELATIVE PERCENT: 0 % (ref 0–6)
ERYTHROCYTE [DISTWIDTH] IN BLOOD BY AUTOMATED COUNT: 14.5 % (ref 11.5–15)
GFR, ESTIMATED: 24 ML/MIN/1.73M2
GLUCOSE BLD-MCNC: 181 MG/DL (ref 74–99)
GLUCOSE BLD-MCNC: 221 MG/DL (ref 74–99)
GLUCOSE BLD-MCNC: 222 MG/DL (ref 74–99)
GLUCOSE BLD-MCNC: 222 MG/DL (ref 74–99)
GLUCOSE BLD-MCNC: 273 MG/DL (ref 74–99)
GLUCOSE SERPL-MCNC: 214 MG/DL (ref 74–99)
HCT VFR BLD AUTO: 36 % (ref 37–54)
HGB BLD-MCNC: 11.3 G/DL (ref 12.5–16.5)
LYMPHOCYTES NFR BLD: 0.96 K/UL (ref 1.5–4)
LYMPHOCYTES RELATIVE PERCENT: 13 % (ref 20–42)
MCH RBC QN AUTO: 28.6 PG (ref 26–35)
MCHC RBC AUTO-ENTMCNC: 31.4 G/DL (ref 32–34.5)
MCV RBC AUTO: 91.1 FL (ref 80–99.9)
MICROALBUMIN UR-MCNC: 39 MG/L (ref 0–19)
MICROALBUMIN/CREAT UR-RTO: 56 MCG/MG CREAT (ref 0–30)
MONOCYTES NFR BLD: 0.32 K/UL (ref 0.1–0.95)
MONOCYTES NFR BLD: 4 % (ref 2–12)
MYELOCYTES ABSOLUTE COUNT: 0.13 K/UL
MYELOCYTES: 2 %
NEUTROPHILS NFR BLD: 80 % (ref 43–80)
NEUTS SEG NFR BLD: 5.73 K/UL (ref 1.8–7.3)
PLATELET # BLD AUTO: 89 K/UL (ref 130–450)
PLATELET CONFIRMATION: NORMAL
PMV BLD AUTO: 9.3 FL (ref 7–12)
POTASSIUM SERPL-SCNC: 5.4 MMOL/L (ref 3.5–5)
POTASSIUM, UR: 31.1 MMOL/L
PROMYELOCYTES ABSOLUTE COUNT: 0.06 K/UL
PROMYELOCYTES: 1 %
PROT SERPL-MCNC: 6.3 G/DL (ref 6.4–8.3)
RBC # BLD AUTO: 3.95 M/UL (ref 3.8–5.8)
RBC # BLD: ABNORMAL 10*6/UL
SODIUM SERPL-SCNC: 141 MMOL/L (ref 132–146)
TOTAL PROTEIN, URINE: 13 MG/DL (ref 0–12)
URINE TOTAL PROTEIN CREATININE RATIO: 0.18 (ref 0–0.2)
WBC OTHER # BLD: 7.2 K/UL (ref 4.5–11.5)

## 2024-11-27 PROCEDURE — 99222 1ST HOSP IP/OBS MODERATE 55: CPT | Performed by: INTERNAL MEDICINE

## 2024-11-27 PROCEDURE — 84156 ASSAY OF PROTEIN URINE: CPT

## 2024-11-27 PROCEDURE — 94660 CPAP INITIATION&MGMT: CPT

## 2024-11-27 PROCEDURE — 1200000000 HC SEMI PRIVATE

## 2024-11-27 PROCEDURE — 6370000000 HC RX 637 (ALT 250 FOR IP)

## 2024-11-27 PROCEDURE — 5A09457 ASSISTANCE WITH RESPIRATORY VENTILATION, 24-96 CONSECUTIVE HOURS, CONTINUOUS POSITIVE AIRWAY PRESSURE: ICD-10-PCS | Performed by: INTERNAL MEDICINE

## 2024-11-27 PROCEDURE — 6360000002 HC RX W HCPCS: Performed by: INTERNAL MEDICINE

## 2024-11-27 PROCEDURE — 2580000003 HC RX 258: Performed by: STUDENT IN AN ORGANIZED HEALTH CARE EDUCATION/TRAINING PROGRAM

## 2024-11-27 PROCEDURE — 36415 COLL VENOUS BLD VENIPUNCTURE: CPT

## 2024-11-27 PROCEDURE — 82570 ASSAY OF URINE CREATININE: CPT

## 2024-11-27 PROCEDURE — 6360000002 HC RX W HCPCS

## 2024-11-27 PROCEDURE — 94640 AIRWAY INHALATION TREATMENT: CPT

## 2024-11-27 PROCEDURE — 82043 UR ALBUMIN QUANTITATIVE: CPT

## 2024-11-27 PROCEDURE — 2580000003 HC RX 258

## 2024-11-27 PROCEDURE — 2700000000 HC OXYGEN THERAPY PER DAY

## 2024-11-27 PROCEDURE — 99232 SBSQ HOSP IP/OBS MODERATE 35: CPT | Performed by: STUDENT IN AN ORGANIZED HEALTH CARE EDUCATION/TRAINING PROGRAM

## 2024-11-27 PROCEDURE — 6370000000 HC RX 637 (ALT 250 FOR IP): Performed by: STUDENT IN AN ORGANIZED HEALTH CARE EDUCATION/TRAINING PROGRAM

## 2024-11-27 PROCEDURE — 80053 COMPREHEN METABOLIC PANEL: CPT

## 2024-11-27 PROCEDURE — 6360000002 HC RX W HCPCS: Performed by: STUDENT IN AN ORGANIZED HEALTH CARE EDUCATION/TRAINING PROGRAM

## 2024-11-27 PROCEDURE — 82947 ASSAY GLUCOSE BLOOD QUANT: CPT

## 2024-11-27 PROCEDURE — 84133 ASSAY OF URINE POTASSIUM: CPT

## 2024-11-27 PROCEDURE — 82010 KETONE BODYS QUAN: CPT

## 2024-11-27 PROCEDURE — 85025 COMPLETE CBC W/AUTO DIFF WBC: CPT

## 2024-11-27 RX ORDER — GLUCAGON 1 MG/ML
1 KIT INJECTION PRN
Status: DISCONTINUED | OUTPATIENT
Start: 2024-11-27 | End: 2024-12-05 | Stop reason: HOSPADM

## 2024-11-27 RX ORDER — INSULIN LISPRO 100 [IU]/ML
0-16 INJECTION, SOLUTION INTRAVENOUS; SUBCUTANEOUS
Status: DISCONTINUED | OUTPATIENT
Start: 2024-11-27 | End: 2024-11-29

## 2024-11-27 RX ORDER — ARFORMOTEROL TARTRATE 15 UG/2ML
15 SOLUTION RESPIRATORY (INHALATION)
Status: DISCONTINUED | OUTPATIENT
Start: 2024-11-27 | End: 2024-12-05 | Stop reason: HOSPADM

## 2024-11-27 RX ORDER — DEXTROSE MONOHYDRATE 100 MG/ML
INJECTION, SOLUTION INTRAVENOUS CONTINUOUS PRN
Status: DISCONTINUED | OUTPATIENT
Start: 2024-11-27 | End: 2024-12-05 | Stop reason: HOSPADM

## 2024-11-27 RX ORDER — INSULIN LISPRO 100 [IU]/ML
5 INJECTION, SOLUTION INTRAVENOUS; SUBCUTANEOUS
Status: DISCONTINUED | OUTPATIENT
Start: 2024-11-27 | End: 2024-11-28

## 2024-11-27 RX ADMIN — ENOXAPARIN SODIUM 30 MG: 100 INJECTION SUBCUTANEOUS at 08:05

## 2024-11-27 RX ADMIN — INSULIN LISPRO 1 UNITS: 100 INJECTION, SOLUTION INTRAVENOUS; SUBCUTANEOUS at 08:05

## 2024-11-27 RX ADMIN — INSULIN LISPRO 8 UNITS: 100 INJECTION, SOLUTION INTRAVENOUS; SUBCUTANEOUS at 16:17

## 2024-11-27 RX ADMIN — ALPRAZOLAM 0.5 MG: 0.25 TABLET ORAL at 02:40

## 2024-11-27 RX ADMIN — INSULIN LISPRO 4 UNITS: 100 INJECTION, SOLUTION INTRAVENOUS; SUBCUTANEOUS at 20:55

## 2024-11-27 RX ADMIN — SODIUM ZIRCONIUM CYCLOSILICATE 10 G: 10 POWDER, FOR SUSPENSION ORAL at 08:06

## 2024-11-27 RX ADMIN — IPRATROPIUM BROMIDE AND ALBUTEROL SULFATE 1 DOSE: 2.5; .5 SOLUTION RESPIRATORY (INHALATION) at 09:28

## 2024-11-27 RX ADMIN — SODIUM CHLORIDE, PRESERVATIVE FREE 10 ML: 5 INJECTION INTRAVENOUS at 04:33

## 2024-11-27 RX ADMIN — ALPRAZOLAM 0.5 MG: 0.25 TABLET ORAL at 16:24

## 2024-11-27 RX ADMIN — SODIUM CHLORIDE, PRESERVATIVE FREE 10 ML: 5 INJECTION INTRAVENOUS at 21:12

## 2024-11-27 RX ADMIN — ARFORMOTEROL TARTRATE 15 MCG: 15 SOLUTION RESPIRATORY (INHALATION) at 21:17

## 2024-11-27 RX ADMIN — ROSUVASTATIN CALCIUM 10 MG: 10 TABLET, FILM COATED ORAL at 08:06

## 2024-11-27 RX ADMIN — FAMOTIDINE 20 MG: 20 TABLET ORAL at 08:05

## 2024-11-27 RX ADMIN — BUMETANIDE 0.2 MG/HR: 0.25 INJECTION INTRAMUSCULAR; INTRAVENOUS at 14:11

## 2024-11-27 RX ADMIN — METOPROLOL TARTRATE 100 MG: 50 TABLET, FILM COATED ORAL at 20:22

## 2024-11-27 RX ADMIN — IPRATROPIUM BROMIDE AND ALBUTEROL SULFATE 1 DOSE: 2.5; .5 SOLUTION RESPIRATORY (INHALATION) at 13:26

## 2024-11-27 RX ADMIN — SODIUM CHLORIDE, PRESERVATIVE FREE 10 ML: 5 INJECTION INTRAVENOUS at 08:06

## 2024-11-27 RX ADMIN — FINASTERIDE 5 MG: 5 TABLET, FILM COATED ORAL at 08:05

## 2024-11-27 RX ADMIN — IPRATROPIUM BROMIDE AND ALBUTEROL SULFATE 1 DOSE: 2.5; .5 SOLUTION RESPIRATORY (INHALATION) at 16:45

## 2024-11-27 RX ADMIN — ENOXAPARIN SODIUM 30 MG: 100 INJECTION SUBCUTANEOUS at 20:21

## 2024-11-27 RX ADMIN — INSULIN GLARGINE 40 UNITS: 100 INJECTION, SOLUTION SUBCUTANEOUS at 09:18

## 2024-11-27 RX ADMIN — ROFLUMILAST 500 MCG: 500 TABLET ORAL at 08:06

## 2024-11-27 RX ADMIN — IPRATROPIUM BROMIDE AND ALBUTEROL SULFATE 1 DOSE: 2.5; .5 SOLUTION RESPIRATORY (INHALATION) at 03:07

## 2024-11-27 RX ADMIN — ISOSORBIDE MONONITRATE 30 MG: 30 TABLET, EXTENDED RELEASE ORAL at 08:05

## 2024-11-27 RX ADMIN — INSULIN LISPRO 1 UNITS: 100 INJECTION, SOLUTION INTRAVENOUS; SUBCUTANEOUS at 11:29

## 2024-11-27 RX ADMIN — ASPIRIN 81 MG: 81 TABLET, COATED ORAL at 08:05

## 2024-11-27 RX ADMIN — IPRATROPIUM BROMIDE AND ALBUTEROL SULFATE 1 DOSE: 2.5; .5 SOLUTION RESPIRATORY (INHALATION) at 00:23

## 2024-11-27 RX ADMIN — TAMSULOSIN HYDROCHLORIDE 0.8 MG: 0.4 CAPSULE ORAL at 08:06

## 2024-11-27 RX ADMIN — SODIUM CHLORIDE: 9 INJECTION, SOLUTION INTRAVENOUS at 04:39

## 2024-11-27 RX ADMIN — METOPROLOL TARTRATE 100 MG: 50 TABLET, FILM COATED ORAL at 08:06

## 2024-11-27 RX ADMIN — INSULIN LISPRO 5 UNITS: 100 INJECTION, SOLUTION INTRAVENOUS; SUBCUTANEOUS at 16:16

## 2024-11-27 RX ADMIN — METHYLPREDNISOLONE SODIUM SUCCINATE 40 MG: 40 INJECTION INTRAMUSCULAR; INTRAVENOUS at 08:06

## 2024-11-27 RX ADMIN — MORPHINE SULFATE 2 MG: 2 INJECTION, SOLUTION INTRAMUSCULAR; INTRAVENOUS at 21:11

## 2024-11-27 RX ADMIN — IPRATROPIUM BROMIDE AND ALBUTEROL SULFATE 1 DOSE: 2.5; .5 SOLUTION RESPIRATORY (INHALATION) at 21:17

## 2024-11-27 RX ADMIN — ACETAMINOPHEN 650 MG: 325 TABLET ORAL at 23:52

## 2024-11-27 RX ADMIN — MORPHINE SULFATE 2 MG: 2 INJECTION, SOLUTION INTRAMUSCULAR; INTRAVENOUS at 13:18

## 2024-11-27 ASSESSMENT — PAIN DESCRIPTION - DESCRIPTORS
DESCRIPTORS: ACHING;DISCOMFORT;SORE;PRESSURE
DESCRIPTORS: ACHING
DESCRIPTORS: ACHING
DESCRIPTORS: DISCOMFORT

## 2024-11-27 ASSESSMENT — PAIN - FUNCTIONAL ASSESSMENT
PAIN_FUNCTIONAL_ASSESSMENT: PREVENTS OR INTERFERES SOME ACTIVE ACTIVITIES AND ADLS
PAIN_FUNCTIONAL_ASSESSMENT: PREVENTS OR INTERFERES WITH MANY ACTIVE NOT PASSIVE ACTIVITIES

## 2024-11-27 ASSESSMENT — PAIN DESCRIPTION - ORIENTATION
ORIENTATION: RIGHT;LEFT;LOWER
ORIENTATION: LEFT;UPPER
ORIENTATION: LEFT;UPPER

## 2024-11-27 ASSESSMENT — PAIN SCALES - GENERAL
PAINLEVEL_OUTOF10: 5
PAINLEVEL_OUTOF10: 7
PAINLEVEL_OUTOF10: 5
PAINLEVEL_OUTOF10: 0
PAINLEVEL_OUTOF10: 6
PAINLEVEL_OUTOF10: 0
PAINLEVEL_OUTOF10: 9
PAINLEVEL_OUTOF10: 8

## 2024-11-27 ASSESSMENT — PAIN DESCRIPTION - PAIN TYPE: TYPE: ACUTE PAIN

## 2024-11-27 ASSESSMENT — PAIN DESCRIPTION - FREQUENCY: FREQUENCY: INTERMITTENT

## 2024-11-27 ASSESSMENT — PAIN SCALES - WONG BAKER: WONGBAKER_NUMERICALRESPONSE: HURTS A LITTLE BIT

## 2024-11-27 ASSESSMENT — PAIN DESCRIPTION - LOCATION
LOCATION: CHEST
LOCATION: CHEST
LOCATION: BACK;CHEST;HIP
LOCATION: CHEST

## 2024-11-27 ASSESSMENT — PAIN DESCRIPTION - ONSET: ONSET: GRADUAL

## 2024-11-27 NOTE — CONSULTS
ENDOCRINOLOGY INITIAL CONSULTATION NOTE      Date of admission: 11/26/2024  Date of service: 11/27/2024  Admitting physician: Brock Nolasco MD   Primary Care Physician: Jose Kwok MD  Consultant physician: Saad Montero MD     Reason for the consultation:  Uncontrolled DM, thyroid nodules    History of Present Illness:  The history is provided by the patient. Accuracy of the patient data is excellent    Chico Frias is a very pleasant 73 y.o. old male with PMH of diabetes mellitus type 2, ESRD, thyroid nodules, HLD, and other listed below admitted to [facility] on 11/26/2024 because of mechanical fall, endocrine service was consulted for diabetes management.    Prior to admission  The patient was diagnosed with type 2 DM many years ago. Prior to admission patient was on lantus 40 units nightly (has been taking 30 units), humalog 12 units TID with meals, metformin 1000mg BID. Patient has had no hypoglycemic episodes.Patient's family at bedside reports he has not been eating very much the last few days, as such his glucose has been better controlled compared to normal.     Lab Results   Component Value Date/Time    LABA1C 7.5 07/30/2024 05:50 AM       Inpatient diet:   Carb Restricted diet     Point of care glucose monitoring   (Independently reviewed)   Recent Labs     11/26/24  2343 11/27/24  0743 11/27/24  0917 11/27/24  1126   POCGLU 172* 181* 221* 222*       Past medical history:   Past Medical History:   Diagnosis Date    COPD (chronic obstructive pulmonary disease) (HCC)     Diabetes mellitus (HCC)     GERD (gastroesophageal reflux disease)     Hyperlipidemia        Past surgical history:  Past Surgical History:   Procedure Laterality Date    BRONCHOSCOPY  3/7/2024    BRONCHOSCOPY DIAGNOSTIC OR CELL WASH ONLY performed by Chase Naqvi MD [place] [facility] [place]    BRONCHOSCOPY  3/7/2024    BRONCHOSCOPY ALVEOLAR LAVAGE performed by Chase Naqvi MD at [facility] [place] [facility]    BRONCHOSCOPY  3/7/2024    BRONCHOSCOPY  PM

## 2024-11-27 NOTE — ED NOTES
ED to Inpatient Handoff Report    Notified Lucila LUNDY that electronic handoff available and patient ready for transport to room 0537.    Safety Risks: Risk of falls    Patient in Restraints: no    Constant Observer or Patient : no    Telemetry Monitoring Ordered :Yes           Order to transfer to unit without monitor:N/A    Last MEWS: 1 Time completed: 0130    Deterioration Index Score:   Predictive Model Details          33 (Caution)  Factor Value    Calculated 11/27/2024 01:31 36% Age 73 years old    Deterioration Index Model 33% Supplemental oxygen PAP (positive airway pressure)     18% Potassium abnormal (5.4 mmol/L)     7% Sodium 143 mmol/L     3% BUN abnormal (109 mg/dL)     2% Systolic 118     1% Platelet count abnormal (86 k/uL)     1% Pulse 84     0% Pulse oximetry 98 %     0% Respiratory rate 16     0% Hematocrit 38.9 %     0% WBC count 6.4 k/uL     0% Temperature 98.4 °F (36.9 °C)        Vitals:    11/27/24 0023 11/27/24 0034 11/27/24 0037 11/27/24 0130   BP:    118/75   Pulse: 85 87 85 84   Resp: 24 16 16 16   Temp:    98.4 °F (36.9 °C)   TempSrc:    Oral   SpO2:  93% 98% 98%   Weight:       Height:             Opportunity for questions and clarification was provided.

## 2024-11-27 NOTE — ACP (ADVANCE CARE PLANNING)
11/27/2024  Advance Care Planning   Healthcare Decision Maker:    Primary Decision Maker: JAYLEIDA - [facility] - [phone]    Click here to complete Healthcare Decision Makers including selection of the Healthcare Decision Maker Relationship (ie \"Primary\").

## 2024-11-27 NOTE — PROGRESS NOTES
Ortho consult sent for Dr Singh to ortho resident. Stated to page attending. Left message with BJ at [facility] office.

## 2024-11-27 NOTE — PROGRESS NOTES
[facility] Hospitalist Progress Note    Admitting Date and Time: 11/26/2024 12:13 PM  Admit Dx: GONZALES (acute kidney injury) (HCC) [N17.9]    Subjective:  Patient is being followed for GONZALES (acute kidney injury) (HCC) [N17.9]   Pt feels weak, dyspneic, was off bpap when seen.  Per RN: No major concerns.     ROS: denies fever, chills, cp, sob, n/v, HA unless stated above.      methylPREDNISolone  125 mg IntraVENous Once    ipratropium 0.5 mg-albuterol 2.5 mg  1 Dose Inhalation Q4H RT    methylPREDNISolone  40 mg IntraVENous Daily    sodium chloride flush  5-40 mL IntraVENous 2 times per day    enoxaparin  30 mg SubCUTAneous BID    famotidine  20 mg Oral Daily    aspirin  81 mg Oral Daily    finasteride  5 mg Oral Daily    insulin glargine  40 Units SubCUTAneous Daily    isosorbide mononitrate  30 mg Oral Daily    metoprolol  100 mg Oral BID    Roflumilast  500 mcg Oral Daily    rosuvastatin  10 mg Oral Daily    tamsulosin  0.8 mg Oral Daily    insulin lispro  0-4 Units SubCUTAneous 4x Daily AC & HS     glucose, 4 tablet, PRN  dextrose bolus, 125 mL, PRN   Or  dextrose bolus, 250 mL, PRN  glucagon (rDNA), 1 mg, PRN  dextrose, , Continuous PRN  morphine, 2 mg, Q4H PRN  sodium chloride flush, 5-40 mL, PRN  sodium chloride, , PRN  potassium chloride, 40 mEq, PRN   Or  potassium alternative oral replacement, 40 mEq, PRN  magnesium sulfate, 2,000 mg, PRN  ondansetron, 4 mg, Q8H PRN   Or  ondansetron, 4 mg, Q6H PRN  polyethylene glycol, 17 g, Daily PRN  acetaminophen, 650 mg, Q6H PRN   Or  acetaminophen, 650 mg, Q6H PRN  albuterol, 2.5 mg, Q6H PRN  ALPRAZolam, 0.5 mg, TID PRN         Objective:    /74   Pulse 90   Temp 97.9 °F (36.6 °C) (Oral)   Resp 24   Ht 1.88 m (6' 2\")   Wt 110.3 kg (243 lb 3.2 oz)   SpO2 97%   BMI 31.23 kg/m²     General Appearance: alert and oriented to person, place and time and in no acute distress  Skin: warm and dry, stasis dermatitis  Head: normocephalic and atraumatic  Eyes:  GONZALES-creatinine 2.6 from baseline around 1.5, got bolus of fluids in ED, S/P gentle hydration, HELD as appears vol overloaded, repeat labs in morning, avoid nephrotoxins, check urine electrolytes, nephrology consulted.  2.  COPD-end-stage, continue inhalers breathing treatments as needed. Was on Bpap/ alt with 4L of oxygen, baseline RA.  3.  Thoracic spine nivackle-G4-O5 fracture, status post fall, pain control, orthopedic consult, will follow.  4.  Diabetes mellitus type 2 - continue home dose of Lantus, insulin sliding scale, hypoglycemia protocol, diabetic diet. Endo consulted.   5.  Hyperlipidemia - stable, continue home medications.  6. Masslike opacity  right upper lobe - focal subsegmental atelectasis or scarring vs Neoplastic. Pulmonology consulted  7. Enlarged thyroid with multiple calcified nodules. F/u tsh, endo consulted.   8. Mech fall and rib pain.     DVT prophylaxis: Lovenox      NOTE: This report was transcribed using voice recognition software. Every effort was made to ensure accuracy; however, inadvertent computerized transcription errors may be present.  Electronically signed by Channing Nolasco MD on 11/27/2024 at 10:58 AM

## 2024-11-27 NOTE — PROGRESS NOTES
Magalie serve to Dr Sevilla regarding nephro consult.    Magalie serve to Dr Montero regarding endo consult.    Answering service notified of pulmonary consult for Dr Goyal.

## 2024-11-27 NOTE — PLAN OF CARE
Problem: Chronic Conditions and Co-morbidities  Goal: Patient's chronic conditions and co-morbidity symptoms are monitored and maintained or improved  11/27/2024 1017 by Talia Izquierdo RN  Outcome: Progressing  11/27/2024 0539 by Lucila Cline RN  Outcome: Progressing     Problem: Discharge Planning  Goal: Discharge to home or other facility with appropriate resources  11/27/2024 1017 by Talia Izquierdo RN  Outcome: Progressing  11/27/2024 0539 by Lucila Cline RN  Outcome: Progressing     Problem: Pain  Goal: Verbalizes/displays adequate comfort level or baseline comfort level  11/27/2024 1017 by Talia Izquierdo RN  Outcome: Progressing  11/27/2024 0539 by Lucila Cline RN  Outcome: Progressing     Problem: Skin/Tissue Integrity  Goal: Absence of new skin breakdown  Description: 1.  Monitor for areas of redness and/or skin breakdown  2.  Assess vascular access sites hourly  3.  Every 4-6 hours minimum:  Change oxygen saturation probe site  4.  Every 4-6 hours:  If on nasal continuous positive airway pressure, respiratory therapy assess nares and determine need for appliance change or resting period.  11/27/2024 1017 by Talia Izquierdo RN  Outcome: Progressing  11/27/2024 0539 by Lucila Cline RN  Outcome: Progressing     Problem: Safety - Adult  Goal: Free from fall injury  11/27/2024 1017 by Talia Izquierdo RN  Outcome: Progressing  11/27/2024 0539 by Lucila Cline RN  Outcome: Progressing     Problem: ABCDS Injury Assessment  Goal: Absence of physical injury  11/27/2024 1017 by Talia Izquierdo RN  Outcome: Progressing  11/27/2024 0539 by Lucila Cline RN  Outcome: Progressing

## 2024-11-27 NOTE — PROGRESS NOTES
Occupational Therapy  OT consult received to eval/treat and chart review complete. Await ortho consult. OT to re-attempt at a later date/time as able and appropriate.     Marzena Mendoza OTR/L  License Number: [id number]

## 2024-11-27 NOTE — H&P
[facility] Hospitalist Group History and Physical      CHIEF COMPLAINT: Fall, rib pain and shortness of breath    History of Present Illness: 73-year-old gentleman with past medical history significant for diabetes mellitus type 2, GERD, hyperlipidemia and end-stage renal disease on palliative care presented to ED with difficulty breathing after fall and rib pain.  Patient accidentally fell while turning of the fireplace leaning onto the back of chair.  Complaining of left lateral sided rib pain and worsening shortness of breath after that.  Pain is worse with inspiration, no relieving factors, moderate in severity, worse with movement and exertion along with shortness of breath.  Patient does have history of COPD.  Because of his worsening shortness of breath he was recently prescribed Levaquin by his PCP without much improvement.  Denies any belly pain, nausea vomiting diarrhea, no lightheadedness or dizziness, no fever or chills, no focal deficits.    Informant(s) for H&P:    REVIEW OF SYSTEMS:  A comprehensive review of systems was negative except for: what is in the HPI      PMH:  Past Medical History:   Diagnosis Date    COPD (chronic obstructive pulmonary disease) (HCC)     Diabetes mellitus (HCC)     GERD (gastroesophageal reflux disease)     Hyperlipidemia        Surgical History:  Past Surgical History:   Procedure Laterality Date    BRONCHOSCOPY  3/7/2024    BRONCHOSCOPY DIAGNOSTIC OR CELL WASH ONLY performed by Chase Naqvi MD at [facility] [place] [facility]    BRONCHOSCOPY  3/7/2024    BRONCHOSCOPY ALVEOLAR LAVAGE performed by Chase Naqvi MD at [facility] [place] [facility]    BRONCHOSCOPY  3/7/2024    BRONCHOSCOPY BIOPSY BRONCHUS performed by Chase Naqvi MD at [facility] [place] [facility]    BRONCHOSCOPY  3/7/2024    BRONCHOSCOPY BRUSHINGS performed by Chase Naqvi MD at [facility] [place] [facility]    BRONCHOSCOPY  3/7/2024    BRONCHOSCOPY ROBOTIC performed by Chase Naqvi MD at [facility] [place] [facility]    CORONARY ANGIOPLASTY WITH STENT  pitting edema bilateral lower extremities  Neurologic: no cranial nerve deficit and speech normal        LABS: Reviewed by me independently  Recent Labs     11/26/24  1354      K 5.4*   CL 98   CO2 27   *   CREATININE 2.6*   GLUCOSE 264*   CALCIUM 9.3       Recent Labs     11/26/24  1354   WBC 6.4   RBC 4.23   HGB 12.2*   HCT 38.9   MCV 92.0   MCH 28.8   MCHC 31.4*   RDW 14.7   PLT 86*   MPV 9.6       No results for input(s): \"POCGLU\" in the last 72 hours.        Radiology: Reviewed by me independently  CT HEAD WO CONTRAST   Final Result   1.  No acute intracranial abnormality.      2.  Tiny chronic lacunar infarct in the left basal ganglia.         CT CERVICAL SPINE WO CONTRAST   Final Result   1.  No acute abnormality of the cervical spine.      2.  Enlarged thyroid gland with multiple partially calcified nodules.   Nonemergent ultrasound recommended.         CT CHEST WO CONTRAST   Final Result   1. Fractures are seen involving visualized superior endplates of T6 and T7.   CT thoracic spine recommended.   2. Masslike opacity is present in right upper lobe appearing similar compared   to the previous examination which may represent focal subsegmental   atelectasis or scarring.  Neoplastic etiology cannot be excluded.   3. New atelectasis is present in the left lower lobe as well as new   subsegmental atelectasis in right lower lobe.   4. Nodular centrilobular opacities are located in the left upper lobe which   may indicate bronchiolitis or viral pneumonia   5. Enlarged thyroid gland with multiple calcified nodules.  Nonemergent   ultrasound recommended.         CT ABDOMEN PELVIS WO CONTRAST Additional Contrast? None    (Results Pending)       EKG: Normal sinus rhythm, reviewed by me independently and confirmed the findings.    ASSESSMENT:      Active Problems:    * No active hospital problems. *  Resolved Problems:    * No resolved hospital problems. *      PLAN:    1.  GONZALES-creatinine 2.6 from

## 2024-11-27 NOTE — CARE COORDINATION
11/27/2024  Social Work Discharge Planning:GONZALES.  This worker met with Pt and his son to discuss  role and transition of care/discharge planning.Pt resides with his spouse in a two story home and has a nebulizer, chest vest he wears 3 times a day, bipap and 3-4 l home o2 via [facility]. Pt is currently on 4l o2 here. Pt is requesting a hospital bed. Referral was made to [facility]. Order is needed. Awaiting therapy evals, however, Pt states he wants to return home at discharge. Pt is active with [facility] but cannot recall the provider and is looking into this. NESHA order will be needed.  Family will transport at discharge.Pharmacy is [facility] in [place] and PCP is Dr. DANA Kwok. Electronically signed by ELIZABETH Blackmon on 11/27/2024 at 1:27 PM

## 2024-11-27 NOTE — PROGRESS NOTES
4 Eyes Skin Assessment     NAME:  Chico Frias  YOB: 1950  MEDICAL RECORD NUMBER:  [id number]    The patient is being assessed for  Admission    I agree that at least one RN has performed a thorough Head to Toe Skin Assessment on the patient. ALL assessment sites listed below have been assessed.      Areas assessed by both nurses:    Head, Face, Ears, Shoulders, Back, Chest, Arms, Elbows, Hands, Legs. Feet and Heels, and Under Medical Devices         Patient not let me look at his butt. So not able to assess for wounds    Does the Patient have a Wound? No noted wound(s)       Bon Prevention initiated by RN: Yes  Wound Care Orders initiated by RN: No    Pressure Injury (Stage 3,4, Unstageable, DTI, NWPT, and Complex wounds) if present, place Wound referral order by RN under : No    New Ostomies, if present place, Ostomy referral order under : No     Nurse 1 eSignature: Electronically signed by Lucila Cline RN on 11/27/24 at 4:54 AM EST    **SHARE this note so that the co-signing nurse can place an eSignature**    Nurse 2 eSignature: Electronically signed by Silva Boyle RN on 11/27/24 at 5:41 AM EST

## 2024-11-27 NOTE — CONSULTS
Department of Internal Medicine  Nephrology Nurse Practitioner Consult Note      Reason for Consult:  GONZALES  Requesting Physician:  Channing Nolasco MD    CHIEF COMPLAINT: Shortness of breath and fall    History Obtained From:  patient, electronic medical record    HISTORY OF PRESENT ILLNESS:  Briefly, Mr. Chico Frias is a 73-year-old male with a past medical history of HFpEF 65%, BPH, type II DM, COPD, GERD and hyperlipidemia who was admitted on 11/26/2024 after presenting to the ED for shortness of breath and falls.  ED lab work revealed potassium 5.4, , creatinine 2.6 mg/dL, reason for this consultation.  Significant home medications include prednisone, Lasix, finasteride, metoprolol, tamsulosin, metformin and aspirin.    Past Medical History:        Diagnosis Date    COPD (chronic obstructive pulmonary disease) (HCC)     Diabetes mellitus (HCC)     GERD (gastroesophageal reflux disease)     Hyperlipidemia      Past Surgical History:        Procedure Laterality Date    BRONCHOSCOPY  3/7/2024    BRONCHOSCOPY DIAGNOSTIC OR CELL WASH ONLY [place] [facility] [place]    BRONCHOSCOPY  3/7/2024    BRONCHOSCOPY ALVEOLAR LAVAGE performed by Chase Naqvi MD [place] [facility] [place] [facility]    BRONCHOSCOPY  3/7/2024    BRONCHOSCOPY BIOPSY BRONCHUS performed by Chase Naqvi MD at [facility] [place] [facility]    BRONCHOSCOPY  3/7/2024    BRONCHOSCOPY BRUSHINGS performed by Chase Naqvi MD at [facility]    BRONCHOSCOPY  3/7/2024    BRONCHOSCOPY ROBOTIC performed by Chase Naqvi MD at [facility] [place] [facility]    CORONARY ANGIOPLASTY WITH STENT PLACEMENT      CT NEEDLE BIOPSY LUNG PERCUTANEOUS  10/6/2022    CT NEEDLE BIOPSY LUNG PERCUTANEOUS 10/6/2022 [facility] CT     Current Medications:    Current Facility-Administered Medications: glucose chewable tablet 16 g, 4 tablet, Oral, PRN  dextrose bolus 10% 125 mL, 125 mL, IntraVENous, PRN **OR** dextrose bolus 10% 250 mL, 250 mL, IntraVENous, PRN  glucagon injection 1 mg, 1 mg,

## 2024-11-28 LAB
ALBUMIN SERPL-MCNC: 3.7 G/DL (ref 3.5–5.2)
ALP SERPL-CCNC: 94 U/L (ref 40–129)
ALT SERPL-CCNC: 17 U/L (ref 0–40)
ANION GAP SERPL CALCULATED.3IONS-SCNC: 12 MMOL/L (ref 7–16)
AST SERPL-CCNC: 15 U/L (ref 0–39)
ATYPICAL LYMPHOCYTE ABSOLUTE COUNT: 0.12 K/UL (ref 0–0.46)
ATYPICAL LYMPHOCYTES: 2 % (ref 0–4)
B.E.: 1.1 MMOL/L (ref -3–3)
BASOPHILS # BLD: 0 K/UL (ref 0–0.2)
BASOPHILS NFR BLD: 0 % (ref 0–2)
BILIRUB SERPL-MCNC: 0.3 MG/DL (ref 0–1.2)
BUN SERPL-MCNC: 111 MG/DL (ref 6–23)
CALCIUM SERPL-MCNC: 9.4 MG/DL (ref 8.6–10.2)
CHLORIDE SERPL-SCNC: 99 MMOL/L (ref 98–107)
CO2 SERPL-SCNC: 30 MMOL/L (ref 22–29)
COHB: 0.9 % (ref 0–1.5)
CREAT SERPL-MCNC: 2.7 MG/DL (ref 0.7–1.2)
CRITICAL: ABNORMAL
DATE ANALYZED: ABNORMAL
DATE OF COLLECTION: ABNORMAL
EOSINOPHIL # BLD: 0 K/UL (ref 0.05–0.5)
EOSINOPHILS RELATIVE PERCENT: 0 % (ref 0–6)
ERYTHROCYTE [DISTWIDTH] IN BLOOD BY AUTOMATED COUNT: 14.6 % (ref 11.5–15)
FERRITIN SERPL-MCNC: 124 NG/ML
FOLATE SERPL-MCNC: 7.1 NG/ML (ref 4.8–24.2)
GFR, ESTIMATED: 24 ML/MIN/1.73M2
GLUCOSE BLD-MCNC: 159 MG/DL (ref 74–99)
GLUCOSE BLD-MCNC: 230 MG/DL (ref 74–99)
GLUCOSE BLD-MCNC: 240 MG/DL (ref 74–99)
GLUCOSE BLD-MCNC: 359 MG/DL (ref 74–99)
GLUCOSE SERPL-MCNC: 155 MG/DL (ref 74–99)
HCO3: 26.9 MMOL/L (ref 22–26)
HCT VFR BLD AUTO: 34.5 % (ref 37–54)
HGB BLD-MCNC: 11 G/DL (ref 12.5–16.5)
HHB: 3.9 % (ref 0–5)
IRON SATN MFR SERPL: 32 % (ref 20–55)
IRON SERPL-MCNC: 85 UG/DL (ref 59–158)
LAB: ABNORMAL
LYMPHOCYTES NFR BLD: 0.94 K/UL (ref 1.5–4)
LYMPHOCYTES RELATIVE PERCENT: 14 % (ref 20–42)
Lab: 958
MCH RBC QN AUTO: 28.9 PG (ref 26–35)
MCHC RBC AUTO-ENTMCNC: 31.9 G/DL (ref 32–34.5)
MCV RBC AUTO: 90.6 FL (ref 80–99.9)
METHB: 0.3 % (ref 0–1.5)
MODE: ABNORMAL
MONOCYTES NFR BLD: 0.47 K/UL (ref 0.1–0.95)
MONOCYTES NFR BLD: 7 % (ref 2–12)
MYELOCYTES ABSOLUTE COUNT: 0.12 K/UL
MYELOCYTES: 2 %
NEUTROPHILS NFR BLD: 75 % (ref 43–80)
NEUTS SEG NFR BLD: 5.05 K/UL (ref 1.8–7.3)
NUCLEATED RED BLOOD CELLS: 2 PER 100 WBC
O2 CONTENT: 16.6 ML/DL
O2 SATURATION: 96.1 % (ref 92–98.5)
O2HB: 94.9 % (ref 94–97)
OPERATOR ID: 101
PATIENT TEMP: 37 C
PCO2: 47.3 MMHG (ref 35–45)
PH BLOOD GAS: 7.37 (ref 7.35–7.45)
PLATELET # BLD AUTO: 78 K/UL (ref 130–450)
PLATELET CONFIRMATION: NORMAL
PMV BLD AUTO: 8.4 FL (ref 7–12)
PO2: 88 MMHG (ref 75–100)
POTASSIUM SERPL-SCNC: 4.7 MMOL/L (ref 3.5–5)
PROT SERPL-MCNC: 6.1 G/DL (ref 6.4–8.3)
RBC # BLD AUTO: 3.81 M/UL (ref 3.8–5.8)
RBC # BLD: ABNORMAL 10*6/UL
SODIUM SERPL-SCNC: 141 MMOL/L (ref 132–146)
SOURCE, BLOOD GAS: ABNORMAL
THB: 12.4 G/DL (ref 11.5–16.5)
TIBC SERPL-MCNC: 266 UG/DL (ref 250–450)
TIME ANALYZED: 1004
TSH SERPL DL<=0.05 MIU/L-ACNC: 0.99 UIU/ML (ref 0.27–4.2)
VIT B12 SERPL-MCNC: 385 PG/ML (ref 211–946)
WBC OTHER # BLD: 6.7 K/UL (ref 4.5–11.5)

## 2024-11-28 PROCEDURE — 2700000000 HC OXYGEN THERAPY PER DAY

## 2024-11-28 PROCEDURE — 2580000003 HC RX 258: Performed by: INTERNAL MEDICINE

## 2024-11-28 PROCEDURE — 6370000000 HC RX 637 (ALT 250 FOR IP): Performed by: INTERNAL MEDICINE

## 2024-11-28 PROCEDURE — 85025 COMPLETE CBC W/AUTO DIFF WBC: CPT

## 2024-11-28 PROCEDURE — 99232 SBSQ HOSP IP/OBS MODERATE 35: CPT | Performed by: INTERNAL MEDICINE

## 2024-11-28 PROCEDURE — 36600 WITHDRAWAL OF ARTERIAL BLOOD: CPT

## 2024-11-28 PROCEDURE — 6370000000 HC RX 637 (ALT 250 FOR IP): Performed by: STUDENT IN AN ORGANIZED HEALTH CARE EDUCATION/TRAINING PROGRAM

## 2024-11-28 PROCEDURE — 82947 ASSAY GLUCOSE BLOOD QUANT: CPT

## 2024-11-28 PROCEDURE — 6360000002 HC RX W HCPCS: Performed by: INTERNAL MEDICINE

## 2024-11-28 PROCEDURE — 84443 ASSAY THYROID STIM HORMONE: CPT

## 2024-11-28 PROCEDURE — 80053 COMPREHEN METABOLIC PANEL: CPT

## 2024-11-28 PROCEDURE — 83550 IRON BINDING TEST: CPT

## 2024-11-28 PROCEDURE — 99232 SBSQ HOSP IP/OBS MODERATE 35: CPT | Performed by: STUDENT IN AN ORGANIZED HEALTH CARE EDUCATION/TRAINING PROGRAM

## 2024-11-28 PROCEDURE — 83540 ASSAY OF IRON: CPT

## 2024-11-28 PROCEDURE — 94640 AIRWAY INHALATION TREATMENT: CPT

## 2024-11-28 PROCEDURE — 82746 ASSAY OF FOLIC ACID SERUM: CPT

## 2024-11-28 PROCEDURE — 6370000000 HC RX 637 (ALT 250 FOR IP)

## 2024-11-28 PROCEDURE — 6360000002 HC RX W HCPCS: Performed by: STUDENT IN AN ORGANIZED HEALTH CARE EDUCATION/TRAINING PROGRAM

## 2024-11-28 PROCEDURE — 82607 VITAMIN B-12: CPT

## 2024-11-28 PROCEDURE — 2060000000 HC ICU INTERMEDIATE R&B

## 2024-11-28 PROCEDURE — 82805 BLOOD GASES W/O2 SATURATION: CPT

## 2024-11-28 PROCEDURE — 94660 CPAP INITIATION&MGMT: CPT

## 2024-11-28 PROCEDURE — 2580000003 HC RX 258: Performed by: STUDENT IN AN ORGANIZED HEALTH CARE EDUCATION/TRAINING PROGRAM

## 2024-11-28 PROCEDURE — 82728 ASSAY OF FERRITIN: CPT

## 2024-11-28 RX ORDER — MORPHINE SULFATE 2 MG/ML
1 INJECTION, SOLUTION INTRAMUSCULAR; INTRAVENOUS EVERY 4 HOURS PRN
Status: DISCONTINUED | OUTPATIENT
Start: 2024-11-28 | End: 2024-11-29

## 2024-11-28 RX ORDER — INSULIN LISPRO 100 [IU]/ML
10 INJECTION, SOLUTION INTRAVENOUS; SUBCUTANEOUS
Status: DISCONTINUED | OUTPATIENT
Start: 2024-11-28 | End: 2024-11-29

## 2024-11-28 RX ORDER — METHYLPREDNISOLONE SODIUM SUCCINATE 40 MG/ML
40 INJECTION INTRAMUSCULAR; INTRAVENOUS EVERY 8 HOURS
Status: DISCONTINUED | OUTPATIENT
Start: 2024-11-28 | End: 2024-11-30

## 2024-11-28 RX ADMIN — IPRATROPIUM BROMIDE AND ALBUTEROL SULFATE 1 DOSE: 2.5; .5 SOLUTION RESPIRATORY (INHALATION) at 16:03

## 2024-11-28 RX ADMIN — MORPHINE SULFATE 1 MG: 2 INJECTION, SOLUTION INTRAMUSCULAR; INTRAVENOUS at 15:31

## 2024-11-28 RX ADMIN — INSULIN LISPRO 10 UNITS: 100 INJECTION, SOLUTION INTRAVENOUS; SUBCUTANEOUS at 17:01

## 2024-11-28 RX ADMIN — ROFLUMILAST 500 MCG: 500 TABLET ORAL at 08:47

## 2024-11-28 RX ADMIN — IPRATROPIUM BROMIDE AND ALBUTEROL SULFATE 1 DOSE: 2.5; .5 SOLUTION RESPIRATORY (INHALATION) at 20:18

## 2024-11-28 RX ADMIN — METOPROLOL TARTRATE 100 MG: 50 TABLET, FILM COATED ORAL at 08:47

## 2024-11-28 RX ADMIN — ROSUVASTATIN CALCIUM 10 MG: 10 TABLET, FILM COATED ORAL at 08:47

## 2024-11-28 RX ADMIN — SODIUM CHLORIDE, PRESERVATIVE FREE 10 ML: 5 INJECTION INTRAVENOUS at 08:47

## 2024-11-28 RX ADMIN — ASPIRIN 81 MG: 81 TABLET, COATED ORAL at 08:46

## 2024-11-28 RX ADMIN — BUMETANIDE 0.5 MG/HR: 0.25 INJECTION INTRAMUSCULAR; INTRAVENOUS at 13:27

## 2024-11-28 RX ADMIN — INSULIN LISPRO 5 UNITS: 100 INJECTION, SOLUTION INTRAVENOUS; SUBCUTANEOUS at 08:46

## 2024-11-28 RX ADMIN — METHYLPREDNISOLONE SODIUM SUCCINATE 40 MG: 40 INJECTION INTRAMUSCULAR; INTRAVENOUS at 08:47

## 2024-11-28 RX ADMIN — IPRATROPIUM BROMIDE AND ALBUTEROL SULFATE 1 DOSE: 2.5; .5 SOLUTION RESPIRATORY (INHALATION) at 10:00

## 2024-11-28 RX ADMIN — INSULIN GLARGINE 40 UNITS: 100 INJECTION, SOLUTION SUBCUTANEOUS at 08:50

## 2024-11-28 RX ADMIN — FINASTERIDE 5 MG: 5 TABLET, FILM COATED ORAL at 08:46

## 2024-11-28 RX ADMIN — IPRATROPIUM BROMIDE AND ALBUTEROL SULFATE 1 DOSE: 2.5; .5 SOLUTION RESPIRATORY (INHALATION) at 04:37

## 2024-11-28 RX ADMIN — IPRATROPIUM BROMIDE AND ALBUTEROL SULFATE 1 DOSE: 2.5; .5 SOLUTION RESPIRATORY (INHALATION) at 00:07

## 2024-11-28 RX ADMIN — ARFORMOTEROL TARTRATE 15 MCG: 15 SOLUTION RESPIRATORY (INHALATION) at 10:00

## 2024-11-28 RX ADMIN — FAMOTIDINE 20 MG: 20 TABLET ORAL at 08:46

## 2024-11-28 RX ADMIN — METOPROLOL TARTRATE 100 MG: 50 TABLET, FILM COATED ORAL at 22:26

## 2024-11-28 RX ADMIN — IPRATROPIUM BROMIDE AND ALBUTEROL SULFATE 1 DOSE: 2.5; .5 SOLUTION RESPIRATORY (INHALATION) at 13:48

## 2024-11-28 RX ADMIN — ALPRAZOLAM 0.5 MG: 0.25 TABLET ORAL at 19:51

## 2024-11-28 RX ADMIN — ARFORMOTEROL TARTRATE 15 MCG: 15 SOLUTION RESPIRATORY (INHALATION) at 20:19

## 2024-11-28 RX ADMIN — ENOXAPARIN SODIUM 30 MG: 100 INJECTION SUBCUTANEOUS at 22:25

## 2024-11-28 RX ADMIN — METHYLPREDNISOLONE SODIUM SUCCINATE 40 MG: 40 INJECTION INTRAMUSCULAR; INTRAVENOUS at 17:03

## 2024-11-28 RX ADMIN — INSULIN LISPRO 16 UNITS: 100 INJECTION, SOLUTION INTRAVENOUS; SUBCUTANEOUS at 17:02

## 2024-11-28 RX ADMIN — INSULIN LISPRO 4 UNITS: 100 INJECTION, SOLUTION INTRAVENOUS; SUBCUTANEOUS at 11:37

## 2024-11-28 RX ADMIN — INSULIN LISPRO 4 UNITS: 100 INJECTION, SOLUTION INTRAVENOUS; SUBCUTANEOUS at 22:39

## 2024-11-28 RX ADMIN — ISOSORBIDE MONONITRATE 30 MG: 30 TABLET, EXTENDED RELEASE ORAL at 08:47

## 2024-11-28 RX ADMIN — INSULIN LISPRO 5 UNITS: 100 INJECTION, SOLUTION INTRAVENOUS; SUBCUTANEOUS at 11:37

## 2024-11-28 RX ADMIN — ALPRAZOLAM 0.5 MG: 0.25 TABLET ORAL at 06:57

## 2024-11-28 RX ADMIN — TAMSULOSIN HYDROCHLORIDE 0.8 MG: 0.4 CAPSULE ORAL at 08:47

## 2024-11-28 ASSESSMENT — PAIN DESCRIPTION - ORIENTATION: ORIENTATION: LEFT

## 2024-11-28 ASSESSMENT — PAIN DESCRIPTION - DESCRIPTORS: DESCRIPTORS: ACHING;DISCOMFORT

## 2024-11-28 ASSESSMENT — PAIN SCALES - GENERAL
PAINLEVEL_OUTOF10: 4
PAINLEVEL_OUTOF10: 0
PAINLEVEL_OUTOF10: 7

## 2024-11-28 ASSESSMENT — PAIN - FUNCTIONAL ASSESSMENT: PAIN_FUNCTIONAL_ASSESSMENT: ACTIVITIES ARE NOT PREVENTED

## 2024-11-28 ASSESSMENT — PAIN DESCRIPTION - LOCATION: LOCATION: CHEST

## 2024-11-28 NOTE — PROGRESS NOTES
4 Eyes Skin Assessment     NAME:  Chico Frias  YOB: 1950  MEDICAL RECORD NUMBER:  [id number]    The patient is being assessed for  Transfer to New Unit    I agree that at least one RN has performed a thorough Head to Toe Skin Assessment on the patient. ALL assessment sites listed below have been assessed.      Areas assessed by both nurses:    Head, Face, Ears, Shoulders, Back, Chest, Arms, Elbows, Hands, Legs. Feet and Heels, and Under Medical Devices . Patient unable to tolerate laying flat for RN to assess backside. Per wife, patient has no wounds on backside. Generalized bruising and dry flaky feet.        Does the Patient have a Wound? No noted wound(s)       Bon Prevention initiated by RN: Yes  Wound Care Orders initiated by RN: No    Pressure Injury (Stage 3,4, Unstageable, DTI, NWPT, and Complex wounds) if present, place Wound referral order by RN under : No    New Ostomies, if present place, Ostomy referral order under : No     Nurse 1 eSignature: Electronically signed by Vanna Shahid RN on 11/28/24 at 1:52 PM EST    **SHARE this note so that the co-signing nurse can place an eSignature**    Nurse 2 eSignature: Electronically signed by ISAIAS MONACO RN on 11/28/24 at 1:55 PM EST

## 2024-11-28 NOTE — CONSULTS
[place] [address]                              CONSULTATION      PATIENT NAME: SALAS THOMPSON               : 1950  MED REC NO: [id number]                        ROOM: 0542  ACCOUNT NO: [id number]                       ADMIT DATE: 2024  PROVIDER: Bladimir Boogie DO      CONSULT DATE: 2024    CHIEF COMPLAINT:  Compression fracture, mid thoracic spine.    HISTORY OF CHIEF COMPLAINT:  Patient was admitted to the hospital on  after a fall.  He was out shutting off his furnace when he fell backward, developed severe pain and discomfort in mid portion of back with difficulty with ambulation.  He has severe pain with inspiration.  He was also up and ambulating and developed pain.  It radiates around his ribs.  It is noted that he has been treated because he had some rattling in his chest with some Levaquin for a possible infection.  He has a longstanding history of COPD, and states that the COPD has gotten exacerbated from this.  He actually is under pulmonary evaluation for this as well as medical management.  Overall though, I talked to him, I woke him up in bed.  He does not seem to be in distress.  He is on oxygen.  There is also a pressure machine next to the bed for breathing treatments.  He does have mid pain when he leans forward and it is in between his shoulder blades.  It does not radiate down his legs.  He has significant edema, +2 in both lower extremities, especially along his feet.  There is ecchymosis in his upper extremities, most likely from injuries to his hands with swelling in both his hands.  His range of motion is intact.  Sensory is intact.  Calves are supple.  It does radiate around the ribs, but I do not find any significant motor loss.  I did review the CT scan that showed some wedge deformities noted in the mid thoracic spine.  This was just on a chest CT scan as I looked through

## 2024-11-28 NOTE — PLAN OF CARE
Problem: Chronic Conditions and Co-morbidities  Goal: Patient's chronic conditions and co-morbidity symptoms are monitored and maintained or improved  11/28/2024 1001 by Talia Izquierdo RN  Outcome: Progressing  11/27/2024 2156 by Lia Jacobsen RN  Outcome: Progressing     Problem: Discharge Planning  Goal: Discharge to home or other facility with appropriate resources  11/28/2024 1001 by Talia Izquierdo RN  Outcome: Progressing  11/27/2024 2156 by Lia Jacobsen RN  Outcome: Progressing     Problem: Pain  Goal: Verbalizes/displays adequate comfort level or baseline comfort level  11/28/2024 1001 by Talia Izquierdo RN  Outcome: Progressing  11/27/2024 2156 by Lia Jacobsen RN  Outcome: Progressing     Problem: Skin/Tissue Integrity  Goal: Absence of new skin breakdown  Description: 1.  Monitor for areas of redness and/or skin breakdown  2.  Assess vascular access sites hourly  3.  Every 4-6 hours minimum:  Change oxygen saturation probe site  4.  Every 4-6 hours:  If on nasal continuous positive airway pressure, respiratory therapy assess nares and determine need for appliance change or resting period.  11/28/2024 1001 by Talia Izquierdo RN  Outcome: Progressing  11/27/2024 2156 by Lia Jacobsen RN  Outcome: Progressing     Problem: Safety - Adult  Goal: Free from fall injury  11/28/2024 1001 by Talia Izquierdo RN  Outcome: Progressing  11/27/2024 2156 by Lia Jacobsen RN  Outcome: Progressing     Problem: ABCDS Injury Assessment  Goal: Absence of physical injury  11/28/2024 1001 by Talia Izquierdo RN  Outcome: Progressing  11/27/2024 2156 by Lia Jacobsen RN  Outcome: Progressing

## 2024-11-28 NOTE — PROGRESS NOTES
[facility] Hospitalist Progress Note    Admitting Date and Time: 11/26/2024 12:13 PM  Admit Dx: GONZALES (acute kidney injury) (HCC) [N17.9]    Subjective:  Patient is being followed for GONZALES (acute kidney injury) (HCC) [N17.9]   Pt feels weak, dyspneic, was off bpap when seen, reports back pain.  Per RN: No major concerns, was confused this am.     ROS: denies fever, chills, cp, sob, n/v, HA unless stated above.      methylPREDNISolone  40 mg IntraVENous Q8H    insulin lispro  5 Units SubCUTAneous TID WC    insulin lispro  0-16 Units SubCUTAneous 4x Daily AC & HS    arformoterol tartrate  15 mcg Nebulization BID RT    ipratropium 0.5 mg-albuterol 2.5 mg  1 Dose Inhalation Q4H RT    sodium chloride flush  5-40 mL IntraVENous 2 times per day    enoxaparin  30 mg SubCUTAneous BID    famotidine  20 mg Oral Daily    aspirin  81 mg Oral Daily    finasteride  5 mg Oral Daily    insulin glargine  40 Units SubCUTAneous Daily    isosorbide mononitrate  30 mg Oral Daily    metoprolol  100 mg Oral BID    Roflumilast  500 mcg Oral Daily    rosuvastatin  10 mg Oral Daily    tamsulosin  0.8 mg Oral Daily     sodium chloride, 2 spray, Q2H PRN  morphine, 1 mg, Q4H PRN  glucose, 4 tablet, PRN  dextrose bolus, 125 mL, PRN   Or  dextrose bolus, 250 mL, PRN  glucagon (rDNA), 1 mg, PRN  dextrose, , Continuous PRN  sodium chloride flush, 5-40 mL, PRN  sodium chloride, , PRN  potassium chloride, 40 mEq, PRN   Or  potassium alternative oral replacement, 40 mEq, PRN  magnesium sulfate, 2,000 mg, PRN  ondansetron, 4 mg, Q8H PRN   Or  ondansetron, 4 mg, Q6H PRN  polyethylene glycol, 17 g, Daily PRN  acetaminophen, 650 mg, Q6H PRN   Or  acetaminophen, 650 mg, Q6H PRN  albuterol, 2.5 mg, Q6H PRN  ALPRAZolam, 0.5 mg, TID PRN         Objective:    /73   Pulse (!) 109   Temp (!) 96.5 °F (35.8 °C) (Oral)   Resp 16   Ht 1.88 m (6' 2\")   Wt 110.4 kg (243 lb 4.8 oz)   SpO2 97%   BMI 31.24 kg/m²     General Appearance: alert and

## 2024-11-28 NOTE — PROGRESS NOTES
ENDOCRINOLOGY PROGRESS NOTE      Date of admission: 11/26/2024  Date of service: 11/28/2024  Admitting physician: Brock Nolasco MD   Primary Care Physician: Jose Kwok MD  Consultant physician: Saad Montero MD     Reason for the consultation:  Uncontrolled DM, thyroid nodules    History of Present Illness:  The history is provided by the patient. Accuracy of the patient data is excellent    Chico Frias is a very pleasant 73 y.o. old male with PMH of diabetes mellitus type 2, ESRD, thyroid nodules, HLD, and other listed below admitted to [facility] on 11/26/2024 because of mechanical fall, endocrine service was consulted for diabetes management.    Subjective  I saw and examined the patient at bedside this morning, no acute events overnight, glucose level is variable.  No hypoglycemia      Inpatient diet:   Carb Restricted diet     Point of care glucose monitoring   (Independently reviewed)   Recent Labs     11/26/24  2343 11/27/24  0743 11/27/24  0917 11/27/24  1126 11/27/24  1616 11/27/24  2048 11/28/24  0653 11/28/24  1026   POCGLU 172* 181* 221* 222* 273* 222* 159* 240*       Scheduled Meds:   methylPREDNISolone  40 mg IntraVENous Q8H    insulin lispro  5 Units SubCUTAneous TID WC    insulin lispro  0-16 Units SubCUTAneous 4x Daily AC & HS    arformoterol tartrate  15 mcg Nebulization BID RT    ipratropium 0.5 mg-albuterol 2.5 mg  1 Dose Inhalation Q4H RT    sodium chloride flush  5-40 mL IntraVENous 2 times per day    enoxaparin  30 mg SubCUTAneous BID    famotidine  20 mg Oral Daily    aspirin  81 mg Oral Daily    finasteride  5 mg Oral Daily    insulin glargine  40 Units SubCUTAneous Daily    isosorbide mononitrate  30 mg Oral Daily    metoprolol  100 mg Oral BID    Roflumilast  500 mcg Oral Daily    rosuvastatin  10 mg Oral Daily    tamsulosin  0.8 mg Oral Daily       PRN Meds:   sodium chloride, 2 spray, Q2H PRN  morphine, 1 mg, Q4H PRN  glucose, 4 tablet, PRN  dextrose bolus, 125 mL, PRN    Or  dextrose bolus, 250 mL, PRN  glucagon (rDNA), 1 mg, PRN  dextrose, , Continuous PRN  sodium chloride flush, 5-40 mL, PRN  sodium chloride, , PRN  potassium chloride, 40 mEq, PRN   Or  potassium alternative oral replacement, 40 mEq, PRN  magnesium sulfate, 2,000 mg, PRN  ondansetron, 4 mg, Q8H PRN   Or  ondansetron, 4 mg, Q6H PRN  polyethylene glycol, 17 g, Daily PRN  acetaminophen, 650 mg, Q6H PRN   Or  acetaminophen, 650 mg, Q6H PRN  albuterol, 2.5 mg, Q6H PRN  ALPRAZolam, 0.5 mg, TID PRN      Continuous Infusions:   dextrose      bumetanide (BUMEX) 12.5 mg in sodium chloride 0.9 % 125 mL infusion 0.5 mg/hr (11/28/24 1137)    sodium chloride         Review of Systems  All systems reviewed. All negative except for symptoms mentioned in HPI     OBJECTIVE    /73   Pulse (!) 109   Temp (!) 96.5 °F (35.8 °C) (Oral)   Resp 16   Ht 1.88 m (6' 2\")   Wt 110.4 kg (243 lb 4.8 oz)   SpO2 97%   BMI 31.24 kg/m²     Intake/Output Summary (Last 24 hours) at 11/28/2024 1149  Last data filed at 11/27/2024 2343  Gross per 24 hour   Intake 300 ml   Output 1800 ml   Net -1500 ml       Physical examination:  General: awake alert, oriented x3  HEENT: normocephalic non traumatic, no exophthalmos   Neck: supple, No thyroid tenderness,  Pulm: good equal air entry no added sounds  CVS: S1 + S2  Abd: soft lax, no tenderness  Skin: warm, no lesions, no rash. No open wounds, no ulcers   Neuro: CN intact, sensation decreased bilateral , muscle power normal  Psych: normal mood, and affect    Review of Laboratory Data:  I personally reviewed the following labs:   Recent Labs     11/26/24  1354 11/27/24  0431 11/28/24  0150   WBC 6.4 7.2 6.7   RBC 4.23 3.95 3.81   HGB 12.2* 11.3* 11.0*   HCT 38.9 36.0* 34.5*   MCV 92.0 91.1 90.6   MCH 28.8 28.6 28.9   MCHC 31.4* 31.4* 31.9*   RDW 14.7 14.5 14.6   PLT 86* 89* 78*   MPV 9.6 9.3 8.4     Recent Labs     11/26/24  1354 11/27/24  0431 11/28/24  0150    141 141   K 5.4* 5.4* 4.7  who understood and agreed with the plan.    Thank you for allowing us to participate in the care of this patient. Please do not hesitate to contact us with any additional questions.     Inés Manzano MD PGY-3    I saw the patient and discussed the management with the resident physician Inés Hanna MD. I reviewed and agree with the findings and plan as documented in the resident's note     Saad Montero MD  Endocrinologist, [facility]   [address], [address], [place] [id number]  Phone: [phone]  Fax: [phone]  --------------------------  An electronic signature was used to authenticate this note. Saad Montero MD on 11/28/2024 at 11:49 AM

## 2024-11-28 NOTE — PLAN OF CARE
Problem: Chronic Conditions and Co-morbidities  Goal: Patient's chronic conditions and co-morbidity symptoms are monitored and maintained or improved  11/27/2024 2156 by Lia Jacobsen RN  Outcome: Progressing  11/27/2024 1017 by Talia Izquierdo RN  Outcome: Progressing     Problem: Discharge Planning  Goal: Discharge to home or other facility with appropriate resources  11/27/2024 2156 by Lia Jacobsen RN  Outcome: Progressing  11/27/2024 1017 by Talia Izquierdo RN  Outcome: Progressing     Problem: Pain  Goal: Verbalizes/displays adequate comfort level or baseline comfort level  11/27/2024 2156 by Lia Jacobsen RN  Outcome: Progressing  11/27/2024 1017 by Talia Izquierdo RN  Outcome: Progressing     Problem: Skin/Tissue Integrity  Goal: Absence of new skin breakdown  Description: 1.  Monitor for areas of redness and/or skin breakdown  2.  Assess vascular access sites hourly  3.  Every 4-6 hours minimum:  Change oxygen saturation probe site  4.  Every 4-6 hours:  If on nasal continuous positive airway pressure, respiratory therapy assess nares and determine need for appliance change or resting period.  11/27/2024 2156 by Lia Jacobsen RN  Outcome: Progressing  11/27/2024 1017 by Talia Izquierdo RN  Outcome: Progressing     Problem: Safety - Adult  Goal: Free from fall injury  11/27/2024 2156 by Lia Jacobsen RN  Outcome: Progressing  11/27/2024 1017 by Talia Izquierdo RN  Outcome: Progressing     Problem: ABCDS Injury Assessment  Goal: Absence of physical injury  11/27/2024 2156 by Lia Jacobsen RN  Outcome: Progressing  11/27/2024 1017 by Talia Izquierdo RN  Outcome: Progressing

## 2024-11-28 NOTE — CONSULTS
[place] [address]                              CONSULTATION      PATIENT NAME: SALAS THOMPSON               : 1950  MED REC NO: [id number]                        ROOM: 0542  ACCOUNT NO: [id number]                       ADMIT DATE: 2024  PROVIDER: Arcadio Goyal MD    PULMONARY CONSULT    CONSULT DATE: 2024      REASON FOR CONSULTATION:  Requested for COPD exacerbation.    IMPRESSION:  Acute exacerbation of chronic obstructive pulmonary disease with underlying chronic hypoxic and hypercapnic respiratory failure, maintained on about 4 L of oxygen, and an NIV at home which he wears more often than not.  He is exacerbated continually.  Really is just end-stage, although his lung function is really not all that bad with an FEV1 of around 50% of predicted from my recollection.  For now, he is again short of breath.  He is on IV steroids which I agree with.  We will continue aerosolized bronchodilators and switch the BiPAP to NIV with a tidal volume of 550, respiratory rate of 16, and EPAP of 8.  The Solu-Medrol dose is reasonable at 40 IV daily as he is not wheezing on exam.  We will continue DuoNeb and add Brovana 15 b.i.d.  We will continue p.r.n. albuterol.  He is not on his chronic morphine which may lead to some withdrawal symptoms and worsen his refractory dyspnea, but we will defer that to the primary service for now as we are dealing with the chest and head pain after his fall.    HISTORY:  A 73-year-old white male followed by me with COPD and chronic hypoxic and hypercapnic respiratory failure, presented to the emergency department where he stayed for 11 hours after a fall.  He has fallen a couple of times per his son who gave a lot of the history.  He hit the back of his head after going to shut off a fireplace, and he also hit his left chest and is complaining of pain in the area of the left  pectoralis muscle.  He is short of breath with any activity.  He is not really coughing or wheezing much, just having the chest pain and shortness of breath.  He was started on IV steroids as well as aerosolized bronchodilators.  He is maintained on supplemental oxygen.  He has been on BiPAP instead of his home noninvasive ventilator since he has been here.    PAST MEDICAL HISTORY:  Besides the COPD, includes diabetes, GERD, hyperlipidemia, and he has had a chronic right lung nodular density that has being worked up with a CT-guided needle biopsy as well as navigational bronchoscopy.  He is also maintained on chronic steroids which he has been taking up to 60 mg a day without talking to me.  He has had some depressive symptoms for which I had started on sertraline which he stopped and then restarted.  His past medical history otherwise includes sleep apnea.    PAST SURGICAL HISTORY:  Includes a coronary stent and is otherwise negative.      Father had lung cancer.    SOCIAL HISTORY:  Former heavy smoker.  , lives with his wife.  1 pack-a-day smoker for 20 years.  He did raise chickens.    MEDICATIONS:  Per the MAR.    REVIEW OF SYSTEMS:  No allergies.  No fever.  No visual or hearing complaints.  Some blurred vision perhaps.  Chest pain and headache as above.  He is short of breath as above.  No GI symptoms.  He is weak and has been falling.  He is grossly fluid overloaded and in acute kidney failure at this time.  Review of systems otherwise negative.    PHYSICAL EXAMINATION:  GENERAL:  He is an elderly debilitated man, in no acute distress.  VITAL SIGNS:  Temp 36.6, pulse 90, respiratory rate 16, blood pressure 120/74.  SKIN:  Right arm is wrapped after the IV was pulled out and led to excoriations.  HEENT:  Eyes had clear sclerae.  Mouth showed moist mucous membranes.  NECK:  Without masses or adenopathy.  CHEST:  Symmetric.  There was no accessory muscle use.  HEART:  Regular but distant.  LUNGS:

## 2024-11-28 NOTE — PROGRESS NOTES
Perfect served Dr Roel THOMPSON patient has critical bun of 111, Cr 2.7, Albumin 3.7, Alk phos 94. K+ 4.0. please advise . No new orders.

## 2024-11-28 NOTE — PROGRESS NOTES
He remains very dyspneic at rest. No cough, wheezing or chest pain and tolerating avaps fine. Legs remain quite edematous.   Additional Respiratory Assessments  Pulse: (!) 109  Respirations: 16  SpO2: 97 %      Current Facility-Administered Medications:     sodium chloride (OCEAN, BABY AYR) 0.65 % nasal spray 2 spray, 2 spray, Each Nostril, Q2H PRN, Rene Short MD    morphine (PF) injection 1 mg, 1 mg, IntraVENous, Q4H PRN, Channing Nolasco MD    methylPREDNISolone sodium succ (SOLU-MEDROL) injection 40 mg, 40 mg, IntraVENous, Q8H, Arcadio Goyal MD    glucose chewable tablet 16 g, 4 tablet, Oral, PRN, Channing Nolasco MD    dextrose bolus 10% 125 mL, 125 mL, IntraVENous, PRN **OR** dextrose bolus 10% 250 mL, 250 mL, IntraVENous, PRN, Channing Nolasco MD    glucagon injection 1 mg, 1 mg, SubCUTAneous, PRN, Channing Nolasco MD    dextrose 10 % infusion, , IntraVENous, Continuous PRN, Channing Nolasco MD    bumetanide (BUMEX) 12.5 mg in sodium chloride 0.9 % 125 mL infusion, 0.2 mg/hr, IntraVENous, Continuous, Therese Ross, APRN - CNP, Last Rate: 2 mL/hr at 11/27/24 1411, 0.2 mg/hr at 11/27/24 1411    insulin lispro (HUMALOG,ADMELOG) injection vial 5 Units, 5 Units, SubCUTAneous, TID WC, Inés Manzano MD, 5 Units at 11/28/24 0846    insulin lispro (HUMALOG,ADMELOG) injection vial 0-16 Units, 0-16 Units, SubCUTAneous, 4x Daily AC & HS, Inés Manzano MD, 4 Units at 11/27/24 2055    arformoterol tartrate (BROVANA) nebulizer solution 15 mcg, 15 mcg, Nebulization, BID RT, Arcadio Goyal MD, 15 mcg at 11/28/24 1000    ipratropium 0.5 mg-albuterol 2.5 mg (DUONEB) nebulizer solution 1 Dose, 1 Dose, Inhalation, Q4H RT, Brock Nolasco MD, 1 Dose at 11/28/24 1000    sodium chloride flush 0.9 % injection 5-40 mL, 5-40 mL, IntraVENous, 2 times per day, Brock Nolasco MD, 10 mL at 11/28/24 0847    sodium chloride flush 0.9 % injection 5-40 mL, 5-40 mL, IntraVENous, PRN, Brock Nolasco MD    0.9 % sodium chloride infusion,

## 2024-11-28 NOTE — PROGRESS NOTES
Department of Internal Medicine  Nephrology Progress Note      SUBJECTIVE: We are following Mr. Frias for GONZALES. Patient reports no new complaints today.     PHYSICAL EXAM:      Vitals:    VITALS:  /73   Pulse (!) 109   Temp (!) 96.5 °F (35.8 °C) (Oral)   Resp 16   Ht 1.88 m (6' 2\")   Wt 110.4 kg (243 lb 4.8 oz)   SpO2 97%   BMI 31.24 kg/m²   24HR INTAKE/OUTPUT:    Intake/Output Summary (Last 24 hours) at 11/28/2024 1103  Last data filed at 11/27/2024 2343  Gross per 24 hour   Intake 300 ml   Output 1800 ml   Net -1500 ml       Constitutional:  Awake, alert, oriented, in NAD  HEENT:  PERRLA, normocephalic, atraumatic  Respiratory: Diminished  Cardiovascular/Edema:  RRR, normal S1, normal S2,+++ edema  Gastrointestinal:  Soft, flat, non-distended, non-tender  Neurologic:  Nonfocal  Skin:  warm, dry, no rashes, no lesions      Scheduled Meds:   methylPREDNISolone  40 mg IntraVENous Q8H    insulin lispro  5 Units SubCUTAneous TID WC    insulin lispro  0-16 Units SubCUTAneous 4x Daily AC & HS    arformoterol tartrate  15 mcg Nebulization BID RT    ipratropium 0.5 mg-albuterol 2.5 mg  1 Dose Inhalation Q4H RT    sodium chloride flush  5-40 mL IntraVENous 2 times per day    enoxaparin  30 mg SubCUTAneous BID    famotidine  20 mg Oral Daily    aspirin  81 mg Oral Daily    finasteride  5 mg Oral Daily    insulin glargine  40 Units SubCUTAneous Daily    isosorbide mononitrate  30 mg Oral Daily    metoprolol  100 mg Oral BID    Roflumilast  500 mcg Oral Daily    rosuvastatin  10 mg Oral Daily    tamsulosin  0.8 mg Oral Daily     Continuous Infusions:   dextrose      bumetanide (BUMEX) 12.5 mg in sodium chloride 0.9 % 125 mL infusion 0.2 mg/hr (11/27/24 1411)    sodium chloride       PRN Meds:.sodium chloride, morphine, glucose, dextrose bolus **OR** dextrose bolus, glucagon (rDNA), dextrose, sodium chloride flush, sodium chloride, potassium chloride **OR** potassium alternative oral replacement **OR**  pneumonia  5. Enlarged thyroid gland with multiple calcified nodules.  Nonemergent  ultrasound recommended.    CT ABDOMEN PELVIS WO CONTRAST 11/26/24    IMPRESSION:  1. New compression fracture involving body of T7 with loss of height of  approximately 40%.  MRI thoracic spine recommended  2. Stable chronic fractures are seen involving T6, T9, T10, T11, and T12.  3. No acute process in the abdomen or pelvis.  4. Diverticulosis.  5. Splenomegaly.    IMPRESSION/RECOMMENDATIONS:      Briefly, Mr. Chico Frias is a 73-year-old male with a past medical history of HFpEF 65%, BPH, type II DM, COPD, GERD and hyperlipidemia who was admitted on 11/26/2024 after presenting to the ED for shortness of breath and falls.  ED lab work revealed potassium 5.4, , creatinine 2.6 mg/dL, reason for this consultation.  Significant home medications include prednisone, Lasix, finasteride, metoprolol, tamsulosin, metformin and aspirin.    GONZALES stage II versus GONZALES on CKD, likely hemodynamically mediated 2/2 acute decompensated heart, creatinine 2.7  mg/dL, bladder scan 131 mL    Hyperkalemia 2/2 decreased GFR, to give Lokelma  High anion gap metabolic acidosis 2/2 uremia  HFpEF 65%, proBNP 285 start Bumex drip  Normocytic anemia, obtain iron studies  -------------------------------------------------------  Type II DM, on SSI  BPH, on finasteride and tamsulosin  COPD masslike opacity found, pulmonology consulted, on Solu-Medrol  GERD  Hyperlipidemia, on rosuvastatin  Thoracic spine fracture, orthopedics consulted  Enlarged thyroid with calcified nodules, endocrinology consulted    Plan:    Bumex 0.2 mg/hr  Monitor potassium level  Monitor renal function  Strict intake and output    Electronically signed by ANDIE Chen CNP on 11/28/2024 at 11:03 AM   MD:  I saw and evaluated the patient, performing the key elements of the service. I discussed the findings, assessment and plan with NP and agree with her findings and plans as

## 2024-11-28 NOTE — PROGRESS NOTES
Date: 11/27/2024    Time: 9:31 PM    Patient Placed On BIPAP/CPAP/ Non-Invasive Ventilation?  Yes    If no must comment.  Facial area red/color change? No           If YES are Blister/Lesion present?No   If yes must notify nursing staff  BIPAP/CPAP skin barrier?  Yes    Skin barrier type:mepilexlite       Comments:        Tim Strauss, [facility]     11/27/24 2130   NIV Type   NIV Started/Stopped On   Equipment Type v60   Mode AVAPS   Mask Type Full face mask   Mask Size Medium   Assessment   Level of Consciousness 0   Comfort Level Good   Using Accessory Muscles No   Mask Compliance Good   Skin Assessment Clean, dry, & intact   Settings/Measurements   PIP Observed 15 cm H20   CPAP/EPAP 8 cmH2O   IPAP Min 15 cmH2O   IPAP Max 20 cmH2O   Vt (Set, mL) 550 mL   Vt (Measured) 697 mL   Rate Ordered 16   Insp Rise Time (%) 3 %   FiO2  50 %   I Time/ I Time % 0.8 s   Minute Volume (L/min) 12.1 Liters   Mask Leak (lpm) 49 lpm   Patient's Home Machine No   Alarm Settings   Alarms On Y

## 2024-11-29 PROBLEM — R73.9 STEROID-INDUCED HYPERGLYCEMIA: Status: ACTIVE | Noted: 2024-11-29

## 2024-11-29 PROBLEM — T38.0X5A STEROID-INDUCED HYPERGLYCEMIA: Status: ACTIVE | Noted: 2024-11-29

## 2024-11-29 PROBLEM — N28.9 WORSENING RENAL FUNCTION: Status: ACTIVE | Noted: 2024-11-29

## 2024-11-29 LAB
ALBUMIN SERPL-MCNC: 3.8 G/DL (ref 3.5–5.2)
ALP SERPL-CCNC: 100 U/L (ref 40–129)
ALT SERPL-CCNC: 18 U/L (ref 0–40)
ANION GAP SERPL CALCULATED.3IONS-SCNC: 15 MMOL/L (ref 7–16)
AST SERPL-CCNC: 18 U/L (ref 0–39)
BASOPHILS # BLD: 0 K/UL (ref 0–0.2)
BASOPHILS NFR BLD: 0 % (ref 0–2)
BILIRUB SERPL-MCNC: 0.4 MG/DL (ref 0–1.2)
BUN SERPL-MCNC: 111 MG/DL (ref 6–23)
CALCIUM SERPL-MCNC: 9.4 MG/DL (ref 8.6–10.2)
CHLORIDE SERPL-SCNC: 98 MMOL/L (ref 98–107)
CO2 SERPL-SCNC: 28 MMOL/L (ref 22–29)
CREAT SERPL-MCNC: 2.7 MG/DL (ref 0.7–1.2)
EOSINOPHIL # BLD: 0 K/UL (ref 0.05–0.5)
EOSINOPHILS RELATIVE PERCENT: 0 % (ref 0–6)
ERYTHROCYTE [DISTWIDTH] IN BLOOD BY AUTOMATED COUNT: 14.1 % (ref 11.5–15)
GFR, ESTIMATED: 24 ML/MIN/1.73M2
GLUCOSE BLD-MCNC: 131 MG/DL (ref 74–99)
GLUCOSE BLD-MCNC: 263 MG/DL (ref 74–99)
GLUCOSE BLD-MCNC: 92 MG/DL (ref 74–99)
GLUCOSE SERPL-MCNC: 218 MG/DL (ref 74–99)
HCT VFR BLD AUTO: 36.4 % (ref 37–54)
HGB BLD-MCNC: 11.9 G/DL (ref 12.5–16.5)
LYMPHOCYTES NFR BLD: 0.71 K/UL (ref 1.5–4)
LYMPHOCYTES RELATIVE PERCENT: 8 % (ref 20–42)
MCH RBC QN AUTO: 28.6 PG (ref 26–35)
MCHC RBC AUTO-ENTMCNC: 32.7 G/DL (ref 32–34.5)
MCV RBC AUTO: 87.5 FL (ref 80–99.9)
METAMYELOCYTES ABSOLUTE COUNT: 0.18 K/UL (ref 0–0.12)
METAMYELOCYTES: 2 % (ref 0–1)
MONOCYTES NFR BLD: 0 % (ref 2–12)
MONOCYTES NFR BLD: 0 K/UL (ref 0.1–0.95)
MYELOCYTES ABSOLUTE COUNT: 0.18 K/UL
MYELOCYTES: 2 %
NEUTROPHILS NFR BLD: 88 % (ref 43–80)
NEUTS SEG NFR BLD: 7.83 K/UL (ref 1.8–7.3)
PLATELET # BLD AUTO: 85 K/UL (ref 130–450)
PLATELET CONFIRMATION: NORMAL
PMV BLD AUTO: 8.7 FL (ref 7–12)
POTASSIUM SERPL-SCNC: 5 MMOL/L (ref 3.5–5)
PROT SERPL-MCNC: 6.5 G/DL (ref 6.4–8.3)
RBC # BLD AUTO: 4.16 M/UL (ref 3.8–5.8)
RBC # BLD: ABNORMAL 10*6/UL
RBC # BLD: ABNORMAL 10*6/UL
SODIUM SERPL-SCNC: 141 MMOL/L (ref 132–146)
WBC OTHER # BLD: 8.9 K/UL (ref 4.5–11.5)

## 2024-11-29 PROCEDURE — 6360000002 HC RX W HCPCS: Performed by: INTERNAL MEDICINE

## 2024-11-29 PROCEDURE — 82947 ASSAY GLUCOSE BLOOD QUANT: CPT

## 2024-11-29 PROCEDURE — 2580000003 HC RX 258: Performed by: INTERNAL MEDICINE

## 2024-11-29 PROCEDURE — 97165 OT EVAL LOW COMPLEX 30 MIN: CPT

## 2024-11-29 PROCEDURE — 99232 SBSQ HOSP IP/OBS MODERATE 35: CPT | Performed by: INTERNAL MEDICINE

## 2024-11-29 PROCEDURE — 36415 COLL VENOUS BLD VENIPUNCTURE: CPT

## 2024-11-29 PROCEDURE — 94660 CPAP INITIATION&MGMT: CPT

## 2024-11-29 PROCEDURE — 6360000002 HC RX W HCPCS: Performed by: STUDENT IN AN ORGANIZED HEALTH CARE EDUCATION/TRAINING PROGRAM

## 2024-11-29 PROCEDURE — 6370000000 HC RX 637 (ALT 250 FOR IP): Performed by: STUDENT IN AN ORGANIZED HEALTH CARE EDUCATION/TRAINING PROGRAM

## 2024-11-29 PROCEDURE — 6370000000 HC RX 637 (ALT 250 FOR IP): Performed by: INTERNAL MEDICINE

## 2024-11-29 PROCEDURE — 2700000000 HC OXYGEN THERAPY PER DAY

## 2024-11-29 PROCEDURE — 99222 1ST HOSP IP/OBS MODERATE 55: CPT | Performed by: NURSE PRACTITIONER

## 2024-11-29 PROCEDURE — 94640 AIRWAY INHALATION TREATMENT: CPT

## 2024-11-29 PROCEDURE — 2580000003 HC RX 258: Performed by: STUDENT IN AN ORGANIZED HEALTH CARE EDUCATION/TRAINING PROGRAM

## 2024-11-29 PROCEDURE — 80053 COMPREHEN METABOLIC PANEL: CPT

## 2024-11-29 PROCEDURE — 85025 COMPLETE CBC W/AUTO DIFF WBC: CPT

## 2024-11-29 PROCEDURE — 6370000000 HC RX 637 (ALT 250 FOR IP)

## 2024-11-29 PROCEDURE — 2060000000 HC ICU INTERMEDIATE R&B

## 2024-11-29 RX ORDER — MORPHINE SULFATE 10 MG/5ML
4 SOLUTION ORAL EVERY 4 HOURS PRN
Status: DISCONTINUED | OUTPATIENT
Start: 2024-11-29 | End: 2024-12-05 | Stop reason: HOSPADM

## 2024-11-29 RX ORDER — MORPHINE SULFATE 2 MG/ML
1 INJECTION, SOLUTION INTRAMUSCULAR; INTRAVENOUS EVERY 8 HOURS PRN
Status: DISCONTINUED | OUTPATIENT
Start: 2024-11-29 | End: 2024-12-05 | Stop reason: HOSPADM

## 2024-11-29 RX ORDER — INSULIN LISPRO 100 [IU]/ML
0-18 INJECTION, SOLUTION INTRAVENOUS; SUBCUTANEOUS
Status: DISCONTINUED | OUTPATIENT
Start: 2024-11-29 | End: 2024-12-03

## 2024-11-29 RX ORDER — INSULIN GLARGINE 100 [IU]/ML
45 INJECTION, SOLUTION SUBCUTANEOUS DAILY
Status: DISCONTINUED | OUTPATIENT
Start: 2024-11-30 | End: 2024-11-30

## 2024-11-29 RX ORDER — INSULIN GLARGINE 100 [IU]/ML
5 INJECTION, SOLUTION SUBCUTANEOUS ONCE
Status: COMPLETED | OUTPATIENT
Start: 2024-11-29 | End: 2024-11-29

## 2024-11-29 RX ORDER — INSULIN LISPRO 100 [IU]/ML
15 INJECTION, SOLUTION INTRAVENOUS; SUBCUTANEOUS
Status: DISCONTINUED | OUTPATIENT
Start: 2024-11-29 | End: 2024-11-30

## 2024-11-29 RX ORDER — INSULIN LISPRO 100 [IU]/ML
0-18 INJECTION, SOLUTION INTRAVENOUS; SUBCUTANEOUS
Status: DISCONTINUED | OUTPATIENT
Start: 2024-11-29 | End: 2024-11-29

## 2024-11-29 RX ADMIN — MORPHINE SULFATE 4 MG: 10 SOLUTION ORAL at 15:40

## 2024-11-29 RX ADMIN — ENOXAPARIN SODIUM 30 MG: 100 INJECTION SUBCUTANEOUS at 20:51

## 2024-11-29 RX ADMIN — ROSUVASTATIN CALCIUM 10 MG: 10 TABLET, FILM COATED ORAL at 10:06

## 2024-11-29 RX ADMIN — ALPRAZOLAM 0.5 MG: 0.25 TABLET ORAL at 17:44

## 2024-11-29 RX ADMIN — MORPHINE SULFATE 1 MG: 2 INJECTION, SOLUTION INTRAMUSCULAR; INTRAVENOUS at 09:58

## 2024-11-29 RX ADMIN — IPRATROPIUM BROMIDE AND ALBUTEROL SULFATE 1 DOSE: 2.5; .5 SOLUTION RESPIRATORY (INHALATION) at 20:34

## 2024-11-29 RX ADMIN — INSULIN LISPRO 15 UNITS: 100 INJECTION, SOLUTION INTRAVENOUS; SUBCUTANEOUS at 12:05

## 2024-11-29 RX ADMIN — ISOSORBIDE MONONITRATE 30 MG: 30 TABLET, EXTENDED RELEASE ORAL at 10:06

## 2024-11-29 RX ADMIN — IPRATROPIUM BROMIDE AND ALBUTEROL SULFATE 1 DOSE: 2.5; .5 SOLUTION RESPIRATORY (INHALATION) at 00:25

## 2024-11-29 RX ADMIN — METHYLPREDNISOLONE SODIUM SUCCINATE 40 MG: 40 INJECTION INTRAMUSCULAR; INTRAVENOUS at 10:00

## 2024-11-29 RX ADMIN — METHYLPREDNISOLONE SODIUM SUCCINATE 40 MG: 40 INJECTION INTRAMUSCULAR; INTRAVENOUS at 15:40

## 2024-11-29 RX ADMIN — INSULIN LISPRO 6 UNITS: 100 INJECTION, SOLUTION INTRAVENOUS; SUBCUTANEOUS at 12:23

## 2024-11-29 RX ADMIN — BUMETANIDE 0.5 MG/HR: 0.25 INJECTION INTRAMUSCULAR; INTRAVENOUS at 10:20

## 2024-11-29 RX ADMIN — ENOXAPARIN SODIUM 30 MG: 100 INJECTION SUBCUTANEOUS at 10:06

## 2024-11-29 RX ADMIN — METHYLPREDNISOLONE SODIUM SUCCINATE 40 MG: 40 INJECTION INTRAMUSCULAR; INTRAVENOUS at 00:46

## 2024-11-29 RX ADMIN — POLYETHYLENE GLYCOL 3350 17 G: 17 POWDER, FOR SOLUTION ORAL at 17:02

## 2024-11-29 RX ADMIN — IPRATROPIUM BROMIDE AND ALBUTEROL SULFATE 1 DOSE: 2.5; .5 SOLUTION RESPIRATORY (INHALATION) at 16:08

## 2024-11-29 RX ADMIN — IPRATROPIUM BROMIDE AND ALBUTEROL SULFATE 1 DOSE: 2.5; .5 SOLUTION RESPIRATORY (INHALATION) at 08:18

## 2024-11-29 RX ADMIN — METOPROLOL TARTRATE 100 MG: 50 TABLET, FILM COATED ORAL at 19:59

## 2024-11-29 RX ADMIN — ARFORMOTEROL TARTRATE 15 MCG: 15 SOLUTION RESPIRATORY (INHALATION) at 08:18

## 2024-11-29 RX ADMIN — FINASTERIDE 5 MG: 5 TABLET, FILM COATED ORAL at 10:06

## 2024-11-29 RX ADMIN — SODIUM CHLORIDE, PRESERVATIVE FREE 10 ML: 5 INJECTION INTRAVENOUS at 10:12

## 2024-11-29 RX ADMIN — IPRATROPIUM BROMIDE AND ALBUTEROL SULFATE 1 DOSE: 2.5; .5 SOLUTION RESPIRATORY (INHALATION) at 12:26

## 2024-11-29 RX ADMIN — ASPIRIN 81 MG: 81 TABLET, COATED ORAL at 10:06

## 2024-11-29 RX ADMIN — MORPHINE SULFATE 1 MG: 2 INJECTION, SOLUTION INTRAMUSCULAR; INTRAVENOUS at 03:56

## 2024-11-29 RX ADMIN — MORPHINE SULFATE 1 MG: 2 INJECTION, SOLUTION INTRAMUSCULAR; INTRAVENOUS at 00:46

## 2024-11-29 RX ADMIN — METOPROLOL TARTRATE 100 MG: 50 TABLET, FILM COATED ORAL at 10:06

## 2024-11-29 RX ADMIN — INSULIN GLARGINE 5 UNITS: 100 INJECTION, SOLUTION SUBCUTANEOUS at 12:04

## 2024-11-29 RX ADMIN — ROFLUMILAST 500 MCG: 500 TABLET ORAL at 10:06

## 2024-11-29 RX ADMIN — ARFORMOTEROL TARTRATE 15 MCG: 15 SOLUTION RESPIRATORY (INHALATION) at 20:34

## 2024-11-29 RX ADMIN — IPRATROPIUM BROMIDE AND ALBUTEROL SULFATE 1 DOSE: 2.5; .5 SOLUTION RESPIRATORY (INHALATION) at 04:05

## 2024-11-29 RX ADMIN — ALPRAZOLAM 0.5 MG: 0.25 TABLET ORAL at 03:57

## 2024-11-29 RX ADMIN — TAMSULOSIN HYDROCHLORIDE 0.8 MG: 0.4 CAPSULE ORAL at 10:06

## 2024-11-29 RX ADMIN — INSULIN LISPRO 4 UNITS: 100 INJECTION, SOLUTION INTRAVENOUS; SUBCUTANEOUS at 10:08

## 2024-11-29 RX ADMIN — MORPHINE SULFATE 4 MG: 10 SOLUTION ORAL at 20:06

## 2024-11-29 RX ADMIN — FAMOTIDINE 20 MG: 20 TABLET ORAL at 10:06

## 2024-11-29 RX ADMIN — INSULIN LISPRO 10 UNITS: 100 INJECTION, SOLUTION INTRAVENOUS; SUBCUTANEOUS at 10:09

## 2024-11-29 RX ADMIN — INSULIN GLARGINE 40 UNITS: 100 INJECTION, SOLUTION SUBCUTANEOUS at 10:07

## 2024-11-29 ASSESSMENT — PAIN SCALES - GENERAL
PAINLEVEL_OUTOF10: 7
PAINLEVEL_OUTOF10: 2
PAINLEVEL_OUTOF10: 0
PAINLEVEL_OUTOF10: 8
PAINLEVEL_OUTOF10: 7
PAINLEVEL_OUTOF10: 0
PAINLEVEL_OUTOF10: 2
PAINLEVEL_OUTOF10: 3
PAINLEVEL_OUTOF10: 7

## 2024-11-29 ASSESSMENT — PAIN DESCRIPTION - ORIENTATION
ORIENTATION: LEFT

## 2024-11-29 ASSESSMENT — PAIN - FUNCTIONAL ASSESSMENT
PAIN_FUNCTIONAL_ASSESSMENT: PREVENTS OR INTERFERES SOME ACTIVE ACTIVITIES AND ADLS
PAIN_FUNCTIONAL_ASSESSMENT: PREVENTS OR INTERFERES SOME ACTIVE ACTIVITIES AND ADLS

## 2024-11-29 ASSESSMENT — PAIN DESCRIPTION - DIRECTION
RADIATING_TOWARDS: BACK
RADIATING_TOWARDS: BACK

## 2024-11-29 ASSESSMENT — PAIN DESCRIPTION - ONSET
ONSET: ON-GOING
ONSET: ON-GOING

## 2024-11-29 ASSESSMENT — PAIN DESCRIPTION - DESCRIPTORS
DESCRIPTORS: ACHING

## 2024-11-29 ASSESSMENT — PAIN DESCRIPTION - LOCATION
LOCATION: CHEST
LOCATION: RIB CAGE
LOCATION: RIB CAGE
LOCATION: CHEST

## 2024-11-29 ASSESSMENT — PAIN DESCRIPTION - PAIN TYPE
TYPE: ACUTE PAIN
TYPE: ACUTE PAIN

## 2024-11-29 ASSESSMENT — PAIN DESCRIPTION - FREQUENCY
FREQUENCY: CONTINUOUS
FREQUENCY: CONTINUOUS

## 2024-11-29 NOTE — PROGRESS NOTES
P Quality Flow/Interdisciplinary Rounds Progress Note        Quality Flow Rounds held on November 29, 2024    Disciplines Attending:  Bedside Nurse, , , Nursing Unit Leadership, and      Chico Gallagherwig was admitted on 11/26/2024 12:13 PM    Anticipated Discharge Date:  Expected Discharge Date: 11/29/24    Disposition: Home with home health     Bon Score:  Bon Scale Score: 18    Readmission Risk              Risk of Unplanned Readmission:  33           Discussed patient goal for the day, patient clinical progression, and barriers to discharge.  The following Goal(s) of the Day/Commitment(s) have been identified:  Other  continue bumex nydia Aponte RN  November 29, 2024

## 2024-11-29 NOTE — CONSULTS
Palliative Care Department  [phone]  Palliative Care Initial Consult  Provider ANDIE Butts CNP      PATIENT: Chico Frias  : 1950  MRN: [id number]  ADMISSION DATE: 2024 12:13 PM  Referring Provider: Yannick Willis MD     Palliative Medicine was consulted on hospital day 1 for assistance with Goals of care    HPI:     Clinical Summary:Chico Frias is a 73 y.o. y/o male with a history of COPD on 4 L NC, IDDM, GERD, HFpEF, CKD, CAD & hyperlipidemia  who presented to [facility] on 2024 with shortness of breath.  He was admitted for COPD exacerbation and an acute kidney injury.  He is placed on a Bumex drip.  He remains admitted to telemetry for further medical management.    ASSESSMENT/PLAN:     Pertinent Hospital Diagnoses     Acute on chronic hypoxic respiratory failure  COPD exacerbation  Acute on chronic kidney disease    Symptom management    Dyspnea  -Continue morphine 4 mg every 4 hours as needed    Palliative Care Encounter / Counseling Regarding Goals of Care  Please see detailed goals of care discussion as below  At this time, Chico Frias, Does have capacity for medical decision-making.  Capacity is time limited and situation/question specific  During encounter Luiz was surrogate medical decision-maker  Outcome of goals of care meeting:  Continue all current medical management  CODE STATUS changed to limited DNR CCA/DNI  Code status Limited DNRCCA/ DNI  Advanced Directives: no POA or living will in epic  Surrogate/Legal NOK:  Luiz Frias (spouse) [phone]    Spiritual assessment: no spiritual distress identified  Bereavement and grief: to be determined  Referrals to: none today    Thank you for the opportunity to participate in the care of Chico Frias.     ANDIE Butts CNP   Palliative Medicine     SUBJECTIVE:     Details of Conversation: Chart reviewed and met with the patient and his son at the bedside.  He is familiar with palliative care  effort has been made to ensure accuracy; however, inadvertent computerized transcription errors may be present.

## 2024-11-29 NOTE — PROGRESS NOTES
Slept poorly and now doing better on home NIV finally getting some sleep. Dyspneic all the time.  Additional Respiratory Assessments  Pulse: (!) 105  Respirations: 22  SpO2: 97 %      Current Facility-Administered Medications:     sodium chloride (OCEAN, BABY AYR) 0.65 % nasal spray 2 spray, 2 spray, Each Nostril, Q2H PRN, Rene Short MD    morphine (PF) injection 1 mg, 1 mg, IntraVENous, Q4H PRN, Channing Nolasco MD, 1 mg at 11/29/24 0958    methylPREDNISolone sodium succ (SOLU-MEDROL) injection 40 mg, 40 mg, IntraVENous, Q8H, Arcadio Goyal MD, 40 mg at 11/29/24 1000    insulin lispro (HUMALOG,ADMELOG) injection vial 10 Units, 10 Units, SubCUTAneous, TID WC, Saad Montero MD, 10 Units at 11/29/24 1009    glucose chewable tablet 16 g, 4 tablet, Oral, PRN, Channing Nolasco MD    dextrose bolus 10% 125 mL, 125 mL, IntraVENous, PRN **OR** dextrose bolus 10% 250 mL, 250 mL, IntraVENous, PRN, Channing Nolasco MD    glucagon injection 1 mg, 1 mg, SubCUTAneous, PRN, Channing Nolasco MD    dextrose 10 % infusion, , IntraVENous, Continuous PRN, Channing Nolasco MD    bumetanide (BUMEX) 12.5 mg in sodium chloride 0.9 % 125 mL infusion, 0.5 mg/hr, IntraVENous, Continuous, Mesfin Traore MD, Last Rate: 5 mL/hr at 11/29/24 1020, 0.5 mg/hr at 11/29/24 1020    insulin lispro (HUMALOG,ADMELOG) injection vial 0-16 Units, 0-16 Units, SubCUTAneous, 4x Daily AC & HS, Inés Manzano MD, 4 Units at 11/29/24 1008    arformoterol tartrate (BROVANA) nebulizer solution 15 mcg, 15 mcg, Nebulization, BID RT, Arcadio Goyal MD, 15 mcg at 11/29/24 0818    ipratropium 0.5 mg-albuterol 2.5 mg (DUONEB) nebulizer solution 1 Dose, 1 Dose, Inhalation, Q4H RT, Brock Nolasco MD, 1 Dose at 11/29/24 0818    sodium chloride flush 0.9 % injection 5-40 mL, 5-40 mL, IntraVENous, 2 times per day, Brock Nolasco MD, 10 mL at 11/29/24 1012    sodium chloride flush 0.9 % injection 5-40 mL, 5-40 mL, IntraVENous, PRN, Brock Nolasco MD    0.9 % sodium

## 2024-11-29 NOTE — PROGRESS NOTES
Department of Internal Medicine  Nephrology Progress Note      SUBJECTIVE: We are following Mr. Frias for GONZALES. Patient reports no new complaints today.     PHYSICAL EXAM:      Vitals:    VITALS:  BP (!) 157/81   Pulse 90   Temp 97.1 °F (36.2 °C) (Axillary)   Resp 18   Ht 1.88 m (6' 2\")   Wt 110.4 kg (243 lb 4.8 oz)   SpO2 97%   BMI 31.24 kg/m²   24HR INTAKE/OUTPUT:    Intake/Output Summary (Last 24 hours) at 11/29/2024 0811  Last data filed at 11/29/2024 0332  Gross per 24 hour   Intake --   Output 2400 ml   Net -2400 ml       Constitutional:  Awake, alert, oriented, in NAD  HEENT:  PERRLA, normocephalic, atraumatic  Respiratory: Diminished  Cardiovascular/Edema:  RRR, normal S1, normal S2,+++ edema  Gastrointestinal:  Soft, flat, non-distended, non-tender  Neurologic:  Nonfocal  Skin:  warm, dry, no rashes, no lesions      Scheduled Meds:   methylPREDNISolone  40 mg IntraVENous Q8H    insulin lispro  10 Units SubCUTAneous TID WC    insulin lispro  0-16 Units SubCUTAneous 4x Daily AC & HS    arformoterol tartrate  15 mcg Nebulization BID RT    ipratropium 0.5 mg-albuterol 2.5 mg  1 Dose Inhalation Q4H RT    sodium chloride flush  5-40 mL IntraVENous 2 times per day    enoxaparin  30 mg SubCUTAneous BID    famotidine  20 mg Oral Daily    aspirin  81 mg Oral Daily    finasteride  5 mg Oral Daily    insulin glargine  40 Units SubCUTAneous Daily    isosorbide mononitrate  30 mg Oral Daily    metoprolol  100 mg Oral BID    Roflumilast  500 mcg Oral Daily    rosuvastatin  10 mg Oral Daily    tamsulosin  0.8 mg Oral Daily     Continuous Infusions:   dextrose      bumetanide (BUMEX) 12.5 mg in sodium chloride 0.9 % 125 mL infusion 0.5 mg/hr (11/28/24 1327)    sodium chloride       PRN Meds:.sodium chloride, morphine, glucose, dextrose bolus **OR** dextrose bolus, glucagon (rDNA), dextrose, sodium chloride flush, sodium chloride, potassium chloride **OR** potassium alternative oral replacement **OR** [DISCONTINUED]  potassium chloride, magnesium sulfate, ondansetron **OR** ondansetron, polyethylene glycol, acetaminophen **OR** acetaminophen, albuterol, ALPRAZolam      DATA:    CBC:   Lab Results   Component Value Date/Time    WBC 8.9 11/29/2024 06:27 AM    RBC 4.16 11/29/2024 06:27 AM    HGB 11.9 11/29/2024 06:27 AM    HCT 36.4 11/29/2024 06:27 AM    MCV 87.5 11/29/2024 06:27 AM    MCH 28.6 11/29/2024 06:27 AM    MCHC 32.7 11/29/2024 06:27 AM    RDW 14.1 11/29/2024 06:27 AM    PLT 85 11/29/2024 06:27 AM    MPV 8.7 11/29/2024 06:27 AM     CMP:    Lab Results   Component Value Date/Time     11/29/2024 06:27 AM    K 5.0 11/29/2024 06:27 AM    K 5.0 09/05/2022 06:16 AM    CL 98 11/29/2024 06:27 AM    CO2 28 11/29/2024 06:27 AM     11/29/2024 06:27 AM    CREATININE 2.7 11/29/2024 06:27 AM    GFRAA 60 10/06/2022 07:16 AM    LABGLOM 24 11/29/2024 06:27 AM    LABGLOM 44 04/23/2024 05:39 AM    GLUCOSE 218 11/29/2024 06:27 AM    GLUCOSE 190 07/28/2021 04:28 AM    CALCIUM 9.4 11/29/2024 06:27 AM    BILITOT 0.4 11/29/2024 06:27 AM    ALKPHOS 100 11/29/2024 06:27 AM    AST 18 11/29/2024 06:27 AM    ALT 18 11/29/2024 06:27 AM     Magnesium:    Lab Results   Component Value Date/Time    MG 1.7 07/23/2021 04:59 AM     Phosphorus:    Lab Results   Component Value Date/Time    PHOS 5.9 07/23/2021 04:59 AM     Radiology Review:    CT CHEST WO CONTRAST 11/26/24    IMPRESSION:  1. Fractures are seen involving visualized superior endplates of T6 and T7.  CT thoracic spine recommended.  2. Masslike opacity is present in right upper lobe appearing similar compared  to the previous examination which may represent focal subsegmental  atelectasis or scarring.  Neoplastic etiology cannot be excluded.  3. New atelectasis is present in the left lower lobe as well as new  subsegmental atelectasis in right lower lobe.  4. Nodular centrilobular opacities are located in the left upper lobe which  may indicate bronchiolitis or viral pneumonia  5.

## 2024-11-29 NOTE — PROGRESS NOTES
[facility] Hospitalist Progress Note    Admitting Date and Time: 11/26/2024 12:13 PM  Admit Dx: GONZALES (acute kidney injury) (HCC) [N17.9]    Subjective:  Patient is being followed for GONZALES (acute kidney injury) (HCC) [N17.9]     Patient is laying in bed with minimal distress. Son is present. Code status discussed. Acordign to son and patient he is nto a full code and states code status documentation provided on admission.   Documentation reviewed. Patient has documenttion on file as DNR-CCA. Code status will be changed to rflect patient's wishs.       ROS: denies fever, chills, cp, sob, n/v, HA unless stated above.      methylPREDNISolone  40 mg IntraVENous Q8H    insulin lispro  10 Units SubCUTAneous TID WC    insulin lispro  0-16 Units SubCUTAneous 4x Daily AC & HS    arformoterol tartrate  15 mcg Nebulization BID RT    ipratropium 0.5 mg-albuterol 2.5 mg  1 Dose Inhalation Q4H RT    sodium chloride flush  5-40 mL IntraVENous 2 times per day    enoxaparin  30 mg SubCUTAneous BID    famotidine  20 mg Oral Daily    aspirin  81 mg Oral Daily    finasteride  5 mg Oral Daily    insulin glargine  40 Units SubCUTAneous Daily    isosorbide mononitrate  30 mg Oral Daily    metoprolol  100 mg Oral BID    Roflumilast  500 mcg Oral Daily    rosuvastatin  10 mg Oral Daily    tamsulosin  0.8 mg Oral Daily     sodium chloride, 2 spray, Q2H PRN  morphine, 1 mg, Q4H PRN  glucose, 4 tablet, PRN  dextrose bolus, 125 mL, PRN   Or  dextrose bolus, 250 mL, PRN  glucagon (rDNA), 1 mg, PRN  dextrose, , Continuous PRN  sodium chloride flush, 5-40 mL, PRN  sodium chloride, , PRN  potassium chloride, 40 mEq, PRN   Or  potassium alternative oral replacement, 40 mEq, PRN  magnesium sulfate, 2,000 mg, PRN  ondansetron, 4 mg, Q8H PRN   Or  ondansetron, 4 mg, Q6H PRN  polyethylene glycol, 17 g, Daily PRN  acetaminophen, 650 mg, Q6H PRN   Or  acetaminophen, 650 mg, Q6H PRN  albuterol, 2.5 mg, Q6H PRN  ALPRAZolam, 0.5 mg, TID PRN          Objective:    BP (!) 107/59   Pulse (!) 105   Temp 97.9 °F (36.6 °C) (Oral)   Resp 22   Ht 1.88 m (6' 2\")   Wt 110.4 kg (243 lb 4.8 oz)   SpO2 97%   BMI 31.24 kg/m²     General Appearance: Lethargic, AOx3.   Skin: Multiple senile purpura upper and lower extremities.  Head: normocephalic and atraumatic  Eyes: pupils equal, round, and reactive to light, extraocular eye movements intact, conjunctivae normal  Neck: neck supple and non tender without mass   Pulmonary/Chest: clear to auscultation bilaterally- no wheezes, rales or rhonchi, normal air movement, no respiratory distress  Cardiovascular: normal rate, normal S1 and S2 and no carotid bruits  Abdomen: Tenderness to deep palpation, sounds, no masses or organomegaly  Extremities: +3 BLE edema, senile purpura  Neurologic: no cranial nerve deficit and speech normal        Recent Labs     11/27/24 0431 11/28/24  0150 11/29/24  0627    141 141   K 5.4* 4.7 5.0    99 98   CO2 21* 30* 28   * 111* 111*   CREATININE 2.7* 2.7* 2.7*   GLUCOSE 214* 155* 218*   CALCIUM 9.3 9.4 9.4       Recent Labs     11/27/24  0431 11/28/24  0150 11/29/24  0627   WBC 7.2 6.7 8.9   RBC 3.95 3.81 4.16   HGB 11.3* 11.0* 11.9*   HCT 36.0* 34.5* 36.4*   MCV 91.1 90.6 87.5   MCH 28.6 28.9 28.6   MCHC 31.4* 31.9* 32.7   RDW 14.5 14.6 14.1   PLT 89* 78* 85*   MPV 9.3 8.4 8.7           Assessment:    Principal Problem:    GONZALES (acute kidney injury) (HCC)  Active Problems:    Poorly controlled type 2 diabetes mellitus (HCC)    Multinodular goiter (nontoxic)    Dietary noncompliance  Resolved Problems:    * No resolved hospital problems. *    11/2 9: Bumex 0.2 mg/hr. Patient requiring 5L NC.  Home morphine reordered.  Palliative care consulted for further pain management.      Plan:  GONZALES stage II on CKD: Hemodynamically mediated.  CR 2.6 on admit.  Baseline 1.5.  S/p IVF.  Strict I's and O's.  Monitor kidney function.  Replace electrolytes as able.  Avoid nephrotoxins.   Nephrology following.  Input appreciated.  COPD, end-stage: Continue breathing treatments.  NIV while asleep and with naps.  Continue O2 supplementation  Chronic hypoxic and hypercapnic respiratory failure at baseline.  Continue O2 and AVAPS.  Thoracic spine fracture at T6/T7: 2/2 fall, Ortho following.  Pain management as needed.  Further imaging reviewed.  T2DM: A1C 7.5%.  Hold p.o. meds.  Insulin while inpatient.  Hypoglycemia protocol.  Carb controlled diet.  Endocrinology following  Right upper lobe opacity: Suspect focal segmental atelectasis VS new close take lesion.  Pulmonology on board.  Appears mass is old.    CODE STATUS: DNR-CCA  DVT/GI prophylaxis: Lovenox/H2    Dispo: Home vs SNF, pending clinical course    NOTE: This report was transcribed using voice recognition software. Every effort was made to ensure accuracy; however, inadvertent computerized transcription errors may be present.  Electronically signed by Yannick Willis MD on 11/29/2024 at 9:36 AM

## 2024-11-29 NOTE — PROGRESS NOTES
Occupational Therapy    OCCUPATIONAL THERAPY INITIAL EVALUATION    [facility]   [address], [place]         Date:2024                                                  Patient Name: Chico Frias    MRN: [id number]    : 1950    Room: [address] [place] [address]      Evaluating OT: Carmen Martell OTR/L   [id number]      Referring Provider:Brock Nolasco MD     Specific Provider Orders/Date:OT eval and treat 2024      Diagnosis:  GONZALES (acute kidney injury) (HCC) [N17.9]   S/p fall Thoracic spine [id number] fracture   Ortho note  : recommend an MRI scan of his mid thoracic spine to rule out acute compression fractur If he is unable to tolerate this, then a bone scan may be appropriate, but unfortunately, his injury was 2 days ago and may not show up for up to 5 to 7 days.  -   Patient and wife informed therapist that the  patient will not go through with MRI - can't tolerate it  No other ortho notes in chart    Pertinent Medical History: COPD, DM,      Precautions:  Fall Risk, O2     Assessment of current deficits    [x] Functional mobility  [x]ADLs  [x] Strength               []Cognition    [x] Functional transfers   [x] IADLs         [x] Safety Awareness   [x]Endurance    [] Fine Coordination              [x] Balance      [] Vision/perception   []Sensation     []Gross Motor Coordination  [] ROM  [] Delirium                   [] Motor Control     OT PLAN OF CARE   OT POC based on physician orders, patient diagnosis and results of clinical assessment    Frequency/Duration  2-4 days/wk for 2 - 4weeks PRN   Specific OT Treatment Interventions to include:   ADL retraining/adapted techniques and AE recommendations to increase functional independence within precautions                    Energy conservation techniques to improve tolerance for selfcare routine   Functional transfer/mobility training/DME recommendations for increased independence, safety

## 2024-11-29 NOTE — PROGRESS NOTES
ENDOCRINOLOGY PROGRESS NOTE      Date of admission: 11/26/2024  Date of service: 11/29/2024  Admitting physician: Brock Nolasco MD   Primary Care Physician: Jose Kwok MD  Consultant physician: Saad Montero MD     Reason for the consultation:  Uncontrolled DM, thyroid nodules    History of Present Illness:  The history is provided by the patient. Accuracy of the patient data is excellent    Chico Frias is a very pleasant 73 y.o. old male with PMH of diabetes mellitus type 2, ESRD, thyroid nodules, HLD, and other listed below admitted to [facility].  On 11/26/2024 because of mechanical fall, endocrine service was consulted for diabetes management.    Subjective  The patient was seen earlier this morning, no acute events overnight, glucose level above goal.  No hypoglycemia  Inpatient diet:   Carb Restricted diet     Point of care glucose monitoring   (Independently reviewed)   Recent Labs     11/27/24  1126 11/27/24  1616 11/27/24  2048 11/28/24  0653 11/28/24  1026 11/28/24  1700 11/28/24  2219 11/29/24  1202   POCGLU 222* 273* 222* 159* 240* 359* 230* 263*       Scheduled Meds:   insulin lispro  15 Units SubCUTAneous TID WC    [START ON 11/30/2024] insulin glargine  45 Units SubCUTAneous Daily    insulin lispro  0-18 Units SubCUTAneous 4x Daily AC & HS    methylPREDNISolone  40 mg IntraVENous Q8H    arformoterol tartrate  15 mcg Nebulization BID RT    ipratropium 0.5 mg-albuterol 2.5 mg  1 Dose Inhalation Q4H RT    sodium chloride flush  5-40 mL IntraVENous 2 times per day    enoxaparin  30 mg SubCUTAneous BID    famotidine  20 mg Oral Daily    aspirin  81 mg Oral Daily    finasteride  5 mg Oral Daily    isosorbide mononitrate  30 mg Oral Daily    metoprolol  100 mg Oral BID    Roflumilast  500 mcg Oral Daily    rosuvastatin  10 mg Oral Daily    tamsulosin  0.8 mg Oral Daily       PRN Meds:   sodium chloride, 2 spray, Q2H PRN  morphine, 1 mg, Q4H PRN  glucose, 4 tablet, PRN  dextrose bolus, 125 mL, PRN    monitoring with ultrasound    Interdisciplinary plan for communication with healthcare providers:   Consult recommendations were discussed with the Primary Service/Nursing staff      The above issues were reviewed with the patient who understood and agreed with the plan.    Thank you for allowing us to participate in the care of this patient. Please do not hesitate to contact us with any additional questions.     Saad Montero MD  Endocrinologist, [facility]   [address], [address], [place] [id number]  Phone: [phone]  Fax: [phone]  --------------------------  An electronic signature was used to authenticate this note. Saad Montero MD on 11/29/2024 at 12:51 PM

## 2024-11-29 NOTE — CARE COORDINATION
Pt transferred from  on 11/28. Ortho following for compression fx T6-T7, awaiting MRI. CM noted SW consult for LTAC review w/referral made to Romario peña/[facility], await his response. Per prior case management notes: Pt resides with his spouse in a two story home and has a nebulizer, chest vest he wears 3 times a day, bipap and 3-4 l home o2 via [facility]. Order for hospital bed in place w/[facility] following. PT/OT evals pending. Pt is active w/[facility], CM confirmed w/Margot w/[facility] order in place. Family will transport at discharge.Pharmacy is [facility] in [place] and PCP is Dr. DANA Kwok.   Beti Davey, BSN, RN  [facility] Case Management  [phone]      No Ltac criteria per Romario Merc[facility].

## 2024-11-30 LAB
GLUCOSE BLD-MCNC: 102 MG/DL (ref 74–99)
GLUCOSE BLD-MCNC: 154 MG/DL (ref 74–99)
GLUCOSE BLD-MCNC: 239 MG/DL (ref 74–99)
GLUCOSE BLD-MCNC: 245 MG/DL (ref 74–99)

## 2024-11-30 PROCEDURE — 2580000003 HC RX 258: Performed by: INTERNAL MEDICINE

## 2024-11-30 PROCEDURE — 6370000000 HC RX 637 (ALT 250 FOR IP): Performed by: INTERNAL MEDICINE

## 2024-11-30 PROCEDURE — 6360000002 HC RX W HCPCS: Performed by: INTERNAL MEDICINE

## 2024-11-30 PROCEDURE — 6370000000 HC RX 637 (ALT 250 FOR IP): Performed by: STUDENT IN AN ORGANIZED HEALTH CARE EDUCATION/TRAINING PROGRAM

## 2024-11-30 PROCEDURE — 6360000002 HC RX W HCPCS: Performed by: STUDENT IN AN ORGANIZED HEALTH CARE EDUCATION/TRAINING PROGRAM

## 2024-11-30 PROCEDURE — 2580000003 HC RX 258: Performed by: STUDENT IN AN ORGANIZED HEALTH CARE EDUCATION/TRAINING PROGRAM

## 2024-11-30 PROCEDURE — 94660 CPAP INITIATION&MGMT: CPT

## 2024-11-30 PROCEDURE — 82947 ASSAY GLUCOSE BLOOD QUANT: CPT

## 2024-11-30 PROCEDURE — 2060000000 HC ICU INTERMEDIATE R&B

## 2024-11-30 PROCEDURE — 94640 AIRWAY INHALATION TREATMENT: CPT

## 2024-11-30 PROCEDURE — 99232 SBSQ HOSP IP/OBS MODERATE 35: CPT | Performed by: INTERNAL MEDICINE

## 2024-11-30 PROCEDURE — 2700000000 HC OXYGEN THERAPY PER DAY

## 2024-11-30 RX ORDER — INSULIN LISPRO 100 [IU]/ML
10 INJECTION, SOLUTION INTRAVENOUS; SUBCUTANEOUS
Status: DISCONTINUED | OUTPATIENT
Start: 2024-11-30 | End: 2024-12-03

## 2024-11-30 RX ORDER — INSULIN GLARGINE 100 [IU]/ML
15 INJECTION, SOLUTION SUBCUTANEOUS 2 TIMES DAILY
Status: DISCONTINUED | OUTPATIENT
Start: 2024-11-30 | End: 2024-12-05 | Stop reason: HOSPADM

## 2024-11-30 RX ORDER — METHYLPREDNISOLONE SODIUM SUCCINATE 40 MG/ML
40 INJECTION INTRAMUSCULAR; INTRAVENOUS EVERY 12 HOURS
Status: DISCONTINUED | OUTPATIENT
Start: 2024-11-30 | End: 2024-12-05

## 2024-11-30 RX ORDER — PANTOPRAZOLE SODIUM 40 MG/1
40 TABLET, DELAYED RELEASE ORAL
Status: DISCONTINUED | OUTPATIENT
Start: 2024-11-30 | End: 2024-12-05 | Stop reason: HOSPADM

## 2024-11-30 RX ADMIN — INSULIN LISPRO 6 UNITS: 100 INJECTION, SOLUTION INTRAVENOUS; SUBCUTANEOUS at 20:50

## 2024-11-30 RX ADMIN — METHYLPREDNISOLONE SODIUM SUCCINATE 40 MG: 40 INJECTION INTRAMUSCULAR; INTRAVENOUS at 08:56

## 2024-11-30 RX ADMIN — IPRATROPIUM BROMIDE AND ALBUTEROL SULFATE 1 DOSE: 2.5; .5 SOLUTION RESPIRATORY (INHALATION) at 16:30

## 2024-11-30 RX ADMIN — MORPHINE SULFATE 4 MG: 10 SOLUTION ORAL at 04:43

## 2024-11-30 RX ADMIN — ISOSORBIDE MONONITRATE 30 MG: 30 TABLET, EXTENDED RELEASE ORAL at 08:58

## 2024-11-30 RX ADMIN — ENOXAPARIN SODIUM 30 MG: 100 INJECTION SUBCUTANEOUS at 08:56

## 2024-11-30 RX ADMIN — INSULIN GLARGINE 15 UNITS: 100 INJECTION, SOLUTION SUBCUTANEOUS at 09:00

## 2024-11-30 RX ADMIN — PANTOPRAZOLE SODIUM 40 MG: 40 TABLET, DELAYED RELEASE ORAL at 08:58

## 2024-11-30 RX ADMIN — MORPHINE SULFATE 4 MG: 10 SOLUTION ORAL at 21:06

## 2024-11-30 RX ADMIN — MORPHINE SULFATE 4 MG: 10 SOLUTION ORAL at 00:20

## 2024-11-30 RX ADMIN — IPRATROPIUM BROMIDE AND ALBUTEROL SULFATE 1 DOSE: 2.5; .5 SOLUTION RESPIRATORY (INHALATION) at 00:22

## 2024-11-30 RX ADMIN — INSULIN GLARGINE 15 UNITS: 100 INJECTION, SOLUTION SUBCUTANEOUS at 20:51

## 2024-11-30 RX ADMIN — IPRATROPIUM BROMIDE AND ALBUTEROL SULFATE 1 DOSE: 2.5; .5 SOLUTION RESPIRATORY (INHALATION) at 20:31

## 2024-11-30 RX ADMIN — ROFLUMILAST 500 MCG: 500 TABLET ORAL at 08:58

## 2024-11-30 RX ADMIN — FINASTERIDE 5 MG: 5 TABLET, FILM COATED ORAL at 08:58

## 2024-11-30 RX ADMIN — MORPHINE SULFATE 4 MG: 10 SOLUTION ORAL at 13:13

## 2024-11-30 RX ADMIN — METOPROLOL TARTRATE 100 MG: 50 TABLET, FILM COATED ORAL at 08:58

## 2024-11-30 RX ADMIN — INSULIN LISPRO 10 UNITS: 100 INJECTION, SOLUTION INTRAVENOUS; SUBCUTANEOUS at 16:08

## 2024-11-30 RX ADMIN — IPRATROPIUM BROMIDE AND ALBUTEROL SULFATE 1 DOSE: 2.5; .5 SOLUTION RESPIRATORY (INHALATION) at 04:22

## 2024-11-30 RX ADMIN — INSULIN LISPRO 6 UNITS: 100 INJECTION, SOLUTION INTRAVENOUS; SUBCUTANEOUS at 11:59

## 2024-11-30 RX ADMIN — TAMSULOSIN HYDROCHLORIDE 0.8 MG: 0.4 CAPSULE ORAL at 08:58

## 2024-11-30 RX ADMIN — Medication 3 MG: at 00:20

## 2024-11-30 RX ADMIN — METHYLPREDNISOLONE SODIUM SUCCINATE 40 MG: 40 INJECTION INTRAMUSCULAR; INTRAVENOUS at 00:20

## 2024-11-30 RX ADMIN — BUMETANIDE 0.5 MG/HR: 0.25 INJECTION INTRAMUSCULAR; INTRAVENOUS at 03:44

## 2024-11-30 RX ADMIN — ENOXAPARIN SODIUM 30 MG: 100 INJECTION SUBCUTANEOUS at 20:50

## 2024-11-30 RX ADMIN — SODIUM CHLORIDE, PRESERVATIVE FREE 10 ML: 5 INJECTION INTRAVENOUS at 08:58

## 2024-11-30 RX ADMIN — METHYLPREDNISOLONE SODIUM SUCCINATE 40 MG: 40 INJECTION INTRAMUSCULAR; INTRAVENOUS at 20:50

## 2024-11-30 RX ADMIN — MORPHINE SULFATE 4 MG: 10 SOLUTION ORAL at 17:09

## 2024-11-30 RX ADMIN — INSULIN LISPRO 10 UNITS: 100 INJECTION, SOLUTION INTRAVENOUS; SUBCUTANEOUS at 11:59

## 2024-11-30 RX ADMIN — ALPRAZOLAM 0.5 MG: 0.25 TABLET ORAL at 10:52

## 2024-11-30 RX ADMIN — ARFORMOTEROL TARTRATE 15 MCG: 15 SOLUTION RESPIRATORY (INHALATION) at 09:23

## 2024-11-30 RX ADMIN — ARFORMOTEROL TARTRATE 15 MCG: 15 SOLUTION RESPIRATORY (INHALATION) at 20:31

## 2024-11-30 RX ADMIN — IPRATROPIUM BROMIDE AND ALBUTEROL SULFATE 1 DOSE: 2.5; .5 SOLUTION RESPIRATORY (INHALATION) at 09:23

## 2024-11-30 RX ADMIN — MORPHINE SULFATE 4 MG: 10 SOLUTION ORAL at 08:57

## 2024-11-30 RX ADMIN — METOPROLOL TARTRATE 100 MG: 50 TABLET, FILM COATED ORAL at 20:50

## 2024-11-30 RX ADMIN — ALPRAZOLAM 0.5 MG: 0.25 TABLET ORAL at 19:08

## 2024-11-30 RX ADMIN — IPRATROPIUM BROMIDE AND ALBUTEROL SULFATE 1 DOSE: 2.5; .5 SOLUTION RESPIRATORY (INHALATION) at 12:37

## 2024-11-30 RX ADMIN — ROSUVASTATIN CALCIUM 10 MG: 10 TABLET, FILM COATED ORAL at 08:58

## 2024-11-30 RX ADMIN — SODIUM CHLORIDE, PRESERVATIVE FREE 10 ML: 5 INJECTION INTRAVENOUS at 20:52

## 2024-11-30 RX ADMIN — ASPIRIN 81 MG: 81 TABLET, COATED ORAL at 08:58

## 2024-11-30 ASSESSMENT — PAIN DESCRIPTION - ONSET
ONSET: ON-GOING

## 2024-11-30 ASSESSMENT — PAIN DESCRIPTION - DESCRIPTORS
DESCRIPTORS: ACHING;DISCOMFORT
DESCRIPTORS: ACHING;DISCOMFORT
DESCRIPTORS: ACHING
DESCRIPTORS: ACHING
DESCRIPTORS: ACHING;DISCOMFORT
DESCRIPTORS: ACHING

## 2024-11-30 ASSESSMENT — PAIN DESCRIPTION - PAIN TYPE
TYPE: ACUTE PAIN

## 2024-11-30 ASSESSMENT — PAIN - FUNCTIONAL ASSESSMENT
PAIN_FUNCTIONAL_ASSESSMENT: PREVENTS OR INTERFERES SOME ACTIVE ACTIVITIES AND ADLS
PAIN_FUNCTIONAL_ASSESSMENT: PREVENTS OR INTERFERES SOME ACTIVE ACTIVITIES AND ADLS
PAIN_FUNCTIONAL_ASSESSMENT: PREVENTS OR INTERFERES WITH MANY ACTIVE NOT PASSIVE ACTIVITIES
PAIN_FUNCTIONAL_ASSESSMENT: PREVENTS OR INTERFERES WITH MANY ACTIVE NOT PASSIVE ACTIVITIES

## 2024-11-30 ASSESSMENT — PAIN DESCRIPTION - LOCATION
LOCATION: RIB CAGE

## 2024-11-30 ASSESSMENT — PAIN SCALES - GENERAL
PAINLEVEL_OUTOF10: 7
PAINLEVEL_OUTOF10: 4
PAINLEVEL_OUTOF10: 7
PAINLEVEL_OUTOF10: 8

## 2024-11-30 ASSESSMENT — PAIN DESCRIPTION - DIRECTION
RADIATING_TOWARDS: BACK

## 2024-11-30 ASSESSMENT — PAIN DESCRIPTION - FREQUENCY
FREQUENCY: CONTINUOUS

## 2024-11-30 ASSESSMENT — PAIN DESCRIPTION - ORIENTATION
ORIENTATION: LEFT

## 2024-11-30 NOTE — PROGRESS NOTES
[facility] Hospitalist Progress Note    Admitting Date and Time: 11/26/2024 12:13 PM  Admit Dx: GONZALES (acute kidney injury) (HCC) [N17.9]    Subjective:  Patient is being followed for GONZALES (acute kidney injury) (HCC) [N17.9]     Patient is more awake and interactive today. He is answering questions and following commands fully. Wife present in room. Discussed clinical course.       ROS: denies fever, chills, cp, sob, n/v, HA unless stated above.      insulin lispro  15 Units SubCUTAneous TID WC    insulin glargine  45 Units SubCUTAneous Daily    insulin lispro  0-18 Units SubCUTAneous 4x Daily AC & HS    methylPREDNISolone  40 mg IntraVENous Q8H    arformoterol tartrate  15 mcg Nebulization BID RT    ipratropium 0.5 mg-albuterol 2.5 mg  1 Dose Inhalation Q4H RT    sodium chloride flush  5-40 mL IntraVENous 2 times per day    enoxaparin  30 mg SubCUTAneous BID    famotidine  20 mg Oral Daily    aspirin  81 mg Oral Daily    finasteride  5 mg Oral Daily    isosorbide mononitrate  30 mg Oral Daily    metoprolol  100 mg Oral BID    Roflumilast  500 mcg Oral Daily    rosuvastatin  10 mg Oral Daily    tamsulosin  0.8 mg Oral Daily     morphine, 4 mg, Q4H PRN  morphine, 1 mg, Q8H PRN  sodium chloride, 2 spray, Q2H PRN  glucose, 4 tablet, PRN  dextrose bolus, 125 mL, PRN   Or  dextrose bolus, 250 mL, PRN  glucagon (rDNA), 1 mg, PRN  dextrose, , Continuous PRN  sodium chloride flush, 5-40 mL, PRN  sodium chloride, , PRN  potassium chloride, 40 mEq, PRN   Or  potassium alternative oral replacement, 40 mEq, PRN  magnesium sulfate, 2,000 mg, PRN  ondansetron, 4 mg, Q8H PRN   Or  ondansetron, 4 mg, Q6H PRN  polyethylene glycol, 17 g, Daily PRN  acetaminophen, 650 mg, Q6H PRN   Or  acetaminophen, 650 mg, Q6H PRN  albuterol, 2.5 mg, Q6H PRN  ALPRAZolam, 0.5 mg, TID PRN         Objective:    /80   Pulse 81   Temp 97.6 °F (36.4 °C)   Resp 18   Ht 1.88 m (6' 2\")   Wt 110.4 kg (243 lb 4.8 oz)   SpO2 97%   BMI 31.24

## 2024-11-30 NOTE — PROGRESS NOTES
Department of Internal Medicine  Nephrology Progress Note      SUBJECTIVE: We are following Mr. Frias for GONZALES. Patient reports no new complaints today.     PHYSICAL EXAM:      Vitals:    VITALS:  /80   Pulse 81   Temp 97.6 °F (36.4 °C) (Oral)   Resp 18   Ht 1.88 m (6' 2\")   Wt 110.4 kg (243 lb 4.8 oz)   SpO2 97%   BMI 31.24 kg/m²   24HR INTAKE/OUTPUT:  No intake or output data in the 24 hours ending 11/30/24 0948      Constitutional:  Awake, alert, oriented, in NAD  HEENT:  PERRLA, normocephalic, atraumatic  Respiratory: Diminished  Cardiovascular/Edema:  RRR, normal S1, normal S2,+++ edema  Gastrointestinal:  Soft, flat, non-distended, non-tender  Neurologic:  Nonfocal  Skin:  warm, dry, no rashes, no lesions      Scheduled Meds:   pantoprazole  40 mg Oral QAM AC    insulin glargine  15 Units SubCUTAneous BID    insulin lispro  10 Units SubCUTAneous TID WC    insulin lispro  0-18 Units SubCUTAneous 4x Daily AC & HS    methylPREDNISolone  40 mg IntraVENous Q8H    arformoterol tartrate  15 mcg Nebulization BID RT    ipratropium 0.5 mg-albuterol 2.5 mg  1 Dose Inhalation Q4H RT    sodium chloride flush  5-40 mL IntraVENous 2 times per day    enoxaparin  30 mg SubCUTAneous BID    aspirin  81 mg Oral Daily    finasteride  5 mg Oral Daily    isosorbide mononitrate  30 mg Oral Daily    metoprolol  100 mg Oral BID    Roflumilast  500 mcg Oral Daily    rosuvastatin  10 mg Oral Daily    tamsulosin  0.8 mg Oral Daily     Continuous Infusions:   dextrose      bumetanide (BUMEX) 12.5 mg in sodium chloride 0.9 % 125 mL infusion 0.5 mg/hr (11/30/24 0344)    sodium chloride       PRN Meds:.morphine, morphine, sodium chloride, glucose, dextrose bolus **OR** dextrose bolus, glucagon (rDNA), dextrose, sodium chloride flush, sodium chloride, potassium chloride **OR** potassium alternative oral replacement **OR** [DISCONTINUED] potassium chloride, magnesium sulfate, ondansetron **OR** ondansetron, polyethylene glycol,  acetaminophen **OR** acetaminophen, albuterol, ALPRAZolam      DATA:    CBC:   Lab Results   Component Value Date/Time    WBC 8.9 11/29/2024 06:27 AM    RBC 4.16 11/29/2024 06:27 AM    HGB 11.9 11/29/2024 06:27 AM    HCT 36.4 11/29/2024 06:27 AM    MCV 87.5 11/29/2024 06:27 AM    MCH 28.6 11/29/2024 06:27 AM    MCHC 32.7 11/29/2024 06:27 AM    RDW 14.1 11/29/2024 06:27 AM    PLT 85 11/29/2024 06:27 AM    MPV 8.7 11/29/2024 06:27 AM     CMP:    Lab Results   Component Value Date/Time     11/29/2024 06:27 AM    K 5.0 11/29/2024 06:27 AM    K 5.0 09/05/2022 06:16 AM    CL 98 11/29/2024 06:27 AM    CO2 28 11/29/2024 06:27 AM     11/29/2024 06:27 AM    CREATININE 2.7 11/29/2024 06:27 AM    GFRAA 60 10/06/2022 07:16 AM    LABGLOM 24 11/29/2024 06:27 AM    LABGLOM 44 04/23/2024 05:39 AM    GLUCOSE 218 11/29/2024 06:27 AM    GLUCOSE 190 07/28/2021 04:28 AM    CALCIUM 9.4 11/29/2024 06:27 AM    BILITOT 0.4 11/29/2024 06:27 AM    ALKPHOS 100 11/29/2024 06:27 AM    AST 18 11/29/2024 06:27 AM    ALT 18 11/29/2024 06:27 AM     Magnesium:    Lab Results   Component Value Date/Time    MG 1.7 07/23/2021 04:59 AM     Phosphorus:    Lab Results   Component Value Date/Time    PHOS 5.9 07/23/2021 04:59 AM     Radiology Review:    CT CHEST WO CONTRAST 11/26/24    IMPRESSION:  1. Fractures are seen involving visualized superior endplates of T6 and T7.  CT thoracic spine recommended.  2. Masslike opacity is present in right upper lobe appearing similar compared  to the previous examination which may represent focal subsegmental  atelectasis or scarring.  Neoplastic etiology cannot be excluded.  3. New atelectasis is present in the left lower lobe as well as new  subsegmental atelectasis in right lower lobe.  4. Nodular centrilobular opacities are located in the left upper lobe which  may indicate bronchiolitis or viral pneumonia  5. Enlarged thyroid gland with multiple calcified nodules.  Nonemergent  ultrasound  recommended.    CT ABDOMEN PELVIS WO CONTRAST 11/26/24    IMPRESSION:  1. New compression fracture involving body of T7 with loss of height of  approximately 40%.  MRI thoracic spine recommended  2. Stable chronic fractures are seen involving T6, T9, T10, T11, and T12.  3. No acute process in the abdomen or pelvis.  4. Diverticulosis.  5. Splenomegaly.    IMPRESSION/RECOMMENDATIONS:      Briefly, Mr. Chico Frias is a 73-year-old male with a past medical history of HFpEF 65%, BPH, type II DM, COPD, GERD and hyperlipidemia who was admitted on 11/26/2024 after presenting to the ED for shortness of breath and falls.  ED lab work revealed potassium 5.4, , creatinine 2.6 mg/dL, reason for this consultation.  Significant home medications include prednisone, Lasix, finasteride, metoprolol, tamsulosin, metformin and aspirin.    GONZALES stage II versus GONZALES on CKD, likely hemodynamically mediated 2/2 acute decompensated heart, creatinine 2.7  mg/dL, bladder scan 131 mL    Hyperkalemia 2/2 decreased GFR, 5.0 today  High anion gap metabolic acidosis 2/2 uremia, improving   HFpEF 65%, proBNP 285, Bumex drip  Normocytic anemia  -------------------------------------------------------  Type II DM, on SSI  BPH, on finasteride and tamsulosin  COPD masslike opacity found, pulmonology consulted, on Solu-Medrol  GERD  Hyperlipidemia, on rosuvastatin  Thoracic spine fracture, orthopedics consulted  Enlarged thyroid with calcified nodules, endocrinology consulted    Plan:    Bumex 0.2 mg/hr  Monitor potassium level  Monitor renal function  Edema better. No I/O chart.  Discussed with RN    Electronically signed by Mesfin Traore MD on 11/30/2024 at 9:48 AM   MD:

## 2024-11-30 NOTE — PROGRESS NOTES
Pulmonary Progress Note    Admit Date: 2024                            PCP: Jose Kwok MD  Principal Problem:    GONZALES (acute kidney injury) (HCC)  Active Problems:    Poorly controlled type 2 diabetes mellitus (HCC)    Multinodular goiter (nontoxic)    Dietary noncompliance    Steroid-induced hyperglycemia    Worsening renal function  Resolved Problems:    * No resolved hospital problems. *      Subjective:  Seen on 5L pox 97%  Wife at bedside  Wearing NIV all night and intermittently throughout the day for SOB, occasional non productive cough, no wheeze  Feels SOB has improved slightly now that morphine dose was changed by palliative      Medications:   dextrose      bumetanide (BUMEX) 12.5 mg in sodium chloride 0.9 % 125 mL infusion 0.5 mg/hr (24 0344)    sodium chloride          pantoprazole  40 mg Oral QAM AC    insulin glargine  15 Units SubCUTAneous BID    insulin lispro  10 Units SubCUTAneous TID WC    insulin lispro  0-18 Units SubCUTAneous 4x Daily AC & HS    methylPREDNISolone  40 mg IntraVENous Q8H    arformoterol tartrate  15 mcg Nebulization BID RT    ipratropium 0.5 mg-albuterol 2.5 mg  1 Dose Inhalation Q4H RT    sodium chloride flush  5-40 mL IntraVENous 2 times per day    enoxaparin  30 mg SubCUTAneous BID    aspirin  81 mg Oral Daily    finasteride  5 mg Oral Daily    isosorbide mononitrate  30 mg Oral Daily    metoprolol  100 mg Oral BID    Roflumilast  500 mcg Oral Daily    rosuvastatin  10 mg Oral Daily    tamsulosin  0.8 mg Oral Daily       Vitals:  VITALS:  /80   Pulse 81   Temp 97.6 °F (36.4 °C) (Oral)   Resp 18   Ht 1.88 m (6' 2\")   Wt 110.4 kg (243 lb 4.8 oz)   SpO2 97%   BMI 31.24 kg/m²   24HR INTAKE/OUTPUT:  No intake or output data in the 24 hours ending 24 1028  CURRENT PULSE OXIMETRY:  SpO2: 97 %  24HR PULSE OXIMETRY RANGE:  SpO2  Av %  Min: 94 %  Max: 97 %  CVP:    VENT SETTINGS:      Additional Respiratory Assessments  Pulse:  a suspected or confirmed emergency medical condition->Emergency Medical Condition (MA) FINDINGS: BONES/ALIGNMENT: There is no acute fracture or traumatic malalignment. DEGENERATIVE CHANGES: Moderate disc space narrowing is present at C3/C4, C4/C5, and C5/C6.  Degenerative posterior disc/osteophyte complexes are also present at these levels resulting in moderate effacement of the ventral thecal sac. SOFT TISSUES: There is no prevertebral soft tissue swelling.  The thyroid gland is enlarged with multiple partially calcified nodules.     1.  No acute abnormality of the cervical spine. 2.  Enlarged thyroid gland with multiple partially calcified nodules. Nonemergent ultrasound recommended.     CT HEAD WO CONTRAST    Result Date: 11/26/2024  THERE SIGNS OF BILATERAL LENS REPLACEMENT.  EXAMINATION: CT OF THE HEAD WITHOUT CONTRAST  11/26/2024 3:52 pm TECHNIQUE: CT of the head was performed without the administration of intravenous contrast. Automated exposure control, iterative reconstruction, and/or weight based adjustment of the mA/kV was utilized to reduce the radiation dose to as low as reasonably achievable. COMPARISON: None. HISTORY: ORDERING SYSTEM PROVIDED HISTORY: fall TECHNOLOGIST PROVIDED HISTORY: Has a \"code stroke\" or \"stroke alert\" been called?->No Reason for exam:->fall Decision Support Exception - unselect if not a suspected or confirmed emergency medical condition->Emergency Medical Condition (MA) FINDINGS: BRAIN/VENTRICLES: There is no acute intracranial hemorrhage, mass effect or midline shift.  No abnormal extra-axial fluid collection.  There is a tiny lacunar infarct adjacent to left caudate nucleus.  This appears remote the gray-white differentiation is maintained without evidence of an acute infarct.  There is no evidence of hydrocephalus. ORBITS: The visualized portion of the orbits demonstrate no acute abnormality. SINUSES: The visualized paranasal sinuses and mastoid air cells demonstrate no acute

## 2024-11-30 NOTE — PROGRESS NOTES
ENDOCRINOLOGY PROGRESS NOTE      Date of admission: 11/26/2024  Date of service: 11/30/2024  Admitting physician: Brock Nolasco MD   Primary Care Physician: Jose Kwok MD  Consultant physician: Saad Montero MD     Reason for the consultation:  Uncontrolled DM, thyroid nodules    History of Present Illness:  The history is provided by the patient. Accuracy of the patient data is excellent    Chico Frias is a very pleasant 73 y.o. old male with PMH of diabetes mellitus type 2, ESRD, thyroid nodules, HLD, and other listed below admitted to [facility].  On 11/26/2024 because of mechanical fall, endocrine service was consulted for diabetes management.    Subjective  Blood sugars very tight today.  No hypoglycemia.    Inpatient diet:   Carb Restricted diet     Point of care glucose monitoring   (Independently reviewed)   Recent Labs     11/28/24  0653 11/28/24  1026 11/28/24  1700 11/28/24  2219 11/29/24  1202 11/29/24  1544 11/29/24  1955 11/30/24  0642   POCGLU 159* 240* 359* 230* 263* 92 131* 102*       Scheduled Meds:   pantoprazole  40 mg Oral QAM AC    insulin glargine  15 Units SubCUTAneous BID    insulin lispro  10 Units SubCUTAneous TID WC    insulin lispro  0-18 Units SubCUTAneous 4x Daily AC & HS    methylPREDNISolone  40 mg IntraVENous Q8H    arformoterol tartrate  15 mcg Nebulization BID RT    ipratropium 0.5 mg-albuterol 2.5 mg  1 Dose Inhalation Q4H RT    sodium chloride flush  5-40 mL IntraVENous 2 times per day    enoxaparin  30 mg SubCUTAneous BID    aspirin  81 mg Oral Daily    finasteride  5 mg Oral Daily    isosorbide mononitrate  30 mg Oral Daily    metoprolol  100 mg Oral BID    Roflumilast  500 mcg Oral Daily    rosuvastatin  10 mg Oral Daily    tamsulosin  0.8 mg Oral Daily       PRN Meds:   morphine, 4 mg, Q4H PRN  morphine, 1 mg, Q8H PRN  sodium chloride, 2 spray, Q2H PRN  glucose, 4 tablet, PRN  dextrose bolus, 125 mL, PRN   Or  dextrose bolus, 250 mL, PRN  glucagon (rDNA), 1 mg,  Final   09/12/2024 0.24 0.02 - 0.27 mmol/L Final     Lab Results   Component Value Date/Time    LABA1C 7.5 07/30/2024 05:50 AM    LABA1C 7.2 07/23/2021 04:59 AM    LABA1C 10.5 02/28/2021 05:16 AM     Lab Results   Component Value Date/Time    TSH 0.99 11/28/2024 01:50 AM    T4FREE 0.94 07/23/2021 04:59 AM     Lab Results   Component Value Date/Time    LABA1C 7.5 07/30/2024 05:50 AM    GLUCOSE 218 11/29/2024 06:27 AM    GLUCOSE 190 07/28/2021 04:28 AM    MALBCR 56 11/27/2024 01:01 PM     Lab Results   Component Value Date/Time    TRIG 52 07/23/2021 04:59 AM    HDL 52 07/23/2021 04:59 AM    CHOL 84 07/23/2021 04:59 AM       Blood culture   No results found for: \"BC\"    Radiology:  CT HEAD WO CONTRAST   Final Result   1.  No acute intracranial abnormality.      2.  Tiny chronic lacunar infarct in the left basal ganglia.         CT CERVICAL SPINE WO CONTRAST   Final Result   1.  No acute abnormality of the cervical spine.      2.  Enlarged thyroid gland with multiple partially calcified nodules.   Nonemergent ultrasound recommended.         CT CHEST WO CONTRAST   Final Result   1. Fractures are seen involving visualized superior endplates of T6 and T7.   CT thoracic spine recommended.   2. Masslike opacity is present in right upper lobe appearing similar compared   to the previous examination which may represent focal subsegmental   atelectasis or scarring.  Neoplastic etiology cannot be excluded.   3. New atelectasis is present in the left lower lobe as well as new   subsegmental atelectasis in right lower lobe.   4. Nodular centrilobular opacities are located in the left upper lobe which   may indicate bronchiolitis or viral pneumonia   5. Enlarged thyroid gland with multiple calcified nodules.  Nonemergent   ultrasound recommended.         CT ABDOMEN PELVIS WO CONTRAST Additional Contrast? None   Final Result   1. New compression fracture involving body of T7 with loss of height of   approximately 40%.  MRI thoracic  participate in the care of this patient. Please do not hesitate to contact us with any additional questions.     Saad Montero MD  Endocrinologist, [facility]   [address], [address], [place] [address]  Phone: [phone]  Fax: [phone]  --------------------------  An electronic signature was used to authenticate this note. Saad Montero MD on 11/30/2024 at 8:44 AM

## 2024-12-01 LAB
ANION GAP SERPL CALCULATED.3IONS-SCNC: 15 MMOL/L (ref 7–16)
BASOPHILS # BLD: 0 K/UL (ref 0–0.2)
BASOPHILS NFR BLD: 0 % (ref 0–2)
BUN SERPL-MCNC: 118 MG/DL (ref 6–23)
CALCIUM SERPL-MCNC: 9.2 MG/DL (ref 8.6–10.2)
CHLORIDE SERPL-SCNC: 93 MMOL/L (ref 98–107)
CO2 SERPL-SCNC: 33 MMOL/L (ref 22–29)
CREAT SERPL-MCNC: 2.9 MG/DL (ref 0.7–1.2)
EOSINOPHIL # BLD: 0 K/UL (ref 0.05–0.5)
EOSINOPHILS RELATIVE PERCENT: 0 % (ref 0–6)
ERYTHROCYTE [DISTWIDTH] IN BLOOD BY AUTOMATED COUNT: 14 % (ref 11.5–15)
GFR, ESTIMATED: 22 ML/MIN/1.73M2
GLUCOSE BLD-MCNC: 179 MG/DL (ref 74–99)
GLUCOSE BLD-MCNC: 222 MG/DL (ref 74–99)
GLUCOSE BLD-MCNC: 240 MG/DL (ref 74–99)
GLUCOSE BLD-MCNC: 361 MG/DL (ref 74–99)
GLUCOSE SERPL-MCNC: 157 MG/DL (ref 74–99)
HCT VFR BLD AUTO: 32.9 % (ref 37–54)
HGB BLD-MCNC: 10.9 G/DL (ref 12.5–16.5)
LYMPHOCYTES NFR BLD: 0.62 K/UL (ref 1.5–4)
LYMPHOCYTES RELATIVE PERCENT: 7 % (ref 20–42)
MCH RBC QN AUTO: 28.8 PG (ref 26–35)
MCHC RBC AUTO-ENTMCNC: 33.1 G/DL (ref 32–34.5)
MCV RBC AUTO: 87 FL (ref 80–99.9)
METAMYELOCYTES ABSOLUTE COUNT: 0.08 K/UL (ref 0–0.12)
METAMYELOCYTES: 1 % (ref 0–1)
MONOCYTES NFR BLD: 0.15 K/UL (ref 0.1–0.95)
MONOCYTES NFR BLD: 2 % (ref 2–12)
MYELOCYTES ABSOLUTE COUNT: 0.31 K/UL
MYELOCYTES: 4 %
NEUTROPHILS NFR BLD: 87 % (ref 43–80)
NEUTS SEG NFR BLD: 7.74 K/UL (ref 1.8–7.3)
PLATELET # BLD AUTO: 91 K/UL (ref 130–450)
PLATELET CONFIRMATION: NORMAL
PMV BLD AUTO: 10.1 FL (ref 7–12)
POTASSIUM SERPL-SCNC: 4.5 MMOL/L (ref 3.5–5)
RBC # BLD AUTO: 3.78 M/UL (ref 3.8–5.8)
RBC # BLD: ABNORMAL 10*6/UL
SODIUM SERPL-SCNC: 141 MMOL/L (ref 132–146)
WBC # BLD: ABNORMAL 10*3/UL
WBC OTHER # BLD: 8.9 K/UL (ref 4.5–11.5)

## 2024-12-01 PROCEDURE — 6370000000 HC RX 637 (ALT 250 FOR IP): Performed by: INTERNAL MEDICINE

## 2024-12-01 PROCEDURE — 2580000003 HC RX 258: Performed by: STUDENT IN AN ORGANIZED HEALTH CARE EDUCATION/TRAINING PROGRAM

## 2024-12-01 PROCEDURE — 2700000000 HC OXYGEN THERAPY PER DAY

## 2024-12-01 PROCEDURE — 6370000000 HC RX 637 (ALT 250 FOR IP): Performed by: STUDENT IN AN ORGANIZED HEALTH CARE EDUCATION/TRAINING PROGRAM

## 2024-12-01 PROCEDURE — 6360000002 HC RX W HCPCS: Performed by: INTERNAL MEDICINE

## 2024-12-01 PROCEDURE — 2580000003 HC RX 258: Performed by: INTERNAL MEDICINE

## 2024-12-01 PROCEDURE — 94660 CPAP INITIATION&MGMT: CPT

## 2024-12-01 PROCEDURE — 94640 AIRWAY INHALATION TREATMENT: CPT

## 2024-12-01 PROCEDURE — 6360000002 HC RX W HCPCS: Performed by: STUDENT IN AN ORGANIZED HEALTH CARE EDUCATION/TRAINING PROGRAM

## 2024-12-01 PROCEDURE — 80048 BASIC METABOLIC PNL TOTAL CA: CPT

## 2024-12-01 PROCEDURE — 99232 SBSQ HOSP IP/OBS MODERATE 35: CPT | Performed by: INTERNAL MEDICINE

## 2024-12-01 PROCEDURE — 2060000000 HC ICU INTERMEDIATE R&B

## 2024-12-01 PROCEDURE — 82947 ASSAY GLUCOSE BLOOD QUANT: CPT

## 2024-12-01 PROCEDURE — 85025 COMPLETE CBC W/AUTO DIFF WBC: CPT

## 2024-12-01 RX ORDER — ENOXAPARIN SODIUM 100 MG/ML
30 INJECTION SUBCUTANEOUS DAILY
Status: DISCONTINUED | OUTPATIENT
Start: 2024-12-01 | End: 2024-12-03

## 2024-12-01 RX ADMIN — MORPHINE SULFATE 4 MG: 10 SOLUTION ORAL at 17:08

## 2024-12-01 RX ADMIN — IPRATROPIUM BROMIDE AND ALBUTEROL SULFATE 1 DOSE: 2.5; .5 SOLUTION RESPIRATORY (INHALATION) at 09:19

## 2024-12-01 RX ADMIN — ALPRAZOLAM 0.5 MG: 0.25 TABLET ORAL at 20:03

## 2024-12-01 RX ADMIN — IPRATROPIUM BROMIDE AND ALBUTEROL SULFATE 1 DOSE: 2.5; .5 SOLUTION RESPIRATORY (INHALATION) at 05:08

## 2024-12-01 RX ADMIN — ROFLUMILAST 500 MCG: 500 TABLET ORAL at 08:44

## 2024-12-01 RX ADMIN — IPRATROPIUM BROMIDE AND ALBUTEROL SULFATE 1 DOSE: 2.5; .5 SOLUTION RESPIRATORY (INHALATION) at 00:59

## 2024-12-01 RX ADMIN — INSULIN LISPRO 3 UNITS: 100 INJECTION, SOLUTION INTRAVENOUS; SUBCUTANEOUS at 08:45

## 2024-12-01 RX ADMIN — METHYLPREDNISOLONE SODIUM SUCCINATE 40 MG: 40 INJECTION INTRAMUSCULAR; INTRAVENOUS at 20:03

## 2024-12-01 RX ADMIN — INSULIN LISPRO 6 UNITS: 100 INJECTION, SOLUTION INTRAVENOUS; SUBCUTANEOUS at 11:24

## 2024-12-01 RX ADMIN — SODIUM CHLORIDE, PRESERVATIVE FREE 10 ML: 5 INJECTION INTRAVENOUS at 20:07

## 2024-12-01 RX ADMIN — IPRATROPIUM BROMIDE AND ALBUTEROL SULFATE 1 DOSE: 2.5; .5 SOLUTION RESPIRATORY (INHALATION) at 16:00

## 2024-12-01 RX ADMIN — INSULIN GLARGINE 15 UNITS: 100 INJECTION, SOLUTION SUBCUTANEOUS at 20:24

## 2024-12-01 RX ADMIN — ALPRAZOLAM 0.5 MG: 0.25 TABLET ORAL at 12:26

## 2024-12-01 RX ADMIN — IPRATROPIUM BROMIDE AND ALBUTEROL SULFATE 1 DOSE: 2.5; .5 SOLUTION RESPIRATORY (INHALATION) at 23:58

## 2024-12-01 RX ADMIN — ARFORMOTEROL TARTRATE 15 MCG: 15 SOLUTION RESPIRATORY (INHALATION) at 20:53

## 2024-12-01 RX ADMIN — MORPHINE SULFATE 4 MG: 10 SOLUTION ORAL at 21:52

## 2024-12-01 RX ADMIN — INSULIN GLARGINE 15 UNITS: 100 INJECTION, SOLUTION SUBCUTANEOUS at 08:53

## 2024-12-01 RX ADMIN — INSULIN LISPRO 6 UNITS: 100 INJECTION, SOLUTION INTRAVENOUS; SUBCUTANEOUS at 17:09

## 2024-12-01 RX ADMIN — TAMSULOSIN HYDROCHLORIDE 0.8 MG: 0.4 CAPSULE ORAL at 08:44

## 2024-12-01 RX ADMIN — MORPHINE SULFATE 4 MG: 10 SOLUTION ORAL at 10:58

## 2024-12-01 RX ADMIN — METOPROLOL TARTRATE 100 MG: 50 TABLET, FILM COATED ORAL at 08:44

## 2024-12-01 RX ADMIN — ROSUVASTATIN CALCIUM 10 MG: 10 TABLET, FILM COATED ORAL at 08:44

## 2024-12-01 RX ADMIN — BUMETANIDE 0.5 MG/HR: 0.25 INJECTION INTRAMUSCULAR; INTRAVENOUS at 12:49

## 2024-12-01 RX ADMIN — INSULIN LISPRO 15 UNITS: 100 INJECTION, SOLUTION INTRAVENOUS; SUBCUTANEOUS at 20:25

## 2024-12-01 RX ADMIN — IPRATROPIUM BROMIDE AND ALBUTEROL SULFATE 1 DOSE: 2.5; .5 SOLUTION RESPIRATORY (INHALATION) at 20:53

## 2024-12-01 RX ADMIN — METHYLPREDNISOLONE SODIUM SUCCINATE 40 MG: 40 INJECTION INTRAMUSCULAR; INTRAVENOUS at 08:45

## 2024-12-01 RX ADMIN — ASPIRIN 81 MG: 81 TABLET, COATED ORAL at 08:44

## 2024-12-01 RX ADMIN — METOPROLOL TARTRATE 100 MG: 50 TABLET, FILM COATED ORAL at 20:03

## 2024-12-01 RX ADMIN — ARFORMOTEROL TARTRATE 15 MCG: 15 SOLUTION RESPIRATORY (INHALATION) at 09:19

## 2024-12-01 RX ADMIN — ENOXAPARIN SODIUM 30 MG: 100 INJECTION SUBCUTANEOUS at 20:03

## 2024-12-01 RX ADMIN — MORPHINE SULFATE 4 MG: 10 SOLUTION ORAL at 02:13

## 2024-12-01 RX ADMIN — FINASTERIDE 5 MG: 5 TABLET, FILM COATED ORAL at 08:44

## 2024-12-01 RX ADMIN — SODIUM CHLORIDE, PRESERVATIVE FREE 10 ML: 5 INJECTION INTRAVENOUS at 08:46

## 2024-12-01 RX ADMIN — IPRATROPIUM BROMIDE AND ALBUTEROL SULFATE 1 DOSE: 2.5; .5 SOLUTION RESPIRATORY (INHALATION) at 12:54

## 2024-12-01 RX ADMIN — ALPRAZOLAM 0.5 MG: 0.25 TABLET ORAL at 05:28

## 2024-12-01 RX ADMIN — PANTOPRAZOLE SODIUM 40 MG: 40 TABLET, DELAYED RELEASE ORAL at 05:28

## 2024-12-01 RX ADMIN — ISOSORBIDE MONONITRATE 30 MG: 30 TABLET, EXTENDED RELEASE ORAL at 08:44

## 2024-12-01 RX ADMIN — MORPHINE SULFATE 4 MG: 10 SOLUTION ORAL at 06:32

## 2024-12-01 ASSESSMENT — PAIN SCALES - GENERAL
PAINLEVEL_OUTOF10: 2
PAINLEVEL_OUTOF10: 9
PAINLEVEL_OUTOF10: 9
PAINLEVEL_OUTOF10: 7
PAINLEVEL_OUTOF10: 7
PAINLEVEL_OUTOF10: 4
PAINLEVEL_OUTOF10: 6
PAINLEVEL_OUTOF10: 7

## 2024-12-01 ASSESSMENT — PAIN DESCRIPTION - ORIENTATION
ORIENTATION: LEFT
ORIENTATION: RIGHT
ORIENTATION: LEFT
ORIENTATION: LEFT

## 2024-12-01 ASSESSMENT — PAIN - FUNCTIONAL ASSESSMENT
PAIN_FUNCTIONAL_ASSESSMENT: PREVENTS OR INTERFERES SOME ACTIVE ACTIVITIES AND ADLS

## 2024-12-01 ASSESSMENT — PAIN DESCRIPTION - LOCATION
LOCATION: CHEST
LOCATION: RIB CAGE
LOCATION: OTHER (COMMENT)
LOCATION: RIB CAGE
LOCATION: OTHER (COMMENT)
LOCATION: BACK

## 2024-12-01 ASSESSMENT — PAIN DESCRIPTION - DESCRIPTORS
DESCRIPTORS: DISCOMFORT
DESCRIPTORS: ACHING
DESCRIPTORS: ACHING;DISCOMFORT

## 2024-12-01 NOTE — PROGRESS NOTES
DVT Prophylaxis Adjustment Policy (DVT Prophylaxis)     This patient is on DVT Prophylaxis medication that requires a dose adjustment      Date Body Weight IBW  Adjusted BW SCr  CrCl Dialysis status   12/1/2024 110.4 kg (243 lb 4.8 oz) Ideal body weight: 82.2 kg (181 lb 3.5 oz)  Adjusted ideal body weight: 93.5 kg (206 lb 0.8 oz) Serum creatinine: 2.9 mg/dL (H) 12/01/24 0455  Estimated creatinine clearance: 29 mL/min (A) N/a       Pharmacy has dose-adjusted the DVT Prophylaxis regimen to match   the recommendations from the following table        Ordered Medication:Lovenox 30mg BID    Order Changed/converted to: Lovenox 30mg daily      These changes were made per protocol according to the [facility] Pharmacist   Review for Appropriate Use and Automatic Dose Adjustments of   Subcutaneous Anticoagulants Policy     *Please note this dose may need readjusted if patient's condition changes.    Please contact pharmacy with any questions regarding these changes.    Yue Martínez RPH  12/1/2024  7:37 AM

## 2024-12-01 NOTE — PROGRESS NOTES
Date: 11/30/2024    Time: 11:24 PM    Patient Placed On BIPAP/CPAP/ Non-Invasive Ventilation?  Yes    If no must comment.  Facial area red/color change? No           If YES are Blister/Lesion present?No   If yes must notify nursing staff  BIPAP/CPAP skin barrier?  Yes    Skin barrier type:mepilexlite       Comments: red outlet        Brandi Sousa, [facility]   11/30/24 5064   NIV Type   NIV Started/Stopped On   Equipment Type V60   Mode AVAPS   Mask Type Full face mask   Mask Size Medium   Settings/Measurements   PIP Observed 17 cm H20   CPAP/EPAP 8 cmH2O   IPAP Min 17 cmH2O   IPAP Max 25 cmH2O   Vt (Set, mL) 550 mL   Vt (Measured) 876 mL   Rate Ordered 16   Insp Rise Time (%) 3 %   FiO2  50 %   I Time/ I Time % 0.8 s   Minute Volume (L/min) 14.1 Liters   Mask Leak (lpm) 38 lpm   Patient's Home Machine No   Alarm Settings   Alarms On Y

## 2024-12-01 NOTE — PROGRESS NOTES
Department of Internal Medicine  Nephrology Progress Note      SUBJECTIVE: We are following Mr. Frias for GONZALES. Patient reports no new complaints today.     PHYSICAL EXAM:      Vitals:    VITALS:  /67   Pulse 80   Temp 97.2 °F (36.2 °C) (Axillary)   Resp 17   Ht 1.88 m (6' 2\")   Wt 110.4 kg (243 lb 4.8 oz)   SpO2 93%   BMI 31.24 kg/m²   24HR INTAKE/OUTPUT:    Intake/Output Summary (Last 24 hours) at 12/1/2024 1609  Last data filed at 11/30/2024 1645  Gross per 24 hour   Intake --   Output 500 ml   Net -500 ml         Constitutional:  Awake, alert, oriented, in NAD  HEENT:  PERRLA, normocephalic, atraumatic  Respiratory: Diminished  Cardiovascular/Edema:  RRR, normal S1, normal S2,+++ edema  Gastrointestinal:  Soft, flat, non-distended, non-tender  Neurologic:  Nonfocal  Skin:  warm, dry, no rashes, no lesions      Scheduled Meds:   enoxaparin  30 mg SubCUTAneous Daily    pantoprazole  40 mg Oral QAM AC    insulin glargine  15 Units SubCUTAneous BID    insulin lispro  10 Units SubCUTAneous TID WC    methylPREDNISolone  40 mg IntraVENous Q12H    insulin lispro  0-18 Units SubCUTAneous 4x Daily AC & HS    arformoterol tartrate  15 mcg Nebulization BID RT    ipratropium 0.5 mg-albuterol 2.5 mg  1 Dose Inhalation Q4H RT    sodium chloride flush  5-40 mL IntraVENous 2 times per day    aspirin  81 mg Oral Daily    finasteride  5 mg Oral Daily    isosorbide mononitrate  30 mg Oral Daily    metoprolol  100 mg Oral BID    Roflumilast  500 mcg Oral Daily    rosuvastatin  10 mg Oral Daily    tamsulosin  0.8 mg Oral Daily     Continuous Infusions:   dextrose      bumetanide (BUMEX) 12.5 mg in sodium chloride 0.9 % 125 mL infusion 0.5 mg/hr (12/01/24 1249)    sodium chloride       PRN Meds:.morphine, morphine, sodium chloride, glucose, dextrose bolus **OR** dextrose bolus, glucagon (rDNA), dextrose, sodium chloride flush, sodium chloride, potassium chloride **OR** potassium alternative oral replacement **OR**

## 2024-12-01 NOTE — PLAN OF CARE
Problem: Chronic Conditions and Co-morbidities  Goal: Patient's chronic conditions and co-morbidity symptoms are monitored and maintained or improved  Outcome: Progressing     Problem: Pain  Goal: Verbalizes/displays adequate comfort level or baseline comfort level  Outcome: Progressing     Problem: Skin/Tissue Integrity  Goal: Absence of new skin breakdown  Description: 1.  Monitor for areas of redness and/or skin breakdown  2.  Assess vascular access sites hourly  3.  Every 4-6 hours minimum:  Change oxygen saturation probe site  4.  Every 4-6 hours:  If on nasal continuous positive airway pressure, respiratory therapy assess nares and determine need for appliance change or resting period.  Outcome: Progressing     Problem: Safety - Adult  Goal: Free from fall injury  Outcome: Progressing  Flowsheets (Taken 11/30/2024 0900 by Silvia Robledo RN)  Free From Fall Injury: Instruct family/caregiver on patient safety     Problem: ABCDS Injury Assessment  Goal: Absence of physical injury  Outcome: Progressing  Flowsheets (Taken 11/30/2024 0900 by Silvia Robledo, RN)  Absence of Physical Injury: Implement safety measures based on patient assessment

## 2024-12-01 NOTE — PROGRESS NOTES
[facility] Hospitalist Progress Note    Admitting Date and Time: 11/26/2024 12:13 PM  Admit Dx: GONZALES (acute kidney injury) (HCC) [N17.9]    Subjective:  Patient is being followed for GONZALES (acute kidney injury) (HCC) [N17.9]     Patient is stated age of the bed comfortably.  Bilateral facial indentation from BiPAP mask.  He has no acute complaints.    ROS: denies fever, chills, cp, sob, n/v, HA unless stated above.      enoxaparin  30 mg SubCUTAneous Daily    pantoprazole  40 mg Oral QAM AC    insulin glargine  15 Units SubCUTAneous BID    insulin lispro  10 Units SubCUTAneous TID WC    methylPREDNISolone  40 mg IntraVENous Q12H    insulin lispro  0-18 Units SubCUTAneous 4x Daily AC & HS    arformoterol tartrate  15 mcg Nebulization BID RT    ipratropium 0.5 mg-albuterol 2.5 mg  1 Dose Inhalation Q4H RT    sodium chloride flush  5-40 mL IntraVENous 2 times per day    aspirin  81 mg Oral Daily    finasteride  5 mg Oral Daily    isosorbide mononitrate  30 mg Oral Daily    metoprolol  100 mg Oral BID    Roflumilast  500 mcg Oral Daily    rosuvastatin  10 mg Oral Daily    tamsulosin  0.8 mg Oral Daily     morphine, 4 mg, Q4H PRN  morphine, 1 mg, Q8H PRN  sodium chloride, 2 spray, Q2H PRN  glucose, 4 tablet, PRN  dextrose bolus, 125 mL, PRN   Or  dextrose bolus, 250 mL, PRN  glucagon (rDNA), 1 mg, PRN  dextrose, , Continuous PRN  sodium chloride flush, 5-40 mL, PRN  sodium chloride, , PRN  potassium chloride, 40 mEq, PRN   Or  potassium alternative oral replacement, 40 mEq, PRN  magnesium sulfate, 2,000 mg, PRN  ondansetron, 4 mg, Q8H PRN   Or  ondansetron, 4 mg, Q6H PRN  polyethylene glycol, 17 g, Daily PRN  acetaminophen, 650 mg, Q6H PRN   Or  acetaminophen, 650 mg, Q6H PRN  albuterol, 2.5 mg, Q6H PRN  ALPRAZolam, 0.5 mg, TID PRN         Objective:    /85   Pulse 89   Temp 97.6 °F (36.4 °C) (Oral)   Resp 19   Ht 1.88 m (6' 2\")   Wt 110.4 kg (243 lb 4.8 oz)   SpO2 99%   BMI 31.24 kg/m²     General  asleep and with naps.  Continue O2 supplementation  Chronic hypoxic and hypercapnic respiratory failure at baseline.  Continue O2 and AVAPS.  Thoracic spine fracture at T6/T7: 2/2 fall, Ortho following.  Pain management as needed.  Further imaging reviewed.  T2DM: A1C 7.5%.  Hold p.o. meds.  Insulin while inpatient.  Hypoglycemia protocol.  Carb controlled diet.  Endocrinology following  Right upper lobe opacity: Suspect focal segmental atelectasis VS new close take lesion.  Pulmonology on board.  Appears mass is old.    CODE STATUS: Limited  DVT/GI prophylaxis: Lovenox/PPI    Dispo: Home vs SNF, pending clinical course    NOTE: This report was transcribed using voice recognition software. Every effort was made to ensure accuracy; however, inadvertent computerized transcription errors may be present.  Electronically signed by Yannick Willis MD on 12/1/2024 at 8:22 AM

## 2024-12-01 NOTE — PROGRESS NOTES
Please note that I had a conversation with patient and wife yesterday. They do not want any additional treatment or the MRI. He actually states that he is having not much pain and really does not want to lay on MRI table because he cannot breath. They will notify me if they want additional treatment. Will sign off for now.

## 2024-12-01 NOTE — PLAN OF CARE
Problem: Chronic Conditions and Co-morbidities  Goal: Patient's chronic conditions and co-morbidity symptoms are monitored and maintained or improved  12/1/2024 1020 by Gina Au RN  Outcome: Progressing  11/30/2024 2254 by Amos Marvin RN  Outcome: Progressing     Problem: Discharge Planning  Goal: Discharge to home or other facility with appropriate resources  12/1/2024 1020 by Gina Au RN  Outcome: Progressing  11/30/2024 2254 by Amos Marvin RN  Outcome: Progressing     Problem: Pain  Goal: Verbalizes/displays adequate comfort level or baseline comfort level  12/1/2024 1020 by Gina Au RN  Outcome: Progressing  11/30/2024 2254 by Amos Marvin RN  Outcome: Progressing     Problem: Skin/Tissue Integrity  Goal: Absence of new skin breakdown  Description: 1.  Monitor for areas of redness and/or skin breakdown  2.  Assess vascular access sites hourly  3.  Every 4-6 hours minimum:  Change oxygen saturation probe site  4.  Every 4-6 hours:  If on nasal continuous positive airway pressure, respiratory therapy assess nares and determine need for appliance change or resting period.  12/1/2024 1020 by Gina Au RN  Outcome: Progressing  11/30/2024 2254 by Amos Marvin RN  Outcome: Progressing     Problem: Safety - Adult  Goal: Free from fall injury  12/1/2024 1020 by Gina Au RN  Outcome: Progressing  11/30/2024 2254 by Amos Marvin RN  Outcome: Progressing  Flowsheets (Taken 11/30/2024 0900 by Silvia Robledo, RN)  Free From Fall Injury: Instruct family/caregiver on patient safety     Problem: ABCDS Injury Assessment  Goal: Absence of physical injury  12/1/2024 1020 by Gina Au RN  Outcome: Progressing  11/30/2024 2254 by Amos Marvin RN  Outcome: Progressing  Flowsheets (Taken 11/30/2024 0900 by Silvia Robledo, RN)  Absence of Physical Injury: Implement safety measures based on patient assessment

## 2024-12-02 LAB
ANION GAP SERPL CALCULATED.3IONS-SCNC: 15 MMOL/L (ref 7–16)
BASOPHILS # BLD: 0 K/UL (ref 0–0.2)
BASOPHILS NFR BLD: 0 % (ref 0–2)
BUN SERPL-MCNC: 114 MG/DL (ref 6–23)
CALCIUM SERPL-MCNC: 9.3 MG/DL (ref 8.6–10.2)
CHLORIDE SERPL-SCNC: 94 MMOL/L (ref 98–107)
CO2 SERPL-SCNC: 31 MMOL/L (ref 22–29)
CREAT SERPL-MCNC: 2.5 MG/DL (ref 0.7–1.2)
EOSINOPHIL # BLD: 0 K/UL (ref 0.05–0.5)
EOSINOPHILS RELATIVE PERCENT: 0 % (ref 0–6)
ERYTHROCYTE [DISTWIDTH] IN BLOOD BY AUTOMATED COUNT: 13.9 % (ref 11.5–15)
GFR, ESTIMATED: 27 ML/MIN/1.73M2
GLUCOSE BLD-MCNC: 141 MG/DL (ref 74–99)
GLUCOSE BLD-MCNC: 145 MG/DL (ref 74–99)
GLUCOSE BLD-MCNC: 256 MG/DL (ref 74–99)
GLUCOSE BLD-MCNC: 368 MG/DL (ref 74–99)
GLUCOSE SERPL-MCNC: 123 MG/DL (ref 74–99)
HCT VFR BLD AUTO: 35.7 % (ref 37–54)
HGB BLD-MCNC: 11.7 G/DL (ref 12.5–16.5)
LYMPHOCYTES NFR BLD: 0.69 K/UL (ref 1.5–4)
LYMPHOCYTES RELATIVE PERCENT: 8 % (ref 20–42)
MCH RBC QN AUTO: 28.6 PG (ref 26–35)
MCHC RBC AUTO-ENTMCNC: 32.8 G/DL (ref 32–34.5)
MCV RBC AUTO: 87.3 FL (ref 80–99.9)
METAMYELOCYTES ABSOLUTE COUNT: 0.15 K/UL (ref 0–0.12)
METAMYELOCYTES: 2 % (ref 0–1)
MONOCYTES NFR BLD: 0.08 K/UL (ref 0.1–0.95)
MONOCYTES NFR BLD: 1 % (ref 2–12)
MYELOCYTES ABSOLUTE COUNT: 0.38 K/UL
MYELOCYTES: 4 %
NEUTROPHILS NFR BLD: 85 % (ref 43–80)
NEUTS SEG NFR BLD: 7.6 K/UL (ref 1.8–7.3)
PLATELET # BLD AUTO: 92 K/UL (ref 130–450)
PLATELET CONFIRMATION: NORMAL
PMV BLD AUTO: 10.1 FL (ref 7–12)
POTASSIUM SERPL-SCNC: 4.1 MMOL/L (ref 3.5–5)
RBC # BLD AUTO: 4.09 M/UL (ref 3.8–5.8)
RBC # BLD: ABNORMAL 10*6/UL
SODIUM SERPL-SCNC: 140 MMOL/L (ref 132–146)
TROPONIN I SERPL HS-MCNC: 55 NG/L (ref 0–11)
WBC OTHER # BLD: 8.9 K/UL (ref 4.5–11.5)

## 2024-12-02 PROCEDURE — 97530 THERAPEUTIC ACTIVITIES: CPT

## 2024-12-02 PROCEDURE — 85025 COMPLETE CBC W/AUTO DIFF WBC: CPT

## 2024-12-02 PROCEDURE — 99232 SBSQ HOSP IP/OBS MODERATE 35: CPT | Performed by: INTERNAL MEDICINE

## 2024-12-02 PROCEDURE — 6360000002 HC RX W HCPCS: Performed by: STUDENT IN AN ORGANIZED HEALTH CARE EDUCATION/TRAINING PROGRAM

## 2024-12-02 PROCEDURE — 82947 ASSAY GLUCOSE BLOOD QUANT: CPT

## 2024-12-02 PROCEDURE — 6360000002 HC RX W HCPCS: Performed by: INTERNAL MEDICINE

## 2024-12-02 PROCEDURE — 36415 COLL VENOUS BLD VENIPUNCTURE: CPT

## 2024-12-02 PROCEDURE — 2700000000 HC OXYGEN THERAPY PER DAY

## 2024-12-02 PROCEDURE — 94660 CPAP INITIATION&MGMT: CPT

## 2024-12-02 PROCEDURE — 2060000000 HC ICU INTERMEDIATE R&B

## 2024-12-02 PROCEDURE — 80048 BASIC METABOLIC PNL TOTAL CA: CPT

## 2024-12-02 PROCEDURE — 6370000000 HC RX 637 (ALT 250 FOR IP): Performed by: STUDENT IN AN ORGANIZED HEALTH CARE EDUCATION/TRAINING PROGRAM

## 2024-12-02 PROCEDURE — 99233 SBSQ HOSP IP/OBS HIGH 50: CPT | Performed by: NURSE PRACTITIONER

## 2024-12-02 PROCEDURE — 97161 PT EVAL LOW COMPLEX 20 MIN: CPT

## 2024-12-02 PROCEDURE — 94640 AIRWAY INHALATION TREATMENT: CPT

## 2024-12-02 PROCEDURE — 6370000000 HC RX 637 (ALT 250 FOR IP): Performed by: INTERNAL MEDICINE

## 2024-12-02 PROCEDURE — 2580000003 HC RX 258: Performed by: STUDENT IN AN ORGANIZED HEALTH CARE EDUCATION/TRAINING PROGRAM

## 2024-12-02 PROCEDURE — 2580000003 HC RX 258: Performed by: INTERNAL MEDICINE

## 2024-12-02 PROCEDURE — 6370000000 HC RX 637 (ALT 250 FOR IP)

## 2024-12-02 PROCEDURE — 84484 ASSAY OF TROPONIN QUANT: CPT

## 2024-12-02 RX ORDER — HYDROXYZINE PAMOATE 25 MG/1
50 CAPSULE ORAL ONCE
Status: COMPLETED | OUTPATIENT
Start: 2024-12-02 | End: 2024-12-02

## 2024-12-02 RX ORDER — FLUTICASONE PROPIONATE 50 MCG
1 SPRAY, SUSPENSION (ML) NASAL DAILY PRN
Status: DISCONTINUED | OUTPATIENT
Start: 2024-12-02 | End: 2024-12-05 | Stop reason: HOSPADM

## 2024-12-02 RX ORDER — METOLAZONE 5 MG/1
5 TABLET ORAL ONCE
Status: COMPLETED | OUTPATIENT
Start: 2024-12-02 | End: 2024-12-02

## 2024-12-02 RX ORDER — FLUTICASONE PROPIONATE 50 MCG
1 SPRAY, SUSPENSION (ML) NASAL DAILY
Status: DISCONTINUED | OUTPATIENT
Start: 2024-12-02 | End: 2024-12-05 | Stop reason: HOSPADM

## 2024-12-02 RX ADMIN — ARFORMOTEROL TARTRATE 15 MCG: 15 SOLUTION RESPIRATORY (INHALATION) at 08:26

## 2024-12-02 RX ADMIN — SODIUM CHLORIDE, PRESERVATIVE FREE 10 ML: 5 INJECTION INTRAVENOUS at 08:50

## 2024-12-02 RX ADMIN — TAMSULOSIN HYDROCHLORIDE 0.8 MG: 0.4 CAPSULE ORAL at 08:49

## 2024-12-02 RX ADMIN — ALPRAZOLAM 0.5 MG: 0.25 TABLET ORAL at 15:54

## 2024-12-02 RX ADMIN — IPRATROPIUM BROMIDE AND ALBUTEROL SULFATE 1 DOSE: 2.5; .5 SOLUTION RESPIRATORY (INHALATION) at 20:46

## 2024-12-02 RX ADMIN — METOPROLOL TARTRATE 100 MG: 50 TABLET, FILM COATED ORAL at 08:49

## 2024-12-02 RX ADMIN — FLUTICASONE PROPIONATE 1 SPRAY: 50 SPRAY, METERED NASAL at 15:54

## 2024-12-02 RX ADMIN — PANTOPRAZOLE SODIUM 40 MG: 40 TABLET, DELAYED RELEASE ORAL at 05:12

## 2024-12-02 RX ADMIN — METOLAZONE 5 MG: 5 TABLET ORAL at 17:01

## 2024-12-02 RX ADMIN — INSULIN GLARGINE 15 UNITS: 100 INJECTION, SOLUTION SUBCUTANEOUS at 08:58

## 2024-12-02 RX ADMIN — IPRATROPIUM BROMIDE AND ALBUTEROL SULFATE 1 DOSE: 2.5; .5 SOLUTION RESPIRATORY (INHALATION) at 08:26

## 2024-12-02 RX ADMIN — METHYLPREDNISOLONE SODIUM SUCCINATE 40 MG: 40 INJECTION INTRAMUSCULAR; INTRAVENOUS at 21:20

## 2024-12-02 RX ADMIN — INSULIN LISPRO 10 UNITS: 100 INJECTION, SOLUTION INTRAVENOUS; SUBCUTANEOUS at 17:02

## 2024-12-02 RX ADMIN — ENOXAPARIN SODIUM 30 MG: 100 INJECTION SUBCUTANEOUS at 21:20

## 2024-12-02 RX ADMIN — BUMETANIDE 0.5 MG/HR: 0.25 INJECTION INTRAMUSCULAR; INTRAVENOUS at 10:47

## 2024-12-02 RX ADMIN — SODIUM CHLORIDE, PRESERVATIVE FREE 10 ML: 5 INJECTION INTRAVENOUS at 21:21

## 2024-12-02 RX ADMIN — ASPIRIN 81 MG: 81 TABLET, COATED ORAL at 08:49

## 2024-12-02 RX ADMIN — ISOSORBIDE MONONITRATE 30 MG: 30 TABLET, EXTENDED RELEASE ORAL at 08:48

## 2024-12-02 RX ADMIN — INSULIN LISPRO 9 UNITS: 100 INJECTION, SOLUTION INTRAVENOUS; SUBCUTANEOUS at 21:32

## 2024-12-02 RX ADMIN — IPRATROPIUM BROMIDE AND ALBUTEROL SULFATE 1 DOSE: 2.5; .5 SOLUTION RESPIRATORY (INHALATION) at 03:48

## 2024-12-02 RX ADMIN — IPRATROPIUM BROMIDE AND ALBUTEROL SULFATE 1 DOSE: 2.5; .5 SOLUTION RESPIRATORY (INHALATION) at 12:40

## 2024-12-02 RX ADMIN — INSULIN GLARGINE 15 UNITS: 100 INJECTION, SOLUTION SUBCUTANEOUS at 21:32

## 2024-12-02 RX ADMIN — ALPRAZOLAM 0.5 MG: 0.25 TABLET ORAL at 05:03

## 2024-12-02 RX ADMIN — INSULIN LISPRO 15 UNITS: 100 INJECTION, SOLUTION INTRAVENOUS; SUBCUTANEOUS at 10:53

## 2024-12-02 RX ADMIN — ROSUVASTATIN CALCIUM 10 MG: 10 TABLET, FILM COATED ORAL at 08:48

## 2024-12-02 RX ADMIN — MORPHINE SULFATE 4 MG: 10 SOLUTION ORAL at 02:59

## 2024-12-02 RX ADMIN — METOPROLOL TARTRATE 100 MG: 50 TABLET, FILM COATED ORAL at 21:20

## 2024-12-02 RX ADMIN — HYDROXYZINE PAMOATE 50 MG: 25 CAPSULE ORAL at 10:43

## 2024-12-02 RX ADMIN — ARFORMOTEROL TARTRATE 15 MCG: 15 SOLUTION RESPIRATORY (INHALATION) at 20:46

## 2024-12-02 RX ADMIN — IPRATROPIUM BROMIDE AND ALBUTEROL SULFATE 1 DOSE: 2.5; .5 SOLUTION RESPIRATORY (INHALATION) at 16:05

## 2024-12-02 RX ADMIN — MORPHINE SULFATE 4 MG: 10 SOLUTION ORAL at 08:49

## 2024-12-02 RX ADMIN — FINASTERIDE 5 MG: 5 TABLET, FILM COATED ORAL at 08:48

## 2024-12-02 RX ADMIN — ROFLUMILAST 500 MCG: 500 TABLET ORAL at 08:49

## 2024-12-02 RX ADMIN — METHYLPREDNISOLONE SODIUM SUCCINATE 40 MG: 40 INJECTION INTRAMUSCULAR; INTRAVENOUS at 08:49

## 2024-12-02 ASSESSMENT — PAIN SCALES - GENERAL
PAINLEVEL_OUTOF10: 7
PAINLEVEL_OUTOF10: 4
PAINLEVEL_OUTOF10: 6

## 2024-12-02 ASSESSMENT — PAIN DESCRIPTION - LOCATION
LOCATION: CHEST
LOCATION: OTHER (COMMENT)

## 2024-12-02 ASSESSMENT — PAIN DESCRIPTION - ORIENTATION: ORIENTATION: RIGHT

## 2024-12-02 ASSESSMENT — PAIN DESCRIPTION - DESCRIPTORS
DESCRIPTORS: STABBING
DESCRIPTORS: DISCOMFORT

## 2024-12-02 ASSESSMENT — PAIN - FUNCTIONAL ASSESSMENT: PAIN_FUNCTIONAL_ASSESSMENT: PREVENTS OR INTERFERES SOME ACTIVE ACTIVITIES AND ADLS

## 2024-12-02 NOTE — PLAN OF CARE
Problem: Chronic Conditions and Co-morbidities  Goal: Patient's chronic conditions and co-morbidity symptoms are monitored and maintained or improved  12/2/2024 1136 by Gina Au RN  Outcome: Progressing  12/2/2024 0127 by Omer Perry RN  Outcome: Progressing  Flowsheets  Taken 12/1/2024 2000 by Omer Perry RN  Care Plan - Patient's Chronic Conditions and Co-Morbidity Symptoms are Monitored and Maintained or Improved: Monitor and assess patient's chronic conditions and comorbid symptoms for stability, deterioration, or improvement  Taken 12/1/2024 1610 by Yue Connelly RN  Care Plan - Patient's Chronic Conditions and Co-Morbidity Symptoms are Monitored and Maintained or Improved:   Monitor and assess patient's chronic conditions and comorbid symptoms for stability, deterioration, or improvement   Collaborate with multidisciplinary team to address chronic and comorbid conditions and prevent exacerbation or deterioration   Update acute care plan with appropriate goals if chronic or comorbid symptoms are exacerbated and prevent overall improvement and discharge     Problem: Discharge Planning  Goal: Discharge to home or other facility with appropriate resources  12/2/2024 1136 by Gina Au RN  Outcome: Progressing  12/2/2024 0127 by Omer Perry RN  Outcome: Progressing  Flowsheets (Taken 12/1/2024 2000)  Discharge to home or other facility with appropriate resources: Identify barriers to discharge with patient and caregiver     Problem: Pain  Goal: Verbalizes/displays adequate comfort level or baseline comfort level  12/2/2024 1136 by Gina Au RN  Outcome: Progressing  12/2/2024 0127 by Omer Perry RN  Outcome: Progressing     Problem: Skin/Tissue Integrity  Goal: Absence of new skin breakdown  Description: 1.  Monitor for areas of redness and/or skin breakdown  2.  Assess vascular access sites hourly  3.  Every 4-6 hours minimum:  Change oxygen saturation probe

## 2024-12-02 NOTE — PROGRESS NOTES
[facility] Hospitalist Progress Note    Admitting Date and Time: 11/26/2024 12:13 PM  Admit Dx: GONZALES (acute kidney injury) (HCC) [N17.9]    Subjective:  Patient is being followed for GONZALES (acute kidney injury) (HCC) [N17.9]     Patient is sitting on bed comfortably. Complains of nasal congestion. He otherwise has no complaints.     ROS: denies fever, chills, cp, sob, n/v, HA unless stated above.      enoxaparin  30 mg SubCUTAneous Daily    pantoprazole  40 mg Oral QAM AC    insulin glargine  15 Units SubCUTAneous BID    insulin lispro  10 Units SubCUTAneous TID WC    methylPREDNISolone  40 mg IntraVENous Q12H    insulin lispro  0-18 Units SubCUTAneous 4x Daily AC & HS    arformoterol tartrate  15 mcg Nebulization BID RT    ipratropium 0.5 mg-albuterol 2.5 mg  1 Dose Inhalation Q4H RT    sodium chloride flush  5-40 mL IntraVENous 2 times per day    aspirin  81 mg Oral Daily    finasteride  5 mg Oral Daily    isosorbide mononitrate  30 mg Oral Daily    metoprolol  100 mg Oral BID    Roflumilast  500 mcg Oral Daily    rosuvastatin  10 mg Oral Daily    tamsulosin  0.8 mg Oral Daily     morphine, 4 mg, Q4H PRN  morphine, 1 mg, Q8H PRN  sodium chloride, 2 spray, Q2H PRN  glucose, 4 tablet, PRN  dextrose bolus, 125 mL, PRN   Or  dextrose bolus, 250 mL, PRN  glucagon (rDNA), 1 mg, PRN  dextrose, , Continuous PRN  sodium chloride flush, 5-40 mL, PRN  sodium chloride, , PRN  potassium chloride, 40 mEq, PRN   Or  potassium alternative oral replacement, 40 mEq, PRN  magnesium sulfate, 2,000 mg, PRN  ondansetron, 4 mg, Q8H PRN   Or  ondansetron, 4 mg, Q6H PRN  polyethylene glycol, 17 g, Daily PRN  acetaminophen, 650 mg, Q6H PRN   Or  acetaminophen, 650 mg, Q6H PRN  albuterol, 2.5 mg, Q6H PRN  ALPRAZolam, 0.5 mg, TID PRN         Objective:    /86   Pulse 68   Temp 97.7 °F (36.5 °C) (Axillary)   Resp 16   Ht 1.88 m (6' 2\")   Wt 110.4 kg (243 lb 4.8 oz)   SpO2 98%   BMI 31.24 kg/m²     General Appearance: AOx3,

## 2024-12-02 NOTE — PROGRESS NOTES
Department of Internal Medicine  Nephrology Progress Note      Events reviewed.     SUBJECTIVE: We are following Mr. Frias for GONZALES. Patient reports no new complaints today.     PHYSICAL EXAM:      Vitals:    VITALS:  /68   Pulse 78   Temp 97.9 °F (36.6 °C) (Axillary)   Resp 16   Ht 1.88 m (6' 2\")   Wt 110.4 kg (243 lb 4.8 oz)   SpO2 98%   BMI 31.24 kg/m²   24HR INTAKE/OUTPUT:    Intake/Output Summary (Last 24 hours) at 12/2/2024 1412  Last data filed at 12/2/2024 0657  Gross per 24 hour   Intake 370 ml   Output 600 ml   Net -230 ml         Constitutional:  Awake, alert, oriented, in NAD  HEENT:  PERRLA, normocephalic, atraumatic  Respiratory: Diminished  Cardiovascular/Edema:  RRR, normal S1, normal S2,+++ edema  Gastrointestinal:  Soft, flat, non-distended, non-tender  Neurologic:  Nonfocal  Skin:  warm, dry, no rashes, no lesions      Scheduled Meds:   enoxaparin  30 mg SubCUTAneous Daily    pantoprazole  40 mg Oral QAM AC    insulin glargine  15 Units SubCUTAneous BID    insulin lispro  10 Units SubCUTAneous TID WC    methylPREDNISolone  40 mg IntraVENous Q12H    insulin lispro  0-18 Units SubCUTAneous 4x Daily AC & HS    arformoterol tartrate  15 mcg Nebulization BID RT    ipratropium 0.5 mg-albuterol 2.5 mg  1 Dose Inhalation Q4H RT    sodium chloride flush  5-40 mL IntraVENous 2 times per day    aspirin  81 mg Oral Daily    finasteride  5 mg Oral Daily    isosorbide mononitrate  30 mg Oral Daily    metoprolol  100 mg Oral BID    Roflumilast  500 mcg Oral Daily    rosuvastatin  10 mg Oral Daily    tamsulosin  0.8 mg Oral Daily     Continuous Infusions:   dextrose      bumetanide (BUMEX) 12.5 mg in sodium chloride 0.9 % 125 mL infusion 0.5 mg/hr (12/02/24 1047)    sodium chloride       PRN Meds:.fluticasone, morphine, morphine, sodium chloride, glucose, dextrose bolus **OR** dextrose bolus, glucagon (rDNA), dextrose, sodium chloride flush, sodium chloride, potassium chloride **OR** potassium

## 2024-12-02 NOTE — PROGRESS NOTES
Pulmonary Progress Note    Admit Date: 2024                            PCP: Jose Kwok MD  Principal Problem:    GONZALES (acute kidney injury) (HCC)  Active Problems:    Poorly controlled type 2 diabetes mellitus (HCC)    Multinodular goiter (nontoxic)    Dietary noncompliance    Steroid-induced hyperglycemia    Worsening renal function  Resolved Problems:    * No resolved hospital problems. *      Subjective:  Seen on 4-5L   Wife at bedside  Wearing NIV all night and intermittently throughout the day for SOB, occasional non productive cough, no wheeze  Complaining of nasal congestion  Medications:   dextrose      bumetanide (BUMEX) 12.5 mg in sodium chloride 0.9 % 125 mL infusion 0.5 mg/hr (24 1047)    sodium chloride          enoxaparin  30 mg SubCUTAneous Daily    pantoprazole  40 mg Oral QAM AC    insulin glargine  15 Units SubCUTAneous BID    insulin lispro  10 Units SubCUTAneous TID WC    methylPREDNISolone  40 mg IntraVENous Q12H    insulin lispro  0-18 Units SubCUTAneous 4x Daily AC & HS    arformoterol tartrate  15 mcg Nebulization BID RT    ipratropium 0.5 mg-albuterol 2.5 mg  1 Dose Inhalation Q4H RT    sodium chloride flush  5-40 mL IntraVENous 2 times per day    aspirin  81 mg Oral Daily    finasteride  5 mg Oral Daily    isosorbide mononitrate  30 mg Oral Daily    metoprolol  100 mg Oral BID    Roflumilast  500 mcg Oral Daily    rosuvastatin  10 mg Oral Daily    tamsulosin  0.8 mg Oral Daily       Vitals:  VITALS:  /68   Pulse 78   Temp 97.9 °F (36.6 °C) (Axillary)   Resp 16   Ht 1.88 m (6' 2\")   Wt 110.4 kg (243 lb 4.8 oz)   SpO2 98%   BMI 31.24 kg/m²   24HR INTAKE/OUTPUT:    Intake/Output Summary (Last 24 hours) at 2024 1439  Last data filed at 2024 0657  Gross per 24 hour   Intake 370 ml   Output 600 ml   Net -230 ml     CURRENT PULSE OXIMETRY:  SpO2: 98 %  24HR PULSE OXIMETRY RANGE:  SpO2  Av.3 %  Min: 94 %  Max: 98 %  CVP:    VENT SETTINGS:     SINUSES: The visualized paranasal sinuses and mastoid air cells demonstrate no acute abnormality. SOFT TISSUES/SKULL:  No acute abnormality of the visualized skull or soft tissues.     1.  No acute intracranial abnormality. 2.  Tiny chronic lacunar infarct in the left basal ganglia.       Summary of Events:   73-year-old white male, patient of Dr. Goyal with history of end-stage COPD, chronic hypoxic/hypercapnic respiratory failure, GERD, diabetes, hyperlipidemia, chronic right lung nodular density worked up with negative CT-guided biopsy and bronchoscopy, depression, IFTIKHAR  11/26 ED after a fall  11/28 pulm consulted  11/30: 5 L, 97%    Assessment/Plan:  End-stage COPD with exacerbation  OP meds: Brovana, Pulmicort, DuoNebs, Daliresp, prednisone 40 mg daily, azithromycin Monday Wednesday Friday, Daliresp and morphine as needed (managed by palliative)  Solu-Medrol 40 mg every 12 hours  Pulmicort on hold while on IV steroids  Palliative following for refractory dyspnea on morphine  Chronic hypoxic and hypercapnic respiratory failure  Baseline 3 L/AVAPS  On 5 L  Wean to maintain pulse ox greater than 90%  GONZALES/acute on chronic right-sided heart failure  On Bumex drip per nephrology  Thoracic fracture T6/T7 2/2 fall  Ortho following  DVT prophylaxis on Lovenox  Nasal congestion: May use saline as needed, Flonase 1 spray to each nostril twice a day.  Discussed with , not a candidate for LTAC placement.        Electronically signed by Dawson Jackson MD on 12/2/2024 at 2:39 PM    ..

## 2024-12-02 NOTE — PROGRESS NOTES
Date: 12/1/2024    Time: 10:43 PM    Patient Placed On BIPAP/CPAP/ Non-Invasive Ventilation?  No, already placed on avaps    If no must comment.  Facial area red/color change? No           If YES are Blister/Lesion present?No   If yes must notify nursing staff  BIPAP/CPAP skin barrier?  Yes    Skin barrier type:mepilexlite       Comments:        Nanci Paris RCP

## 2024-12-02 NOTE — PROGRESS NOTES
Physical Therapy  Facility/Department: [address] INTERMEDIATE  Physical Therapy Initial Assessment    Name: Chico Frias  : 1950  MRN: [id number]  Date of Service: 2024        Patient Diagnosis(es): The primary encounter diagnosis was GONZALES (acute kidney injury) ([facility]). Diagnoses of COPD exacerbation (HCC) and Compression fracture of thoracic vertebra, unspecified thoracic vertebral level, initial encounter (HCC) were also pertinent to this visit.  Past Medical History:  has a past medical history of COPD (chronic obstructive pulmonary disease) (HCC), Diabetes mellitus (HCC), GERD (gastroesophageal reflux disease), and Hyperlipidemia.  Past Surgical History:  has a past surgical history that includes Coronary angioplasty with stent; CT NEEDLE BIOPSY LUNG PERCUTANEOUS (10/6/2022); bronchoscopy (3/7/2024); bronchoscopy (3/7/2024); bronchoscopy (3/7/2024); bronchoscopy (3/7/2024); and bronchoscopy (3/7/2024).      Evaluating Therapist: Malina Giles PT      Room #:  0642/0642-A  Diagnosis:  GONZALES (acute kidney injury) ([facility]) [N17.9]  PMHx/PSHx:  COPD, DM  Precautions:  falls, O2      Social:  Pt lives with wife (wife works) in a 2 floor plan. Ambulates with ww uses home O2. Pt and wife report recent decline in function and ability to manage at home.  Wife is working on having a first floor bedroom     Initial Evaluation  Date: 24 Treatment      Short Term/ Long Term   Goals   Was pt agreeable to Eval/treatment? yes     Does pt have pain? Chest pain     Bed Mobility  Rolling: NT  Supine to sit: NT  Sit to supine: NT  Scooting: NT  independent   Transfers Sit to stand: min assist  Stand to sit: min assist  Stand pivot: NT  SBA   Ambulation    Attempted, pt unable due to dizziness. Did march in place at ww with min assist  40 feet with ww with SBA   Stair Negotiation  Ascended and descended  NT   4 steps with 1 rail with min assist   LE strength     3+/5    4/5   balance      fair     AM-PAC Raw score               grid    Frequency of treatments: 2-5x/week x 7 days.    Time in  1030  Time out  1043        Evaluation Time includes thorough review of current medical information, gathering information on past medical history/social history and prior level of function, completion of standardized testing/informal observation of tasks, assessment of data and education on plan of care and goals.      CPT codes:  [x] Low Complexity PT evaluation 28036  [] Moderate Complexity PT evaluation 64975  [] High Complexity PT evaluation 50387  [] PT Re-evaluation 81009  [] Gait training 91249 minutes  [] Manual therapy 27240 minutes  [] Therapeutic activities 67854 minutes  [] Therapeutic exercises 30234 minutes  [] Neuromuscular reeducation 49936 minutes     Malina Giles PT [id number]

## 2024-12-02 NOTE — PROGRESS NOTES
Occupational Therapy  OT BEDSIDE TREATMENT NOTE      Date:2024  Patient Name: Chico Frias  MRN: [id number]  : 1950  Room: [address] [place], [place] [address]     Evaluating OT: Carmen Martell OTR/L   [id number]       Referring Provider:Brock Nolasco MD     Specific Provider Orders/Date:OT eval and treat 2024       Diagnosis:  GONZALES (acute kidney injury) (HCC) [N17.9]   S/p fall Thoracic spine [id number] fracture   Ortho note  : recommend an MRI scan of his mid thoracic spine to rule out acute compression fractur If he is unable to tolerate this, then a bone scan may be appropriate, but unfortunately, his injury was 2 days ago and may not show up for up to 5 to 7 days.  -   Patient and wife informed therapist that the  patient will not go through with MRI - can't tolerate it  No other ortho notes in chart    Pertinent Medical History: COPD, DM,      Precautions:  Fall Risk, O2      Assessment of current deficits    [x] Functional mobility            [x]ADLs           [x] Strength                  []Cognition    [x] Functional transfers          [x] IADLs         [x] Safety Awareness   [x]Endurance    [] Fine Coordination                         [x] Balance      [] Vision/perception   []Sensation      []Gross Motor Coordination             [] ROM           [] Delirium                   [] Motor Control      OT PLAN OF CARE   OT POC based on physician orders, patient diagnosis and results of clinical assessment     Frequency/Duration  2-4 days/wk for 2 - 4weeks PRN   Specific OT Treatment Interventions to include:   ADL retraining/adapted techniques and AE recommendations to increase functional independence within precautions                    Energy conservation techniques to improve tolerance for selfcare routine   Functional transfer/mobility training/DME recommendations for increased independence, safety and fall prevention         Patient/family education to increase safety and functional independence              Environmental modifications for safe mobility and completion of ADLs                             Therapeutic activity to improve functional performance during ADLs.                                         Therapeutic exercise to improve tolerance and functional strength for ADLs    Balance retraining/tolerance tasks for facilitation of postural control with dynamic challenges during ADLs .      Positioning to improve functional independence         Recommended Adaptive Equipment: wheelchair, BSC     Home Living: Pt lives with wife, 2 story with 2 steps in.   Bed/bath on 2nd. 1/2 bath on 1st    Bathroom setup: shower    Equipment owned: walker , home O2     Prior Level of Function:   Per son:   past week or so patient has required increase assist  with ADLs , and  with IADLs; ambulated with walker   Wife works 3 days/week.     Reports she contacting palliative care to see about getting a hospital bed for 1st floor      Pain Level: did not complain of pain  Cognition: A&O:  pleasant, following simple commands, conversing               Memory:  fair+               Sequencing:  fair +               Problem solving:  fair +               Judgement/safety:  fair +                Functional Assessment:  AM-PAC Daily Activity Raw Score: 15/24    Initial Eval Status  Date: 11/29/2024 Treatment Status  Date: 12/2/24 STGs = LTGs  Time frame: 10-14 days   Feeding Set-up    Independent    Grooming SBA/set-up ,seated   Decrease standing tolerance    Supervision    UB Dressing Min A  Min A to arrange gown  Supervision    LB Dressing Max A   max A Min A    Bathing Mod A    Min A    Toileting Assist with hygiene after bowel movement   Min A    Bed Mobility  Upon entry sitting EOB -    Nurse stated patient wanting to walk to bathroom to try to have bowel movement  No complaints of back pain    SBA    Functional Transfers Min A  Sit-stand from bed, elevated commode  Sit <> stand min A  SBA//supervision   Functional Mobility Min

## 2024-12-02 NOTE — PROGRESS NOTES
P Quality Flow/Interdisciplinary Rounds Progress Note        Quality Flow Rounds held on December 2, 2024    Disciplines Attending:  Bedside Nurse, , , and Nursing Unit Leadership    Chico Frias was admitted on 11/26/2024 12:13 PM    Anticipated Discharge Date:  Expected Discharge Date: 11/29/24    Disposition:    Bon Score:  Bon Scale Score: 16    Readmission Risk              Risk of Unplanned Readmission:  34           Discussed patient goal for the day, patient clinical progression, and barriers to discharge.  The following Goal(s) of the Day/Commitment(s) have been identified:   pt ot eval, check renal plan      Joyce Ba RN  December 2, 2024

## 2024-12-02 NOTE — CARE COORDINATION
Hospice consult to [facility] placed by Lucila peña/perry. Referral sent by CLAUDIA.  Prior to admission: Pt resides with his spouse in a two story home and has a nebulizer, chest vest he wears 3 times a day, bipap and 3-4 l home o2 via [facility]. Order for hospital bed in place w/[facility] following.  Pt is active w/[facility], CM confirmed w/Margot w/[facility] order in place. Family will transport at discharge.Pharmacy is [facility] in [place] and PCP is Dr. DANA Kwok. Will follow pending further treatment plans.   Beti Davey, BRENNAN, RN  [facility] Case Management  [phone]

## 2024-12-02 NOTE — PROGRESS NOTES
Palliative Care Department  [phone]  Palliative Care Progress Note  Provider ANDIE Butts CNP      PATIENT: Chico Frias  : 1950  MRN: [id number]  ADMISSION DATE: 2024 12:13 PM  Referring Provider: Yannick Willis MD     Palliative Medicine was consulted on hospital day 1 for assistance with Goals of care    HPI:     Clinical Summary:Chico Frias is a 73 y.o. y/o male with a history of COPD on 4 L NC, IDDM, GERD, HFpEF, CKD, CAD & hyperlipidemia  who presented to [facility] on 2024 with shortness of breath.  He was admitted for COPD exacerbation and an acute kidney injury.  He is placed on a Bumex drip.  He remains admitted to telemetry for further medical management.    ASSESSMENT/PLAN:     Pertinent Hospital Diagnoses     Acute on chronic hypoxic respiratory failure  COPD exacerbation  Acute on chronic kidney disease    Symptom management    Dyspnea  -Continue morphine 4 mg every 4 hours as needed    Palliative Care Encounter / Counseling Regarding Goals of Care  Please see detailed goals of care discussion as below  At this time, Chico Frias, Does have capacity for medical decision-making.  Capacity is time limited and situation/question specific  During encounter Luiz was surrogate medical decision-maker  Outcome of goals of care meeting:  Continue all current medical management  CODE STATUS changed to limited DNR CCA/DNI  Wife would like to speak with hospice, but requested that I do not bring it up to the patient at this time  Code status Limited DNRCCA/ DNI  Advanced Directives: no POA or living will in epic  Surrogate/Legal NOK:  Luiz Frias (spouse) [phone]    Spiritual assessment: no spiritual distress identified  Bereavement and grief: to be determined  Referrals to: none today    Thank you for the opportunity to participate in the care of Chico Frias.     ANDIE Butts CNP   Palliative Medicine     SUBJECTIVE:     Details of Conversation:  Chart reviewed and met with the patient and his wife Luiz at the bedside.  We had a long discussion about goals of care.  His wife explains that at this point he is only somewhat comfortable when he has his pain and anxiety medication.  Unfortunately we discussed with his underlying COPD how much his breathing will improve.  We had a further discussion about hospice.  His wife explains that she has started having discussions with her children about hospice.  She would like to set up a meeting with hospice.  She asked that I not bring up to her  at this time, because she does not feel like he can handle it right now.  She follows with [facility] and requested that she continue with [facility]'s hospice group.  Provided update to .  Support provided.  We will continue to follow.  Prognosis: Guarded    OBJECTIVE:     /71   Pulse 80   Temp 97.4 °F (36.3 °C) (Oral)   Resp 16   Ht 1.88 m (6' 2\")   Wt 110.4 kg (243 lb 4.8 oz)   SpO2 98%   BMI 31.24 kg/m²     Physical Examination:  Gen: elderly, awake, alert, 5 Li NC  HEENT: normocephalic, atraumatic  Neck: trachea midline, no JVD  Lungs: respirations diminished, dyspnea at rest  Heart: regular rate and rhythm, distant heart tones,   Abdomen: normoactive bowel sounds, soft, non-tender  Extremities: no clubbing, cyanosis or edema, moving all extremities    Skin: warm, dry without rashes, lesions, bruising  Neuro: awake, alert, oriented x 3, follows commands, no gross neurologic deficit    Objective data reviewed: labs, images, records, medication use, vitals, and chart    Time/Communication  Greater than 50% of time spent, total 50 minutes in counseling and coordination of care at the bedside regarding goals of care and symptom management.    Thank you for allowing Palliative Medicine to participate in the care of Chico Frias.    Note: This report was completed using [employer] voiced recognition software.  Every effort has been made to

## 2024-12-03 LAB
ANION GAP SERPL CALCULATED.3IONS-SCNC: 16 MMOL/L (ref 7–16)
BASOPHILS # BLD: 0 K/UL (ref 0–0.2)
BASOPHILS NFR BLD: 0 % (ref 0–2)
BUN SERPL-MCNC: 110 MG/DL (ref 6–23)
CALCIUM SERPL-MCNC: 9 MG/DL (ref 8.6–10.2)
CHLORIDE SERPL-SCNC: 92 MMOL/L (ref 98–107)
CO2 SERPL-SCNC: 27 MMOL/L (ref 22–29)
CREAT SERPL-MCNC: 2.4 MG/DL (ref 0.7–1.2)
EOSINOPHIL # BLD: 0 K/UL (ref 0.05–0.5)
EOSINOPHILS RELATIVE PERCENT: 0 % (ref 0–6)
ERYTHROCYTE [DISTWIDTH] IN BLOOD BY AUTOMATED COUNT: 14 % (ref 11.5–15)
GFR, ESTIMATED: 27 ML/MIN/1.73M2
GLUCOSE BLD-MCNC: 165 MG/DL (ref 74–99)
GLUCOSE BLD-MCNC: 221 MG/DL (ref 74–99)
GLUCOSE BLD-MCNC: 294 MG/DL (ref 74–99)
GLUCOSE BLD-MCNC: 70 MG/DL (ref 74–99)
GLUCOSE BLD-MCNC: 97 MG/DL (ref 74–99)
GLUCOSE SERPL-MCNC: 234 MG/DL (ref 74–99)
HCT VFR BLD AUTO: 33.1 % (ref 37–54)
HGB BLD-MCNC: 10.8 G/DL (ref 12.5–16.5)
LYMPHOCYTES NFR BLD: 0.22 K/UL (ref 1.5–4)
LYMPHOCYTES RELATIVE PERCENT: 3 % (ref 20–42)
MCH RBC QN AUTO: 28.9 PG (ref 26–35)
MCHC RBC AUTO-ENTMCNC: 32.6 G/DL (ref 32–34.5)
MCV RBC AUTO: 88.5 FL (ref 80–99.9)
METAMYELOCYTES ABSOLUTE COUNT: 0.22 K/UL (ref 0–0.12)
METAMYELOCYTES: 3 % (ref 0–1)
MONOCYTES NFR BLD: 0.15 K/UL (ref 0.1–0.95)
MONOCYTES NFR BLD: 2 % (ref 2–12)
MYELOCYTES ABSOLUTE COUNT: 0.07 K/UL
MYELOCYTES: 1 %
NEUTROPHILS NFR BLD: 92 % (ref 43–80)
NEUTS SEG NFR BLD: 7.93 K/UL (ref 1.8–7.3)
PH VENOUS: 7.39 (ref 7.35–7.45)
PLATELET # BLD AUTO: 90 K/UL (ref 130–450)
PLATELET CONFIRMATION: NORMAL
PMV BLD AUTO: 9.6 FL (ref 7–12)
POTASSIUM SERPL-SCNC: 4.4 MMOL/L (ref 3.5–5)
RBC # BLD AUTO: 3.74 M/UL (ref 3.8–5.8)
RBC # BLD: ABNORMAL 10*6/UL
SODIUM SERPL-SCNC: 135 MMOL/L (ref 132–146)
WBC OTHER # BLD: 8.6 K/UL (ref 4.5–11.5)

## 2024-12-03 PROCEDURE — 2060000000 HC ICU INTERMEDIATE R&B

## 2024-12-03 PROCEDURE — 6360000002 HC RX W HCPCS: Performed by: STUDENT IN AN ORGANIZED HEALTH CARE EDUCATION/TRAINING PROGRAM

## 2024-12-03 PROCEDURE — 6370000000 HC RX 637 (ALT 250 FOR IP): Performed by: STUDENT IN AN ORGANIZED HEALTH CARE EDUCATION/TRAINING PROGRAM

## 2024-12-03 PROCEDURE — 99232 SBSQ HOSP IP/OBS MODERATE 35: CPT | Performed by: INTERNAL MEDICINE

## 2024-12-03 PROCEDURE — 36415 COLL VENOUS BLD VENIPUNCTURE: CPT

## 2024-12-03 PROCEDURE — 82947 ASSAY GLUCOSE BLOOD QUANT: CPT

## 2024-12-03 PROCEDURE — 6360000002 HC RX W HCPCS: Performed by: INTERNAL MEDICINE

## 2024-12-03 PROCEDURE — 94660 CPAP INITIATION&MGMT: CPT

## 2024-12-03 PROCEDURE — 2700000000 HC OXYGEN THERAPY PER DAY

## 2024-12-03 PROCEDURE — 2580000003 HC RX 258: Performed by: STUDENT IN AN ORGANIZED HEALTH CARE EDUCATION/TRAINING PROGRAM

## 2024-12-03 PROCEDURE — 6370000000 HC RX 637 (ALT 250 FOR IP): Performed by: INTERNAL MEDICINE

## 2024-12-03 PROCEDURE — 2580000003 HC RX 258: Performed by: INTERNAL MEDICINE

## 2024-12-03 PROCEDURE — P9047 ALBUMIN (HUMAN), 25%, 50ML: HCPCS | Performed by: INTERNAL MEDICINE

## 2024-12-03 PROCEDURE — 82800 BLOOD PH: CPT

## 2024-12-03 PROCEDURE — 94640 AIRWAY INHALATION TREATMENT: CPT

## 2024-12-03 PROCEDURE — 85025 COMPLETE CBC W/AUTO DIFF WBC: CPT

## 2024-12-03 PROCEDURE — 80048 BASIC METABOLIC PNL TOTAL CA: CPT

## 2024-12-03 RX ORDER — HYDROXYZINE PAMOATE 25 MG/1
25 CAPSULE ORAL 4 TIMES DAILY PRN
Status: DISCONTINUED | OUTPATIENT
Start: 2024-12-03 | End: 2024-12-05 | Stop reason: HOSPADM

## 2024-12-03 RX ORDER — ALBUMIN (HUMAN) 12.5 G/50ML
25 SOLUTION INTRAVENOUS ONCE
Status: COMPLETED | OUTPATIENT
Start: 2024-12-03 | End: 2024-12-03

## 2024-12-03 RX ORDER — MIDODRINE HYDROCHLORIDE 5 MG/1
5 TABLET ORAL
Status: DISCONTINUED | OUTPATIENT
Start: 2024-12-03 | End: 2024-12-05 | Stop reason: HOSPADM

## 2024-12-03 RX ORDER — INSULIN LISPRO 100 [IU]/ML
0-12 INJECTION, SOLUTION INTRAVENOUS; SUBCUTANEOUS
Status: DISCONTINUED | OUTPATIENT
Start: 2024-12-03 | End: 2024-12-05 | Stop reason: HOSPADM

## 2024-12-03 RX ORDER — ENOXAPARIN SODIUM 100 MG/ML
30 INJECTION SUBCUTANEOUS 2 TIMES DAILY
Status: DISCONTINUED | OUTPATIENT
Start: 2024-12-03 | End: 2024-12-05 | Stop reason: HOSPADM

## 2024-12-03 RX ORDER — INSULIN LISPRO 100 [IU]/ML
8 INJECTION, SOLUTION INTRAVENOUS; SUBCUTANEOUS
Status: DISCONTINUED | OUTPATIENT
Start: 2024-12-04 | End: 2024-12-05 | Stop reason: HOSPADM

## 2024-12-03 RX ADMIN — ALBUMIN (HUMAN) 25 G: 0.25 INJECTION, SOLUTION INTRAVENOUS at 16:49

## 2024-12-03 RX ADMIN — MORPHINE SULFATE 4 MG: 10 SOLUTION ORAL at 16:49

## 2024-12-03 RX ADMIN — INSULIN GLARGINE 15 UNITS: 100 INJECTION, SOLUTION SUBCUTANEOUS at 08:30

## 2024-12-03 RX ADMIN — IPRATROPIUM BROMIDE AND ALBUTEROL SULFATE 1 DOSE: 2.5; .5 SOLUTION RESPIRATORY (INHALATION) at 03:32

## 2024-12-03 RX ADMIN — ISOSORBIDE MONONITRATE 30 MG: 30 TABLET, EXTENDED RELEASE ORAL at 08:29

## 2024-12-03 RX ADMIN — HYDROXYZINE PAMOATE 25 MG: 25 CAPSULE ORAL at 15:22

## 2024-12-03 RX ADMIN — FINASTERIDE 5 MG: 5 TABLET, FILM COATED ORAL at 08:28

## 2024-12-03 RX ADMIN — ARFORMOTEROL TARTRATE 15 MCG: 15 SOLUTION RESPIRATORY (INHALATION) at 21:31

## 2024-12-03 RX ADMIN — INSULIN LISPRO 10 UNITS: 100 INJECTION, SOLUTION INTRAVENOUS; SUBCUTANEOUS at 12:05

## 2024-12-03 RX ADMIN — IPRATROPIUM BROMIDE AND ALBUTEROL SULFATE 1 DOSE: 2.5; .5 SOLUTION RESPIRATORY (INHALATION) at 08:24

## 2024-12-03 RX ADMIN — FLUTICASONE PROPIONATE 1 SPRAY: 50 SPRAY, METERED NASAL at 08:29

## 2024-12-03 RX ADMIN — ENOXAPARIN SODIUM 30 MG: 100 INJECTION SUBCUTANEOUS at 20:02

## 2024-12-03 RX ADMIN — MIDODRINE HYDROCHLORIDE 5 MG: 5 TABLET ORAL at 16:43

## 2024-12-03 RX ADMIN — ARFORMOTEROL TARTRATE 15 MCG: 15 SOLUTION RESPIRATORY (INHALATION) at 08:24

## 2024-12-03 RX ADMIN — IPRATROPIUM BROMIDE AND ALBUTEROL SULFATE 1 DOSE: 2.5; .5 SOLUTION RESPIRATORY (INHALATION) at 23:43

## 2024-12-03 RX ADMIN — IPRATROPIUM BROMIDE AND ALBUTEROL SULFATE 1 DOSE: 2.5; .5 SOLUTION RESPIRATORY (INHALATION) at 21:31

## 2024-12-03 RX ADMIN — MORPHINE SULFATE 4 MG: 10 SOLUTION ORAL at 12:03

## 2024-12-03 RX ADMIN — INSULIN LISPRO 10 UNITS: 100 INJECTION, SOLUTION INTRAVENOUS; SUBCUTANEOUS at 08:30

## 2024-12-03 RX ADMIN — MORPHINE SULFATE 4 MG: 10 SOLUTION ORAL at 01:33

## 2024-12-03 RX ADMIN — ROFLUMILAST 500 MCG: 500 TABLET ORAL at 08:27

## 2024-12-03 RX ADMIN — SODIUM CHLORIDE, PRESERVATIVE FREE 10 ML: 5 INJECTION INTRAVENOUS at 08:31

## 2024-12-03 RX ADMIN — INSULIN LISPRO 9 UNITS: 100 INJECTION, SOLUTION INTRAVENOUS; SUBCUTANEOUS at 07:21

## 2024-12-03 RX ADMIN — IPRATROPIUM BROMIDE AND ALBUTEROL SULFATE 1 DOSE: 2.5; .5 SOLUTION RESPIRATORY (INHALATION) at 11:50

## 2024-12-03 RX ADMIN — PANTOPRAZOLE SODIUM 40 MG: 40 TABLET, DELAYED RELEASE ORAL at 07:23

## 2024-12-03 RX ADMIN — MORPHINE SULFATE 4 MG: 10 SOLUTION ORAL at 21:20

## 2024-12-03 RX ADMIN — TAMSULOSIN HYDROCHLORIDE 0.8 MG: 0.4 CAPSULE ORAL at 08:29

## 2024-12-03 RX ADMIN — ROSUVASTATIN CALCIUM 10 MG: 10 TABLET, FILM COATED ORAL at 08:38

## 2024-12-03 RX ADMIN — IPRATROPIUM BROMIDE AND ALBUTEROL SULFATE 1 DOSE: 2.5; .5 SOLUTION RESPIRATORY (INHALATION) at 00:10

## 2024-12-03 RX ADMIN — ASPIRIN 81 MG: 81 TABLET, COATED ORAL at 08:29

## 2024-12-03 RX ADMIN — INSULIN LISPRO 6 UNITS: 100 INJECTION, SOLUTION INTRAVENOUS; SUBCUTANEOUS at 11:02

## 2024-12-03 RX ADMIN — ENOXAPARIN SODIUM 30 MG: 100 INJECTION SUBCUTANEOUS at 08:29

## 2024-12-03 RX ADMIN — ALPRAZOLAM 0.5 MG: 0.25 TABLET ORAL at 09:42

## 2024-12-03 RX ADMIN — ALPRAZOLAM 0.5 MG: 0.25 TABLET ORAL at 03:41

## 2024-12-03 RX ADMIN — METHYLPREDNISOLONE SODIUM SUCCINATE 40 MG: 40 INJECTION INTRAMUSCULAR; INTRAVENOUS at 21:21

## 2024-12-03 RX ADMIN — METHYLPREDNISOLONE SODIUM SUCCINATE 40 MG: 40 INJECTION INTRAMUSCULAR; INTRAVENOUS at 08:29

## 2024-12-03 RX ADMIN — METOPROLOL TARTRATE 100 MG: 50 TABLET, FILM COATED ORAL at 08:28

## 2024-12-03 RX ADMIN — METOPROLOL TARTRATE 100 MG: 50 TABLET, FILM COATED ORAL at 20:01

## 2024-12-03 RX ADMIN — BUMETANIDE 0.5 MG/HR: 0.25 INJECTION INTRAMUSCULAR; INTRAVENOUS at 17:52

## 2024-12-03 ASSESSMENT — PAIN SCALES - GENERAL
PAINLEVEL_OUTOF10: 7
PAINLEVEL_OUTOF10: 0
PAINLEVEL_OUTOF10: 0
PAINLEVEL_OUTOF10: 7
PAINLEVEL_OUTOF10: 7
PAINLEVEL_OUTOF10: 0
PAINLEVEL_OUTOF10: 7

## 2024-12-03 ASSESSMENT — PAIN DESCRIPTION - DESCRIPTORS
DESCRIPTORS: ACHING;DISCOMFORT
DESCRIPTORS: ACHING;TIGHTNESS;DISCOMFORT
DESCRIPTORS: ACHING;DULL;DISCOMFORT
DESCRIPTORS: ACHING;DISCOMFORT

## 2024-12-03 ASSESSMENT — PAIN DESCRIPTION - LOCATION
LOCATION: RIB CAGE;BACK
LOCATION: RIB CAGE
LOCATION: CHEST
LOCATION: CHEST

## 2024-12-03 ASSESSMENT — PAIN DESCRIPTION - FREQUENCY: FREQUENCY: CONTINUOUS

## 2024-12-03 ASSESSMENT — PAIN DESCRIPTION - ORIENTATION
ORIENTATION: LEFT
ORIENTATION: MID

## 2024-12-03 ASSESSMENT — PAIN DESCRIPTION - ONSET: ONSET: ON-GOING

## 2024-12-03 ASSESSMENT — PAIN DESCRIPTION - PAIN TYPE: TYPE: ACUTE PAIN

## 2024-12-03 NOTE — PROGRESS NOTES
Department of Internal Medicine  Nephrology Progress Note      Events reviewed.     SUBJECTIVE: We are following Mr. Frias for GONZALES. Patient reports no new complaints today.     PHYSICAL EXAM:      Vitals:    VITALS:  /71   Pulse 90   Temp 97.6 °F (36.4 °C) (Oral)   Resp 24   Ht 1.88 m (6' 2\")   Wt 110.4 kg (243 lb 4.8 oz)   SpO2 91%   BMI 31.24 kg/m²   24HR INTAKE/OUTPUT:    Intake/Output Summary (Last 24 hours) at 12/3/2024 1340  Last data filed at 12/3/2024 0816  Gross per 24 hour   Intake 540 ml   Output 2250 ml   Net -1710 ml         Constitutional:  Awake, alert, oriented, in NAD  HEENT:  PERRLA, normocephalic, atraumatic  Respiratory: Diminished  Cardiovascular/Edema:  RRR, normal S1, normal S2,+++ edema  Gastrointestinal:  Soft, flat, non-distended, non-tender  Neurologic:  Nonfocal  Skin:  warm, dry, no rashes, no lesions      Scheduled Meds:   enoxaparin  30 mg SubCUTAneous BID    fluticasone  1 spray Each Nostril Daily    pantoprazole  40 mg Oral QAM AC    insulin glargine  15 Units SubCUTAneous BID    insulin lispro  10 Units SubCUTAneous TID WC    methylPREDNISolone  40 mg IntraVENous Q12H    insulin lispro  0-18 Units SubCUTAneous 4x Daily AC & HS    arformoterol tartrate  15 mcg Nebulization BID RT    ipratropium 0.5 mg-albuterol 2.5 mg  1 Dose Inhalation Q4H RT    sodium chloride flush  5-40 mL IntraVENous 2 times per day    aspirin  81 mg Oral Daily    finasteride  5 mg Oral Daily    isosorbide mononitrate  30 mg Oral Daily    metoprolol  100 mg Oral BID    Roflumilast  500 mcg Oral Daily    rosuvastatin  10 mg Oral Daily    tamsulosin  0.8 mg Oral Daily     Continuous Infusions:   dextrose      bumetanide (BUMEX) 12.5 mg in sodium chloride 0.9 % 125 mL infusion 0.5 mg/hr (12/02/24 1047)    sodium chloride       PRN Meds:.fluticasone, morphine, morphine, sodium chloride, glucose, dextrose bolus **OR** dextrose bolus, glucagon (rDNA), dextrose, sodium chloride flush, sodium chloride,  Sepsis

## 2024-12-03 NOTE — PROGRESS NOTES
[facility] Hospitalist Progress Note    Patient:  Chico Frias 73 y.o. male MRN: [id number]     Date of Service: 12/3/2024     CC: Dyspnea s/p fall and rib pain   Days since admission: 11/26/2024     Subjective   Overnight events: None     This morning the patient was seen and examined at bedside in no acute distress. He remains on 5 L. He was not able to answer any question today.     Objective     Physical Exam:  Vitals: /71   Pulse 90   Temp 97.6 °F (36.4 °C) (Oral)   Resp 24   Ht 1.88 m (6' 2\")   Wt 110.4 kg (243 lb 4.8 oz)   SpO2 91%   BMI 31.24 kg/m²     I & O - 24hr:   Intake/Output Summary (Last 24 hours) at 12/3/2024 1217  Last data filed at 12/3/2024 0816  Gross per 24 hour   Intake 540 ml   Output 2250 ml   Net -1710 ml      General Appearance: alert and appears stated age  Lung: rales bilaterally and rhonchi bilaterally  Heart: regular rate and rhythm, S1, S2 normal, no murmur, click, rub or gallop  Abdomen: soft, non-tender; bowel sounds normal; no masses,  no organomegaly  Extremities:  edema 3+ bilateral peripheral edema, right upper extremity, hands erythematous    Neurologic: Mental status: Alert, oriented, thought content appropriate  Subject  Pertinent Information & Imaging Studies, Consults   christi  CBC with Differential:    Lab Results   Component Value Date/Time    WBC 8.6 12/03/2024 04:35 AM    RBC 3.74 12/03/2024 04:35 AM    HGB 10.8 12/03/2024 04:35 AM    HCT 33.1 12/03/2024 04:35 AM    PLT 90 12/03/2024 04:35 AM    MCV 88.5 12/03/2024 04:35 AM    MCH 28.9 12/03/2024 04:35 AM    MCHC 32.6 12/03/2024 04:35 AM    RDW 14.0 12/03/2024 04:35 AM    NRBC 2 11/28/2024 01:50 AM    METASPCT 3 12/03/2024 04:35 AM    LYMPHOPCT 3 12/03/2024 04:35 AM    LYMPHOPCT 12.4 07/28/2021 04:28 AM    PROMYELOPCT 1 11/27/2024 04:31 AM    MONOPCT 2 12/03/2024 04:35 AM    MONOPCT 8.8 07/28/2021 04:28 AM    MYELOPCT 1 12/03/2024 04:35 AM    MYELOPCT 2.6 03/02/2021 06:15 AM    EOSPCT 0 12/03/2024 04:35  however, inadvertent computerized transcription errors may be present.

## 2024-12-03 NOTE — PROGRESS NOTES
Instructed pt/family to call me after pt voids so that bladder scan can be done. Pt voided several times but this RN was not told. Educated pt/family on need to inform this RN right after voiding. Pt/family verbalizing understanding at this time.

## 2024-12-03 NOTE — PROGRESS NOTES
Pulmonary Progress Note    Admit Date: 2024                            PCP: Jose Kwok MD  Principal Problem:    GONZALES (acute kidney injury) (HCC)  Active Problems:    Poorly controlled type 2 diabetes mellitus (HCC)    Multinodular goiter (nontoxic)    Dietary noncompliance    Steroid-induced hyperglycemia    Worsening renal function  Resolved Problems:    * No resolved hospital problems. *      Subjective:  Seen on 4-5L   Wife  and son at bedside  Wearing NIV all night   Complaining of nasal congestion,  however better than yesterday  Medications:   dextrose      bumetanide (BUMEX) 12.5 mg in sodium chloride 0.9 % 125 mL infusion 0.5 mg/hr (24 1047)    sodium chloride          enoxaparin  30 mg SubCUTAneous BID    fluticasone  1 spray Each Nostril Daily    pantoprazole  40 mg Oral QAM AC    insulin glargine  15 Units SubCUTAneous BID    insulin lispro  10 Units SubCUTAneous TID WC    methylPREDNISolone  40 mg IntraVENous Q12H    insulin lispro  0-18 Units SubCUTAneous 4x Daily AC & HS    arformoterol tartrate  15 mcg Nebulization BID RT    ipratropium 0.5 mg-albuterol 2.5 mg  1 Dose Inhalation Q4H RT    sodium chloride flush  5-40 mL IntraVENous 2 times per day    aspirin  81 mg Oral Daily    finasteride  5 mg Oral Daily    isosorbide mononitrate  30 mg Oral Daily    metoprolol  100 mg Oral BID    Roflumilast  500 mcg Oral Daily    rosuvastatin  10 mg Oral Daily    tamsulosin  0.8 mg Oral Daily       Vitals:  VITALS:  /71   Pulse 90   Temp 97.6 °F (36.4 °C) (Oral)   Resp 24   Ht 1.88 m (6' 2\")   Wt 110.4 kg (243 lb 4.8 oz)   SpO2 91%   BMI 31.24 kg/m²   24HR INTAKE/OUTPUT:    Intake/Output Summary (Last 24 hours) at 12/3/2024 1420  Last data filed at 12/3/2024 0816  Gross per 24 hour   Intake 540 ml   Output 2250 ml   Net -1710 ml     CURRENT PULSE OXIMETRY:  SpO2: 91 %  24HR PULSE OXIMETRY RANGE:  SpO2  Av.3 %  Min: 91 %  Max: 100 %  CVP:    VENT SETTINGS:      Additional  TECHNOLOGIST PROVIDED HISTORY: Reason for exam:->fall Decision Support Exception - unselect if not a suspected or confirmed emergency medical condition->Emergency Medical Condition (MA) FINDINGS: New confluent opacities are present in the left lower lobe related to atelectasis.  Additional areas of subsegmental atelectasis are present in the right lower lobe.  There is a stable focal nodular opacity located in the right upper lobe measuring 3 x 2.8 cm.  Subtle ground-glass centrilobular opacities are present in the left upper lobe.  No pleural effusion.  No pneumothorax.  The heart is normal in size.  No pericardial effusion. Atherosclerotic calcifications are associated with the coronary arteries. The thyroid gland is enlarged with multiple calcified nodules.  There are fractures involving visualized superior endplates of T6 and T7.     1. Fractures are seen involving visualized superior endplates of T6 and T7. CT thoracic spine recommended. 2. Masslike opacity is present in right upper lobe appearing similar compared to the previous examination which may represent focal subsegmental atelectasis or scarring.  Neoplastic etiology cannot be excluded. 3. New atelectasis is present in the left lower lobe as well as new subsegmental atelectasis in right lower lobe. 4. Nodular centrilobular opacities are located in the left upper lobe which may indicate bronchiolitis or viral pneumonia 5. Enlarged thyroid gland with multiple calcified nodules.  Nonemergent ultrasound recommended.     CT CERVICAL SPINE WO CONTRAST    Result Date: 11/26/2024  EXAMINATION: CT OF THE CERVICAL SPINE WITHOUT CONTRAST 11/26/2024 3:52 pm TECHNIQUE: CT of the cervical spine was performed without the administration of intravenous contrast. Multiplanar reformatted images are provided for review. Automated exposure control, iterative reconstruction, and/or weight based adjustment of the mA/kV was utilized to reduce the radiation dose to as low as

## 2024-12-03 NOTE — PROGRESS NOTES
ENDOCRINOLOGY PROGRESS NOTE      Date of admission: 11/26/2024  Date of service: 12/2/2024  Admitting physician: Brock Nolasco MD   Primary Care Physician: Jose Kwok MD  Consultant physician: Saad Montero MD     Reason for the consultation:  Uncontrolled DM, thyroid nodules    History of Present Illness:  The history is provided by the patient. Accuracy of the patient data is excellent    Chico Frias is a very pleasant 73 y.o. old male with PMH of diabetes mellitus type 2, ESRD, thyroid nodules, HLD, and other listed below admitted to [facility].  On 11/26/2024 because of mechanical fall, endocrine service was consulted for diabetes management.    Subjective  There is no acute events overnight, glucose level is variable but generally improving.  No hypoglycemia    Inpatient diet:   Carb Restricted diet     Point of care glucose monitoring   (Independently reviewed)   Recent Labs     11/30/24 2048 12/01/24  0539 12/01/24  1101 12/01/24  1700 12/01/24  2023 12/02/24  0513 12/02/24  1050 12/02/24  1556   POCGLU 245* 179* 222* 240* 361* 145* 368* 141*       Scheduled Meds:   fluticasone  1 spray Each Nostril Daily    enoxaparin  30 mg SubCUTAneous Daily    pantoprazole  40 mg Oral QAM AC    insulin glargine  15 Units SubCUTAneous BID    insulin lispro  10 Units SubCUTAneous TID WC    methylPREDNISolone  40 mg IntraVENous Q12H    insulin lispro  0-18 Units SubCUTAneous 4x Daily AC & HS    arformoterol tartrate  15 mcg Nebulization BID RT    ipratropium 0.5 mg-albuterol 2.5 mg  1 Dose Inhalation Q4H RT    sodium chloride flush  5-40 mL IntraVENous 2 times per day    aspirin  81 mg Oral Daily    finasteride  5 mg Oral Daily    isosorbide mononitrate  30 mg Oral Daily    metoprolol  100 mg Oral BID    Roflumilast  500 mcg Oral Daily    rosuvastatin  10 mg Oral Daily    tamsulosin  0.8 mg Oral Daily       PRN Meds:   fluticasone, 1 spray, Daily PRN  morphine, 4 mg, Q4H PRN  morphine, 1 mg, Q8H PRN  sodium chloride, 2  PELVIS WO CONTRAST Additional Contrast? None   Final Result   1. New compression fracture involving body of T7 with loss of height of   approximately 40%.  MRI thoracic spine recommended   2. Stable chronic fractures are seen involving T6, T9, T10, T11, and T12.   3. No acute process in the abdomen or pelvis.   4. Diverticulosis.   5. Splenomegaly.             Medical Records/Labs/Images review:   I personally reviewed and summarized previous records   All labs and imaging were reviewed independently     ASSESSMENT & PLAN   Chico Gallagherwig, a 73 y.o.-old male seen today for inpatient diabetes management    Diabetes Mellitus type 2, steroid induced hyperglycemia  Patient's diabetes is uncontrolled  Currently worsened with steroids  Glucose level still variable but generally improving  We recommend the following DM regimen  Lantus to 15 units twice daily  Humalog 10 units 3 times daily with meals  HDSSI  Continue glucose check with meals and at bedtime  Will titrate insulin dose based on the blood glucose trend & insulin requirement  Upon discharge, I will arrange for the patient to be seen in the endocrinology clinic for routine diabetes maintenance and prevention    Steroid-induced hyperglycemia  Adjusted insulin regimen as per above.  Will continue closely monitor glucose and adjust insulin accordingly.      Dietary noncompliance  Discussed with patient the importance of eating consistent carbohydrate meals, avoiding high glycemic index food. Also, discussed with patient the risk and negative consequences of dietary noncompliance on blood glucose control, blood pressure and weight    Thyroid nodules  Multiple nodules noted on thyroid ultrasound 3/1/21  3/11/21 FNA performed, benign  Recommended that patient undergo thyroid surgery however patient declined   We will continue monitoring with ultrasound    Interdisciplinary plan for communication with healthcare providers:   Consult recommendations were discussed with  the Primary Service/Nursing staff      The above issues were reviewed with the patient who understood and agreed with the plan.    Thank you for allowing us to participate in the care of this patient. Please do not hesitate to contact us with any additional questions.     Saad Montero MD  Endocrinologist, [facility]   [address] [facility], [address], [place] [address]  Phone: [phone]  Fax: [phone]  --------------------------  An electronic signature was used to authenticate this note. Saad Montero MD on 12/2/2024 at 7:44 PM

## 2024-12-03 NOTE — PLAN OF CARE
Problem: Chronic Conditions and Co-morbidities  Goal: Patient's chronic conditions and co-morbidity symptoms are monitored and maintained or improved  12/3/2024 0442 by Zuleima Altamirano RN  Outcome: Progressing  Flowsheets (Taken 12/3/2024 0442)  Care Plan - Patient's Chronic Conditions and Co-Morbidity Symptoms are Monitored and Maintained or Improved:   Monitor and assess patient's chronic conditions and comorbid symptoms for stability, deterioration, or improvement   Collaborate with multidisciplinary team to address chronic and comorbid conditions and prevent exacerbation or deterioration   Update acute care plan with appropriate goals if chronic or comorbid symptoms are exacerbated and prevent overall improvement and discharge     Problem: Pain  Goal: Verbalizes/displays adequate comfort level or baseline comfort level  12/3/2024 0442 by Zuleima Altamirano, RN  Outcome: Progressing  Flowsheets (Taken 12/3/2024 0442)  Verbalizes/displays adequate comfort level or baseline comfort level:   Encourage patient to monitor pain and request assistance   Assess pain using appropriate pain scale   Administer analgesics based on type and severity of pain and evaluate response   Implement non-pharmacological measures as appropriate and evaluate response     Problem: Safety - Adult  Goal: Free from fall injury  12/3/2024 0442 by Zuleima Altamirano, RN  Outcome: Progressing  Flowsheets (Taken 12/3/2024 0442)  Free From Fall Injury: Instruct family/caregiver on patient safety     Problem: ABCDS Injury Assessment  Goal: Absence of physical injury  12/3/2024 0442 by Zuleima Altamirano, RN  Outcome: Progressing  Flowsheets (Taken 12/3/2024 0442)  Absence of Physical Injury: Implement safety measures based on patient assessment

## 2024-12-03 NOTE — PROGRESS NOTES
ENDOCRINOLOGY PROGRESS NOTE      Date of admission: 11/26/2024  Date of service: 12/3/2024  Admitting physician: Brock Nolasco MD   Primary Care Physician: Jose Kwok MD  Consultant physician: Saad Montero MD     Reason for the consultation:  Uncontrolled DM, thyroid nodules    History of Present Illness:  The history is provided by the patient. Accuracy of the patient data is excellent    Chico Frias is a very pleasant 73 y.o. old male with PMH of diabetes mellitus type 2, ESRD, thyroid nodules, HLD, and other listed below admitted to [facility].  On 11/26/2024 because of mechanical fall, endocrine service was consulted for diabetes management.    Subjective  I saw and examined the patient this evening, no acute events overnight, family at bedside.  His appetite is still poor    Inpatient diet:   Carb Restricted diet     Point of care glucose monitoring   (Independently reviewed)   Recent Labs     12/02/24  0513 12/02/24  1050 12/02/24  1556 12/02/24  2125 12/03/24  0629 12/03/24  1036 12/03/24  1626 12/03/24  1745   POCGLU 145* 368* 141* 256* 294* 221* 70* 97       Scheduled Meds:   enoxaparin  30 mg SubCUTAneous BID    midodrine  5 mg Oral TID WC    [START ON 12/4/2024] insulin lispro  8 Units SubCUTAneous TID WC    insulin lispro  0-12 Units SubCUTAneous 4x Daily AC & HS    fluticasone  1 spray Each Nostril Daily    pantoprazole  40 mg Oral QAM AC    insulin glargine  15 Units SubCUTAneous BID    methylPREDNISolone  40 mg IntraVENous Q12H    arformoterol tartrate  15 mcg Nebulization BID RT    ipratropium 0.5 mg-albuterol 2.5 mg  1 Dose Inhalation Q4H RT    sodium chloride flush  5-40 mL IntraVENous 2 times per day    aspirin  81 mg Oral Daily    finasteride  5 mg Oral Daily    isosorbide mononitrate  30 mg Oral Daily    metoprolol  100 mg Oral BID    Roflumilast  500 mcg Oral Daily    rosuvastatin  10 mg Oral Daily    tamsulosin  0.8 mg Oral Daily       PRN Meds:   hydrOXYzine pamoate, 25 mg, 4x Daily

## 2024-12-03 NOTE — PROGRESS NOTES
Nutrition Note     Reason for Visit:   Length of Stay    Nutrition Assessment:  Pt w/ GONZALES, end-stage COPD exacerbation. Hospice consulted. Hx DM, CHF, CAD, COPD. Currently consuming >50% of meals. Continue current diet and monitor.     Current Nutrition Therapies:    ADULT DIET; Regular; Low Potassium (Less than 3000 mg/day)    Anthropometrics:   Current Height: 188 cm (6' 2\")  Current Weight - Scale: 110.4 kg (243 lb 4.8 oz)      Monitoring and Evaluation:  No nutrition diagnosis. Patient will be monitored per nutrition standards of care.     Consult Dietitian if nutrition intervention essential to patient care is needed.     Discharge Planning:  No needs    JAMEE MONTOYA MPH, RD, LD  [phone]

## 2024-12-03 NOTE — CARE COORDINATION
Social Work / Discharge Planning : SW updated patient / family meeting today with [facility]. Await result of meeting. SW to follow. Electronically signed by ELIZABETH Gupta on 12/3/24 at 11:11 AM EST

## 2024-12-03 NOTE — PROGRESS NOTES
This patient is on medication that requires renal, weight, and/or indication dose adjustment.      Date Body Weight IBW  Adjusted BW SCr  CrCl Dialysis status   12/3/2024 110.4 kg (243 lb 4.8 oz) Ideal body weight: 82.2 kg (181 lb 3.5 oz)  Adjusted ideal body weight: 93.5 kg (206 lb 0.8 oz) Serum creatinine: 2.4 mg/dL (H) 12/03/24 0435  Estimated creatinine clearance: 35 mL/min (A) N/a       Pharmacy has dose-adjusted the following medication(s):    Date Previous Order Adjusted Order   12/3/2024 Lovenox 30 mg daily Lovenox 30 mg bid       These changes were made per protocol according to the [facility]   Automatic Renal Dose Adjustment Policy.     *Please note this dose may need readjusted if patient's condition changes.    Please contact pharmacy with any questions regarding these changes.    Laverne Rogel RPH  12/3/2024  7:00 AM

## 2024-12-03 NOTE — PROGRESS NOTES
[facility] Quality Flow/Interdisciplinary Rounds Progress Note        Quality Flow Rounds held on December 3, 2024    Disciplines Attending:  Bedside Nurse, , , and Nursing Unit Leadership    Chico Frias was admitted on 11/26/2024 12:13 PM    Anticipated Discharge Date:  Expected Discharge Date: 11/29/24    Disposition:    Bon Score:  Bon Scale Score: 16    Readmission Risk              Risk of Unplanned Readmission:  34           Discussed patient goal for the day, patient clinical progression, and barriers to discharge.  The following Goal(s) of the Day/Commitment(s) have been identified:   hospice meeting today, iv steroids, bumex gtt, wean oxygen as able.       Joyce Ba RN  December 3, 2024

## 2024-12-04 LAB
ANION GAP SERPL CALCULATED.3IONS-SCNC: 17 MMOL/L (ref 7–16)
BASOPHILS # BLD: 0 K/UL (ref 0–0.2)
BASOPHILS NFR BLD: 0 % (ref 0–2)
BUN SERPL-MCNC: 109 MG/DL (ref 6–23)
CALCIUM SERPL-MCNC: 9.1 MG/DL (ref 8.6–10.2)
CHLORIDE SERPL-SCNC: 90 MMOL/L (ref 98–107)
CO2 SERPL-SCNC: 32 MMOL/L (ref 22–29)
CREAT SERPL-MCNC: 2.7 MG/DL (ref 0.7–1.2)
EOSINOPHIL # BLD: 0 K/UL (ref 0.05–0.5)
EOSINOPHILS RELATIVE PERCENT: 0 % (ref 0–6)
ERYTHROCYTE [DISTWIDTH] IN BLOOD BY AUTOMATED COUNT: 13.8 % (ref 11.5–15)
GFR, ESTIMATED: 24 ML/MIN/1.73M2
GLUCOSE BLD-MCNC: 185 MG/DL (ref 74–99)
GLUCOSE BLD-MCNC: 239 MG/DL (ref 74–99)
GLUCOSE BLD-MCNC: 250 MG/DL (ref 74–99)
GLUCOSE BLD-MCNC: 263 MG/DL (ref 74–99)
GLUCOSE BLD-MCNC: 293 MG/DL (ref 74–99)
GLUCOSE SERPL-MCNC: 285 MG/DL (ref 74–99)
HCT VFR BLD AUTO: 31.7 % (ref 37–54)
HGB BLD-MCNC: 10.2 G/DL (ref 12.5–16.5)
LYMPHOCYTES NFR BLD: 0.35 K/UL (ref 1.5–4)
LYMPHOCYTES RELATIVE PERCENT: 4 % (ref 20–42)
MCH RBC QN AUTO: 28.3 PG (ref 26–35)
MCHC RBC AUTO-ENTMCNC: 32.2 G/DL (ref 32–34.5)
MCV RBC AUTO: 87.8 FL (ref 80–99.9)
MONOCYTES NFR BLD: 0.28 K/UL (ref 0.1–0.95)
MONOCYTES NFR BLD: 4 % (ref 2–12)
NEUTROPHILS NFR BLD: 92 % (ref 43–80)
NEUTS SEG NFR BLD: 7.36 K/UL (ref 1.8–7.3)
NUCLEATED RED BLOOD CELLS: 1 PER 100 WBC
PLATELET # BLD AUTO: 83 K/UL (ref 130–450)
PLATELET CONFIRMATION: NORMAL
PMV BLD AUTO: 10.5 FL (ref 7–12)
POTASSIUM SERPL-SCNC: 4.2 MMOL/L (ref 3.5–5)
RBC # BLD AUTO: 3.61 M/UL (ref 3.8–5.8)
RBC # BLD: ABNORMAL 10*6/UL
SODIUM SERPL-SCNC: 139 MMOL/L (ref 132–146)
WBC OTHER # BLD: 8 K/UL (ref 4.5–11.5)

## 2024-12-04 PROCEDURE — 2700000000 HC OXYGEN THERAPY PER DAY

## 2024-12-04 PROCEDURE — 6360000002 HC RX W HCPCS: Performed by: INTERNAL MEDICINE

## 2024-12-04 PROCEDURE — 6370000000 HC RX 637 (ALT 250 FOR IP): Performed by: INTERNAL MEDICINE

## 2024-12-04 PROCEDURE — 99232 SBSQ HOSP IP/OBS MODERATE 35: CPT | Performed by: INTERNAL MEDICINE

## 2024-12-04 PROCEDURE — 6370000000 HC RX 637 (ALT 250 FOR IP): Performed by: STUDENT IN AN ORGANIZED HEALTH CARE EDUCATION/TRAINING PROGRAM

## 2024-12-04 PROCEDURE — 2580000003 HC RX 258: Performed by: STUDENT IN AN ORGANIZED HEALTH CARE EDUCATION/TRAINING PROGRAM

## 2024-12-04 PROCEDURE — 2060000000 HC ICU INTERMEDIATE R&B

## 2024-12-04 PROCEDURE — 82947 ASSAY GLUCOSE BLOOD QUANT: CPT

## 2024-12-04 PROCEDURE — 85025 COMPLETE CBC W/AUTO DIFF WBC: CPT

## 2024-12-04 PROCEDURE — 80048 BASIC METABOLIC PNL TOTAL CA: CPT

## 2024-12-04 PROCEDURE — 2580000003 HC RX 258: Performed by: INTERNAL MEDICINE

## 2024-12-04 PROCEDURE — 94660 CPAP INITIATION&MGMT: CPT

## 2024-12-04 PROCEDURE — 94640 AIRWAY INHALATION TREATMENT: CPT

## 2024-12-04 PROCEDURE — 6360000002 HC RX W HCPCS: Performed by: STUDENT IN AN ORGANIZED HEALTH CARE EDUCATION/TRAINING PROGRAM

## 2024-12-04 PROCEDURE — 36415 COLL VENOUS BLD VENIPUNCTURE: CPT

## 2024-12-04 RX ORDER — SENNOSIDES A AND B 8.6 MG/1
1 TABLET, FILM COATED ORAL NIGHTLY
Status: DISCONTINUED | OUTPATIENT
Start: 2024-12-04 | End: 2024-12-05 | Stop reason: HOSPADM

## 2024-12-04 RX ORDER — POLYETHYLENE GLYCOL 3350 17 G/17G
17 POWDER, FOR SOLUTION ORAL DAILY
Status: DISCONTINUED | OUTPATIENT
Start: 2024-12-04 | End: 2024-12-05 | Stop reason: HOSPADM

## 2024-12-04 RX ADMIN — ASPIRIN 81 MG: 81 TABLET, COATED ORAL at 09:12

## 2024-12-04 RX ADMIN — ISOSORBIDE MONONITRATE 30 MG: 30 TABLET, EXTENDED RELEASE ORAL at 09:12

## 2024-12-04 RX ADMIN — METOPROLOL TARTRATE 100 MG: 50 TABLET, FILM COATED ORAL at 09:12

## 2024-12-04 RX ADMIN — INSULIN LISPRO 8 UNITS: 100 INJECTION, SOLUTION INTRAVENOUS; SUBCUTANEOUS at 11:47

## 2024-12-04 RX ADMIN — METOPROLOL TARTRATE 100 MG: 50 TABLET, FILM COATED ORAL at 19:50

## 2024-12-04 RX ADMIN — ROSUVASTATIN CALCIUM 10 MG: 10 TABLET, FILM COATED ORAL at 09:12

## 2024-12-04 RX ADMIN — METHYLPREDNISOLONE SODIUM SUCCINATE 40 MG: 40 INJECTION INTRAMUSCULAR; INTRAVENOUS at 19:50

## 2024-12-04 RX ADMIN — HYDROXYZINE PAMOATE 25 MG: 25 CAPSULE ORAL at 04:30

## 2024-12-04 RX ADMIN — MAJOR MAGNESIUM CITRATE ORAL SOLUTION - LEMON 296 ML: 1.75 LIQUID ORAL at 16:08

## 2024-12-04 RX ADMIN — HYDROXYZINE PAMOATE 25 MG: 25 CAPSULE ORAL at 17:49

## 2024-12-04 RX ADMIN — SENNOSIDES 8.6 MG: 8.6 TABLET, COATED ORAL at 15:33

## 2024-12-04 RX ADMIN — ARFORMOTEROL TARTRATE 15 MCG: 15 SOLUTION RESPIRATORY (INHALATION) at 20:04

## 2024-12-04 RX ADMIN — TAMSULOSIN HYDROCHLORIDE 0.8 MG: 0.4 CAPSULE ORAL at 09:13

## 2024-12-04 RX ADMIN — IPRATROPIUM BROMIDE AND ALBUTEROL SULFATE 1 DOSE: 2.5; .5 SOLUTION RESPIRATORY (INHALATION) at 03:59

## 2024-12-04 RX ADMIN — INSULIN LISPRO 4 UNITS: 100 INJECTION, SOLUTION INTRAVENOUS; SUBCUTANEOUS at 16:50

## 2024-12-04 RX ADMIN — INSULIN LISPRO 4 UNITS: 100 INJECTION, SOLUTION INTRAVENOUS; SUBCUTANEOUS at 09:30

## 2024-12-04 RX ADMIN — ALPRAZOLAM 0.5 MG: 0.25 TABLET ORAL at 21:06

## 2024-12-04 RX ADMIN — MORPHINE SULFATE 4 MG: 10 SOLUTION ORAL at 15:42

## 2024-12-04 RX ADMIN — ARFORMOTEROL TARTRATE 15 MCG: 15 SOLUTION RESPIRATORY (INHALATION) at 09:45

## 2024-12-04 RX ADMIN — MIDODRINE HYDROCHLORIDE 5 MG: 5 TABLET ORAL at 11:59

## 2024-12-04 RX ADMIN — MORPHINE SULFATE 4 MG: 10 SOLUTION ORAL at 03:52

## 2024-12-04 RX ADMIN — IPRATROPIUM BROMIDE AND ALBUTEROL SULFATE 1 DOSE: 2.5; .5 SOLUTION RESPIRATORY (INHALATION) at 09:45

## 2024-12-04 RX ADMIN — INSULIN GLARGINE 15 UNITS: 100 INJECTION, SOLUTION SUBCUTANEOUS at 19:59

## 2024-12-04 RX ADMIN — INSULIN LISPRO 6 UNITS: 100 INJECTION, SOLUTION INTRAVENOUS; SUBCUTANEOUS at 11:48

## 2024-12-04 RX ADMIN — INSULIN LISPRO 8 UNITS: 100 INJECTION, SOLUTION INTRAVENOUS; SUBCUTANEOUS at 09:31

## 2024-12-04 RX ADMIN — MORPHINE SULFATE 4 MG: 10 SOLUTION ORAL at 19:50

## 2024-12-04 RX ADMIN — INSULIN LISPRO 8 UNITS: 100 INJECTION, SOLUTION INTRAVENOUS; SUBCUTANEOUS at 16:50

## 2024-12-04 RX ADMIN — PANTOPRAZOLE SODIUM 40 MG: 40 TABLET, DELAYED RELEASE ORAL at 06:30

## 2024-12-04 RX ADMIN — ENOXAPARIN SODIUM 30 MG: 100 INJECTION SUBCUTANEOUS at 19:50

## 2024-12-04 RX ADMIN — MINERAL OIL 1 ENEMA: 100 ENEMA RECTAL at 18:08

## 2024-12-04 RX ADMIN — MORPHINE SULFATE 4 MG: 10 SOLUTION ORAL at 09:14

## 2024-12-04 RX ADMIN — IPRATROPIUM BROMIDE AND ALBUTEROL SULFATE 1 DOSE: 2.5; .5 SOLUTION RESPIRATORY (INHALATION) at 20:04

## 2024-12-04 RX ADMIN — SODIUM CHLORIDE, PRESERVATIVE FREE 10 ML: 5 INJECTION INTRAVENOUS at 19:50

## 2024-12-04 RX ADMIN — POLYETHYLENE GLYCOL 3350 17 G: 17 POWDER, FOR SOLUTION ORAL at 15:33

## 2024-12-04 RX ADMIN — SODIUM CHLORIDE, PRESERVATIVE FREE 10 ML: 5 INJECTION INTRAVENOUS at 09:37

## 2024-12-04 RX ADMIN — ENOXAPARIN SODIUM 30 MG: 100 INJECTION SUBCUTANEOUS at 09:13

## 2024-12-04 RX ADMIN — IPRATROPIUM BROMIDE AND ALBUTEROL SULFATE 1 DOSE: 2.5; .5 SOLUTION RESPIRATORY (INHALATION) at 23:45

## 2024-12-04 RX ADMIN — ALPRAZOLAM 0.5 MG: 0.25 TABLET ORAL at 10:52

## 2024-12-04 RX ADMIN — IPRATROPIUM BROMIDE AND ALBUTEROL SULFATE 1 DOSE: 2.5; .5 SOLUTION RESPIRATORY (INHALATION) at 16:09

## 2024-12-04 RX ADMIN — ALPRAZOLAM 0.5 MG: 0.25 TABLET ORAL at 00:09

## 2024-12-04 RX ADMIN — BUMETANIDE 0.5 MG/HR: 0.25 INJECTION INTRAMUSCULAR; INTRAVENOUS at 19:11

## 2024-12-04 RX ADMIN — ROFLUMILAST 500 MCG: 500 TABLET ORAL at 09:11

## 2024-12-04 RX ADMIN — FINASTERIDE 5 MG: 5 TABLET, FILM COATED ORAL at 09:13

## 2024-12-04 RX ADMIN — METHYLPREDNISOLONE SODIUM SUCCINATE 40 MG: 40 INJECTION INTRAMUSCULAR; INTRAVENOUS at 09:13

## 2024-12-04 RX ADMIN — INSULIN GLARGINE 15 UNITS: 100 INJECTION, SOLUTION SUBCUTANEOUS at 09:33

## 2024-12-04 RX ADMIN — MIDODRINE HYDROCHLORIDE 5 MG: 5 TABLET ORAL at 09:12

## 2024-12-04 RX ADMIN — INSULIN LISPRO 2 UNITS: 100 INJECTION, SOLUTION INTRAVENOUS; SUBCUTANEOUS at 19:59

## 2024-12-04 ASSESSMENT — PAIN SCALES - GENERAL
PAINLEVEL_OUTOF10: 6
PAINLEVEL_OUTOF10: 7

## 2024-12-04 ASSESSMENT — PAIN DESCRIPTION - DESCRIPTORS
DESCRIPTORS: STABBING;SORE;SHOOTING
DESCRIPTORS: HEAVINESS;PRESSURE
DESCRIPTORS: ACHING;DISCOMFORT
DESCRIPTORS: ACHING;DISCOMFORT

## 2024-12-04 ASSESSMENT — PAIN DESCRIPTION - ORIENTATION
ORIENTATION: LEFT
ORIENTATION: UPPER;LEFT

## 2024-12-04 ASSESSMENT — PAIN DESCRIPTION - DIRECTION: RADIATING_TOWARDS: BACK

## 2024-12-04 ASSESSMENT — PAIN DESCRIPTION - LOCATION
LOCATION: CHEST
LOCATION: RIB CAGE
LOCATION: CHEST
LOCATION: RIB CAGE

## 2024-12-04 ASSESSMENT — PAIN DESCRIPTION - FREQUENCY: FREQUENCY: CONTINUOUS

## 2024-12-04 ASSESSMENT — PAIN DESCRIPTION - ONSET: ONSET: ON-GOING

## 2024-12-04 NOTE — FLOWSHEET NOTE
Inpatient Wound Care (initial consult) 642    Admit Date: 11/26/2024 12:13 PM    Reason for consult:  right arm    Patient sitting up in bed, awake, alert and oriented. Assist of two people to roll. Son and spouse at bedside.     Significant history:  per H&P    CHIEF COMPLAINT: Fall, rib pain and shortness of breath     History of Present Illness: 73-year-old gentleman with past medical history significant for diabetes mellitus type 2, GERD, hyperlipidemia and end-stage renal disease on palliative care presented to ED with difficulty breathing after fall and rib pain.    Past Medical History:   Diagnosis Date    COPD (chronic obstructive pulmonary disease) (HCC)     Diabetes mellitus (HCC)     GERD (gastroesophageal reflux disease)     Hyperlipidemia      Findings:     12/04/24 1622   Skin Integumentary    Skin Integrity Ecchymosis;Other (comment)  (intact dry scabs)   Location BUE, BLE   Skin Integrity Site 2   Skin Integrity Location 2 Vascular discoloration   Location 2 BLE   Skin Integrity Site 3   Skin Integrity Location 3 Other (comment)  (intact dry scab, rest is intact blanching on right heel)    Location 3 intact blanching left heel   Skin Integrity Site 4   Skin Integrity Location 4 Blister;Erosion/denuded;Other (comment);Redness  (with blanching, chronic skin irritation, open serous filled blister, old healed scars)   Location 4 left buttock   Skin Integrity Site 5   Skin Integrity Location 5 Other (comment);Redness  (blanching, chronic skin irritation, old healed scarring)   Location 5 right buttock   Wound 11/29/24 Arm Right;Lower   Date First Assessed/Time First Assessed: 11/29/24 2300   Present on Original Admission: No  Primary Wound Type: Skin Tear  Location: Arm  Wound Location Orientation: Right;Lower   Wound Image    Wound Etiology Skin Tear   Dressing Status New dressing applied   Wound Cleansed Cleansed with saline   Dressing/Treatment ABD;Roll gauze;Tubular bandage  (Versatel reninforced,)  Description Sanguinous   Odor None   Talya-wound Assessment Ecchymosis;Fragile   Wound 12/03/24 Leg Right;Lower;Anterior   Date First Assessed/Time First Assessed: 12/03/24 2000   Present on Original Admission: No  Primary Wound Type: Skin Tear  Location: Leg  Wound Location Orientation: Right;Lower;Anterior   Wound Image    Wound Etiology Skin Tear   Dressing Status Reinforced dressing   Wound Cleansed Cleansed with saline   Dressing/Treatment   (Versatel reinforced)   Wound Length (cm) 1 cm   Wound Width (cm) 1 cm   Wound Depth (cm) 0.1 cm   Wound Surface Area (cm^2) 1 cm^2   Wound Volume (cm^3) 0.1 cm^3   Wound Assessment   (intact dry scab)   Drainage Amount None (dry)   Talya-wound Assessment Ecchymosis;Fragile    Bilateral buttocks     Right medial heel      **Informed Consent**    The patient has given verbal consent to have photos taken of wounds and inserted into their chart as part of their permanent medical record for purposes of documentation, treatment management and/or medical review.   All Images taken on 12/4/24 of patient name: Chico Frias were transmitted and stored on secured Epic  Site located within Media Folder Tab by a registered [employer] Mobile Application Device.     Plan: Versatel to right and left hand, right arm and right lower leg  Aquaphor to bilateral buttocks  Comfort glide  Wedges  Heel protectors  Dietary consult  Patient will need continued preventative care    Alesia Connelly RN 12/4/2024 5:44 PM

## 2024-12-04 NOTE — PROGRESS NOTES
Pulmonary Progress Note    Admit Date: 2024                            PCP: Jose Kwok MD  Principal Problem:    GONZALES (acute kidney injury) (HCC)  Active Problems:    Poorly controlled type 2 diabetes mellitus (HCC)    Multinodular goiter (nontoxic)    Dietary noncompliance    Steroid-induced hyperglycemia    Worsening renal function  Resolved Problems:    * No resolved hospital problems. *      Subjective:  Remains on 5L NC saturations 98%  Weak today  Shortness of breath remains unchanged  Wore NIV x 5 hrs overnight   Wife at bedside       Medications:   dextrose      bumetanide (BUMEX) 12.5 mg in sodium chloride 0.9 % 125 mL infusion 0.5 mg/hr (24 1752)    sodium chloride          enoxaparin  30 mg SubCUTAneous BID    midodrine  5 mg Oral TID WC    insulin lispro  8 Units SubCUTAneous TID WC    insulin lispro  0-12 Units SubCUTAneous 4x Daily AC & HS    fluticasone  1 spray Each Nostril Daily    pantoprazole  40 mg Oral QAM AC    insulin glargine  15 Units SubCUTAneous BID    methylPREDNISolone  40 mg IntraVENous Q12H    arformoterol tartrate  15 mcg Nebulization BID RT    ipratropium 0.5 mg-albuterol 2.5 mg  1 Dose Inhalation Q4H RT    sodium chloride flush  5-40 mL IntraVENous 2 times per day    aspirin  81 mg Oral Daily    finasteride  5 mg Oral Daily    isosorbide mononitrate  30 mg Oral Daily    metoprolol  100 mg Oral BID    Roflumilast  500 mcg Oral Daily    rosuvastatin  10 mg Oral Daily    tamsulosin  0.8 mg Oral Daily       Vitals:  VITALS:  /72   Pulse 92   Temp 96.9 °F (36.1 °C) (Axillary)   Resp 19   Ht 1.88 m (6' 2\")   Wt 110.4 kg (243 lb 4.8 oz)   SpO2 98%   BMI 31.24 kg/m²   24HR INTAKE/OUTPUT:    Intake/Output Summary (Last 24 hours) at 2024 1054  Last data filed at 2024 0932  Gross per 24 hour   Intake --   Output 700 ml   Net -700 ml     CURRENT PULSE OXIMETRY:  SpO2: 98 %  24HR PULSE OXIMETRY RANGE:  SpO2  Av.5 %  Min: 91 %  Max: 98 %  CVP:   reduce the radiation dose to as low as reasonably achievable. COMPARISON: None. HISTORY: ORDERING SYSTEM PROVIDED HISTORY: fall TECHNOLOGIST PROVIDED HISTORY: Reason for exam:->fall Decision Support Exception - unselect if not a suspected or confirmed emergency medical condition->Emergency Medical Condition (MA) FINDINGS: BONES/ALIGNMENT: There is no acute fracture or traumatic malalignment. DEGENERATIVE CHANGES: Moderate disc space narrowing is present at C3/C4, C4/C5, and C5/C6.  Degenerative posterior disc/osteophyte complexes are also present at these levels resulting in moderate effacement of the ventral thecal sac. SOFT TISSUES: There is no prevertebral soft tissue swelling.  The thyroid gland is enlarged with multiple partially calcified nodules.     1.  No acute abnormality of the cervical spine. 2.  Enlarged thyroid gland with multiple partially calcified nodules. Nonemergent ultrasound recommended.     CT HEAD WO CONTRAST    Result Date: 11/26/2024  THERE SIGNS OF BILATERAL LENS REPLACEMENT.  EXAMINATION: CT OF THE HEAD WITHOUT CONTRAST  11/26/2024 3:52 pm TECHNIQUE: CT of the head was performed without the administration of intravenous contrast. Automated exposure control, iterative reconstruction, and/or weight based adjustment of the mA/kV was utilized to reduce the radiation dose to as low as reasonably achievable. COMPARISON: None. HISTORY: ORDERING SYSTEM PROVIDED HISTORY: fall TECHNOLOGIST PROVIDED HISTORY: Has a \"code stroke\" or \"stroke alert\" been called?->No Reason for exam:->fall Decision Support Exception - unselect if not a suspected or confirmed emergency medical condition->Emergency Medical Condition (MA) FINDINGS: BRAIN/VENTRICLES: There is no acute intracranial hemorrhage, mass effect or midline shift.  No abnormal extra-axial fluid collection.  There is a tiny lacunar infarct adjacent to left caudate nucleus.  This appears remote the gray-white differentiation is maintained without

## 2024-12-04 NOTE — PROGRESS NOTES
P Quality Flow/Interdisciplinary Rounds Progress Note        Quality Flow Rounds held on December 4, 2024    Disciplines Attending:  Bedside Nurse, , , and Nursing Unit Leadership    Chico Frias was admitted on 11/26/2024 12:13 PM    Anticipated Discharge Date:  Expected Discharge Date: 12/04/24    Disposition:    Bon Score:  Bon Scale Score: 15    Readmission Risk              Risk of Unplanned Readmission:  35           Discussed patient goal for the day, patient clinical progression, and barriers to discharge.  The following Goal(s) of the Day/Commitment(s) have been identified:  bumex gtt await hospice plan.      Joyce Ba RN  December 4, 2024

## 2024-12-04 NOTE — PROGRESS NOTES
NOTE: This report was transcribed using voice recognition software. Every effort was made to ensure accuracy; however, inadvertent computerized transcription errors may be present.

## 2024-12-04 NOTE — PROGRESS NOTES
ENDOCRINOLOGY PROGRESS NOTE      Date of admission: 11/26/2024  Date of service: 12/4/2024  Admitting physician: Brock Nolasco MD   Primary Care Physician: Jose Kwok MD  Consultant physician: Saad Montero MD     Reason for the consultation:  Uncontrolled DM, thyroid nodules    History of Present Illness:  The history is provided by the patient. Accuracy of the patient data is excellent    Chico Frias is a very pleasant 73 y.o. old male with PMH of diabetes mellitus type 2, ESRD, thyroid nodules, HLD, and other listed below admitted to [facility].  On 11/26/2024 because of mechanical fall, endocrine service was consulted for diabetes management.    Subjective  I saw and examined the patient this evening, no acute events overnight, family at bedside.  His appetite is still poor    Inpatient diet:   Carb Restricted diet     Point of care glucose monitoring   (Independently reviewed)   Recent Labs     12/03/24  1036 12/03/24  1626 12/03/24  1745 12/03/24  1959 12/04/24  0628 12/04/24  0929 12/04/24  1146 12/04/24  1648   POCGLU 221* 70* 97 165* 263* 250* 293* 239*       Scheduled Meds:   polyethylene glycol  17 g Oral Daily    senna  1 tablet Oral Nightly    enoxaparin  30 mg SubCUTAneous BID    midodrine  5 mg Oral TID WC    insulin lispro  8 Units SubCUTAneous TID WC    insulin lispro  0-12 Units SubCUTAneous 4x Daily AC & HS    fluticasone  1 spray Each Nostril Daily    pantoprazole  40 mg Oral QAM AC    insulin glargine  15 Units SubCUTAneous BID    methylPREDNISolone  40 mg IntraVENous Q12H    arformoterol tartrate  15 mcg Nebulization BID RT    ipratropium 0.5 mg-albuterol 2.5 mg  1 Dose Inhalation Q4H RT    sodium chloride flush  5-40 mL IntraVENous 2 times per day    aspirin  81 mg Oral Daily    finasteride  5 mg Oral Daily    isosorbide mononitrate  30 mg Oral Daily    metoprolol  100 mg Oral BID    Roflumilast  500 mcg Oral Daily    rosuvastatin  10 mg Oral Daily    tamsulosin  0.8 mg Oral Daily       PRN

## 2024-12-04 NOTE — PROGRESS NOTES
Department of Internal Medicine  Nephrology Progress Note      Events reviewed.     SUBJECTIVE: We are following Mr. Frias for GONZALES. Patient reports no new complaints today.     PHYSICAL EXAM:      Vitals:    VITALS:  /72   Pulse 92   Temp 96.9 °F (36.1 °C) (Axillary)   Resp 19   Ht 1.88 m (6' 2\")   Wt 110.4 kg (243 lb 4.8 oz)   SpO2 98%   BMI 31.24 kg/m²   24HR INTAKE/OUTPUT:  No intake or output data in the 24 hours ending 12/04/24 0917        Constitutional:  Awake, alert, oriented, in NAD  HEENT:  PERRLA, normocephalic, atraumatic  Respiratory: Diminished  Cardiovascular/Edema:  RRR, normal S1, normal S2,+++ edema  Gastrointestinal:  Soft, flat, non-distended, non-tender  Neurologic:  Nonfocal  Skin:  warm, dry, no rashes, no lesions      Scheduled Meds:   enoxaparin  30 mg SubCUTAneous BID    midodrine  5 mg Oral TID WC    insulin lispro  8 Units SubCUTAneous TID WC    insulin lispro  0-12 Units SubCUTAneous 4x Daily AC & HS    fluticasone  1 spray Each Nostril Daily    pantoprazole  40 mg Oral QAM AC    insulin glargine  15 Units SubCUTAneous BID    methylPREDNISolone  40 mg IntraVENous Q12H    arformoterol tartrate  15 mcg Nebulization BID RT    ipratropium 0.5 mg-albuterol 2.5 mg  1 Dose Inhalation Q4H RT    sodium chloride flush  5-40 mL IntraVENous 2 times per day    aspirin  81 mg Oral Daily    finasteride  5 mg Oral Daily    isosorbide mononitrate  30 mg Oral Daily    metoprolol  100 mg Oral BID    Roflumilast  500 mcg Oral Daily    rosuvastatin  10 mg Oral Daily    tamsulosin  0.8 mg Oral Daily     Continuous Infusions:   dextrose      bumetanide (BUMEX) 12.5 mg in sodium chloride 0.9 % 125 mL infusion 0.5 mg/hr (12/03/24 1752)    sodium chloride       PRN Meds:.hydrOXYzine pamoate, fluticasone, morphine, morphine, sodium chloride, glucose, dextrose bolus **OR** dextrose bolus, glucagon (rDNA), dextrose, sodium chloride flush, sodium chloride, potassium chloride **OR** potassium alternative  oral replacement **OR** [DISCONTINUED] potassium chloride, magnesium sulfate, ondansetron **OR** ondansetron, polyethylene glycol, acetaminophen **OR** acetaminophen, albuterol, ALPRAZolam      DATA:    CBC:   Lab Results   Component Value Date/Time    WBC 8.0 12/04/2024 05:00 AM    RBC 3.61 12/04/2024 05:00 AM    HGB 10.2 12/04/2024 05:00 AM    HCT 31.7 12/04/2024 05:00 AM    MCV 87.8 12/04/2024 05:00 AM    MCH 28.3 12/04/2024 05:00 AM    MCHC 32.2 12/04/2024 05:00 AM    RDW 13.8 12/04/2024 05:00 AM    PLT 83 12/04/2024 05:00 AM    MPV 10.5 12/04/2024 05:00 AM     CMP:    Lab Results   Component Value Date/Time     12/04/2024 05:00 AM    K 4.2 12/04/2024 05:00 AM    K 5.0 09/05/2022 06:16 AM    CL 90 12/04/2024 05:00 AM    CO2 32 12/04/2024 05:00 AM     12/04/2024 05:00 AM    CREATININE 2.7 12/04/2024 05:00 AM    GFRAA 60 10/06/2022 07:16 AM    LABGLOM 24 12/04/2024 05:00 AM    LABGLOM 44 04/23/2024 05:39 AM    GLUCOSE 285 12/04/2024 05:00 AM    GLUCOSE 190 07/28/2021 04:28 AM    CALCIUM 9.1 12/04/2024 05:00 AM    BILITOT 0.4 11/29/2024 06:27 AM    ALKPHOS 100 11/29/2024 06:27 AM    AST 18 11/29/2024 06:27 AM    ALT 18 11/29/2024 06:27 AM     Magnesium:    Lab Results   Component Value Date/Time    MG 1.7 07/23/2021 04:59 AM     Phosphorus:    Lab Results   Component Value Date/Time    PHOS 5.9 07/23/2021 04:59 AM     Radiology Review:    CT CHEST WO CONTRAST 11/26/24    IMPRESSION:  1. Fractures are seen involving visualized superior endplates of T6 and T7.  CT thoracic spine recommended.  2. Masslike opacity is present in right upper lobe appearing similar compared  to the previous examination which may represent focal subsegmental  atelectasis or scarring.  Neoplastic etiology cannot be excluded.  3. New atelectasis is present in the left lower lobe as well as new  subsegmental atelectasis in right lower lobe.  4. Nodular centrilobular opacities are located in the left upper lobe which  may indicate

## 2024-12-04 NOTE — PLAN OF CARE
Problem: Chronic Conditions and Co-morbidities  Goal: Patient's chronic conditions and co-morbidity symptoms are monitored and maintained or improved  12/4/2024 1313 by Caitlin Sykes RN  Outcome: Progressing  Flowsheets (Taken 12/4/2024 0900)  Care Plan - Patient's Chronic Conditions and Co-Morbidity Symptoms are Monitored and Maintained or Improved: Monitor and assess patient's chronic conditions and comorbid symptoms for stability, deterioration, or improvement  12/4/2024 0040 by Griselda Munoz RN  Outcome: Progressing     Problem: Discharge Planning  Goal: Discharge to home or other facility with appropriate resources  12/4/2024 1313 by Caitlin Sykes RN  Outcome: Progressing  Flowsheets (Taken 12/4/2024 0900)  Discharge to home or other facility with appropriate resources: Identify barriers to discharge with patient and caregiver  12/4/2024 0040 by Griselda Munoz RN  Outcome: Progressing     Problem: Pain  Goal: Verbalizes/displays adequate comfort level or baseline comfort level  12/4/2024 1313 by Caitlin Sykes RN  Outcome: Progressing  Flowsheets (Taken 12/4/2024 0900)  Verbalizes/displays adequate comfort level or baseline comfort level: Encourage patient to monitor pain and request assistance  12/4/2024 0040 by Griselda Munoz RN  Outcome: Progressing     Problem: Skin/Tissue Integrity  Goal: Absence of new skin breakdown  Description: 1.  Monitor for areas of redness and/or skin breakdown  2.  Assess vascular access sites hourly  3.  Every 4-6 hours minimum:  Change oxygen saturation probe site  4.  Every 4-6 hours:  If on nasal continuous positive airway pressure, respiratory therapy assess nares and determine need for appliance change or resting period.  12/4/2024 1313 by Caitlin Sykes RN  Outcome: Progressing  12/4/2024 0040 by Griselda Munoz RN  Outcome: Progressing     Problem: Safety - Adult  Goal: Free from fall injury  12/4/2024 1313 by Caitlin Sykes RN  Outcome:  Progressing  Flowsheets (Taken 12/4/2024 0900)  Free From Fall Injury: Instruct family/caregiver on patient safety  12/4/2024 0040 by Griselda Munoz RN  Outcome: Progressing     Problem: ABCDS Injury Assessment  Goal: Absence of physical injury  12/4/2024 1313 by Caitlin Sykes, RN  Outcome: Progressing  Flowsheets (Taken 12/4/2024 0900)  Absence of Physical Injury: Implement safety measures based on patient assessment  12/4/2024 0040 by Griselda Munoz, RN  Outcome: Progressing

## 2024-12-04 NOTE — CARE COORDINATION
Social Work / Discharge Planning : DAWSON updated by Hoa from [facility] that she spoke to family and sons will be coming out to the hospital this afternoon to meet with Liaison Hoa from [facility] this afternoon. They are deciding home vs SNF.Spouse did ask about LTAC and patient does NOT meet criteria. AWait final plan [facility] vs SNF. If SNF is decision, will not need pre-cert . Patient resides in [place] and spouse did mention to [facility] as possible option. Await final meeting with [facility] and spouse and children. SW to follow.  Electronically signed by ELIZABETH Gupta on 12/4/24 at 11:02 AM EST     Addendum: [facility] Hoa updated SW spouse still deciding but wanted SW to check with [facility] if they have a bed. SW spoke to [facility] and there second building has beds. Referral faxed. AWait if they can accept IF decision. SW to follow. Electronically signed by ELIZABETH Gupta on 12/4/24 at 12:45 PM EST

## 2024-12-05 VITALS
OXYGEN SATURATION: 92 % | RESPIRATION RATE: 18 BRPM | WEIGHT: 243.3 LBS | TEMPERATURE: 97.5 F | DIASTOLIC BLOOD PRESSURE: 72 MMHG | HEIGHT: 74 IN | HEART RATE: 86 BPM | BODY MASS INDEX: 31.22 KG/M2 | SYSTOLIC BLOOD PRESSURE: 148 MMHG

## 2024-12-05 LAB
ANION GAP SERPL CALCULATED.3IONS-SCNC: 11 MMOL/L (ref 7–16)
BASOPHILS # BLD: 0 K/UL (ref 0–0.2)
BASOPHILS NFR BLD: 0 % (ref 0–2)
BUN SERPL-MCNC: 110 MG/DL (ref 6–23)
CALCIUM SERPL-MCNC: 9 MG/DL (ref 8.6–10.2)
CHLORIDE SERPL-SCNC: 91 MMOL/L (ref 98–107)
CO2 SERPL-SCNC: 37 MMOL/L (ref 22–29)
CREAT SERPL-MCNC: 2.5 MG/DL (ref 0.7–1.2)
EOSINOPHIL # BLD: 0 K/UL (ref 0.05–0.5)
EOSINOPHILS RELATIVE PERCENT: 0 % (ref 0–6)
ERYTHROCYTE [DISTWIDTH] IN BLOOD BY AUTOMATED COUNT: 14 % (ref 11.5–15)
GFR, ESTIMATED: 26 ML/MIN/1.73M2
GLUCOSE BLD-MCNC: 167 MG/DL (ref 74–99)
GLUCOSE BLD-MCNC: 203 MG/DL (ref 74–99)
GLUCOSE BLD-MCNC: 209 MG/DL (ref 74–99)
GLUCOSE BLD-MCNC: 228 MG/DL (ref 74–99)
GLUCOSE SERPL-MCNC: 195 MG/DL (ref 74–99)
HCT VFR BLD AUTO: 29.5 % (ref 37–54)
HGB BLD-MCNC: 9.8 G/DL (ref 12.5–16.5)
LYMPHOCYTES NFR BLD: 0.33 K/UL (ref 1.5–4)
LYMPHOCYTES RELATIVE PERCENT: 4 % (ref 20–42)
MCH RBC QN AUTO: 29.3 PG (ref 26–35)
MCHC RBC AUTO-ENTMCNC: 33.2 G/DL (ref 32–34.5)
MCV RBC AUTO: 88.1 FL (ref 80–99.9)
METAMYELOCYTES ABSOLUTE COUNT: 0.26 K/UL (ref 0–0.12)
METAMYELOCYTES: 4 % (ref 0–1)
MONOCYTES NFR BLD: 0.33 K/UL (ref 0.1–0.95)
MONOCYTES NFR BLD: 4 % (ref 2–12)
MYELOCYTES ABSOLUTE COUNT: 0.07 K/UL
MYELOCYTES: 1 %
NEUTROPHILS NFR BLD: 87 % (ref 43–80)
NEUTS SEG NFR BLD: 6.61 K/UL (ref 1.8–7.3)
PLATELET # BLD AUTO: 78 K/UL (ref 130–450)
PLATELET CONFIRMATION: NORMAL
PMV BLD AUTO: 9.8 FL (ref 7–12)
POTASSIUM SERPL-SCNC: 4.6 MMOL/L (ref 3.5–5)
RBC # BLD AUTO: 3.35 M/UL (ref 3.8–5.8)
RBC # BLD: ABNORMAL 10*6/UL
SODIUM SERPL-SCNC: 139 MMOL/L (ref 132–146)
WBC OTHER # BLD: 7.6 K/UL (ref 4.5–11.5)

## 2024-12-05 PROCEDURE — 6370000000 HC RX 637 (ALT 250 FOR IP): Performed by: STUDENT IN AN ORGANIZED HEALTH CARE EDUCATION/TRAINING PROGRAM

## 2024-12-05 PROCEDURE — 82947 ASSAY GLUCOSE BLOOD QUANT: CPT

## 2024-12-05 PROCEDURE — 6370000000 HC RX 637 (ALT 250 FOR IP): Performed by: INTERNAL MEDICINE

## 2024-12-05 PROCEDURE — 6360000002 HC RX W HCPCS: Performed by: INTERNAL MEDICINE

## 2024-12-05 PROCEDURE — 94660 CPAP INITIATION&MGMT: CPT

## 2024-12-05 PROCEDURE — 6360000002 HC RX W HCPCS: Performed by: NURSE PRACTITIONER

## 2024-12-05 PROCEDURE — 6360000002 HC RX W HCPCS: Performed by: STUDENT IN AN ORGANIZED HEALTH CARE EDUCATION/TRAINING PROGRAM

## 2024-12-05 PROCEDURE — 85025 COMPLETE CBC W/AUTO DIFF WBC: CPT

## 2024-12-05 PROCEDURE — 99231 SBSQ HOSP IP/OBS SF/LOW 25: CPT | Performed by: NURSE PRACTITIONER

## 2024-12-05 PROCEDURE — 2580000003 HC RX 258: Performed by: STUDENT IN AN ORGANIZED HEALTH CARE EDUCATION/TRAINING PROGRAM

## 2024-12-05 PROCEDURE — 36415 COLL VENOUS BLD VENIPUNCTURE: CPT

## 2024-12-05 PROCEDURE — 2700000000 HC OXYGEN THERAPY PER DAY

## 2024-12-05 PROCEDURE — 99239 HOSP IP/OBS DSCHRG MGMT >30: CPT | Performed by: STUDENT IN AN ORGANIZED HEALTH CARE EDUCATION/TRAINING PROGRAM

## 2024-12-05 PROCEDURE — 94640 AIRWAY INHALATION TREATMENT: CPT

## 2024-12-05 PROCEDURE — 80048 BASIC METABOLIC PNL TOTAL CA: CPT

## 2024-12-05 RX ORDER — PREDNISONE 20 MG/1
40 TABLET ORAL DAILY
Status: DISCONTINUED | OUTPATIENT
Start: 2024-12-06 | End: 2024-12-05 | Stop reason: HOSPADM

## 2024-12-05 RX ORDER — BUDESONIDE 0.5 MG/2ML
500 INHALANT ORAL
Status: DISCONTINUED | OUTPATIENT
Start: 2024-12-05 | End: 2024-12-05 | Stop reason: HOSPADM

## 2024-12-05 RX ORDER — LORAZEPAM 0.5 MG/1
0.5 TABLET ORAL EVERY 6 HOURS PRN
Qty: 12 TABLET | Refills: 0 | Status: SHIPPED | OUTPATIENT
Start: 2024-12-05 | End: 2024-12-08

## 2024-12-05 RX ORDER — HYDROXYZINE PAMOATE 25 MG/1
25 CAPSULE ORAL 4 TIMES DAILY PRN
Qty: 56 CAPSULE | Refills: 0 | Status: SHIPPED | OUTPATIENT
Start: 2024-12-05 | End: 2024-12-19

## 2024-12-05 RX ORDER — MORPHINE SULFATE 100 MG/5ML
5 SOLUTION ORAL
Qty: 30 ML | Refills: 0 | Status: SHIPPED | OUTPATIENT
Start: 2024-12-05 | End: 2024-12-20

## 2024-12-05 RX ADMIN — MIDODRINE HYDROCHLORIDE 5 MG: 5 TABLET ORAL at 16:39

## 2024-12-05 RX ADMIN — INSULIN LISPRO 8 UNITS: 100 INJECTION, SOLUTION INTRAVENOUS; SUBCUTANEOUS at 16:46

## 2024-12-05 RX ADMIN — MIDODRINE HYDROCHLORIDE 5 MG: 5 TABLET ORAL at 11:45

## 2024-12-05 RX ADMIN — ENOXAPARIN SODIUM 30 MG: 100 INJECTION SUBCUTANEOUS at 08:13

## 2024-12-05 RX ADMIN — MORPHINE SULFATE 4 MG: 10 SOLUTION ORAL at 05:09

## 2024-12-05 RX ADMIN — ISOSORBIDE MONONITRATE 30 MG: 30 TABLET, EXTENDED RELEASE ORAL at 08:12

## 2024-12-05 RX ADMIN — BUDESONIDE 500 MCG: 0.5 SUSPENSION RESPIRATORY (INHALATION) at 13:12

## 2024-12-05 RX ADMIN — INSULIN LISPRO 8 UNITS: 100 INJECTION, SOLUTION INTRAVENOUS; SUBCUTANEOUS at 11:54

## 2024-12-05 RX ADMIN — METHYLPREDNISOLONE SODIUM SUCCINATE 40 MG: 40 INJECTION INTRAMUSCULAR; INTRAVENOUS at 08:13

## 2024-12-05 RX ADMIN — ALPRAZOLAM 0.5 MG: 0.25 TABLET ORAL at 16:39

## 2024-12-05 RX ADMIN — IPRATROPIUM BROMIDE AND ALBUTEROL SULFATE 1 DOSE: 2.5; .5 SOLUTION RESPIRATORY (INHALATION) at 08:49

## 2024-12-05 RX ADMIN — MIDODRINE HYDROCHLORIDE 5 MG: 5 TABLET ORAL at 08:12

## 2024-12-05 RX ADMIN — ROFLUMILAST 500 MCG: 500 TABLET ORAL at 08:12

## 2024-12-05 RX ADMIN — MORPHINE SULFATE 4 MG: 10 SOLUTION ORAL at 13:20

## 2024-12-05 RX ADMIN — ARFORMOTEROL TARTRATE 15 MCG: 15 SOLUTION RESPIRATORY (INHALATION) at 08:49

## 2024-12-05 RX ADMIN — TAMSULOSIN HYDROCHLORIDE 0.8 MG: 0.4 CAPSULE ORAL at 08:11

## 2024-12-05 RX ADMIN — ASPIRIN 81 MG: 81 TABLET, COATED ORAL at 08:12

## 2024-12-05 RX ADMIN — FINASTERIDE 5 MG: 5 TABLET, FILM COATED ORAL at 08:12

## 2024-12-05 RX ADMIN — ALPRAZOLAM 0.5 MG: 0.25 TABLET ORAL at 08:13

## 2024-12-05 RX ADMIN — PANTOPRAZOLE SODIUM 40 MG: 40 TABLET, DELAYED RELEASE ORAL at 05:09

## 2024-12-05 RX ADMIN — ROSUVASTATIN CALCIUM 10 MG: 10 TABLET, FILM COATED ORAL at 08:12

## 2024-12-05 RX ADMIN — MORPHINE SULFATE 4 MG: 10 SOLUTION ORAL at 18:18

## 2024-12-05 RX ADMIN — MORPHINE SULFATE 4 MG: 10 SOLUTION ORAL at 00:28

## 2024-12-05 RX ADMIN — SODIUM CHLORIDE, PRESERVATIVE FREE 10 ML: 5 INJECTION INTRAVENOUS at 08:15

## 2024-12-05 RX ADMIN — INSULIN LISPRO 8 UNITS: 100 INJECTION, SOLUTION INTRAVENOUS; SUBCUTANEOUS at 08:36

## 2024-12-05 RX ADMIN — METOPROLOL TARTRATE 100 MG: 50 TABLET, FILM COATED ORAL at 08:12

## 2024-12-05 RX ADMIN — INSULIN LISPRO 4 UNITS: 100 INJECTION, SOLUTION INTRAVENOUS; SUBCUTANEOUS at 11:54

## 2024-12-05 RX ADMIN — INSULIN GLARGINE 15 UNITS: 100 INJECTION, SOLUTION SUBCUTANEOUS at 10:30

## 2024-12-05 RX ADMIN — INSULIN LISPRO 4 UNITS: 100 INJECTION, SOLUTION INTRAVENOUS; SUBCUTANEOUS at 16:46

## 2024-12-05 RX ADMIN — IPRATROPIUM BROMIDE AND ALBUTEROL SULFATE 1 DOSE: 2.5; .5 SOLUTION RESPIRATORY (INHALATION) at 13:12

## 2024-12-05 RX ADMIN — FLUTICASONE PROPIONATE 1 SPRAY: 50 SPRAY, METERED NASAL at 08:13

## 2024-12-05 RX ADMIN — IPRATROPIUM BROMIDE AND ALBUTEROL SULFATE 1 DOSE: 2.5; .5 SOLUTION RESPIRATORY (INHALATION) at 04:01

## 2024-12-05 ASSESSMENT — PAIN DESCRIPTION - ORIENTATION
ORIENTATION: LEFT
ORIENTATION: LEFT

## 2024-12-05 ASSESSMENT — PAIN DESCRIPTION - LOCATION
LOCATION: CHEST
LOCATION: RIB CAGE;BACK
LOCATION: CHEST
LOCATION: RIB CAGE

## 2024-12-05 ASSESSMENT — PAIN SCALES - GENERAL
PAINLEVEL_OUTOF10: 7
PAINLEVEL_OUTOF10: 10

## 2024-12-05 ASSESSMENT — PAIN DESCRIPTION - DESCRIPTORS
DESCRIPTORS: PRESSURE;HEAVINESS
DESCRIPTORS: ACHING;DISCOMFORT
DESCRIPTORS: ACHING;DISCOMFORT

## 2024-12-05 NOTE — PROGRESS NOTES
Department of Internal Medicine  Nephrology Progress Note      Events reviewed.     SUBJECTIVE: We are following Mr. Frias for GONZALES. Patient reports no new complaints today.     PHYSICAL EXAM:      Vitals:    VITALS:  BP (!) 99/58   Pulse 77   Temp 97.4 °F (36.3 °C) (Oral)   Resp 18   Ht 1.88 m (6' 2\")   Wt 110.4 kg (243 lb 4.8 oz)   SpO2 93%   BMI 31.24 kg/m²   24HR INTAKE/OUTPUT:  No intake or output data in the 24 hours ending 12/05/24 1145        Constitutional:  Awake, alert, oriented, in NAD  HEENT:  PERRLA, normocephalic, atraumatic  Respiratory: Diminished  Cardiovascular/Edema:  RRR, normal S1, normal S2,+++ edema  Gastrointestinal:  Soft, flat, non-distended, non-tender  Neurologic:  Nonfocal  Skin:  warm, dry, no rashes, no lesions      Scheduled Meds:   [START ON 12/6/2024] predniSONE  40 mg Oral Daily    budesonide  500 mcg Nebulization BID RT    polyethylene glycol  17 g Oral Daily    senna  1 tablet Oral Nightly    enoxaparin  30 mg SubCUTAneous BID    midodrine  5 mg Oral TID WC    insulin lispro  8 Units SubCUTAneous TID WC    insulin lispro  0-12 Units SubCUTAneous 4x Daily AC & HS    fluticasone  1 spray Each Nostril Daily    pantoprazole  40 mg Oral QAM AC    insulin glargine  15 Units SubCUTAneous BID    arformoterol tartrate  15 mcg Nebulization BID RT    ipratropium 0.5 mg-albuterol 2.5 mg  1 Dose Inhalation Q4H RT    sodium chloride flush  5-40 mL IntraVENous 2 times per day    aspirin  81 mg Oral Daily    finasteride  5 mg Oral Daily    isosorbide mononitrate  30 mg Oral Daily    metoprolol  100 mg Oral BID    Roflumilast  500 mcg Oral Daily    rosuvastatin  10 mg Oral Daily    tamsulosin  0.8 mg Oral Daily     Continuous Infusions:   dextrose      bumetanide (BUMEX) 12.5 mg in sodium chloride 0.9 % 125 mL infusion 0.5 mg/hr (12/04/24 1911)    sodium chloride       PRN Meds:.mineral oil, white petrolatum, hydrOXYzine pamoate, fluticasone, morphine, morphine, sodium chloride, glucose,  dextrose bolus **OR** dextrose bolus, glucagon (rDNA), dextrose, sodium chloride flush, sodium chloride, potassium chloride **OR** potassium alternative oral replacement **OR** [DISCONTINUED] potassium chloride, magnesium sulfate, ondansetron **OR** ondansetron, acetaminophen **OR** acetaminophen, albuterol, ALPRAZolam      DATA:    CBC:   Lab Results   Component Value Date/Time    WBC 8.0 12/04/2024 05:00 AM    RBC 3.61 12/04/2024 05:00 AM    HGB 10.2 12/04/2024 05:00 AM    HCT 31.7 12/04/2024 05:00 AM    MCV 87.8 12/04/2024 05:00 AM    MCH 28.3 12/04/2024 05:00 AM    MCHC 32.2 12/04/2024 05:00 AM    RDW 13.8 12/04/2024 05:00 AM    PLT 83 12/04/2024 05:00 AM    MPV 10.5 12/04/2024 05:00 AM     CMP:    Lab Results   Component Value Date/Time     12/04/2024 05:00 AM    K 4.2 12/04/2024 05:00 AM    K 5.0 09/05/2022 06:16 AM    CL 90 12/04/2024 05:00 AM    CO2 32 12/04/2024 05:00 AM     12/04/2024 05:00 AM    CREATININE 2.7 12/04/2024 05:00 AM    GFRAA 60 10/06/2022 07:16 AM    LABGLOM 24 12/04/2024 05:00 AM    LABGLOM 44 04/23/2024 05:39 AM    GLUCOSE 285 12/04/2024 05:00 AM    GLUCOSE 190 07/28/2021 04:28 AM    CALCIUM 9.1 12/04/2024 05:00 AM    BILITOT 0.4 11/29/2024 06:27 AM    ALKPHOS 100 11/29/2024 06:27 AM    AST 18 11/29/2024 06:27 AM    ALT 18 11/29/2024 06:27 AM     Magnesium:    Lab Results   Component Value Date/Time    MG 1.7 07/23/2021 04:59 AM     Phosphorus:    Lab Results   Component Value Date/Time    PHOS 5.9 07/23/2021 04:59 AM     Radiology Review:    CT CHEST WO CONTRAST 11/26/24    IMPRESSION:  1. Fractures are seen involving visualized superior endplates of T6 and T7.  CT thoracic spine recommended.  2. Masslike opacity is present in right upper lobe appearing similar compared  to the previous examination which may represent focal subsegmental  atelectasis or scarring.  Neoplastic etiology cannot be excluded.  3. New atelectasis is present in the left lower lobe as well as

## 2024-12-05 NOTE — PROGRESS NOTES
[facility] Pulmonary Progress Note  SEB  Admit Date: 11/26/2024                            PCP: Jose Kwok MD  Principal Problem:    GONZLAES (acute kidney injury) (HCC)  Active Problems:    Poorly controlled type 2 diabetes mellitus (HCC)    Multinodular goiter (nontoxic)    Dietary noncompliance    Steroid-induced hyperglycemia    Worsening renal function  Resolved Problems:    * No resolved hospital problems. *      Subjective:  Patient seen, sleeping  Remains on 5L NC saturations 93%  Shortness of breath remains unchanged  Wore NIV x 3 hrs overnight   Niece at bedside   Per SW, patient/family still deciding on home with hospice vs SNF      Medications:   dextrose      bumetanide (BUMEX) 12.5 mg in sodium chloride 0.9 % 125 mL infusion 0.5 mg/hr (12/04/24 1911)    sodium chloride          predniSONE  40 mg Oral Daily    budesonide  500 mcg Nebulization BID RT    polyethylene glycol  17 g Oral Daily    senna  1 tablet Oral Nightly    enoxaparin  30 mg SubCUTAneous BID    midodrine  5 mg Oral TID WC    insulin lispro  8 Units SubCUTAneous TID WC    insulin lispro  0-12 Units SubCUTAneous 4x Daily AC & HS    fluticasone  1 spray Each Nostril Daily    pantoprazole  40 mg Oral QAM AC    insulin glargine  15 Units SubCUTAneous BID    arformoterol tartrate  15 mcg Nebulization BID RT    ipratropium 0.5 mg-albuterol 2.5 mg  1 Dose Inhalation Q4H RT    sodium chloride flush  5-40 mL IntraVENous 2 times per day    aspirin  81 mg Oral Daily    finasteride  5 mg Oral Daily    isosorbide mononitrate  30 mg Oral Daily    metoprolol  100 mg Oral BID    Roflumilast  500 mcg Oral Daily    rosuvastatin  10 mg Oral Daily    tamsulosin  0.8 mg Oral Daily       Vitals:  VITALS:  /76   Pulse 59   Temp 97.4 °F (36.3 °C) (Oral)   Resp 18   Ht 1.88 m (6' 2\")   Wt 110.4 kg (243 lb 4.8 oz)   SpO2 93%   BMI 31.24 kg/m²   24HR INTAKE/OUTPUT:  No intake or output data in the 24 hours ending 12/05/24 1121    CURRENT PULSE  unselect if not a suspected or confirmed emergency medical condition->Emergency Medical Condition (MA) FINDINGS: BONES/ALIGNMENT: There is no acute fracture or traumatic malalignment. DEGENERATIVE CHANGES: Moderate disc space narrowing is present at C3/C4, C4/C5, and C5/C6.  Degenerative posterior disc/osteophyte complexes are also present at these levels resulting in moderate effacement of the ventral thecal sac. SOFT TISSUES: There is no prevertebral soft tissue swelling.  The thyroid gland is enlarged with multiple partially calcified nodules.     1.  No acute abnormality of the cervical spine. 2.  Enlarged thyroid gland with multiple partially calcified nodules. Nonemergent ultrasound recommended.     CT HEAD WO CONTRAST    Result Date: 11/26/2024  THERE SIGNS OF BILATERAL LENS REPLACEMENT.  EXAMINATION: CT OF THE HEAD WITHOUT CONTRAST  11/26/2024 3:52 pm TECHNIQUE: CT of the head was performed without the administration of intravenous contrast. Automated exposure control, iterative reconstruction, and/or weight based adjustment of the mA/kV was utilized to reduce the radiation dose to as low as reasonably achievable. COMPARISON: None. HISTORY: ORDERING SYSTEM PROVIDED HISTORY: fall TECHNOLOGIST PROVIDED HISTORY: Has a \"code stroke\" or \"stroke alert\" been called?->No Reason for exam:->fall Decision Support Exception - unselect if not a suspected or confirmed emergency medical condition->Emergency Medical Condition (MA) FINDINGS: BRAIN/VENTRICLES: There is no acute intracranial hemorrhage, mass effect or midline shift.  No abnormal extra-axial fluid collection.  There is a tiny lacunar infarct adjacent to left caudate nucleus.  This appears remote the gray-white differentiation is maintained without evidence of an acute infarct.  There is no evidence of hydrocephalus. ORBITS: The visualized portion of the orbits demonstrate no acute abnormality. SINUSES: The visualized paranasal sinuses and mastoid air cells

## 2024-12-05 NOTE — CARE COORDINATION
Social Work / Discharge Planning : SW spoke to Hoa from [facility]. Patient plan is HOME with [facility]. Hoa is have equipment delivered to home today and will set up discharge once DME in place. Charge RN updated to obtain D/C order. SW to follow.  SW to follow. Electronically signed by ELIZABETH Gupta on 12/5/24 at 10:13 AM EST

## 2024-12-05 NOTE — PLAN OF CARE
Problem: Chronic Conditions and Co-morbidities  Goal: Patient's chronic conditions and co-morbidity symptoms are monitored and maintained or improved  12/4/2024 2005 by Griselda Munoz RN  Outcome: Progressing  12/4/2024 1313 by Caitlin Sykes RN  Outcome: Progressing  Flowsheets (Taken 12/4/2024 0900)  Care Plan - Patient's Chronic Conditions and Co-Morbidity Symptoms are Monitored and Maintained or Improved: Monitor and assess patient's chronic conditions and comorbid symptoms for stability, deterioration, or improvement     Problem: Discharge Planning  Goal: Discharge to home or other facility with appropriate resources  12/4/2024 2005 by Griselda Munoz RN  Outcome: Progressing  12/4/2024 1313 by Caitlin Sykes RN  Outcome: Progressing  Flowsheets (Taken 12/4/2024 0900)  Discharge to home or other facility with appropriate resources: Identify barriers to discharge with patient and caregiver     Problem: Pain  Goal: Verbalizes/displays adequate comfort level or baseline comfort level  12/4/2024 2005 by Griselda Munoz RN  Outcome: Progressing  12/4/2024 1313 by Caitlin Sykes RN  Outcome: Progressing  Flowsheets (Taken 12/4/2024 0900)  Verbalizes/displays adequate comfort level or baseline comfort level: Encourage patient to monitor pain and request assistance     Problem: Skin/Tissue Integrity  Goal: Absence of new skin breakdown  Description: 1.  Monitor for areas of redness and/or skin breakdown  2.  Assess vascular access sites hourly  3.  Every 4-6 hours minimum:  Change oxygen saturation probe site  4.  Every 4-6 hours:  If on nasal continuous positive airway pressure, respiratory therapy assess nares and determine need for appliance change or resting period.  12/4/2024 2005 by Griselda Munoz RN  Outcome: Progressing  12/4/2024 1313 by Caitlin Sykes RN  Outcome: Progressing     Problem: Safety - Adult  Goal: Free from fall injury  12/4/2024 2005 by Griselda Munoz RN  Outcome:  Progressing  12/4/2024 1313 by Caitlin Sykes, RN  Outcome: Progressing  Flowsheets (Taken 12/4/2024 0900)  Free From Fall Injury: Instruct family/caregiver on patient safety     Problem: ABCDS Injury Assessment  Goal: Absence of physical injury  12/4/2024 2005 by Griselda Munoz, RN  Outcome: Progressing  12/4/2024 1313 by Caitlin Sykes, RN  Outcome: Progressing  Flowsheets (Taken 12/4/2024 0900)  Absence of Physical Injury: Implement safety measures based on patient assessment

## 2024-12-05 NOTE — DISCHARGE SUMMARY
[facility] Hospitalist Physician Discharge Summary       [facility] by Mimi Del Cid  [address] [place] [address]  [phone]          Activity level: As tolerated     Dispo: Home with Hospice      Condition on discharge: Stable     Patient ID:  Chico Frias  [id number]  73 y.o.  1950    Admit date: 11/26/2024    Discharge date and time:  12/5/2024  1:12 PM    Admission Diagnoses: Principal Problem:    GONZALES (acute kidney injury) (HCC)  Active Problems:    CHF (congestive heart failure) (HCC)    Coronary artery disease involving native coronary artery of native heart with angina pectoris (HCC)    Poorly controlled type 2 diabetes mellitus (HCC)    Chronic respiratory failure with hypercapnia    COPD (chronic obstructive pulmonary disease) (HCC)    Multinodular goiter (nontoxic)    Dietary noncompliance    Steroid-induced hyperglycemia    Worsening renal function  Resolved Problems:    * No resolved hospital problems. *      Discharge Diagnoses: Principal Problem:    GONZALES (acute kidney injury) (HCC)  Active Problems:    CHF (congestive heart failure) (HCC)    Coronary artery disease involving native coronary artery of native heart with angina pectoris (HCC)    Poorly controlled type 2 diabetes mellitus (HCC)    Chronic respiratory failure with hypercapnia    COPD (chronic obstructive pulmonary disease) (HCC)    Multinodular goiter (nontoxic)    Dietary noncompliance    Steroid-induced hyperglycemia    Worsening renal function  Resolved Problems:    * No resolved hospital problems. *      Consults:  IP CONSULT TO INTERNAL MEDICINE  IP CONSULT TO RESPIRATORY CARE  IP CONSULT TO NEPHROLOGY  IP CONSULT TO PULMONOLOGY  IP CONSULT TO ENDOCRINOLOGY  IP CONSULT TO ORTHOPEDIC SURGERY  IP CONSULT TO VASCULAR ACCESS TEAM  IP CONSULT TO SOCIAL WORK  IP CONSULT TO PALLIATIVE CARE  IP CONSULT TO VASCULAR ACCESS TEAM  IP CONSULT TO HOSPICE  IP CONSULT TO  radiation dose to as low as reasonably achievable. COMPARISON: None. HISTORY: ORDERING SYSTEM PROVIDED HISTORY: fall TECHNOLOGIST PROVIDED HISTORY: Reason for exam:->fall Decision Support Exception - unselect if not a suspected or confirmed emergency medical condition->Emergency Medical Condition (MA) FINDINGS: BONES/ALIGNMENT: There is no acute fracture or traumatic malalignment. DEGENERATIVE CHANGES: Moderate disc space narrowing is present at C3/C4, C4/C5, and C5/C6.  Degenerative posterior disc/osteophyte complexes are also present at these levels resulting in moderate effacement of the ventral thecal sac. SOFT TISSUES: There is no prevertebral soft tissue swelling.  The thyroid gland is enlarged with multiple partially calcified nodules.     1.  No acute abnormality of the cervical spine. 2.  Enlarged thyroid gland with multiple partially calcified nodules. Nonemergent ultrasound recommended.     CT HEAD WO CONTRAST    Result Date: 11/26/2024  THERE SIGNS OF BILATERAL LENS REPLACEMENT.  EXAMINATION: CT OF THE HEAD WITHOUT CONTRAST  11/26/2024 3:52 pm TECHNIQUE: CT of the head was performed without the administration of intravenous contrast. Automated exposure control, iterative reconstruction, and/or weight based adjustment of the mA/kV was utilized to reduce the radiation dose to as low as reasonably achievable. COMPARISON: None. HISTORY: ORDERING SYSTEM PROVIDED HISTORY: fall TECHNOLOGIST PROVIDED HISTORY: Has a \"code stroke\" or \"stroke alert\" been called?->No Reason for exam:->fall Decision Support Exception - unselect if not a suspected or confirmed emergency medical condition->Emergency Medical Condition (MA) FINDINGS: BRAIN/VENTRICLES: There is no acute intracranial hemorrhage, mass effect or midline shift.  No abnormal extra-axial fluid collection.  There is a tiny lacunar infarct adjacent to left caudate nucleus.  This appears remote the gray-white differentiation is maintained without evidence of an  acute infarct.  There is no evidence of hydrocephalus. ORBITS: The visualized portion of the orbits demonstrate no acute abnormality. SINUSES: The visualized paranasal sinuses and mastoid air cells demonstrate no acute abnormality. SOFT TISSUES/SKULL:  No acute abnormality of the visualized skull or soft tissues.     1.  No acute intracranial abnormality. 2.  Tiny chronic lacunar infarct in the left basal ganglia.       Patient Instructions:      Medication List        START taking these medications      hydrOXYzine pamoate 25 MG capsule  Commonly known as: VISTARIL  Take 1 capsule by mouth 4 times daily as needed for Anxiety            CONTINUE taking these medications      albuterol sulfate  (90 Base) MCG/ACT inhaler  Commonly known as: PROVENTIL;VENTOLIN;PROAIR  Inhale 2 puffs into the lungs every 6 hours as needed for Wheezing     ALPRAZolam 0.5 MG tablet  Commonly known as: XANAX  Take 1 tablet by mouth 3 times daily as needed for Anxiety for up to 30 days. Max Daily Amount: 1.5 mg     budesonide 0.5 MG/2ML nebulizer suspension  Commonly known as: PULMICORT     fluticasone 50 MCG/ACT nasal spray  Commonly known as: FLONASE  2 sprays by Each Nostril route daily     formoterol 20 MCG/2ML nebulizer solution  Commonly known as: PERFOROMIST     furosemide 40 MG tablet  Commonly known as: LASIX     guaiFENesin 400 MG tablet     insulin glargine 100 UNIT/ML injection vial  Commonly known as: LANTUS     insulin lispro 100 UNIT/ML Soln injection vial  Commonly known as: HUMALOG,ADMELOG     isosorbide mononitrate 30 MG extended release tablet  Commonly known as: IMDUR     metFORMIN 1000 MG tablet  Commonly known as: GLUCOPHAGE     metoprolol 100 MG tablet  Commonly known as: LOPRESSOR     predniSONE 20 MG tablet  Commonly known as: DELTASONE  Take 2 tablets by mouth daily     Roflumilast 500 MCG tablet  Commonly known as: DALIRESP     tamsulosin 0.4 MG capsule  Commonly known as: FLOMAX            STOP taking

## 2024-12-05 NOTE — PROGRESS NOTES
[facility] Quality Flow/Interdisciplinary Rounds Progress Note        Quality Flow Rounds held on December 5, 2024    Disciplines Attending:  Bedside Nurse, , , and Nursing Unit Leadership    Chico Frias was admitted on 11/26/2024 12:13 PM    Anticipated Discharge Date:  Expected Discharge Date: 12/05/24    Disposition:    Bon Score:  Bon Scale Score: 17    Readmission Risk              Risk of Unplanned Readmission:  37           Discussed patient goal for the day, patient clinical progression, and barriers to discharge.  The following Goal(s) of the Day/Commitment(s) have been identified:   discharge planning, endocrine consult, pulm plan, nephro plan, bumex gtt, IV solu-medrol      Darvin Cole RN  December 5, 2024

## 2024-12-05 NOTE — CONSULTS
Nutrition Note    Per SW note, pt plan is home w/ HOTV. No further nutrition recommendations. Dietitian available per consult.     Electronically signed by JAMEE MONTOYA MPH, RD, LD on 12/5/24 at 12:03 PM EST    Contact: x [phone]

## 2024-12-05 NOTE — PROGRESS NOTES
Palliative Care Department  [phone]  Palliative Care Progress Note  Provider ANDIE Butts CNP      PATIENT: Chico Frias  : 1950  MRN: [id number]  ADMISSION DATE: 2024 12:13 PM  Referring Provider: Yannick Willis MD     Palliative Medicine was consulted on hospital day 1 for assistance with Goals of care    HPI:     Clinical Summary:Chico Frias is a 73 y.o. y/o male with a history of COPD on 4 L NC, IDDM, GERD, HFpEF, CKD, CAD & hyperlipidemia  who presented to [facility] on 2024 with shortness of breath.  He was admitted for COPD exacerbation and an acute kidney injury.  He is placed on a Bumex drip.  He remains admitted to telemetry for further medical management.    ASSESSMENT/PLAN:     Pertinent Hospital Diagnoses     Acute on chronic hypoxic respiratory failure  COPD exacerbation  Acute on chronic kidney disease    Symptom management    Dyspnea  -Continue morphine 4 mg every 4 hours as needed    Palliative Care Encounter / Counseling Regarding Goals of Care  Please see detailed goals of care discussion as below  At this time, Chico Frias, Does have capacity for medical decision-making.  Capacity is time limited and situation/question specific  During encounter Luiz was surrogate medical decision-maker  Outcome of goals of care meeting:  Plan is to discharge home with hospice  Code status Limited DNRCCA/ DNI  Advanced Directives: no POA or living will in epic  Surrogate/Legal NOK:  Luiz Frias (spouse) [phone]    Spiritual assessment: no spiritual distress identified  Bereavement and grief: to be determined  Referrals to: none today    Thank you for the opportunity to participate in the care of Chico Frias.     ANDIE Butts CNP   Palliative Medicine     SUBJECTIVE:     Details of Conversation: Chart reviewed.  Patient was asleep.  Niece was at the bedside.  She explained that the patient had a rough night, and had multiple bowel movements.  She

## 2024-12-16 NOTE — PROGRESS NOTES
Physician Progress Note      PATIENT:               SALAS THOMPSON  [facility] #:                  [id number]  :                       1950  ADMIT DATE:       2024 12:13 PM  DISCH DATE:        2024 6:39 PM  RESPONDING  PROVIDER #:        BING FLORES          QUERY TEXT:      Patient admitted with GONZALES. Noted documentation of acute respiratory failure in   Acute on chronic hypoxic respiratory failure in Palliative PN dated . In   order to support the diagnosis of acute on chronic respiratory failure, please   include additional clinical indicators in your documentation.  Or please   document if the diagnosis of acute on chronic respiratory failure has been   ruled out after further study.    The medical record reflects the following:  Risk Factors: Chronic hypoxic and hypercapnic respiratory failure,End stage   COPD and HFpEF, Former heavy smoker.  Clinical Indicators:  In Palliative PN dated  documentation of Acute on   chronic hypoxic respiratory failure.  In pulmonology CN dated  documentation of chronic hypoxic and hypercapnic   respiratory failure  In Internal Medicine PN dated -Chronic hypoxic and hypercapnic respiratory   failure at baseline. Continue O2 and AVAPS. Lung: rales bilaterally and   rhonchi bilaterally.  In DS dated - Patient found to have GONZALES which unfortunately did not   improve, and cause hypervolemia treated with Bumex drip, this also worsened   his chronic respiratory failure from end stage COPD and HFpEF.  In H&P dated - Pulmonary/Chest: Diminished breath sounds bilaterally,   mild expiratory wheeze, no Rales.  In Internal Medicine PN 12/3- Lung: rales bilaterally and rhonchi bilaterally  In Pulmonology PN dated - CHEST: Symmetric. There was no accessory muscle   use.  SPO2- 91%-100% [-]  RR- 26, 22, 20, 24  Blood Gas - PCO2-47.3, HCO3-26.9  CO2- 27, 21, 30, 28, 33, 31, 27, 32, 37 [-]  Venous Blood Gas 12/3-7.390  Chest -

## 2024-12-16 NOTE — PROGRESS NOTES
CLINICAL PHARMACY NOTE: MEDS TO BEDS    Total # of Prescriptions Filled: 3   The following medications were delivered to the patient:  Lorazepam 0.5mg tabs  Hydroxyzine 25mg caps  Morphine 20mg/ml    Additional Documentation:  To pt

## 2025-01-29 ENCOUNTER — TELEPHONE (OUTPATIENT)
Dept: ADMINISTRATIVE | Age: 75
End: 2025-01-29

## 2025-05-18 NOTE — PROGRESS NOTES
Home regimen: amlodipine 5 mg daily and lisinopril 20 mg daily    Plan:  - Continue home amlodipine 5 mg daily   - Hold home lisinopril 20 mg daily in preparation for CABG  - Continue Lopressor 50 mg twice daily   Physician Progress Note      Mikael Rosenbaum  CSN #:                  [id number]  :                       1950  ADMIT DATE:       2021 4:07 PM  [address] [place] DATE:  RESPONDING  PROVIDER #:        Althea Caballero MD          QUERY TEXT:    Pt admitted with COPD exacerbation. Pt noted to have pneumonia. If possible,   please document in the progress notes and discharge summary if you are   evaluating and/or treating any of the following: The medical record reflects the following:  Risk Factors: AECOPD  Clinical Indicators: per pulmonology consult \"Right Multi-Lobar Pneumonia. \"    PN \"Community-acquired pneumonia. \" CXR \"Right mid lung airspace opacity   worrisome for pneumonia. \"  Treatment: Ceftriaxone, Azithromycin    Thank you,  Sravanthi Parham RN, CCDS  Clinical Documentation Improvement Specialist  [email or web address]  [phone]  Options provided:  -- Gram negative pneumonia  -- Gram positive pneumonia  -- MRSA pneumonia  -- MSSA pneumonia  -- Bacterial pneumonia  -- Viral pneumonia  -- Aspiration pneumonia  -- Hypostatic pneumonia  -- Other - I will add my own diagnosis  -- Disagree - Not applicable / Not valid  -- Disagree - Clinically unable to determine / Unknown  -- Refer to Clinical Documentation Reviewer    PROVIDER RESPONSE TEXT:    This patient has bacterial pneumonia.     Query created by: Chandrakant Pham on 3/2/2021 9:34 AM      Electronically signed by:  Althea Caballero MD 3/3/2021 6:20 PM

## (undated) DEVICE — ADAPTER TBNG DIA15MM SWVL FBROPT BRONCHSCP TERM 2 AXIS PEEP

## (undated) DEVICE — BRUSH CYTO FLX DISP SUPERDIMENSION

## (undated) DEVICE — NEEDLE BRONCHSCP 21GA HNDL SHTH NDL STYL PERIVIEW FLX

## (undated) DEVICE — CONMED DISPOSABLE MICROBIOLOGY BRUSH, Ø1 MM, 1.8 MM WORKING DIAMETER, 110 CM LENGTH: Brand: CONMED

## (undated) DEVICE — SINGLE USE SUCTION VALVE MAJ-209: Brand: SINGLE USE SUCTION VALVE (STERILE)

## (undated) DEVICE — FORCEPS L110MM DIA1.7MM PREMARKED REINF SHFT

## (undated) DEVICE — KIT PROCEDURE BRONCHOSCOPY GALAXY (MUST BE PURCHASED IN INCREMENTS OF 4 EA)